# Patient Record
Sex: MALE | Race: WHITE | HISPANIC OR LATINO | Employment: OTHER | URBAN - METROPOLITAN AREA
[De-identification: names, ages, dates, MRNs, and addresses within clinical notes are randomized per-mention and may not be internally consistent; named-entity substitution may affect disease eponyms.]

---

## 2019-09-23 ENCOUNTER — HOSPITAL ENCOUNTER (INPATIENT)
Facility: HOSPITAL | Age: 79
LOS: 4 days | Discharge: HOME | DRG: 445 | End: 2019-09-27
Attending: EMERGENCY MEDICINE | Admitting: EMERGENCY MEDICINE
Payer: MEDICAID

## 2019-09-23 DIAGNOSIS — R10.13 EPIGASTRIC PAIN: Primary | ICD-10-CM

## 2019-09-23 DIAGNOSIS — K80.20 CALCULUS OF GALLBLADDER WITHOUT CHOLECYSTITIS WITHOUT OBSTRUCTION: ICD-10-CM

## 2019-09-23 DIAGNOSIS — R10.9 ABDOMINAL PAIN: ICD-10-CM

## 2019-09-23 DIAGNOSIS — G40.209 PARTIAL SYMPTOMATIC EPILEPSY WITH COMPLEX PARTIAL SEIZURES, NOT INTRACTABLE, WITHOUT STATUS EPILEPTICUS: ICD-10-CM

## 2019-09-23 DIAGNOSIS — I21.4 NSTEMI (NON-ST ELEVATED MYOCARDIAL INFARCTION): ICD-10-CM

## 2019-09-23 PROCEDURE — 12000002 HC ACUTE/MED SURGE SEMI-PRIVATE ROOM

## 2019-09-23 PROCEDURE — 93005 ELECTROCARDIOGRAM TRACING: CPT

## 2019-09-23 PROCEDURE — 99291 CRITICAL CARE FIRST HOUR: CPT | Mod: 25

## 2019-09-23 PROCEDURE — 93010 EKG 12-LEAD: ICD-10-PCS | Mod: ,,, | Performed by: INTERNAL MEDICINE

## 2019-09-23 PROCEDURE — 93010 ELECTROCARDIOGRAM REPORT: CPT | Mod: ,,, | Performed by: INTERNAL MEDICINE

## 2019-09-23 PROCEDURE — 96374 THER/PROPH/DIAG INJ IV PUSH: CPT

## 2019-09-23 PROCEDURE — 96372 THER/PROPH/DIAG INJ SC/IM: CPT

## 2019-09-23 RX ORDER — HYDROMORPHONE HYDROCHLORIDE 2 MG/ML
1 INJECTION, SOLUTION INTRAMUSCULAR; INTRAVENOUS; SUBCUTANEOUS
Status: DISCONTINUED | OUTPATIENT
Start: 2019-09-23 | End: 2019-09-24

## 2019-09-23 RX ORDER — ONDANSETRON 2 MG/ML
4 INJECTION INTRAMUSCULAR; INTRAVENOUS
Status: DISCONTINUED | OUTPATIENT
Start: 2019-09-23 | End: 2019-09-24

## 2019-09-23 RX ORDER — MIDAZOLAM HYDROCHLORIDE 1 MG/ML
INJECTION INTRAMUSCULAR; INTRAVENOUS
Status: DISPENSED
Start: 2019-09-23 | End: 2019-09-24

## 2019-09-23 RX ADMIN — MIDAZOLAM HYDROCHLORIDE 2.5 MG: 1 INJECTION, SOLUTION INTRAMUSCULAR; INTRAVENOUS at 11:09

## 2019-09-23 NOTE — Clinical Note
Catheter is inserted into the ostium   right coronary artery. Angiography performed of the right coronary arteries in multiple views.Hemo Performed.

## 2019-09-23 NOTE — Clinical Note
39 ml injected throughout the case. 111 mL total wasted during the case. 150 mL total used in the case.

## 2019-09-24 PROBLEM — R79.89 ELEVATED TROPONIN: Status: ACTIVE | Noted: 2019-09-24

## 2019-09-24 PROBLEM — K80.20 CALCULUS OF GALLBLADDER WITHOUT BILIARY OBSTRUCTION: Status: ACTIVE | Noted: 2019-09-24

## 2019-09-24 PROBLEM — R31.0 GROSS HEMATURIA: Status: ACTIVE | Noted: 2019-09-24

## 2019-09-24 PROBLEM — R07.9 CHEST PAIN: Status: ACTIVE | Noted: 2019-09-24

## 2019-09-24 PROBLEM — I21.4 NSTEMI (NON-ST ELEVATED MYOCARDIAL INFARCTION): Status: RESOLVED | Noted: 2019-09-24 | Resolved: 2019-09-24

## 2019-09-24 PROBLEM — I21.4 NSTEMI (NON-ST ELEVATED MYOCARDIAL INFARCTION): Status: ACTIVE | Noted: 2019-09-24

## 2019-09-24 LAB
ALBUMIN SERPL BCP-MCNC: 4.1 G/DL (ref 3.5–5.2)
ALP SERPL-CCNC: 71 U/L (ref 55–135)
ALT SERPL W/O P-5'-P-CCNC: 30 U/L (ref 10–44)
ANION GAP SERPL CALC-SCNC: 8 MMOL/L (ref 8–16)
AORTIC ROOT ANNULUS: 3.3 CM
AORTIC VALVE CUSP SEPERATION: 2 CM
ASCENDING AORTA: 3.05 CM
AST SERPL-CCNC: 36 U/L (ref 10–40)
AV INDEX (PROSTH): 0.75
AV MEAN GRADIENT: 7 MMHG
AV PEAK GRADIENT: 13 MMHG
AV VALVE AREA: 3.44 CM2
AV VELOCITY RATIO: 0.75
BACTERIA #/AREA URNS HPF: ABNORMAL /HPF
BACTERIA #/AREA URNS HPF: NORMAL /HPF
BASOPHILS # BLD AUTO: 0.01 K/UL (ref 0–0.2)
BASOPHILS NFR BLD: 0.1 % (ref 0–1.9)
BILIRUB SERPL-MCNC: 0.3 MG/DL (ref 0.1–1)
BILIRUB UR QL STRIP: NEGATIVE
BILIRUB UR QL STRIP: NEGATIVE
BSA FOR ECHO PROCEDURE: 2.03 M2
BUN SERPL-MCNC: 19 MG/DL (ref 8–23)
CALCIUM SERPL-MCNC: 9.3 MG/DL (ref 8.7–10.5)
CHLORIDE SERPL-SCNC: 99 MMOL/L (ref 95–110)
CLARITY UR: CLEAR
CLARITY UR: CLEAR
CO2 SERPL-SCNC: 30 MMOL/L (ref 23–29)
COLOR UR: YELLOW
COLOR UR: YELLOW
CREAT SERPL-MCNC: 1 MG/DL (ref 0.5–1.4)
CRP SERPL-MCNC: 32.2 MG/L (ref 0–8.2)
CV ECHO LV RWT: 0.83 CM
DIFFERENTIAL METHOD: ABNORMAL
DOP CALC AO PEAK VEL: 1.8 M/S
DOP CALC AO VTI: 32.48 CM
DOP CALC LVOT AREA: 4.6 CM2
DOP CALC LVOT DIAMETER: 2.41 CM
DOP CALC LVOT PEAK VEL: 1.35 M/S
DOP CALC LVOT STROKE VOLUME: 111.66 CM3
DOP CALCLVOT PEAK VEL VTI: 24.49 CM
E WAVE DECELERATION TIME: 217.99 MSEC
E/A RATIO: 0.95
E/E' RATIO: 10.44 M/S
ECHO LV POSTERIOR WALL: 1.7 CM (ref 0.6–1.1)
EOSINOPHIL # BLD AUTO: 0 K/UL (ref 0–0.5)
EOSINOPHIL NFR BLD: 0.2 % (ref 0–8)
ERYTHROCYTE [DISTWIDTH] IN BLOOD BY AUTOMATED COUNT: 12.8 % (ref 11.5–14.5)
EST. GFR  (AFRICAN AMERICAN): >60 ML/MIN/1.73 M^2
EST. GFR  (NON AFRICAN AMERICAN): >60 ML/MIN/1.73 M^2
FRACTIONAL SHORTENING: 48 % (ref 28–44)
GLUCOSE SERPL-MCNC: 139 MG/DL (ref 70–110)
GLUCOSE UR QL STRIP: NEGATIVE
GLUCOSE UR QL STRIP: NEGATIVE
HCT VFR BLD AUTO: 42.5 % (ref 40–54)
HGB BLD-MCNC: 14 G/DL (ref 14–18)
HGB UR QL STRIP: ABNORMAL
HGB UR QL STRIP: ABNORMAL
HYALINE CASTS #/AREA URNS LPF: 0 /LPF
INTERVENTRICULAR SEPTUM: 1.6 CM (ref 0.6–1.1)
IVRT: 0.11 MSEC
KETONES UR QL STRIP: NEGATIVE
KETONES UR QL STRIP: NEGATIVE
LA MAJOR: 4.79 CM
LA MINOR: 6.09 CM
LA WIDTH: 3.27 CM
LEFT ATRIUM SIZE: 4.47 CM
LEFT ATRIUM VOLUME INDEX: 33.8 ML/M2
LEFT ATRIUM VOLUME: 66.62 CM3
LEFT INTERNAL DIMENSION IN SYSTOLE: 2.11 CM (ref 2.1–4)
LEFT VENTRICLE DIASTOLIC VOLUME INDEX: 37.42 ML/M2
LEFT VENTRICLE DIASTOLIC VOLUME: 73.7 ML
LEFT VENTRICLE MASS INDEX: 142 G/M2
LEFT VENTRICLE SYSTOLIC VOLUME INDEX: 7.4 ML/M2
LEFT VENTRICLE SYSTOLIC VOLUME: 14.57 ML
LEFT VENTRICULAR INTERNAL DIMENSION IN DIASTOLE: 4.09 CM (ref 3.5–6)
LEFT VENTRICULAR MASS: 279.46 G
LEUKOCYTE ESTERASE UR QL STRIP: NEGATIVE
LEUKOCYTE ESTERASE UR QL STRIP: NEGATIVE
LIPASE SERPL-CCNC: 18 U/L (ref 4–60)
LV LATERAL E/E' RATIO: 9.4 M/S
LV SEPTAL E/E' RATIO: 11.75 M/S
LYMPHOCYTES # BLD AUTO: 0.5 K/UL (ref 1–4.8)
LYMPHOCYTES NFR BLD: 5.1 % (ref 18–48)
MAGNESIUM SERPL-MCNC: 1.6 MG/DL (ref 1.6–2.6)
MCH RBC QN AUTO: 31.3 PG (ref 27–31)
MCHC RBC AUTO-ENTMCNC: 32.9 G/DL (ref 32–36)
MCV RBC AUTO: 95 FL (ref 82–98)
MICROSCOPIC COMMENT: ABNORMAL
MICROSCOPIC COMMENT: NORMAL
MONOCYTES # BLD AUTO: 0.6 K/UL (ref 0.3–1)
MONOCYTES NFR BLD: 6 % (ref 4–15)
MV PEAK A VEL: 0.99 M/S
MV PEAK E VEL: 0.94 M/S
NEUTROPHILS # BLD AUTO: 8.8 K/UL (ref 1.8–7.7)
NEUTROPHILS NFR BLD: 89 % (ref 38–73)
NITRITE UR QL STRIP: NEGATIVE
NITRITE UR QL STRIP: NEGATIVE
PH UR STRIP: 5 [PH] (ref 5–8)
PH UR STRIP: 6 [PH] (ref 5–8)
PHENOBARB SERPL-MCNC: 17.1 UG/DL (ref 15–40)
PHENYTOIN SERPL-MCNC: 11.1 UG/ML (ref 10–20)
PISA TR MAX VEL: 3.23 M/S
PLATELET # BLD AUTO: 214 K/UL (ref 150–350)
PMV BLD AUTO: 8.9 FL (ref 9.2–12.9)
POTASSIUM SERPL-SCNC: 4 MMOL/L (ref 3.5–5.1)
PROT SERPL-MCNC: 8.2 G/DL (ref 6–8.4)
PROT UR QL STRIP: ABNORMAL
PROT UR QL STRIP: NEGATIVE
PULM VEIN S/D RATIO: 1.12
PV PEAK D VEL: 0.34 M/S
PV PEAK S VEL: 0.38 M/S
PV PEAK VELOCITY: 1.06 CM/S
RA MAJOR: 5.27 CM
RA PRESSURE: 3 MMHG
RA WIDTH: 3.02 CM
RBC # BLD AUTO: 4.48 M/UL (ref 4.6–6.2)
RBC #/AREA URNS HPF: 3 /HPF (ref 0–4)
RBC #/AREA URNS HPF: 50 /HPF (ref 0–4)
RIGHT VENTRICULAR END-DIASTOLIC DIMENSION: 3.64 CM
RV TISSUE DOPPLER FREE WALL SYSTOLIC VELOCITY 1 (APICAL 4 CHAMBER VIEW): 17.52 CM/S
SINUS: 3.32 CM
SODIUM SERPL-SCNC: 137 MMOL/L (ref 136–145)
SP GR UR STRIP: 1.02 (ref 1–1.03)
SP GR UR STRIP: 1.03 (ref 1–1.03)
SQUAMOUS #/AREA URNS HPF: 1 /HPF
SQUAMOUS #/AREA URNS HPF: 1 /HPF
STJ: 2.47 CM
TDI LATERAL: 0.1 M/S
TDI SEPTAL: 0.08 M/S
TDI: 0.09 M/S
TR MAX PG: 42 MMHG
TRICUSPID ANNULAR PLANE SYSTOLIC EXCURSION: 2.47 CM
TROPONIN I SERPL DL<=0.01 NG/ML-MCNC: 0.04 NG/ML (ref 0–0.03)
TROPONIN I SERPL DL<=0.01 NG/ML-MCNC: 0.1 NG/ML (ref 0–0.03)
TROPONIN I SERPL DL<=0.01 NG/ML-MCNC: 0.1 NG/ML (ref 0–0.03)
TROPONIN I SERPL DL<=0.01 NG/ML-MCNC: 0.14 NG/ML (ref 0–0.03)
TV REST PULMONARY ARTERY PRESSURE: 45 MMHG
URN SPEC COLLECT METH UR: ABNORMAL
URN SPEC COLLECT METH UR: ABNORMAL
UROBILINOGEN UR STRIP-ACNC: NEGATIVE EU/DL
UROBILINOGEN UR STRIP-ACNC: NEGATIVE EU/DL
WBC # BLD AUTO: 9.93 K/UL (ref 3.9–12.7)
WBC #/AREA URNS HPF: 1 /HPF (ref 0–5)
WBC #/AREA URNS HPF: 2 /HPF (ref 0–5)

## 2019-09-24 PROCEDURE — 21400001 HC TELEMETRY ROOM

## 2019-09-24 PROCEDURE — 99232 SBSQ HOSP IP/OBS MODERATE 35: CPT | Mod: ,,, | Performed by: PSYCHIATRY & NEUROLOGY

## 2019-09-24 PROCEDURE — 99232 PR SUBSEQUENT HOSPITAL CARE,LEVL II: ICD-10-PCS | Mod: ,,, | Performed by: PSYCHIATRY & NEUROLOGY

## 2019-09-24 PROCEDURE — 25000003 PHARM REV CODE 250: Performed by: EMERGENCY MEDICINE

## 2019-09-24 PROCEDURE — 80053 COMPREHEN METABOLIC PANEL: CPT

## 2019-09-24 PROCEDURE — 83690 ASSAY OF LIPASE: CPT

## 2019-09-24 PROCEDURE — 99233 PR SUBSEQUENT HOSPITAL CARE,LEVL III: ICD-10-PCS | Mod: ,,, | Performed by: UROLOGY

## 2019-09-24 PROCEDURE — 81000 URINALYSIS NONAUTO W/SCOPE: CPT | Mod: 91

## 2019-09-24 PROCEDURE — 99233 SBSQ HOSP IP/OBS HIGH 50: CPT | Mod: 25,,, | Performed by: INTERNAL MEDICINE

## 2019-09-24 PROCEDURE — 80184 ASSAY OF PHENOBARBITAL: CPT

## 2019-09-24 PROCEDURE — 80185 ASSAY OF PHENYTOIN TOTAL: CPT

## 2019-09-24 PROCEDURE — 86140 C-REACTIVE PROTEIN: CPT

## 2019-09-24 PROCEDURE — 96372 THER/PROPH/DIAG INJ SC/IM: CPT | Mod: 59

## 2019-09-24 PROCEDURE — 81000 URINALYSIS NONAUTO W/SCOPE: CPT

## 2019-09-24 PROCEDURE — 83735 ASSAY OF MAGNESIUM: CPT

## 2019-09-24 PROCEDURE — 93005 ELECTROCARDIOGRAM TRACING: CPT

## 2019-09-24 PROCEDURE — 93010 ELECTROCARDIOGRAM REPORT: CPT | Mod: ,,, | Performed by: INTERNAL MEDICINE

## 2019-09-24 PROCEDURE — 85025 COMPLETE CBC W/AUTO DIFF WBC: CPT

## 2019-09-24 PROCEDURE — 25500020 PHARM REV CODE 255: Performed by: EMERGENCY MEDICINE

## 2019-09-24 PROCEDURE — 99233 PR SUBSEQUENT HOSPITAL CARE,LEVL III: ICD-10-PCS | Mod: 25,,, | Performed by: INTERNAL MEDICINE

## 2019-09-24 PROCEDURE — 25000003 PHARM REV CODE 250: Performed by: HOSPITALIST

## 2019-09-24 PROCEDURE — 84484 ASSAY OF TROPONIN QUANT: CPT

## 2019-09-24 PROCEDURE — 36415 COLL VENOUS BLD VENIPUNCTURE: CPT

## 2019-09-24 PROCEDURE — 99233 SBSQ HOSP IP/OBS HIGH 50: CPT | Mod: ,,, | Performed by: UROLOGY

## 2019-09-24 PROCEDURE — 84484 ASSAY OF TROPONIN QUANT: CPT | Mod: 91

## 2019-09-24 PROCEDURE — 93010 EKG 12-LEAD: ICD-10-PCS | Mod: ,,, | Performed by: INTERNAL MEDICINE

## 2019-09-24 PROCEDURE — 63600175 PHARM REV CODE 636 W HCPCS: Performed by: EMERGENCY MEDICINE

## 2019-09-24 RX ORDER — PHENYTOIN SODIUM 100 MG/1
200 CAPSULE, EXTENDED RELEASE ORAL 2 TIMES DAILY
Status: DISCONTINUED | OUTPATIENT
Start: 2019-09-24 | End: 2019-09-27 | Stop reason: HOSPADM

## 2019-09-24 RX ORDER — ATORVASTATIN CALCIUM 40 MG/1
40 TABLET, FILM COATED ORAL NIGHTLY
Status: DISCONTINUED | OUTPATIENT
Start: 2019-09-24 | End: 2019-09-27 | Stop reason: HOSPADM

## 2019-09-24 RX ORDER — PHENYTOIN SODIUM 100 MG/1
300 CAPSULE, EXTENDED RELEASE ORAL
Status: COMPLETED | OUTPATIENT
Start: 2019-09-24 | End: 2019-09-24

## 2019-09-24 RX ORDER — ASPIRIN 325 MG
325 TABLET, DELAYED RELEASE (ENTERIC COATED) ORAL
Status: COMPLETED | OUTPATIENT
Start: 2019-09-24 | End: 2019-09-24

## 2019-09-24 RX ORDER — SODIUM CHLORIDE 0.9 % (FLUSH) 0.9 %
10 SYRINGE (ML) INJECTION
Status: DISCONTINUED | OUTPATIENT
Start: 2019-09-24 | End: 2019-09-27 | Stop reason: HOSPADM

## 2019-09-24 RX ORDER — MIDAZOLAM HYDROCHLORIDE 5 MG/ML
2.5 INJECTION INTRAMUSCULAR; INTRAVENOUS ONCE
Status: DISCONTINUED | OUTPATIENT
Start: 2019-09-24 | End: 2019-09-24 | Stop reason: RX

## 2019-09-24 RX ORDER — LISINOPRIL 5 MG/1
10 TABLET ORAL DAILY
Status: DISCONTINUED | OUTPATIENT
Start: 2019-09-24 | End: 2019-09-27 | Stop reason: HOSPADM

## 2019-09-24 RX ORDER — FINASTERIDE 5 MG/1
5 TABLET, FILM COATED ORAL DAILY
Status: DISCONTINUED | OUTPATIENT
Start: 2019-09-24 | End: 2019-09-27 | Stop reason: HOSPADM

## 2019-09-24 RX ORDER — NAPROXEN SODIUM 220 MG/1
81 TABLET, FILM COATED ORAL DAILY
Status: DISCONTINUED | OUTPATIENT
Start: 2019-09-25 | End: 2019-09-27 | Stop reason: HOSPADM

## 2019-09-24 RX ORDER — NITROGLYCERIN 0.4 MG/1
0.4 TABLET SUBLINGUAL EVERY 5 MIN PRN
Status: DISCONTINUED | OUTPATIENT
Start: 2019-09-24 | End: 2019-09-27 | Stop reason: HOSPADM

## 2019-09-24 RX ORDER — PHENYTOIN 50 MG/1
200 TABLET, CHEWABLE ORAL 2 TIMES DAILY
Status: DISCONTINUED | OUTPATIENT
Start: 2019-09-24 | End: 2019-09-24 | Stop reason: CLARIF

## 2019-09-24 RX ORDER — ACETAMINOPHEN 325 MG/1
650 TABLET ORAL EVERY 8 HOURS PRN
Status: DISCONTINUED | OUTPATIENT
Start: 2019-09-24 | End: 2019-09-26 | Stop reason: SDUPTHER

## 2019-09-24 RX ORDER — LORAZEPAM 2 MG/ML
2 INJECTION INTRAMUSCULAR
Status: DISCONTINUED | OUTPATIENT
Start: 2019-09-24 | End: 2019-09-27 | Stop reason: HOSPADM

## 2019-09-24 RX ORDER — FAMOTIDINE 20 MG/1
20 TABLET, FILM COATED ORAL 2 TIMES DAILY
Status: DISCONTINUED | OUTPATIENT
Start: 2019-09-24 | End: 2019-09-27 | Stop reason: HOSPADM

## 2019-09-24 RX ORDER — MIDAZOLAM HYDROCHLORIDE 1 MG/ML
2.5 INJECTION INTRAMUSCULAR; INTRAVENOUS ONCE
Status: COMPLETED | OUTPATIENT
Start: 2019-09-24 | End: 2019-09-23

## 2019-09-24 RX ORDER — PHENOBARBITAL 20 MG/5ML
100 ELIXIR ORAL DAILY
Status: DISCONTINUED | OUTPATIENT
Start: 2019-09-24 | End: 2019-09-27 | Stop reason: HOSPADM

## 2019-09-24 RX ORDER — CLOPIDOGREL BISULFATE 75 MG/1
75 TABLET ORAL DAILY
Status: DISCONTINUED | OUTPATIENT
Start: 2019-09-25 | End: 2019-09-27 | Stop reason: HOSPADM

## 2019-09-24 RX ORDER — CLOPIDOGREL 300 MG/1
300 TABLET, FILM COATED ORAL
Status: COMPLETED | OUTPATIENT
Start: 2019-09-24 | End: 2019-09-24

## 2019-09-24 RX ORDER — PHENYTOIN SODIUM 100 MG/1
200 CAPSULE, EXTENDED RELEASE ORAL 2 TIMES DAILY
Status: DISCONTINUED | OUTPATIENT
Start: 2019-09-24 | End: 2019-09-24

## 2019-09-24 RX ORDER — ONDANSETRON 2 MG/ML
4 INJECTION INTRAMUSCULAR; INTRAVENOUS EVERY 8 HOURS PRN
Status: DISCONTINUED | OUTPATIENT
Start: 2019-09-24 | End: 2019-09-27 | Stop reason: HOSPADM

## 2019-09-24 RX ORDER — MIDAZOLAM HYDROCHLORIDE 1 MG/ML
2.5 INJECTION INTRAMUSCULAR; INTRAVENOUS
Status: COMPLETED | OUTPATIENT
Start: 2019-09-24 | End: 2019-09-23

## 2019-09-24 RX ORDER — ENOXAPARIN SODIUM 100 MG/ML
1 INJECTION SUBCUTANEOUS
Status: DISCONTINUED | OUTPATIENT
Start: 2019-09-24 | End: 2019-09-24

## 2019-09-24 RX ORDER — METOPROLOL TARTRATE 25 MG/1
25 TABLET, FILM COATED ORAL 2 TIMES DAILY
Status: DISCONTINUED | OUTPATIENT
Start: 2019-09-24 | End: 2019-09-27 | Stop reason: HOSPADM

## 2019-09-24 RX ORDER — TAMSULOSIN HYDROCHLORIDE 0.4 MG/1
0.4 CAPSULE ORAL DAILY
Status: DISCONTINUED | OUTPATIENT
Start: 2019-09-24 | End: 2019-09-27 | Stop reason: HOSPADM

## 2019-09-24 RX ADMIN — ENOXAPARIN SODIUM 90 MG: 100 INJECTION SUBCUTANEOUS at 06:09

## 2019-09-24 RX ADMIN — LISINOPRIL 10 MG: 5 TABLET ORAL at 09:09

## 2019-09-24 RX ADMIN — TAMSULOSIN HYDROCHLORIDE 0.4 MG: 0.4 CAPSULE ORAL at 09:09

## 2019-09-24 RX ADMIN — IOHEXOL 80 ML: 350 INJECTION, SOLUTION INTRAVENOUS at 01:09

## 2019-09-24 RX ADMIN — PHENOBARBITAL 100 MG: 20 ELIXIR ORAL at 10:09

## 2019-09-24 RX ADMIN — FAMOTIDINE 20 MG: 20 TABLET ORAL at 09:09

## 2019-09-24 RX ADMIN — ATORVASTATIN CALCIUM 40 MG: 40 TABLET, FILM COATED ORAL at 12:09

## 2019-09-24 RX ADMIN — PHENYTOIN SODIUM 200 MG: 100 CAPSULE ORAL at 08:09

## 2019-09-24 RX ADMIN — CLOPIDOGREL BISULFATE 300 MG: 300 TABLET, FILM COATED ORAL at 06:09

## 2019-09-24 RX ADMIN — METOPROLOL TARTRATE 25 MG: 25 TABLET ORAL at 08:09

## 2019-09-24 RX ADMIN — ASPIRIN 325 MG: 325 TABLET, DELAYED RELEASE ORAL at 04:09

## 2019-09-24 RX ADMIN — NITROGLYCERIN 0.4 MG: 0.4 TABLET SUBLINGUAL at 06:09

## 2019-09-24 RX ADMIN — METOPROLOL TARTRATE 25 MG: 25 TABLET ORAL at 09:09

## 2019-09-24 RX ADMIN — PHENYTOIN SODIUM 300 MG: 100 CAPSULE, EXTENDED RELEASE ORAL at 04:09

## 2019-09-24 RX ADMIN — ATORVASTATIN CALCIUM 40 MG: 40 TABLET, FILM COATED ORAL at 08:09

## 2019-09-24 RX ADMIN — FAMOTIDINE 20 MG: 20 TABLET ORAL at 08:09

## 2019-09-24 RX ADMIN — FINASTERIDE 5 MG: 5 TABLET, FILM COATED ORAL at 09:09

## 2019-09-24 NOTE — NURSING
Patient to scheduled procedure as ordered. Tele monitoring in progress. Patient awake alert and oriented. No apparent distress noted. Explained to patient via Libby  that he is going for a echo. Verbalized an understanding. Patient went via his bed.

## 2019-09-24 NOTE — SUBJECTIVE & OBJECTIVE
Past Medical History:   Diagnosis Date    Abdominal hernia     BPH (benign prostatic hyperplasia)     Dyslipidemia     Hypercholesteremia     Hypertension     Seizure disorder     Seizures     Sinus disorder        Past Surgical History:   Procedure Laterality Date    KNEE SURGERY      NASAL SINUS SURGERY      NOSE SURGERY         Review of patient's allergies indicates:  No Known Allergies    Family History     Problem Relation (Age of Onset)    Coronary artery disease Brother, Brother    Hypertension Brother, Brother          Tobacco Use    Smoking status: Never Smoker    Smokeless tobacco: Never Used   Substance and Sexual Activity    Alcohol use: No    Drug use: No    Sexual activity: Not on file       Review of Systems   Constitutional: Negative.  Negative for fever.   HENT: Negative.    Eyes: Negative.    Respiratory: Negative for cough, chest tightness and shortness of breath.    Cardiovascular: Negative for chest pain.   Gastrointestinal: Positive for abdominal pain. Negative for constipation, diarrhea and nausea.   Genitourinary: Positive for hematuria.   Musculoskeletal: Negative.    Neurological: Negative.    Psychiatric/Behavioral: Negative.        Objective:     Temp:  [97.5 °F (36.4 °C)-98 °F (36.7 °C)] 97.5 °F (36.4 °C)  Pulse:  [] 70  Resp:  [17-30] 18  SpO2:  [94 %-99 %] 98 %  BP: (112-179)/(56-96) 112/59     Body mass index is 32.95 kg/m².      Bladder Scan Volume (mL): 0 mL (09/24/19 1020)    Drains     None                 Physical Exam   Nursing note and vitals reviewed.  Constitutional: He is oriented to person, place, and time. He appears well-developed and well-nourished.   HENT:   Head: Normocephalic.   Eyes: Conjunctivae are normal.   Neck: Normal range of motion. Neck supple. No tracheal deviation present. No thyromegaly present.   Cardiovascular: Normal rate and normal heart sounds.    Pulmonary/Chest: Effort normal and breath sounds normal. No respiratory distress.  He has no wheezes.   Abdominal: Soft. Bowel sounds are normal. There is no hepatosplenomegaly. There is no tenderness. There is no rebound and no CVA tenderness. No hernia.   Musculoskeletal: Normal range of motion. He exhibits no edema or tenderness.   Lymphadenopathy:     He has no cervical adenopathy.   Neurological: He is alert and oriented to person, place, and time.   Skin: Skin is warm and dry. No rash noted. No erythema.     Psychiatric: He has a normal mood and affect. His behavior is normal. Judgment and thought content normal.       Significant Labs:    BMP:  Recent Labs   Lab 09/24/19  0037      K 4.0   CL 99   CO2 30*   BUN 19   CREATININE 1.0   CALCIUM 9.3       CBC:  Recent Labs   Lab 09/24/19 0037   WBC 9.93   HGB 14.0   HCT 42.5          Blood Culture: No results for input(s): LABBLOO in the last 168 hours.  Urine Culture: No results for input(s): LABURIN in the last 168 hours.    Significant Imaging:  CT: I have reviewed all results within the past 24 hours and my personal findings are:  no stones, masses, or hydronephrosis

## 2019-09-24 NOTE — CONSULTS
[facility]  Neurology  Consult Note    Patient Name: Vern Lr  MRN: [id number]  Admission Date: 9/23/2019  Hospital Length of Stay: 0 days  Code Status: Full Code   Attending Provider: Reyna Rocha MD   Consulting Provider: Ori Tineo MD  Primary Care Physician: Maria Fernanda Abbasi MD (Inactive)  Principal Problem:Elevated troponin    Inpatient consult to Neurology  Consult performed by: Ori Tineo MD  Consult ordered by: Reyna Rocha MD        Subjective:     Chief Complaint/Reason for consult: Seizures     HPI: 78 y/o male with medical Hx as listed below comes to ED for abdominal pain. Pt found to have cholelithiasis. Pt had two reported focal seizures in ED. He has Hx of epilepsy; take phenobarbital 97.2 mg and phenytoin 200 mg BID. Pt is back to baseline according to his family.    Past Medical History:   Diagnosis Date    Abdominal hernia     BPH (benign prostatic hyperplasia)     Dyslipidemia     Hypercholesteremia     Hypertension     Seizure disorder     Seizures     Sinus disorder        Past Surgical History:   Procedure Laterality Date    KNEE SURGERY      NASAL SINUS SURGERY      NOSE SURGERY         Review of patient's allergies indicates:  No Known Allergies    Current Neurological Medications:     No current facility-administered medications on file prior to encounter.      Current Outpatient Medications on File Prior to Encounter   Medication Sig    alendronate (FOSAMAX) 70 MG tablet Take 1 tablet (70 mg total) by mouth every 7 days.    aspirin (ECOTRIN) 81 MG EC tablet Take 81 mg by mouth once daily.    atorvastatin (LIPITOR) 40 MG tablet Take 1 tablet (40 mg total) by mouth once daily.    calcium citrate-vitamin D3 (CITRACAL + D MAXIMUM) 315-250 mg-unit Tab Take 315 mg by mouth 2 (two) times daily.    finasteride (PROSCAR) 5 mg tablet Take 1 tablet (5 mg total) by mouth once daily.    fluticasone (FLONASE) 50 mcg/actuation nasal spray  SHAKE WELL AND U 2 SPRAYS IEN BID    lisinopril 10 MG tablet Take 1 tablet (10 mg total) by mouth once daily.    metoprolol tartrate (LOPRESSOR) 50 MG tablet Take 1 tablet (50 mg total) by mouth once daily.    montelukast (SINGULAIR) 10 mg tablet Take 10 mg by mouth every evening.    phenobarbital (LUMINAL) 97.2 MG tablet Take 1 tablet (97.2 mg total) by mouth once daily.    phenytoin (DILANTIN) 100 MG ER capsule 2 capsules po q am, 2 capsules po q hs    tamsulosin (FLOMAX) 0.4 mg Cp24 Take 1 capsule (0.4 mg total) by mouth once daily.    ketorolac 0.5% (ACULAR) 0.5 % Drop Place 1 drop into the right eye 4 (four) times daily.      Family History     Problem Relation (Age of Onset)    Coronary artery disease Brother, Brother    Hypertension Brother, Brother        Tobacco Use    Smoking status: Never Smoker    Smokeless tobacco: Never Used   Substance and Sexual Activity    Alcohol use: No    Drug use: No    Sexual activity: Not on file     Review of Systems   Constitutional: Negative for fever.   HENT: Negative for trouble swallowing.    Eyes: Negative for photophobia.   Respiratory: Negative for shortness of breath.    Cardiovascular: Negative for chest pain.   Gastrointestinal: Negative for abdominal pain.   Genitourinary: Negative for dysuria.   Musculoskeletal: Negative for back pain.   Neurological: Negative for headaches.   Psychiatric/Behavioral: Negative for confusion.     Objective:     Vital Signs (Most Recent):  Temp: 97.9 °F (36.6 °C) (09/24/19 0739)  Pulse: 98 (09/24/19 0739)  Resp: 17 (09/24/19 0739)  BP: 137/75 (09/24/19 0739)  SpO2: 98 % (09/24/19 0739) Vital Signs (24h Range):  Temp:  [97.9 °F (36.6 °C)-98 °F (36.7 °C)] 97.9 °F (36.6 °C)  Pulse:  [] 98  Resp:  [17-30] 17  SpO2:  [94 %-99 %] 98 %  BP: (115-179)/(56-96) 137/75     Weight: 89.8 kg (198 lb)  Body mass index is 32.95 kg/m².    Physical Exam   Constitutional: No distress.   HENT:   Head: Normocephalic.   Eyes: Right eye  exhibits no discharge. Left eye exhibits no discharge.   Neck: Normal range of motion.   Cardiovascular: Regular rhythm.   Pulmonary/Chest: Breath sounds normal.   Abdominal: Bowel sounds are normal.   Musculoskeletal: He exhibits no edema.   Skin: He is not diaphoretic.   Psychiatric: His speech is normal.       NEUROLOGICAL EXAMINATION:     MENTAL STATUS   Speech: speech is normal   Level of consciousness: alert       Oriented to self     CRANIAL NERVES     CN III, IV, VI   Right pupil: Size: 3 mm. Reactivity: brisk.   Left pupil: Size: 3 mm. Reactivity: brisk.   Nystagmus: none   Ophthalmoparesis: none  Conjugate gaze: present    CN VII   Right facial weakness: none  Left facial weakness: none    CN XII   Tongue deviation: none    MOTOR EXAM        UE's 4+5  LE's 4+/5     SENSORY EXAM   Right arm pinprick: normal  Left arm pinprick: normal  Right leg pinprick: normal  Left leg pinprick: normal      Significant Labs:   CBC:   Recent Labs   Lab 09/24/19 0037   WBC 9.93   HGB 14.0   HCT 42.5        CMP:   Recent Labs   Lab 09/24/19 0037   *      K 4.0   CL 99   CO2 30*   BUN 19   CREATININE 1.0   CALCIUM 9.3   MG 1.6   PROT 8.2   ALBUMIN 4.1   BILITOT 0.3   ALKPHOS 71   AST 36   ALT 30   ANIONGAP 8   EGFRNONAA >60       Significant Imaging:  Head CT  FINDINGS:  Mild cerebral atrophy and chronic small vessel ischemic changes are present.  There is no acute intracranial hemorrhage, territorial infarct or mass effect, or midline shift. There are bilateral nasal maxillary windows.  Mild mucoperiosteal seen maxillary sinuses right greater left.  There is mild bilateral inferior frontal sinus mucoperiosteal.  There is partial opacification of the right mastoid cells.      Impression       No acute intracranial abnormality detected.    Partial opacification of the right mastoid air cells.      Electronically signed by: Josie Schulz  Date: 09/24/2019  Time: 01:49         Assessment and Plan:     79  y/o male consulted for seizures    1. Seizures: likely temporal lobe focus given encephalomalacia on left temporal lobe. Breakthrough seizure could be due to acute medical issues.   Levels of AED are low normal.   -Continue phenytoin 200 mg BID. Phenobarbital 100 mg daily.    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 I will follow-up with patient. Please contact us if you have any additional questions.    Ori Tineo MD  Neurology  [facility]

## 2019-09-24 NOTE — PLAN OF CARE
09/24/19 1550   Discharge Assessment   Assessment Type Discharge Planning Assessment   Confirmed/corrected address and phone number on facesheet? Yes   Assessment information obtained from? Patient;Caregiver   Expected Length of Stay (days) 3   Communicated expected length of stay with patient/caregiver yes   Prior to hospitilization cognitive status: Alert/Oriented   Prior to hospitalization functional status: Assistive Equipment;Needs Assistance   Current cognitive status: Alert/Oriented   Current Functional Status: Assistive Equipment;Needs Assistance   Lives With alone   Able to Return to Prior Arrangements no  (Pt. needs assistance at home)   Is patient able to care for self after discharge? No   Who are your caregiver(s) and their phone number(s)? Sister Gianna [phone]   Patient's perception of discharge disposition home or selfcare   Readmission Within the Last 30 Days no previous admission in last 30 days   Patient currently being followed by outpatient case management? No   Patient currently receives any other outside agency services? No   Equipment Currently Used at Home walker, standard;cane, straight   Do you have any problems affording any of your prescribed medications? No   Is the patient taking medications as prescribed? yes   Does the patient have transportation home? Yes   Transportation Anticipated family or friend will provide   Does the patient receive services at the [facility]? No   Discharge Plan A Home Health;Home   Discharge Plan B Home;Home Health   DME Needed Upon Discharge  cane, straight;walker, standard   Patient/Family in Agreement with Plan other (see comments)  (Pt Tamazight speaking . All information received through [facility])   To patient's room to discuss patient managing his care at home.      TN Role Explained.  Patient identified by using 2 identifiers:  Name and date of birth    Patient stated that Sister Gianna  WILL HELP AT HOME WITH his RECOVERY.       TN  "reviewed with patient contents of "[employer] Packet".      TN name and contact info placed on the communication board    Preferred Pharmacy:  [id number] - [place] - [address] [place], [place] [address] [address] [address]  Phone: [phone] Fax: [phone]      Preferred appointment time:evenings  "

## 2019-09-24 NOTE — SUBJECTIVE & OBJECTIVE
Past Medical History:   Diagnosis Date    Abdominal hernia     BPH (benign prostatic hyperplasia)     Dyslipidemia     Hypercholesteremia     Hypertension     Seizure disorder     Seizures     Sinus disorder        Past Surgical History:   Procedure Laterality Date    KNEE SURGERY      NASAL SINUS SURGERY      NOSE SURGERY         Review of patient's allergies indicates:  No Known Allergies    No current facility-administered medications on file prior to encounter.      Current Outpatient Medications on File Prior to Encounter   Medication Sig    alendronate (FOSAMAX) 70 MG tablet Take 1 tablet (70 mg total) by mouth every 7 days.    aspirin (ECOTRIN) 81 MG EC tablet Take 81 mg by mouth once daily.    atorvastatin (LIPITOR) 40 MG tablet Take 1 tablet (40 mg total) by mouth once daily.    calcium citrate-vitamin D3 (CITRACAL + D MAXIMUM) 315-250 mg-unit Tab Take 315 mg by mouth 2 (two) times daily.    finasteride (PROSCAR) 5 mg tablet Take 1 tablet (5 mg total) by mouth once daily.    fluticasone (FLONASE) 50 mcg/actuation nasal spray SHAKE WELL AND U 2 SPRAYS IEN BID    lisinopril 10 MG tablet Take 1 tablet (10 mg total) by mouth once daily.    metoprolol tartrate (LOPRESSOR) 50 MG tablet Take 1 tablet (50 mg total) by mouth once daily.    montelukast (SINGULAIR) 10 mg tablet Take 10 mg by mouth every evening.    phenobarbital (LUMINAL) 97.2 MG tablet Take 1 tablet (97.2 mg total) by mouth once daily.    phenytoin (DILANTIN) 100 MG ER capsule 2 capsules po q am, 2 capsules po q hs    tamsulosin (FLOMAX) 0.4 mg Cp24 Take 1 capsule (0.4 mg total) by mouth once daily.    ketorolac 0.5% (ACULAR) 0.5 % Drop Place 1 drop into the right eye 4 (four) times daily.     Family History     Problem Relation (Age of Onset)    Coronary artery disease Brother, Brother    Hypertension Brother, Brother        Tobacco Use    Smoking status: Never Smoker    Smokeless tobacco: Never Used   Substance and Sexual  Activity    Alcohol use: No    Drug use: No    Sexual activity: Not on file     Review of Systems   Constitutional: Negative for activity change and appetite change.   HENT: Negative for congestion and dental problem.    Eyes: Negative for discharge and itching.   Respiratory: Negative for apnea and chest tightness.    Cardiovascular: Negative for chest pain and leg swelling.   Gastrointestinal: Positive for abdominal pain. Negative for abdominal distention and anal bleeding.   Endocrine: Negative for cold intolerance and heat intolerance.   Genitourinary: Negative for difficulty urinating.   Musculoskeletal: Negative for arthralgias and back pain.   Skin: Negative for color change and pallor.   Allergic/Immunologic: Negative for environmental allergies and food allergies.   Neurological: Negative for dizziness and facial asymmetry.   Hematological: Negative for adenopathy. Does not bruise/bleed easily.   Psychiatric/Behavioral: Negative for agitation.     Objective:     Vital Signs (Most Recent):  Temp: 97.9 °F (36.6 °C) (09/24/19 0739)  Pulse: 98 (09/24/19 0739)  Resp: 17 (09/24/19 0739)  BP: 137/75 (09/24/19 0739)  SpO2: 98 % (09/24/19 0739) Vital Signs (24h Range):  Temp:  [97.9 °F (36.6 °C)-98 °F (36.7 °C)] 97.9 °F (36.6 °C)  Pulse:  [] 98  Resp:  [17-30] 17  SpO2:  [94 %-99 %] 98 %  BP: (115-179)/(56-96) 137/75     Weight: 89.8 kg (198 lb)  Body mass index is 32.95 kg/m².    Physical Exam   Constitutional: No distress.   HENT:   Head: Atraumatic.   Eyes: EOM are normal.   Neck: Neck supple.   Cardiovascular: Normal rate and regular rhythm.   Pulmonary/Chest: Effort normal and breath sounds normal.   Abdominal: Soft. He exhibits no distension. There is no tenderness.   Musculoskeletal: Normal range of motion. He exhibits no edema or deformity.   Neurological: He is alert.   Skin: Skin is dry.   Psychiatric: He has a normal mood and affect. His behavior is normal.         CRANIAL NERVES     CN III, IV,  VI   Extraocular motions are normal.        Significant Labs:   BMP:   Recent Labs   Lab 09/24/19  0037   *      K 4.0   CL 99   CO2 30*   BUN 19   CREATININE 1.0   CALCIUM 9.3   MG 1.6     CBC:   Recent Labs   Lab 09/24/19 0037   WBC 9.93   HGB 14.0   HCT 42.5        Troponin:   Recent Labs   Lab 09/24/19 0037 09/24/19  0451   TROPONINI 0.035* 0.137*       Significant Imaging: reviewed,

## 2019-09-24 NOTE — ED TRIAGE NOTES
Pt present to the ED via personal transportation pt niece reports RUQ pain, pt reports abd hernia pain for 1 day, pt has had bowel movement upon ED arrival. Denies n/v/d, taking pain medication. Last time pt had a seizure it has been months. While transferring pt to the bed the pt urinated on himself and began to have a seizure. MD was at the bedside.

## 2019-09-24 NOTE — H&P
[facility] Medicine  History & Physical    Patient Name: Vern Lr  MRN: [id number]  Admission Date: 9/23/2019  Attending Physician: Reyna Rocha MD   Primary Care Provider: Maria Fernanda Abbasi MD (Inactive)         Patient information was obtained from patient, relative(s), past medical records and ER records.     Subjective:     Principal Problem:Elevated troponin    Chief Complaint:   Chief Complaint   Patient presents with    Abdominal Pain     RUQ pain, pt reports hernia pain for 1 day, pt has had bowel movement upon ED arrival         HPI: 79 years old male with history of mild MR,HTN,BPH,seizure disorder,came with CC of upper abdominal pain. Symptoms started sometime this evening.  History is very limited due to patient's language barrier and history of mental retardation.  He is a poor historian.  Review previous records reveal that he has been a poor historian in the past even with a .  Patient was asked multiple questions in Sammarinese without responding.  He  was  Appearing uncomfortable in ER, He keeps pointing to his upper abdomen.  He will not answer questions regarding chest pain, back pain, nausea, or vomiting. EMS reports that they were called to her residence because the patient was having abdominal pain at the Family believe was secondary to a hernia.  They said no other history was provided.  Review of old medical records reveals the patient was admitted for seizure-like activity and headache 5 years ago.  CT of the abdomen pelvis was performed during the admission that revealed a 2.4x2.7 cm infrarenal abdominal aortic aneurysm.  No significant solid organ abnormalities were seen.  Gallbladder appeared normal.   Per ER record,Niece arrived and provided additional information.  States he started complaining of abdominal pain approximately 45 hr ago.  No reported nausea or vomiting. He was also complaining of headache. No fever.  No diarrhea.  No urinary  symptoms. No reported seizure activity at home.  On arrival to emergency room after being placed in room patient had twobrief focal seizure involving the left side.  Niece  reported in ERstates he has not had a seizure in over a year.  States he has compliance medications.  Patient was postictal after the seizure,at this time is improved,CT abdomen show no acute process,he is alert at this time,his Troponin level is increased,patient has been  admitted for evaluation by cardiology,his abdominal pain is currently resolved,US show cholelithiasis.family arrived to floor,information by family provided.    Past Medical History:   Diagnosis Date    Abdominal hernia     BPH (benign prostatic hyperplasia)     Dyslipidemia     Hypercholesteremia     Hypertension     Seizure disorder     Seizures     Sinus disorder        Past Surgical History:   Procedure Laterality Date    KNEE SURGERY      NASAL SINUS SURGERY      NOSE SURGERY         Review of patient's allergies indicates:  No Known Allergies    No current facility-administered medications on file prior to encounter.      Current Outpatient Medications on File Prior to Encounter   Medication Sig    alendronate (FOSAMAX) 70 MG tablet Take 1 tablet (70 mg total) by mouth every 7 days.    aspirin (ECOTRIN) 81 MG EC tablet Take 81 mg by mouth once daily.    atorvastatin (LIPITOR) 40 MG tablet Take 1 tablet (40 mg total) by mouth once daily.    calcium citrate-vitamin D3 (CITRACAL + D MAXIMUM) 315-250 mg-unit Tab Take 315 mg by mouth 2 (two) times daily.    finasteride (PROSCAR) 5 mg tablet Take 1 tablet (5 mg total) by mouth once daily.    fluticasone (FLONASE) 50 mcg/actuation nasal spray SHAKE WELL AND U 2 SPRAYS IEN BID    lisinopril 10 MG tablet Take 1 tablet (10 mg total) by mouth once daily.    metoprolol tartrate (LOPRESSOR) 50 MG tablet Take 1 tablet (50 mg total) by mouth once daily.    montelukast (SINGULAIR) 10 mg tablet Take 10 mg by mouth  every evening.    phenobarbital (LUMINAL) 97.2 MG tablet Take 1 tablet (97.2 mg total) by mouth once daily.    phenytoin (DILANTIN) 100 MG ER capsule 2 capsules po q am, 2 capsules po q hs    tamsulosin (FLOMAX) 0.4 mg Cp24 Take 1 capsule (0.4 mg total) by mouth once daily.    ketorolac 0.5% (ACULAR) 0.5 % Drop Place 1 drop into the right eye 4 (four) times daily.     Family History     Problem Relation (Age of Onset)    Coronary artery disease Brother, Brother    Hypertension Brother, Brother        Tobacco Use    Smoking status: Never Smoker    Smokeless tobacco: Never Used   Substance and Sexual Activity    Alcohol use: No    Drug use: No    Sexual activity: Not on file     Review of Systems   Constitutional: Negative for activity change and appetite change.   HENT: Negative for congestion and dental problem.    Eyes: Negative for discharge and itching.   Respiratory: Negative for apnea and chest tightness.    Cardiovascular: Negative for chest pain and leg swelling.   Gastrointestinal: Positive for abdominal pain. Negative for abdominal distention and anal bleeding.   Endocrine: Negative for cold intolerance and heat intolerance.   Genitourinary: Negative for difficulty urinating.   Musculoskeletal: Negative for arthralgias and back pain.   Skin: Negative for color change and pallor.   Allergic/Immunologic: Negative for environmental allergies and food allergies.   Neurological: Negative for dizziness and facial asymmetry.   Hematological: Negative for adenopathy. Does not bruise/bleed easily.   Psychiatric/Behavioral: Negative for agitation.     Objective:     Vital Signs (Most Recent):  Temp: 97.9 °F (36.6 °C) (09/24/19 0739)  Pulse: 98 (09/24/19 0739)  Resp: 17 (09/24/19 0739)  BP: 137/75 (09/24/19 0739)  SpO2: 98 % (09/24/19 0739) Vital Signs (24h Range):  Temp:  [97.9 °F (36.6 °C)-98 °F (36.7 °C)] 97.9 °F (36.6 °C)  Pulse:  [] 98  Resp:  [17-30] 17  SpO2:  [94 %-99 %] 98 %  BP:  (115-179)/(56-96) 137/75     Weight: 89.8 kg (198 lb)  Body mass index is 32.95 kg/m².    Physical Exam   Constitutional: No distress.   HENT:   Head: Atraumatic.   Eyes: EOM are normal.   Neck: Neck supple.   Cardiovascular: Normal rate and regular rhythm.   Pulmonary/Chest: Effort normal and breath sounds normal.   Abdominal: Soft. He exhibits no distension. There is no tenderness.   Musculoskeletal: Normal range of motion. He exhibits no edema or deformity.   Neurological: He is alert.   Skin: Skin is dry.   Psychiatric: He has a normal mood and affect. His behavior is normal.         CRANIAL NERVES     CN III, IV, VI   Extraocular motions are normal.        Significant Labs:   BMP:   Recent Labs   Lab 09/24/19  0037   *      K 4.0   CL 99   CO2 30*   BUN 19   CREATININE 1.0   CALCIUM 9.3   MG 1.6     CBC:   Recent Labs   Lab 09/24/19 0037   WBC 9.93   HGB 14.0   HCT 42.5        Troponin:   Recent Labs   Lab 09/24/19 0037 09/24/19  0451   TROPONINI 0.035* 0.137*       Significant Imaging: reviewed,    Assessment/Plan:     * Elevated troponin  He denies chest pain at this time,his pain was in upper abdomen,check Echo,on ACS medications,cardiology consult.      Calculus of gallbladder without biliary obstruction  Per US,possible etiology for abdominal pain.      Mild mental retardation  Supportive care,      DJD (degenerative joint disease)  supportive care,      Abdominal hernia  Will be monitored.      Seizure disorder  On phenobarbital  and Dilantine at home,in therapeutic  [place],had seizure  In ER,alert at this time,resumed home medications,head CT show no acute process,consulted neurology,seizy=ure precaution,prn IV Ativan.      BPH (benign prostatic hyperplasia)  On flomax and finasteride,checkl baller scan.      Essential hypertension, benign  Stable,will monitor.        VTE Risk Mitigation (From admission, onward)    None             Reyna Rocha MD  Department of Hospital  Medicine   [facility]

## 2019-09-24 NOTE — HPI
79 years old male with history of mild MR,HTN,BPH,seizure disorder,came with CC of upper abdominal pain. Symptoms started sometime this evening.  History is very limited due to patient's language barrier and history of mental retardation.  He is a poor historian.  Review previous records reveal that he has been a poor historian in the past even with a .  Patient was asked multiple questions in R[place]andan without responding.  He was  Appearing uncomfortable in ER, He keeps pointing to his upper abdomen.  He will not answer questions regarding chest pain, back pain, nausea, or vomiting. EMS reports that they were called to her residence because the patient was having abdominal pain at the Family believe was secondary to a hernia.  They said no other history was provided.  Review of old medical records reveals the patient was admitted for seizure-like activity and headache 5 years ago.  CT of the abdomen pelvis was performed during the admission that revealed a 2.4x2.7 cm infrarenal abdominal aortic aneurysm.  No significant solid organ abnormalities were seen.  Gallbladder appeared normal.   Per ER record,Niece arrived and provided additional information.  States he started complaining of abdominal pain approximately 45 hr ago.  No reported nausea or vomiting. He was also complaining of headache. No fever.  No diarrhea.  No urinary symptoms. No reported seizure activity at home.  On arrival to emergency room after being placed in room patient had twobrief focal seizure involving the left side.  Niece reported in ERstates he has not had a seizure in over a year.  States he has compliance medications.  Patient was postictal after the seizure,at this time is improved,CT abdomen show no acute process,he is alert at this time,his Troponin level is increased,patient has been  admitted for evaluation by cardiology,his abdominal pain is currently resolved,US show cholelithiasis.family arrived to floor,information by  family provided.

## 2019-09-24 NOTE — ED PROVIDER NOTES
Encounter Date: 9/23/2019       History     Chief Complaint   Patient presents with    Abdominal Pain     RUQ pain, pt reports hernia pain for 1 day, pt has had bowel movement upon ED arrival      Patient presents for evaluation of upper abdominal pain. Symptoms started sometime this evening.  History is very limited due to patient's language barrier and history of mental retardation.  He is a poor historian.  Review previous records reveal that he has been a poor historian in the past even with a .  Patient was asked multiple questions in American without responding.  He appears uncomfortable.  He keeps pointing to his upper abdomen.  He will not answer questions regarding chest pain, back pain, nausea, or vomiting. EMS reports that they were called to her residence because the patient was having abdominal pain at the Family believe was secondary to a hernia.  They said no other history was provided.  Review of old medical records reveals the patient was admitted for seizure-like activity and headache 5 years ago.  CT of the abdomen pelvis was performed during the admission that revealed a 2.4x2.7 cm infrarenal abdominal aortic aneurysm.  No significant solid organ abnormalities were seen.  Gallbladder appeared normal.    Niece arrived and provided additional information.  States he started complaining of abdominal pain approximately 45 hr ago.  No reported nausea or vomiting. He was also complaining of headache. No fever.  No diarrhea.  No urinary symptoms. No reported seizure activity at home.  On arrival to emergency room after being placed in room patient had twobrief focal seizure involving the left side.  Niece states he has not had a seizure in over a year.  States he has compliance medications.  Patient was postictal after the seizure.        Review of patient's allergies indicates:  No Known Allergies  Past Medical History:   Diagnosis Date    Abdominal hernia     BPH (benign prostatic  hyperplasia)     Dyslipidemia     Hypercholesteremia     Hypertension     Seizure disorder     Seizures     Sinus disorder      Past Surgical History:   Procedure Laterality Date    KNEE SURGERY      NASAL SINUS SURGERY      NOSE SURGERY       Family History   Problem Relation Age of Onset    Hypertension Brother     Hypertension Brother     Coronary artery disease Brother     Coronary artery disease Brother      Social History     Tobacco Use    Smoking status: Never Smoker    Smokeless tobacco: Never Used   Substance Use Topics    Alcohol use: No    Drug use: No     Review of Systems   Unable to perform ROS: Other (Mental retardation)       Physical Exam     Initial Vitals [09/23/19 2257]   BP Pulse Resp Temp SpO2   (!) 167/96 110 18 98 °F (36.7 °C) 99 %      MAP       --         Physical Exam    Nursing note and vitals reviewed.  Constitutional: He appears well-developed and well-nourished. He is not diaphoretic. No distress.   HENT:   Head: Normocephalic and atraumatic.   Right Ear: External ear normal.   Left Ear: External ear normal.   Nose: Nose normal.   Eyes: Conjunctivae and EOM are normal. Pupils are equal, round, and reactive to light. Right eye exhibits no discharge. Left eye exhibits no discharge. No scleral icterus.   Neck: Neck supple. No tracheal deviation present.   Cardiovascular: Regular rhythm and normal heart sounds.   No murmur heard.  Tachycardic.   Pulmonary/Chest: Breath sounds normal. No stridor. No respiratory distress. He has no wheezes. He has no rhonchi. He has no rales. He exhibits no tenderness.   Abdominal: Soft. He exhibits no distension and no mass. There is tenderness (Mild tenderness epigastric area.). There is no rebound and no guarding.   Musculoskeletal: Normal range of motion. He exhibits edema (2+ pitting edema to bilateral lower extremities from the knees down.). He exhibits no tenderness.   Moderate effusion to left knee.  No bony tenderness. Normal  bilateral upper extremity exams.  No effusion left knee.   Neurological: He is alert.   Patient moving all 4 extremities. No obvious facial droop.  Patient will not cooperate with exam.  Cannot assess sensory exam.   Skin: Skin is warm and dry. No rash noted. No erythema.   Psychiatric:   Anxious.  Flat affect. Patient will not answer questions.         ED Course   Critical Care  Date/Time: 9/24/2019 5:33 AM  Performed by: Sunny Contreras MD  Authorized by: Sunny Contreras MD   Direct patient critical care time: 10 minutes  Additional history critical care time: 10 minutes  Ordering / reviewing critical care time: 10 minutes  Documentation critical care time: 10 minutes  Total critical care time (exclusive of procedural time) : 40 minutes  Critical care was time spent personally by me on the following activities: pulse oximetry, review of old charts, ordering and review of radiographic studies, ordering and performing treatments and interventions, examination of patient, evaluation of patient's response to treatment, ordering and review of laboratory studies and obtaining history from patient or surrogate.        Labs Reviewed   CBC W/ AUTO DIFFERENTIAL - Abnormal; Notable for the following components:       Result Value    RBC 4.48 (*)     Mean Corpuscular Hemoglobin 31.3 (*)     MPV 8.9 (*)     Gran # (ANC) 8.8 (*)     Lymph # 0.5 (*)     Gran% 89.0 (*)     Lymph% 5.1 (*)     All other components within normal limits   COMPREHENSIVE METABOLIC PANEL - Abnormal; Notable for the following components:    CO2 30 (*)     Glucose 139 (*)     All other components within normal limits   URINALYSIS, REFLEX TO URINE CULTURE - Abnormal; Notable for the following components:    Occult Blood UA 1+ (*)     All other components within normal limits    Narrative:     Preferred Collection Type->Urine, Clean Catch   TROPONIN I - Abnormal; Notable for the following components:    Troponin I 0.035 (*)     All other components  within normal limits   TROPONIN I - Abnormal; Notable for the following components:    Troponin I 0.137 (*)     All other components within normal limits   C-REACTIVE PROTEIN - Abnormal; Notable for the following components:    CRP 32.2 (*)     All other components within normal limits   LIPASE   MAGNESIUM   PHENYTOIN LEVEL, TOTAL   PHENOBARBITAL LEVEL   URINALYSIS MICROSCOPIC    Narrative:     Preferred Collection Type->Urine, Clean Catch     EKG Readings: (Independently Interpreted)   Initial Reading: No STEMI. Rhythm: Sinus Tachycardia. Heart Rate: 119. Ectopy: No Ectopy. Conduction: Normal. ST Segments: Normal ST Segments. T Waves Flipped: I and AVL. Axis: Left Axis Deviation.   Incomplete right bundle branch block.  LVH.  No acute ischemic changes.  No significant change compared to 03/26/2016.       Imaging Results          US Abdomen Limited (Final result)  Result time 09/24/19 03:45:05    Final result by Dheeraj Granger MD (09/24/19 03:45:05)                 Impression:      Biliary sludge and possible cholelithiasis without sonographic evidence of acute cholecystitis.      Electronically signed by: Dheeraj Granger MD  Date:    09/24/2019  Time:    03:45             Narrative:    EXAMINATION:  US ABDOMEN LIMITED    CLINICAL HISTORY:  upper abdominal pain;    TECHNIQUE:  Limited ultrasound of the right upper quadrant of the abdomen (including pancreas, liver, gallbladder, common bile duct, and spleen) was performed.    COMPARISON:  CT abdomen and pelvis 09/24/2019    FINDINGS:  Liver: Mildly enlarged measuring 18.1 cm. Homogeneous echotexture. No focal hepatic lesions.    Gallbladder: There is a small volume of layering sludge within the gallbladder lumen.  There are possible nonshadowing calculi within the sludge.  No significant gallbladder wall thickening or hypervascularity appreciated.  The technologist reports a negative sonographic Johnson sign.    Biliary system: The common duct is not dilated,  measuring 7 mm.  No intrahepatic ductal dilatation.    Pancreas: Obscured by bowel gas.    Spleen: Normal in size and echotexture, measuring 10.0 cm.    Miscellaneous: No upper abdominal ascites.                               CT Abdomen Pelvis With Contrast (Final result)  Result time 09/24/19 02:00:14    Final result by Dheeraj Granger MD (09/24/19 02:00:14)                 Impression:      1.  No CT evidence of acute intra-abdominal abnormality.    2.  Relatively stable appearing ectasia of the infrarenal abdominal aorta.  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    3.  Interval development of new mild-to-moderate compression deformities of the T12, L2, L3, and L5 vertebral bodies.  Additional L4 compression deformity which was present on prior examination, although has significantly progressed from prior study with approximately 80% central height loss now present.  Additional stable appearing chronic compression deformities of the T11 and L1 vertebral bodies.    4.  Tiny layering stones or sludge within the gallbladder lumen.    5.  Additional findings as above.      Electronically signed by: Dheeraj Granger MD  Date:    09/24/2019  Time:    02:00             Narrative:    EXAMINATION:  CT ABDOMEN PELVIS WITH CONTRAST    CLINICAL HISTORY:  Abd pain, fever, abscess suspected;    TECHNIQUE:  Low dose axial images, sagittal and coronal reformations were obtained from the lung bases to the pubic symphysis following the IV administration of 80 mL of Omnipaque 350 .  Oral contrast was not given.    COMPARISON:  CT 11/29/2015    FINDINGS:  Please note image quality is degraded by patient motion artifact.    There is mild bibasilar atelectasis.  No significant pleural effusion.  Heart is mildly enlarged.  There is calcification of the aortic valve.  There is no significant pericardial effusion.    The liver is mildly enlarged.  There is a  punctate left hepatic lobe hypodensity which is too small to accurately characterize.  The portal vein is patent.  There are tiny layering stones or sludge within the gallbladder lumen.  There is no intra-or extrahepatic biliary ductal dilatation.    The stomach, spleen, pancreas, and adrenal glands are unremarkable.    The kidneys enhance symmetrically without evidence of hydronephrosis.  The urinary bladder is unremarkable. There are central dystrophic prostate calcifications.    There is ectasia of the infrarenal abdominal aorta measuring approximately 2.7 x 2.7 cm, similar to prior examination.  There is moderate atherosclerotic calcification along its course.  There is mild aneurysmal dilatation of the bilateral common iliac arteries again demonstrated.  This appears slightly increased in extent compared to prior examination measuring 2.2 cm on the right and 2.1 cm on the left.  There is no evidence of retroperitoneal hematoma.    The visualized loops of small and large bowel show no evidence of obstruction or inflammation.  The appendix is not definitively visualized, however no inflammatory changes identified at its expected location.  There is scattered retained stool throughout the colon.  There is no ascites, free intraperitoneal air or portal venous gas present.  There is a small fat containing umbilical hernia.    There are relatively stable appearing chronic compression deformities of the T11 and L1 vertebral bodies.  There is a severe chronic L4 compression deformity which is advanced compared to prior examination of 2015 with approximately 80% central height loss.  There are new moderate compression deformities of the L2 and L3 vertebral bodies.  There are new mild T12 and L5 compression deformities.  There are multilevel degenerative changes with vacuum disc phenomena present.  The extraperitoneal soft tissues are unremarkable.                               CT Head Without Contrast (Final result)   Result time 09/24/19 01:49:23    Final result by Josie Scuhlz MD (09/24/19 01:49:23)                 Impression:      No acute intracranial abnormality detected.    Partial opacification of the right mastoid air cells.      Electronically signed by: Josie Schulz  Date:    09/24/2019  Time:    01:49             Narrative:    EXAMINATION:  CT OF THE HEAD WITHOUT    CLINICAL HISTORY:  Seizures new or progressive;    TECHNIQUE:  5 mm unenhanced axial images were obtained from the skull base to the vertex.    COMPARISON:  03/26/2016    FINDINGS:  Mild cerebral atrophy and chronic small vessel ischemic changes are present.  There is no acute intracranial hemorrhage, territorial infarct or mass effect, or midline shift. There are bilateral nasal maxillary windows.  Mild mucoperiosteal seen maxillary sinuses right greater left.  There is mild bilateral inferior frontal sinus mucoperiosteal.  There is partial opacification of the right mastoid cells.                               X-Ray Chest 1 View (Final result)  Result time 09/24/19 00:19:18    Final result by Dheeraj Granger MD (09/24/19 00:19:18)                 Impression:      No radiographic evidence of acute intrathoracic process..      Electronically signed by: Dheeraj Granger MD  Date:    09/24/2019  Time:    00:19             Narrative:    EXAMINATION:  XR CHEST 1 VIEW    CLINICAL HISTORY:  Unspecified abdominal pain    TECHNIQUE:  Single frontal view of the chest was performed.    COMPARISON:  03/26/2016    FINDINGS:  Cardiac monitoring leads overlie the chest.  The cardiomediastinal silhouette is mildly enlarged, similar to prior examination.  The lungs are symmetrically expanded with coarse interstitial markings bilaterally.  No large confluent airspace consolidation appreciated.  No evidence of significant pleural effusion or pneumothorax.  Visualized osseous structures demonstrate degenerative changes.                                 Medical  Decision Making:   Differential Diagnosis:   Aortic aneurysm, biliary colic, cholecystitis, peptic ulcer disease, hernia, pancreatitis, acute coronary syndrome  Clinical Tests:   Lab Tests: Ordered and Reviewed  The following lab test(s) were unremarkable: CBC, CMP and Troponin  Radiological Study: Ordered and Reviewed  Medical Tests: Ordered and Reviewed  ED Management:  0400:  No significant ischemic changes on EKG.  Mild elevation troponin which is similar to previous measurements and May of 2018.  Patient states that pain is improved.  Admits to mild discomfort in the right upper quadrant.  Minimal tenderness on repeat exam.  No guarding or rebound. CT negative for intra-abdominal surgical abnormality.  No evidence of perforation.  No evidence of obstruction.  No evidence of aneurysmal dilatation of the aorta.  No evidence of hydronephrosis.  Ultrasound of the gallbladder shows sludge or small stones.  No ultrasonographic evidence of cholecystitis.  Negative sonographic Johnson sign. White blood cell count is normal. Transaminases are normal.  Lipase is normal. Chest x-ray negative for infection.  Phenobarbital and Dilantin level are at the lower level of normal limits. No further seizure activity in the emergency room.  Neurologic exam remains normal. No significant changes at CT of the head.  Given findings on ultrasound I believe the patient's abdominal pain tonight was due to biliary colic.  Will and CRP.  Will repeat troponin level since it is unclear whether not the patient was experiencing chest pain. He continues to be a poor historian.    0440:  Repeat EKG unchanged.  Rate improved.  No ischemic changes.    0530:  Repeat troponin elevated.  Patient remains chest pain free.  Will admit for NSTEMI.   from [employer] ([id number]) used to obtain additional history and give instructions to patient.  Patient still complaining of lower chest pain and upper abdominal pain. Will give  nitroglycerin.    Additional MDM:   Heart Score:    History:          Slightly suspicious.  ECG:             Nonspecific repolarisation disturbance  Age:               >65 years  Risk factors: 1-2 risk factors  Troponin:       1-2x normal limit  Final Score: 5                       Clinical Impression:       ICD-10-CM ICD-9-CM   1. Epigastric pain R10.13 789.06   2. Abdominal pain R10.9 789.00   3. NSTEMI (non-ST elevated myocardial infarction) I21.4 410.70   4. Partial symptomatic epilepsy with complex partial seizures, not intractable, without status epilepticus G40.209 345.40   5. Calculus of gallbladder without cholecystitis without obstruction K80.20 574.20         Disposition:   Disposition: Admitted  Condition: Stable                        Sunny Contreras MD  09/24/19 0533       Sunny Contreras MD  09/24/19 0534       Sunny Cotnreras MD  09/24/19 0601

## 2019-09-24 NOTE — ASSESSMENT & PLAN NOTE
He denies chest pain at this time,his pain was in upper abdomen,check Echo,on ACS medications,cardiology consult.

## 2019-09-24 NOTE — HPI
Patient presents for evaluation of upper abdominal pain. Symptoms started sometime this evening.  History is very limited due to patient's language barrier and history of mental retardation.  He is a poor historian.  Review previous records reveal that he has been a poor historian in the past even with a .  Patient was asked multiple questions in [place]ian without responding.  He appears uncomfortable.  He keeps pointing to his upper abdomen.  He will not answer questions regarding chest pain, back pain, nausea, or vomiting. EMS reports that they were called to her residence because the patient was having abdominal pain at the Family believe was secondary to a hernia.  They said no other history was provided.  Review of old medical records reveals the patient was admitted for seizure-like activity and headache 5 years ago.  CT of the abdomen pelvis was performed during the admission that revealed a 2.4x2.7 cm infrarenal abdominal aortic aneurysm.  No significant solid organ abnormalities were seen.  Gallbladder appeared normal.     Niece arrived and provided additional information.  States he started complaining of abdominal pain approximately 45 hr ago.  No reported nausea or vomiting. He was also complaining of headache. No fever.  No diarrhea.  No urinary symptoms. No reported seizure activity at home.  On arrival to emergency room after being placed in room patient had twobrief focal seizure involving the left side.  Niece states he has not had a seizure in over a year.  States he has compliance medications.  Patient was postictal after the seizure.    EKG personally reviewed. NSR, non sp st changes. No STEMI.    I personally took a detailed history from the patient with the help of Libby and .  Patient states that for the past few years he has been having abdominal pain.  When he came to the emergency room he complained of abdominal bloating and pain.  However since today morning he  states that he has been having some chest tightness.  He is a poor historian and sometimes states that the chest tightness in substernal and all over his chest.  He is unable to tell aggravating or relieving factors.  He states that he lives alone but his sister lives close by and is very involved in his care.  She states that she will be able to take care of his medications.  Denies any orthopnea, PND.  His initial troponin is mildly elevated. He already ate today.      ST 2015:  1. The perfusion scan is free of evidence for myocardial ischemia or injury.   2. Resting wall motion is physiologic.   3. Resting LV function is normal.   4. The ventricular volumes are normal at rest and stress.   5. The extracardiac distribution of radioactivity is normal.         Results for AISHA ULRICH (MRN [id number]) as of 9/24/2019 08:19   Ref. Range 9/24/2019 00:37 9/24/2019 01:38 9/24/2019 03:26 9/24/2019 04:34 9/24/2019 04:51   Troponin I Latest Ref Range: 0.000 - 0.026 ng/mL 0.035 (H)    0.137 (H)

## 2019-09-24 NOTE — ASSESSMENT & PLAN NOTE
Urine culture  He will need a cystoscopy with bilateral retrograde pyelogram as an outpatient  No need for urgent urologic intervention at this time

## 2019-09-24 NOTE — ASSESSMENT & PLAN NOTE
Patient is a poor historian, however complains of occasional chest tightness.  It comes and goes.  Initial troponin mildly elevated.  Complicated by the fact that he has gross hematuria.    Urology consultation has been obtained.  Would need clearance from Neurology for initiating dual antiplatelet therapy on the patient.    Check 2D echocardiogram.  Trend troponins.      If Urology clears patient to be on dual antiplatelet therapy, then start aspirin and Plavix.

## 2019-09-24 NOTE — SUBJECTIVE & OBJECTIVE
Past Medical History:   Diagnosis Date    Abdominal hernia     BPH (benign prostatic hyperplasia)     Dyslipidemia     Hypercholesteremia     Hypertension     Seizure disorder     Seizures     Sinus disorder        Past Surgical History:   Procedure Laterality Date    KNEE SURGERY      NASAL SINUS SURGERY      NOSE SURGERY         Review of patient's allergies indicates:  No Known Allergies    No current facility-administered medications on file prior to encounter.      Current Outpatient Medications on File Prior to Encounter   Medication Sig    alendronate (FOSAMAX) 70 MG tablet Take 1 tablet (70 mg total) by mouth every 7 days.    aspirin (ECOTRIN) 81 MG EC tablet Take 81 mg by mouth once daily.    atorvastatin (LIPITOR) 40 MG tablet Take 1 tablet (40 mg total) by mouth once daily.    calcium citrate-vitamin D3 (CITRACAL + D MAXIMUM) 315-250 mg-unit Tab Take 315 mg by mouth 2 (two) times daily.    finasteride (PROSCAR) 5 mg tablet Take 1 tablet (5 mg total) by mouth once daily.    fluticasone (FLONASE) 50 mcg/actuation nasal spray SHAKE WELL AND U 2 SPRAYS IEN BID    lisinopril 10 MG tablet Take 1 tablet (10 mg total) by mouth once daily.    metoprolol tartrate (LOPRESSOR) 50 MG tablet Take 1 tablet (50 mg total) by mouth once daily.    montelukast (SINGULAIR) 10 mg tablet Take 10 mg by mouth every evening.    phenobarbital (LUMINAL) 97.2 MG tablet Take 1 tablet (97.2 mg total) by mouth once daily.    phenytoin (DILANTIN) 100 MG ER capsule 2 capsules po q am, 2 capsules po q hs    tamsulosin (FLOMAX) 0.4 mg Cp24 Take 1 capsule (0.4 mg total) by mouth once daily.    ketorolac 0.5% (ACULAR) 0.5 % Drop Place 1 drop into the right eye 4 (four) times daily.     Family History     Problem Relation (Age of Onset)    Coronary artery disease Brother, Brother    Hypertension Brother, Brother        Tobacco Use    Smoking status: Never Smoker    Smokeless tobacco: Never Used   Substance and Sexual  Activity    Alcohol use: No    Drug use: No    Sexual activity: Not on file     Review of Systems   Unable to perform ROS: other   Constitution: Positive for malaise/fatigue.   Eyes: Negative.    Cardiovascular: Positive for chest pain.   Respiratory: Positive for shortness of breath.    Endocrine: Negative.    Gastrointestinal: Positive for bloating and abdominal pain.   Genitourinary: Positive for hematuria.     Objective:     Vital Signs (Most Recent):  Temp: 97.9 °F (36.6 °C) (09/24/19 0739)  Pulse: 98 (09/24/19 0739)  Resp: 17 (09/24/19 0739)  BP: 137/75 (09/24/19 0739)  SpO2: 98 % (09/24/19 0739) Vital Signs (24h Range):  Temp:  [97.9 °F (36.6 °C)-98 °F (36.7 °C)] 97.9 °F (36.6 °C)  Pulse:  [] 98  Resp:  [17-30] 17  SpO2:  [94 %-99 %] 98 %  BP: (115-179)/(56-96) 137/75     Weight: 89.8 kg (198 lb)  Body mass index is 32.95 kg/m².    SpO2: 98 %  O2 Device (Oxygen Therapy): room air    No intake or output data in the 24 hours ending 09/24/19 1002    Lines/Drains/Airways     Peripheral Intravenous Line                 Peripheral IV - Single Lumen 11/29/15 0345 Left Forearm 1395 days                Physical Exam   Constitutional: He is oriented to person, place, and time. He appears well-developed and well-nourished.   HENT:   Head: Normocephalic.   Eyes: Pupils are equal, round, and reactive to light. Conjunctivae are normal.   Neck: Normal range of motion. Neck supple.   Cardiovascular: Normal rate, regular rhythm and normal heart sounds.   Pulmonary/Chest: Effort normal and breath sounds normal.   Abdominal: Soft. Bowel sounds are normal.   Neurological: He is alert and oriented to person, place, and time.   Skin: Skin is warm.   Nursing note and vitals reviewed.      Significant Labs:   BMP:   Recent Labs   Lab 09/24/19  0037   *      K 4.0   CL 99   CO2 30*   BUN 19   CREATININE 1.0   CALCIUM 9.3   MG 1.6   , CMP   Recent Labs   Lab 09/24/19  0037      K 4.0   CL 99   CO2 30*   GLU  139*   BUN 19   CREATININE 1.0   CALCIUM 9.3   PROT 8.2   ALBUMIN 4.1   BILITOT 0.3   ALKPHOS 71   AST 36   ALT 30   ANIONGAP 8   ESTGFRAFRICA >60   EGFRNONAA >60   , CBC   Recent Labs   Lab 09/24/19  0037   WBC 9.93   HGB 14.0   HCT 42.5      , INR No results for input(s): INR, PROTIME in the last 48 hours., Lipid Panel No results for input(s): CHOL, HDL, LDLCALC, TRIG, CHOLHDL in the last 48 hours., Troponin   Recent Labs   Lab 09/24/19  0037 09/24/19  0451   TROPONINI 0.035* 0.137*    and All pertinent lab results from the last 24 hours have been reviewed.    Significant Imaging: EKG: personally reviewed

## 2019-09-24 NOTE — CONSULTS
[facility]  Urology  Consult Note    Patient Name: Vern Lr  MRN: [id number]  Admission Date: 9/23/2019  Hospital Length of Stay: 0   Code Status: Full Code   Attending Provider: Reyna Rocha MD   Consulting Provider: ANUM Pearson MD  Primary Care Physician: Maria Fernanda Abbasi MD (Inactive)  Principal Problem:Elevated troponin    Inpatient consult to Urology  Consult performed by: LINDA Pearson MD  Consult ordered by: Reyna Rocha MD          Subjective:     HPI:  Hematuria  Patient complains of gross hematuria. Onset of hematuria was 1 day ago and was sudden in onset. There is not a history of nephrolithiasis. There is not a history of urologic trauma. Other urologic symptoms include nocturia. Patient admits to history of no risk factors for cancer. Patient denies history of Agent Orange exposure, chronic Munoz catheter,  surgeries, occupational exposure, sexually transmitted diseases, tobacco use, trauma and urolithiasis. Prior workup has been CT.  MARTII in the room but not operational.         Past Medical History:   Diagnosis Date    Abdominal hernia     BPH (benign prostatic hyperplasia)     Dyslipidemia     Hypercholesteremia     Hypertension     Seizure disorder     Seizures     Sinus disorder        Past Surgical History:   Procedure Laterality Date    KNEE SURGERY      NASAL SINUS SURGERY      NOSE SURGERY         Review of patient's allergies indicates:  No Known Allergies    Family History     Problem Relation (Age of Onset)    Coronary artery disease Brother, Brother    Hypertension Brother, Brother          Tobacco Use    Smoking status: Never Smoker    Smokeless tobacco: Never Used   Substance and Sexual Activity    Alcohol use: No    Drug use: No    Sexual activity: Not on file       Review of Systems   Constitutional: Negative.  Negative for fever.   HENT: Negative.    Eyes: Negative.    Respiratory: Negative for cough, chest tightness  and shortness of breath.    Cardiovascular: Negative for chest pain.   Gastrointestinal: Positive for abdominal pain. Negative for constipation, diarrhea and nausea.   Genitourinary: Positive for hematuria.   Musculoskeletal: Negative.    Neurological: Negative.    Psychiatric/Behavioral: Negative.        Objective:     Temp:  [97.5 °F (36.4 °C)-98 °F (36.7 °C)] 97.5 °F (36.4 °C)  Pulse:  [] 70  Resp:  [17-30] 18  SpO2:  [94 %-99 %] 98 %  BP: (112-179)/(56-96) 112/59     Body mass index is 32.95 kg/m².      Bladder Scan Volume (mL): 0 mL (09/24/19 1020)    Drains     None                 Physical Exam   Nursing note and vitals reviewed.  Constitutional: He is oriented to person, place, and time. He appears well-developed and well-nourished.   HENT:   Head: Normocephalic.   Eyes: Conjunctivae are normal.   Neck: Normal range of motion. Neck supple. No tracheal deviation present. No thyromegaly present.   Cardiovascular: Normal rate and normal heart sounds.    Pulmonary/Chest: Effort normal and breath sounds normal. No respiratory distress. He has no wheezes.   Abdominal: Soft. Bowel sounds are normal. There is no hepatosplenomegaly. There is no tenderness. There is no rebound and no CVA tenderness. No hernia.   Musculoskeletal: Normal range of motion. He exhibits no edema or tenderness.   Lymphadenopathy:     He has no cervical adenopathy.   Neurological: He is alert and oriented to person, place, and time.   Skin: Skin is warm and dry. No rash noted. No erythema.     Psychiatric: He has a normal mood and affect. His behavior is normal. Judgment and thought content normal.       Significant Labs:    BMP:  Recent Labs   Lab 09/24/19  0037      K 4.0   CL 99   CO2 30*   BUN 19   CREATININE 1.0   CALCIUM 9.3       CBC:  Recent Labs   Lab 09/24/19 0037   WBC 9.93   HGB 14.0   HCT 42.5          Blood Culture: No results for input(s): LABBLOO in the last 168 hours.  Urine Culture: No results for  input(s): LABURIN in the last 168 hours.    Significant Imaging:  CT: I have reviewed all results within the past 24 hours and my personal findings are:  no stones, masses, or hydronephrosis                    Assessment and Plan:     Gross hematuria  Urine culture  He will need a cystoscopy with bilateral retrograde pyelogram as an outpatient  No need for urgent urologic intervention at this time    BPH (benign prostatic hyperplasia)  Stay on Flomax and Proscar        VTE Risk Mitigation (From admission, onward)    None          Thank you for your consult. I will follow-up with patient. Please contact us if you have any additional questions.    ANUM Pearson MD  Urology  [facility]

## 2019-09-24 NOTE — CONSULTS
[facility]  Cardiology  Consult Note    Patient Name: Vern Lr  MRN: [id number]  Admission Date: 9/23/2019  Hospital Length of Stay: 0 days  Code Status: Full Code   Attending Provider: Reyna Rocha MD   Consulting Provider: Zahra Roca MD  Primary Care Physician: Maria Fernanda Abbasi MD (Inactive)  Principal Problem:Elevated troponin    Patient information was obtained from patient and ER records.     Inpatient consult to Cardiology  Consult performed by: Zahra Roca MD  Consult ordered by: Reyna Rocha MD        Subjective:     Chief Complaint:  abd pain  / chest pain     HPI:     Patient presents for evaluation of upper abdominal pain. Symptoms started sometime this evening.  History is very limited due to patient's language barrier and history of mental retardation.  He is a poor historian.  Review previous records reveal that he has been a poor historian in the past even with a .  Patient was asked multiple questions in Latvian without responding.  He appears uncomfortable.  He keeps pointing to his upper abdomen.  He will not answer questions regarding chest pain, back pain, nausea, or vomiting. EMS reports that they were called to her residence because the patient was having abdominal pain at the Family believe was secondary to a hernia.  They said no other history was provided.  Review of old medical records reveals the patient was admitted for seizure-like activity and headache 5 years ago.  CT of the abdomen pelvis was performed during the admission that revealed a 2.4x2.7 cm infrarenal abdominal aortic aneurysm.  No significant solid organ abnormalities were seen.  Gallbladder appeared normal.     Niece arrived and provided additional information.  States he started complaining of abdominal pain approximately 45 hr ago.  No reported nausea or vomiting. He was also complaining of headache. No fever.  No diarrhea.  No urinary symptoms. No reported seizure  activity at home.  On arrival to emergency room after being placed in room patient had twobrief focal seizure involving the left side.  Niece states he has not had a seizure in over a year.  States he has compliance medications.  Patient was postictal after the seizure.    EKG personally reviewed. NSR, non sp st changes. No STEMI.    I personally took a detailed history from the patient with the help of Libby and .  Patient states that for the past few years he has been having abdominal pain.  When he came to the emergency room he complained of abdominal bloating and pain.  However since today morning he states that he has been having some chest tightness.  He is a poor historian and sometimes states that the chest tightness in substernal and all over his chest.  He is unable to tell aggravating or relieving factors.  He states that he lives alone but his sister lives close by and is very involved in his care.  She states that she will be able to take care of his medications.  Denies any orthopnea, PND.  His initial troponin is mildly elevated. He already ate today.      ST 2015:  1. The perfusion scan is free of evidence for myocardial ischemia or injury.   2. Resting wall motion is physiologic.   3. Resting LV function is normal.   4. The ventricular volumes are normal at rest and stress.   5. The extracardiac distribution of radioactivity is normal.         Results for AISHA ULRICH (MRN [id number]) as of 9/24/2019 08:19   Ref. Range 9/24/2019 00:37 9/24/2019 01:38 9/24/2019 03:26 9/24/2019 04:34 9/24/2019 04:51   Troponin I Latest Ref Range: 0.000 - 0.026 ng/mL 0.035 (H)    0.137 (H)       Past Medical History:   Diagnosis Date    Abdominal hernia     BPH (benign prostatic hyperplasia)     Dyslipidemia     Hypercholesteremia     Hypertension     Seizure disorder     Seizures     Sinus disorder        Past Surgical History:   Procedure Laterality Date    KNEE SURGERY      NASAL SINUS  SURGERY      NOSE SURGERY         Review of patient's allergies indicates:  No Known Allergies    No current facility-administered medications on file prior to encounter.      Current Outpatient Medications on File Prior to Encounter   Medication Sig    alendronate (FOSAMAX) 70 MG tablet Take 1 tablet (70 mg total) by mouth every 7 days.    aspirin (ECOTRIN) 81 MG EC tablet Take 81 mg by mouth once daily.    atorvastatin (LIPITOR) 40 MG tablet Take 1 tablet (40 mg total) by mouth once daily.    calcium citrate-vitamin D3 (CITRACAL + D MAXIMUM) 315-250 mg-unit Tab Take 315 mg by mouth 2 (two) times daily.    finasteride (PROSCAR) 5 mg tablet Take 1 tablet (5 mg total) by mouth once daily.    fluticasone (FLONASE) 50 mcg/actuation nasal spray SHAKE WELL AND U 2 SPRAYS IEN BID    lisinopril 10 MG tablet Take 1 tablet (10 mg total) by mouth once daily.    metoprolol tartrate (LOPRESSOR) 50 MG tablet Take 1 tablet (50 mg total) by mouth once daily.    montelukast (SINGULAIR) 10 mg tablet Take 10 mg by mouth every evening.    phenobarbital (LUMINAL) 97.2 MG tablet Take 1 tablet (97.2 mg total) by mouth once daily.    phenytoin (DILANTIN) 100 MG ER capsule 2 capsules po q am, 2 capsules po q hs    tamsulosin (FLOMAX) 0.4 mg Cp24 Take 1 capsule (0.4 mg total) by mouth once daily.    ketorolac 0.5% (ACULAR) 0.5 % Drop Place 1 drop into the right eye 4 (four) times daily.     Family History     Problem Relation (Age of Onset)    Coronary artery disease Brother, Brother    Hypertension Brother, Brother        Tobacco Use    Smoking status: Never Smoker    Smokeless tobacco: Never Used   Substance and Sexual Activity    Alcohol use: No    Drug use: No    Sexual activity: Not on file     Review of Systems   Unable to perform ROS: other   Constitution: Positive for malaise/fatigue.   Eyes: Negative.    Cardiovascular: Positive for chest pain.   Respiratory: Positive for shortness of breath.    Endocrine:  Negative.    Gastrointestinal: Positive for bloating and abdominal pain.   Genitourinary: Positive for hematuria.     Objective:     Vital Signs (Most Recent):  Temp: 97.9 °F (36.6 °C) (09/24/19 0739)  Pulse: 98 (09/24/19 0739)  Resp: 17 (09/24/19 0739)  BP: 137/75 (09/24/19 0739)  SpO2: 98 % (09/24/19 0739) Vital Signs (24h Range):  Temp:  [97.9 °F (36.6 °C)-98 °F (36.7 °C)] 97.9 °F (36.6 °C)  Pulse:  [] 98  Resp:  [17-30] 17  SpO2:  [94 %-99 %] 98 %  BP: (115-179)/(56-96) 137/75     Weight: 89.8 kg (198 lb)  Body mass index is 32.95 kg/m².    SpO2: 98 %  O2 Device (Oxygen Therapy): room air    No intake or output data in the 24 hours ending 09/24/19 1002    Lines/Drains/Airways     Peripheral Intravenous Line                 Peripheral IV - Single Lumen 11/29/15 0345 Left Forearm 1395 days                Physical Exam   Constitutional: He is oriented to person, place, and time. He appears well-developed and well-nourished.   HENT:   Head: Normocephalic.   Eyes: Pupils are equal, round, and reactive to light. Conjunctivae are normal.   Neck: Normal range of motion. Neck supple.   Cardiovascular: Normal rate, regular rhythm and normal heart sounds.   Pulmonary/Chest: Effort normal and breath sounds normal.   Abdominal: Soft. Bowel sounds are normal.   Neurological: He is alert and oriented to person, place, and time.   Skin: Skin is warm.   Nursing note and vitals reviewed.      Significant Labs:   BMP:   Recent Labs   Lab 09/24/19  0037   *      K 4.0   CL 99   CO2 30*   BUN 19   CREATININE 1.0   CALCIUM 9.3   MG 1.6   , CMP   Recent Labs   Lab 09/24/19 0037      K 4.0   CL 99   CO2 30*   *   BUN 19   CREATININE 1.0   CALCIUM 9.3   PROT 8.2   ALBUMIN 4.1   BILITOT 0.3   ALKPHOS 71   AST 36   ALT 30   ANIONGAP 8   ESTGFRAFRICA >60   EGFRNONAA >60   , CBC   Recent Labs   Lab 09/24/19  0037   WBC 9.93   HGB 14.0   HCT 42.5      , INR No results for input(s): INR, PROTIME in the  last 48 hours., Lipid Panel No results for input(s): CHOL, HDL, LDLCALC, TRIG, CHOLHDL in the last 48 hours., Troponin   Recent Labs   Lab 09/24/19  0037 09/24/19  0451   TROPONINI 0.035* 0.137*    and All pertinent lab results from the last 24 hours have been reviewed.    Significant Imaging: EKG: personally reviewed    Assessment and Plan:     Chest pain  Patient is a poor historian, however complains of occasional chest tightness.  It comes and goes.  Initial troponin mildly elevated.  Complicated by the fact that he has gross hematuria.    Urology consultation has been obtained.  Would need clearance from Neurology for initiating dual antiplatelet therapy on the patient.    Check 2D echocardiogram.  Trend troponins.      If Urology clears patient to be on dual antiplatelet therapy, then start aspirin and Plavix.    Dyslipidemia  On lipitor    Essential hypertension, benign  On metoprolol and lisinopril        VTE Risk Mitigation (From admission, onward)    None          Thank you for your consult. I will follow-up with patient. Please contact us if you have any additional questions.    Zahra Roca MD  Cardiology   [facility]

## 2019-09-24 NOTE — ASSESSMENT & PLAN NOTE
On phenobarbital  and Dilantine at home,in therapeutic  [place],had seizure  In ER,alert at this time,resumed home medications,head CT show no acute process,consulted neurology,seizy=ure precaution,prn IV Ativan.

## 2019-09-25 PROBLEM — L03.116 CELLULITIS OF LEFT LOWER EXTREMITY: Status: ACTIVE | Noted: 2019-09-25

## 2019-09-25 PROBLEM — R31.9 HEMATURIA: Status: ACTIVE | Noted: 2019-09-25

## 2019-09-25 LAB
ALBUMIN SERPL BCP-MCNC: 3.5 G/DL (ref 3.5–5.2)
ALP SERPL-CCNC: 58 U/L (ref 55–135)
ALT SERPL W/O P-5'-P-CCNC: 24 U/L (ref 10–44)
ANION GAP SERPL CALC-SCNC: 9 MMOL/L (ref 8–16)
AST SERPL-CCNC: 24 U/L (ref 10–40)
BASOPHILS # BLD AUTO: 0.02 K/UL (ref 0–0.2)
BASOPHILS NFR BLD: 0.3 % (ref 0–1.9)
BILIRUB SERPL-MCNC: 0.3 MG/DL (ref 0.1–1)
BUN SERPL-MCNC: 14 MG/DL (ref 8–23)
CALCIUM SERPL-MCNC: 8.6 MG/DL (ref 8.7–10.5)
CHLORIDE SERPL-SCNC: 100 MMOL/L (ref 95–110)
CO2 SERPL-SCNC: 28 MMOL/L (ref 23–29)
CREAT SERPL-MCNC: 0.7 MG/DL (ref 0.5–1.4)
DIFFERENTIAL METHOD: NORMAL
EOSINOPHIL # BLD AUTO: 0 K/UL (ref 0–0.5)
EOSINOPHIL NFR BLD: 0 % (ref 0–8)
ERYTHROCYTE [DISTWIDTH] IN BLOOD BY AUTOMATED COUNT: 13 % (ref 11.5–14.5)
EST. GFR  (AFRICAN AMERICAN): >60 ML/MIN/1.73 M^2
EST. GFR  (NON AFRICAN AMERICAN): >60 ML/MIN/1.73 M^2
GLUCOSE SERPL-MCNC: 107 MG/DL (ref 70–110)
HCT VFR BLD AUTO: 44.9 % (ref 40–54)
HGB BLD-MCNC: 15 G/DL (ref 14–18)
LYMPHOCYTES # BLD AUTO: 1.4 K/UL (ref 1–4.8)
LYMPHOCYTES NFR BLD: 18.5 % (ref 18–48)
MCH RBC QN AUTO: 30.7 PG (ref 27–31)
MCHC RBC AUTO-ENTMCNC: 33.4 G/DL (ref 32–36)
MCV RBC AUTO: 92 FL (ref 82–98)
MONOCYTES # BLD AUTO: 0.8 K/UL (ref 0.3–1)
MONOCYTES NFR BLD: 10.5 % (ref 4–15)
NEUTROPHILS # BLD AUTO: 5.3 K/UL (ref 1.8–7.7)
NEUTROPHILS NFR BLD: 71 % (ref 38–73)
PLATELET # BLD AUTO: 167 K/UL (ref 150–350)
PMV BLD AUTO: 9.3 FL (ref 9.2–12.9)
POCT GLUCOSE: 121 MG/DL (ref 70–110)
POTASSIUM SERPL-SCNC: 3.9 MMOL/L (ref 3.5–5.1)
PROT SERPL-MCNC: 7.7 G/DL (ref 6–8.4)
RBC # BLD AUTO: 4.88 M/UL (ref 4.6–6.2)
SODIUM SERPL-SCNC: 137 MMOL/L (ref 136–145)
WBC # BLD AUTO: 7.55 K/UL (ref 3.9–12.7)

## 2019-09-25 PROCEDURE — 80053 COMPREHEN METABOLIC PANEL: CPT

## 2019-09-25 PROCEDURE — 99233 PR SUBSEQUENT HOSPITAL CARE,LEVL III: ICD-10-PCS | Mod: ,,, | Performed by: INTERNAL MEDICINE

## 2019-09-25 PROCEDURE — 21400001 HC TELEMETRY ROOM

## 2019-09-25 PROCEDURE — 99232 SBSQ HOSP IP/OBS MODERATE 35: CPT | Mod: ,,, | Performed by: UROLOGY

## 2019-09-25 PROCEDURE — 25000003 PHARM REV CODE 250: Performed by: HOSPITALIST

## 2019-09-25 PROCEDURE — 63600175 PHARM REV CODE 636 W HCPCS: Performed by: INTERNAL MEDICINE

## 2019-09-25 PROCEDURE — 99232 PR SUBSEQUENT HOSPITAL CARE,LEVL II: ICD-10-PCS | Mod: ,,, | Performed by: UROLOGY

## 2019-09-25 PROCEDURE — 25000003 PHARM REV CODE 250: Performed by: EMERGENCY MEDICINE

## 2019-09-25 PROCEDURE — 25000003 PHARM REV CODE 250: Performed by: INTERNAL MEDICINE

## 2019-09-25 PROCEDURE — 85025 COMPLETE CBC W/AUTO DIFF WBC: CPT

## 2019-09-25 PROCEDURE — 63600175 PHARM REV CODE 636 W HCPCS: Performed by: HOSPITALIST

## 2019-09-25 PROCEDURE — 99233 SBSQ HOSP IP/OBS HIGH 50: CPT | Mod: ,,, | Performed by: INTERNAL MEDICINE

## 2019-09-25 PROCEDURE — 36415 COLL VENOUS BLD VENIPUNCTURE: CPT

## 2019-09-25 RX ORDER — SODIUM CHLORIDE 9 MG/ML
INJECTION, SOLUTION INTRAVENOUS CONTINUOUS
Status: DISCONTINUED | OUTPATIENT
Start: 2019-09-25 | End: 2019-09-26

## 2019-09-25 RX ADMIN — ASPIRIN 81 MG 81 MG: 81 TABLET ORAL at 09:09

## 2019-09-25 RX ADMIN — FINASTERIDE 5 MG: 5 TABLET, FILM COATED ORAL at 09:09

## 2019-09-25 RX ADMIN — CLOPIDOGREL BISULFATE 75 MG: 75 TABLET ORAL at 09:09

## 2019-09-25 RX ADMIN — FAMOTIDINE 20 MG: 20 TABLET ORAL at 10:09

## 2019-09-25 RX ADMIN — VANCOMYCIN HYDROCHLORIDE 2250 MG: 500 INJECTION, POWDER, LYOPHILIZED, FOR SOLUTION INTRAVENOUS at 12:09

## 2019-09-25 RX ADMIN — ATORVASTATIN CALCIUM 40 MG: 40 TABLET, FILM COATED ORAL at 10:09

## 2019-09-25 RX ADMIN — PHENYTOIN SODIUM 200 MG: 100 CAPSULE ORAL at 09:09

## 2019-09-25 RX ADMIN — PHENOBARBITAL 100 MG: 20 ELIXIR ORAL at 09:09

## 2019-09-25 RX ADMIN — SODIUM CHLORIDE: 0.9 INJECTION, SOLUTION INTRAVENOUS at 03:09

## 2019-09-25 RX ADMIN — METOPROLOL TARTRATE 25 MG: 25 TABLET ORAL at 10:09

## 2019-09-25 RX ADMIN — VANCOMYCIN HYDROCHLORIDE 1250 MG: 1.25 INJECTION, POWDER, LYOPHILIZED, FOR SOLUTION INTRAVENOUS at 11:09

## 2019-09-25 RX ADMIN — LISINOPRIL 10 MG: 5 TABLET ORAL at 09:09

## 2019-09-25 RX ADMIN — PHENYTOIN SODIUM 200 MG: 100 CAPSULE ORAL at 10:09

## 2019-09-25 NOTE — ASSESSMENT & PLAN NOTE
Urine culture  He will need a cystoscopy with bilateral retrograde pyelogram as an outpatient  No need for urgent urologic intervention at this time  Okay for anticoagulation

## 2019-09-25 NOTE — ASSESSMENT & PLAN NOTE
He denies chest pain at this time,his pain was in upper abdomen,check Echo,on ACS medications,cardiology consult.willhave NST today.

## 2019-09-25 NOTE — PROGRESS NOTES
[facility]  Urology  Progress Note    Patient Name: Vern Lr  MRN: [id number]  Admission Date: 9/23/2019  Hospital Length of Stay: 1 days  Code Status: Full Code   Attending Provider: Reyna Rocha MD   Primary Care Physician: Maria Fernanda Abbasi MD (Inactive)    Subjective:     HPI:  Hematuria  Patient complains of gross hematuria. Onset of hematuria was 1 day ago and was sudden in onset. There is not a history of nephrolithiasis. There is not a history of urologic trauma. Other urologic symptoms include nocturia. Patient admits to history of no risk factors for cancer. Patient denies history of Agent Orange exposure, chronic Munoz catheter,  surgeries, occupational exposure, sexually transmitted diseases, tobacco use, trauma and urolithiasis. Prior workup has been CT.  MARTII in the room but not operational.         Interval History: His family tells me that his last couple of voids have been yellow.  He had a loading dose of Plavix yesterday.    Review of Systems   Constitutional: Negative.  Negative for fever.   HENT: Negative.    Eyes: Negative.    Respiratory: Negative for cough, chest tightness and shortness of breath.    Cardiovascular: Negative for chest pain.   Gastrointestinal: Negative.  Negative for constipation, diarrhea and nausea.   Genitourinary: Positive for hematuria. Negative for difficulty urinating.   Musculoskeletal: Negative.    Neurological: Negative.    Psychiatric/Behavioral: Negative.      Objective:     Temp:  [97.4 °F (36.3 °C)-98.4 °F (36.9 °C)] 98 °F (36.7 °C)  Pulse:  [70-90] 90  Resp:  [18] 18  SpO2:  [95 %-99 %] 95 %  BP: (112-149)/(59-82) 135/82     Body mass index is 31.7 kg/m².      Bladder Scan Volume (mL): 0 mL (09/24/19 1020)    Drains     None                 Physical Exam   Nursing note and vitals reviewed.  Constitutional: He is oriented to person, place, and time. He appears well-developed and well-nourished.   HENT:   Head: Normocephalic.    Eyes: Conjunctivae are normal.   Neck: Normal range of motion. Neck supple. No tracheal deviation present. No thyromegaly present.   Cardiovascular: Normal rate and normal heart sounds.    Pulmonary/Chest: Effort normal and breath sounds normal. No respiratory distress. He has no wheezes.   Abdominal: Soft. Bowel sounds are normal. There is no hepatosplenomegaly. There is no tenderness. There is no rebound and no CVA tenderness. No hernia.   Musculoskeletal: Normal range of motion. He exhibits no edema or tenderness.   Lymphadenopathy:     He has no cervical adenopathy.   Neurological: He is alert and oriented to person, place, and time.   Skin: Skin is warm and dry. No rash noted. No erythema.     Psychiatric: He has a normal mood and affect. His behavior is normal. Judgment and thought content normal.       Significant Labs:    BMP:  Recent Labs   Lab 09/24/19 0037 09/25/19 0613    137   K 4.0 3.9   CL 99 100   CO2 30* 28   BUN 19 14   CREATININE 1.0 0.7   CALCIUM 9.3 8.6*       CBC:   Recent Labs   Lab 09/24/19 0037 09/25/19 0613   WBC 9.93 7.55   HGB 14.0 15.0   HCT 42.5 44.9    167       Blood Culture: No results for input(s): LABBLOO in the last 168 hours.  Urine Culture: No results for input(s): LABURIN in the last 168 hours.    Significant Imaging:                    Assessment/Plan:     Gross hematuria  Urine culture  He will need a cystoscopy with bilateral retrograde pyelogram as an outpatient  No need for urgent urologic intervention at this time  Okay for anticoagulation    BPH (benign prostatic hyperplasia)  Stay on Flomax and Proscar        VTE Risk Mitigation (From admission, onward)    None          ANUM Pearson MD  Urology  [facility]

## 2019-09-25 NOTE — NURSING
New IV started by House supervisor Will. N/S infusing at 100ml/hr to LAC. Will continue to monitor.

## 2019-09-25 NOTE — SUBJECTIVE & OBJECTIVE
Interval History:  Patient was brought down to the cardiac catheterization lab artery for procedure today.  However patient stated that he has been hungry all day and wants to eat now.  Does not want to wait for the procedure to be done and would like to get the procedure done tomorrow.  Denies any active chest pains at the current time.    Review of Systems   Constitution: Negative.   HENT: Negative.    Eyes: Negative.    Cardiovascular: Negative for chest pain.   Respiratory: Negative.    Endocrine: Negative.    Hematologic/Lymphatic: Negative.    Skin: Negative.    Musculoskeletal: Negative.    Gastrointestinal: Negative.    Genitourinary: Negative.    Neurological: Negative.    Psychiatric/Behavioral: Negative.    Allergic/Immunologic: Negative.      Objective:     Vital Signs (Most Recent):  Temp: 98 °F (36.7 °C) (09/25/19 1547)  Pulse: 84 (09/25/19 1547)  Resp: 18 (09/25/19 1547)  BP: 138/65 (09/25/19 1547)  SpO2: 98 % (09/25/19 1547) Vital Signs (24h Range):  Temp:  [97.4 °F (36.3 °C)-98.4 °F (36.9 °C)] 98 °F (36.7 °C)  Pulse:  [77-92] 84  Resp:  [18] 18  SpO2:  [94 %-99 %] 98 %  BP: (130-154)/(65-82) 138/65     Weight: 86.4 kg (190 lb 7.6 oz)  Body mass index is 31.7 kg/m².     SpO2: 98 %  O2 Device (Oxygen Therapy): room air      Intake/Output Summary (Last 24 hours) at 9/25/2019 1751  Last data filed at 9/25/2019 0330  Gross per 24 hour   Intake --   Output 200 ml   Net -200 ml       Lines/Drains/Airways     Peripheral Intravenous Line                 Peripheral IV - Single Lumen 11/29/15 0345 Left Forearm 1396 days         Peripheral IV - Single Lumen 09/25/19 0330 18 G less than 1 day                Physical Exam   Constitutional: He is oriented to person, place, and time. He appears well-developed and well-nourished.   HENT:   Head: Normocephalic.   Eyes: Pupils are equal, round, and reactive to light. Conjunctivae are normal.   Neck: Normal range of motion. Neck supple.   Cardiovascular: Normal rate,  regular rhythm and normal heart sounds.   Pulmonary/Chest: Effort normal and breath sounds normal.   Abdominal: Soft. Bowel sounds are normal.   Neurological: He is alert and oriented to person, place, and time.   Skin: Skin is warm.   Nursing note and vitals reviewed.      Significant Labs:   BMP:   Recent Labs   Lab 09/24/19  0037 09/25/19  0613   * 107    137   K 4.0 3.9   CL 99 100   CO2 30* 28   BUN 19 14   CREATININE 1.0 0.7   CALCIUM 9.3 8.6*   MG 1.6  --    , CMP   Recent Labs   Lab 09/24/19  0037 09/25/19  0613    137   K 4.0 3.9   CL 99 100   CO2 30* 28   * 107   BUN 19 14   CREATININE 1.0 0.7   CALCIUM 9.3 8.6*   PROT 8.2 7.7   ALBUMIN 4.1 3.5   BILITOT 0.3 0.3   ALKPHOS 71 58   AST 36 24   ALT 30 24   ANIONGAP 8 9   ESTGFRAFRICA >60 >60   EGFRNONAA >60 >60   , CBC   Recent Labs   Lab 09/24/19 0037 09/25/19  0613   WBC 9.93 7.55   HGB 14.0 15.0   HCT 42.5 44.9    167   , INR No results for input(s): INR, PROTIME in the last 48 hours., Lipid Panel No results for input(s): CHOL, HDL, LDLCALC, TRIG, CHOLHDL in the last 48 hours., Troponin   Recent Labs   Lab 09/24/19  0037 09/24/19  0451 09/24/19  1008   TROPONINI 0.035* 0.137* 0.099*  0.099*    and All pertinent lab results from the last 24 hours have been reviewed.    Significant Imaging: Echocardiogram:   Transthoracic echo (TTE) complete (Cupid Only):   Results for orders placed or performed during the hospital encounter of 09/23/19   Echo Color Flow Doppler? Yes   Result Value Ref Range    BSA 2.03 m2    TDI SEPTAL 0.08 m/s    LV LATERAL E/E' RATIO 9.40 m/s    LV SEPTAL E/E' RATIO 11.75 m/s    LA WIDTH 3.27 cm    AORTIC VALVE CUSP SEPERATION 2.00 cm    TDI LATERAL 0.10 m/s    PV PEAK VELOCITY 1.06 cm/s    LVIDD 4.09 3.5 - 6.0 cm    IVS 1.60 (A) 0.6 - 1.1 cm    PW 1.70 (A) 0.6 - 1.1 cm    Ao root annulus 3.30 cm    LVIDS 2.11 2.1 - 4.0 cm    FS 48 28 - 44 %    LA volume 66.62 cm3    Sinus 3.32 cm    STJ 2.47 cm     Ascending aorta 3.05 cm    LV mass 279.46 g    LA size 4.47 cm    RVDD 3.64 cm    TAPSE 2.47 cm    RV S' 17.52 cm/s    Left Ventricle Relative Wall Thickness 0.83 cm    AV mean gradient 7 mmHg    AV valve area 3.44 cm2    AV Velocity Ratio 0.75     AV index (prosthetic) 0.75     E/A ratio 0.95     Mean e' 0.09 m/s    E wave decelartion time 217.99 msec    IVRT 0.11 msec    Pulm vein S/D ratio 1.12     LVOT diameter 2.41 cm    LVOT area 4.6 cm2    LVOT peak marquis 1.35 m/s    LVOT peak VTI 24.49 cm    Ao peak marquis 1.80 m/s    Ao VTI 32.48 cm    LVOT stroke volume 111.66 cm3    AV peak gradient 13 mmHg    E/E' ratio 10.44 m/s    MV Peak E Marquis 0.94 m/s    TR Max Marquis 3.23 m/s    MV Peak A Marquis 0.99 m/s    PV Peak S Marquis 0.38 m/s    PV Peak D Marquis 0.34 m/s    LV Systolic Volume 14.57 mL    LV Systolic Volume Index 7.4 mL/m2    LV Diastolic Volume 73.70 mL    LV Diastolic Volume Index 37.42 mL/m2    LA Volume Index 33.8 mL/m2    LV Mass Index 142 g/m2    RA Major Axis 5.27 cm    Left Atrium Minor Axis 6.09 cm    Left Atrium Major Axis 4.79 cm    Triscuspid Valve Regurgitation Peak Gradient 42 mmHg    RA Width 3.02 cm    Right Atrial Pressure (from IVC) 3 mmHg    TV rest pulmonary artery pressure 45 mmHg    Narrative    · Moderate concentric left ventricular hypertrophy.  · Normal left ventricular systolic function. The estimated ejection   fraction is 65%  · Indeterminate left ventricular diastolic function.  · Mild-moderate left atrial enlargement.  · Mild right atrial enlargement.  · Normal central venous pressure (3 mm Hg).  · The estimated PA systolic pressure is 45 mm Hg  · Pulmonary hypertension present.

## 2019-09-25 NOTE — SUBJECTIVE & OBJECTIVE
Past Medical History:   Diagnosis Date    Abdominal hernia     BPH (benign prostatic hyperplasia)     Dyslipidemia     Hypercholesteremia     Hypertension     Seizure disorder     Seizures     Sinus disorder        Past Surgical History:   Procedure Laterality Date    KNEE SURGERY      NASAL SINUS SURGERY      NOSE SURGERY         Review of patient's allergies indicates:  No Known Allergies    No current facility-administered medications on file prior to encounter.      Current Outpatient Medications on File Prior to Encounter   Medication Sig    alendronate (FOSAMAX) 70 MG tablet Take 1 tablet (70 mg total) by mouth every 7 days.    aspirin (ECOTRIN) 81 MG EC tablet Take 81 mg by mouth once daily.    atorvastatin (LIPITOR) 40 MG tablet Take 1 tablet (40 mg total) by mouth once daily.    calcium citrate-vitamin D3 (CITRACAL + D MAXIMUM) 315-250 mg-unit Tab Take 315 mg by mouth 2 (two) times daily.    finasteride (PROSCAR) 5 mg tablet Take 1 tablet (5 mg total) by mouth once daily.    fluticasone (FLONASE) 50 mcg/actuation nasal spray SHAKE WELL AND U 2 SPRAYS IEN BID    lisinopril 10 MG tablet Take 1 tablet (10 mg total) by mouth once daily.    metoprolol tartrate (LOPRESSOR) 50 MG tablet Take 1 tablet (50 mg total) by mouth once daily.    montelukast (SINGULAIR) 10 mg tablet Take 10 mg by mouth every evening.    phenobarbital (LUMINAL) 97.2 MG tablet Take 1 tablet (97.2 mg total) by mouth once daily.    phenytoin (DILANTIN) 100 MG ER capsule 2 capsules po q am, 2 capsules po q hs    tamsulosin (FLOMAX) 0.4 mg Cp24 Take 1 capsule (0.4 mg total) by mouth once daily.    ketorolac 0.5% (ACULAR) 0.5 % Drop Place 1 drop into the right eye 4 (four) times daily.     Family History     Problem Relation (Age of Onset)    Coronary artery disease Brother, Brother    Hypertension Brother, Brother        Tobacco Use    Smoking status: Never Smoker    Smokeless tobacco: Never Used   Substance and Sexual  Activity    Alcohol use: No    Drug use: No    Sexual activity: Not on file     Review of Systems   Constitutional: Negative for activity change and appetite change.   HENT: Negative for congestion and dental problem.    Eyes: Negative for discharge and itching.   Respiratory: Negative for apnea and chest tightness.    Cardiovascular: Negative for chest pain and leg swelling.   Gastrointestinal: Negative for abdominal distention, abdominal pain and anal bleeding.   Endocrine: Negative for cold intolerance and heat intolerance.   Genitourinary: Negative for difficulty urinating.   Musculoskeletal: Negative for arthralgias and back pain.   Skin: Negative for color change and pallor.   Allergic/Immunologic: Negative for environmental allergies and food allergies.   Neurological: Negative for dizziness and facial asymmetry.   Hematological: Negative for adenopathy. Does not bruise/bleed easily.   Psychiatric/Behavioral: Negative for agitation.     Objective:     Vital Signs (Most Recent):  Temp: 98 °F (36.7 °C) (09/25/19 0706)  Pulse: 90 (09/25/19 0706)  Resp: 18 (09/25/19 0706)  BP: 135/82 (09/25/19 0706)  SpO2: 95 % (09/25/19 0706) Vital Signs (24h Range):  Temp:  [97.4 °F (36.3 °C)-98.4 °F (36.9 °C)] 98 °F (36.7 °C)  Pulse:  [70-90] 90  Resp:  [18] 18  SpO2:  [95 %-99 %] 95 %  BP: (112-149)/(59-82) 135/82     Weight: 86.4 kg (190 lb 7.6 oz)  Body mass index is 31.7 kg/m².    Physical Exam   Constitutional: No distress.   HENT:   Head: Atraumatic.   Eyes: EOM are normal.   Neck: Neck supple.   Cardiovascular: Normal rate and regular rhythm.   Pulmonary/Chest: Effort normal and breath sounds normal.   Abdominal: Soft. He exhibits no distension. There is no tenderness.   Musculoskeletal: Normal range of motion. He exhibits no edema or deformity.   Neurological: He is alert.   Skin: Skin is dry.   Psychiatric: He has a normal mood and affect. His behavior is normal.         CRANIAL NERVES     CN III, IV, VI    Extraocular motions are normal.        Significant Labs:   BMP:   Recent Labs   Lab 09/24/19  0037 09/25/19  0613   * 107    137   K 4.0 3.9   CL 99 100   CO2 30* 28   BUN 19 14   CREATININE 1.0 0.7   CALCIUM 9.3 8.6*   MG 1.6  --      CBC:   Recent Labs   Lab 09/24/19  0037 09/25/19  0613   WBC 9.93 7.55   HGB 14.0 15.0   HCT 42.5 44.9    167     Troponin:   Recent Labs   Lab 09/24/19  0037 09/24/19  0451 09/24/19  1008   TROPONINI 0.035* 0.137* 0.099*  0.099*       Significant Imaging: reviewed,

## 2019-09-25 NOTE — PLAN OF CARE
Problem: Physical Therapy Goal  Goal: Physical Therapy Goal  Description  Goals to be met by: 10/02/2019     Patient will increase functional independence with mobility by performin. Supine to sit with Stand-by Assistance  2. Sit to stand transfer with Stand-by Assistance  3. Gait  x 100 feet with Stand-by Assistance using Rolling Walker.   4. Lower extremity exercise program x10 reps per handout, with assistance as needed     Outcome: Ongoing, Progressing   Initial PT evaluation completed.  Pt could benefit from skilled PT services 3-5x/wk in order to maximize function prior to D/C.  Recommend  PT and 24hr supervision upon D/C

## 2019-09-25 NOTE — HOSPITAL COURSE
79 years old male with history of mild MR,HTN,BPH,seizure disorder,came with CC of upper abdominal pain.   History was very limited due to patient's language barrier and history of mental retardation.  He is a poor historian.  Review previous records reveal that he has been a poor historian in the past even with a .  Patient was asked multiple questions in [place] without responding.  He was  Appearing uncomfortable in ER, He keeps pointing to his upper abdomen.  He will not answer questions regarding chest pain, back pain, nausea, or vomiting. EMS reports that they were called to her residence because the patient was having abdominal pain , Family believe was secondary to a hernia.  They said no other history was provided.  Review of old medical records reveals the patient was admitted for seizure-like activity and headache 5 years ago.  CT of the abdomen pelvis was performed during that admission that revealed a 2.4x2.7 cm infrarenal abdominal aortic aneurysm.  No significant solid organ abnormalities were seen.  Gallbladder appeared normal.   Per ER record,Niece arrived and provided additional information.  States he started complaining of abdominal pain approximately 45 hr ago.  No reported nausea or vomiting. He was also complaining of headache. No fever.  No diarrhea.  No urinary symptoms. No reported seizure activity at home.  On arrival to emergency room after being placed in room patient had two brief focal seizure involving the left side.  Niece reported in ER ,states he has not had a seizure in over a year.  States he has compliance medications.  Patient was postictal after the seizure,was alert on floor,.,CT abdomen show no acute process,,his Troponin level is increased,patient has been  admitted for evaluation by cardiology,his abdominal pain is  resolved,US show cholelithiasis.family arrived to floor,information by family provided.he remains seizure free,t,neuroogy was  Following.his home  medications levels,dilantin and phenobarbital was normal.  cardiology performed [facility],has CAD with no obstruction.  Started on IV Abx for left leg cellulitis,PT,OT is consulted.checked US legs,no DVT.  He was tolerated dier.  Patient remains stable with no chest pain,abdominal pain or seizure activity,patient has been discharged home with PO Abx and  PCP follow up.

## 2019-09-25 NOTE — PT/OT/SLP EVAL
Occupational Therapy   Evaluation    Name: Vern Lr  MRN: [id number]  Admitting Diagnosis:  Elevated troponin      Recommendations:     Discharge Recommendations: home health OT  Discharge Equipment Recommendations:  (to be determined by PT)  Barriers to discharge:  (appears to have family support with family in room)    Assessment:     Vern Lr is a 79 y.o. male with a medical diagnosis of Elevated troponin.  He presents with support of family in room. Performance deficits affecting function: impaired endurance, impaired functional mobilty, impaired balance, pain, decreased ROM, impaired muscle length.      Rehab Prognosis: Fair; patient would benefit from acute skilled OT services to address these deficits and reach maximum level of function.       Plan:     Patient to be seen 2 x/week to address the above listed problems via self-care/home management, therapeutic exercises, therapeutic activities  · Plan of Care Expires:    · Plan of Care Reviewed with: patient    Subjective     Chief Complaint: little pain  Patient/Family Comments/goals: none stated    Occupational Profile:  Living Environment: to be determined  Previous level of function: pt appears to ambulate without assistance device    Equipment Used at Home:  (unable to determine)  Assistance upon Discharge: to be determined    Pain/Comfort:  · Pain Rating 1: ( a little pain )    Patients cultural, spiritual, Adventist conflicts given the current situation:      Objective:     Communicated with: nurse Roberts prior to session.  Patient found supine with telemetry, peripheral IV upon OT entry to room.    General Precautions: Standard, fall   Orthopedic Precautions:N/A   Braces: N/A     Occupational Performance:    Bed Mobility:    · Patient completed Scooting/Bridging with minimum assistance  · Patient completed Supine to Sit with contact guard assistance and minimum assistance  · Patient completed Sit to Supine with contact guard assistance and  minimum assistance    Functional Mobility/Transfers:  · Patient completed Sit <> Stand Transfer with contact guard assistance and minimum assistance  with  rolling walker   · Patient completed Toilet Transfer Stand Pivot technique with contact guard assistance and minimum assistance with  rolling walker  · Functional Mobility: Pt able to ambulate to bathroom c/g minimal assist    Activities of Daily Living:  · Grooming: contact guard assistance standing at sink  · Upper Body Dressing: minimum assistance sitting edge of bed  · Lower Body Dressing: moderate assistance pt able to reach to adjust socks    Cognitive/Visual Perceptual:  Visual/Perceptual:      -Intact      Physical Exam:  Upper Extremity Range of Motion:     -       Right Upper Extremity: WFL  -       Left Upper Extremity: WFL  Upper Extremity Strength:    -       Right Upper Extremity: WFL  -       Left Upper Extremity: WFL    AMPAC 6 Click ADL:  AMPAC Total Score: 16    Treatment & Education:  -gestures used to perform treatment of self care and toileitng    Education:    Patient left supine with all lines intact and call button in reach    GOALS:   Multidisciplinary Problems     Occupational Therapy Goals        Problem: Occupational Therapy Goal    Goal Priority Disciplines Outcome Interventions   Occupational Therapy Goal     OT, PT/OT Ongoing, Progressing    Description:  Goals to be met by: 10/5/19    Patient will increase functional independence with ADLs by performing:    UE Dressing with Supervision.  LE Dressing with Stand-by Assistance.  Grooming while standing with Set-up Assistance.  Toileting from toilet with Set-up Assistance for hygiene and clothing management.   Sitting at edge of bed x25 minutes with Supervision.  Toilet transfer to toilet with Stand-by Assistance.  Upper extremity exercise program x15 reps per handout, with supervision.                      History:     Past Medical History:   Diagnosis Date    Abdominal hernia      BPH (benign prostatic hyperplasia)     Dyslipidemia     Hypercholesteremia     Hypertension     Seizure disorder     Seizures     Sinus disorder        Past Surgical History:   Procedure Laterality Date    KNEE SURGERY      NASAL SINUS SURGERY      NOSE SURGERY         Time Tracking:     OT Date of Treatment: 09/25/19  OT Start Time: 1450  OT Stop Time: 1522  OT Total Time (min): 32 min    Billable Minutes:Evaluation 15  Self Care/Home Management 17    Dominique Cagle, OT  9/25/2019

## 2019-09-25 NOTE — NURSING
Patient returned to the floor via bed unable to do the procedure the patient refused. Dinner tray served patient's appetite good. Iv fluids continued at

## 2019-09-25 NOTE — NURSING
11:15pm -Loss of IV access to right hand. Attempt x2 to start new IV, unsuccessful. Charge nurse aware. House supervisor Will notified. IV fluids on hold at this time.   0200- Pt has been shaved to bilateral groin and wrist. Will continue to monitor.

## 2019-09-25 NOTE — PROGRESS NOTES
[facility] Hospital Medicine  Progress Note    Patient Name: Vern Lr  MRN: [id number]  Patient Class: IP- Inpatient   Admission Date: 9/23/2019  Length of Stay: 1 days  Attending Physician: Reyna Rocha MD  Primary Care Provider: Maria Fernanda Abbasi MD (Inactive)        Subjective:     Principal Problem:Elevated troponin        HPI:  79 years old male with history of mild MR,HTN,BPH,seizure disorder,came with CC of upper abdominal pain. Symptoms started sometime this evening.  History is very limited due to patient's language barrier and history of mental retardation.  He is a poor historian.  Review previous records reveal that he has been a poor historian in the past even with a .  Patient was asked multiple questions in Vietnamese without responding.  He  was  Appearing uncomfortable in ER, He keeps pointing to his upper abdomen.  He will not answer questions regarding chest pain, back pain, nausea, or vomiting. EMS reports that they were called to her residence because the patient was having abdominal pain at the Family believe was secondary to a hernia.  They said no other history was provided.  Review of old medical records reveals the patient was admitted for seizure-like activity and headache 5 years ago.  CT of the abdomen pelvis was performed during the admission that revealed a 2.4x2.7 cm infrarenal abdominal aortic aneurysm.  No significant solid organ abnormalities were seen.  Gallbladder appeared normal.   Per ER record,Niece arrived and provided additional information.  States he started complaining of abdominal pain approximately 45 hr ago.  No reported nausea or vomiting. He was also complaining of headache. No fever.  No diarrhea.  No urinary symptoms. No reported seizure activity at home.  On arrival to emergency room after being placed in room patient had twobrief focal seizure involving the left side.  Niece  reported in ERstates he has not had a seizure in  over a year.  States he has compliance medications.  Patient was postictal after the seizure,at this time is improved,CT abdomen show no acute process,he is alert at this time,his Troponin level is increased,patient has been  admitted for evaluation by cardiology,his abdominal pain is currently resolved,US show cholelithiasis.family arrived to floor,information by family provided.    Overview/Hospital Course:  79 years old male with history of mild MR,HTN,BPH,seizure disorder,came with CC of upper abdominal pain. Symptoms started sometime this evening.  History is very limited due to patient's language barrier and history of mental retardation.  He is a poor historian.  Review previous records reveal that he has been a poor historian in the past even with a .  Patient was asked multiple questions in Swedish without responding.  He was  Appearing uncomfortable in ER, He keeps pointing to his upper abdomen.  He will not answer questions regarding chest pain, back pain, nausea, or vomiting. EMS reports that they were called to her residence because the patient was having abdominal pain at the Family believe was secondary to a hernia.  They said no other history was provided.  Review of old medical records reveals the patient was admitted for seizure-like activity and headache 5 years ago.  CT of the abdomen pelvis was performed during the admission that revealed a 2.4x2.7 cm infrarenal abdominal aortic aneurysm.  No significant solid organ abnormalities were seen.  Gallbladder appeared normal.   Per ER record,Niece arrived and provided additional information.  States he started complaining of abdominal pain approximately 45 hr ago.  No reported nausea or vomiting. He was also complaining of headache. No fever.  No diarrhea.  No urinary symptoms. No reported seizure activity at home.  On arrival to emergency room after being placed in room patient had twobrief focal seizure involving the left side.  Niece  reported in ERstates he has not had a seizure in over a year.  States he has compliance medications.  Patient was postictal after the seizure,at this time is improved,CT abdomen show no acute process,he is alert at this time,his Troponin level is increased,patient has been  admitted for evaluation by cardiology,his abdominal pain is currently resolved,US show cholelithiasis.family arrived to floor,information by family provided.he remains seizure free over night,neuroogy is following.  cardiology planing doing NST today.  Started on IV Abx for left leg cellulitis,PT,OT is consulted.check US for DVT.  He tolerated diet last night.    Past Medical History:   Diagnosis Date    Abdominal hernia     BPH (benign prostatic hyperplasia)     Dyslipidemia     Hypercholesteremia     Hypertension     Seizure disorder     Seizures     Sinus disorder        Past Surgical History:   Procedure Laterality Date    KNEE SURGERY      NASAL SINUS SURGERY      NOSE SURGERY         Review of patient's allergies indicates:  No Known Allergies    No current facility-administered medications on file prior to encounter.      Current Outpatient Medications on File Prior to Encounter   Medication Sig    alendronate (FOSAMAX) 70 MG tablet Take 1 tablet (70 mg total) by mouth every 7 days.    aspirin (ECOTRIN) 81 MG EC tablet Take 81 mg by mouth once daily.    atorvastatin (LIPITOR) 40 MG tablet Take 1 tablet (40 mg total) by mouth once daily.    calcium citrate-vitamin D3 (CITRACAL + D MAXIMUM) 315-250 mg-unit Tab Take 315 mg by mouth 2 (two) times daily.    finasteride (PROSCAR) 5 mg tablet Take 1 tablet (5 mg total) by mouth once daily.    fluticasone (FLONASE) 50 mcg/actuation nasal spray SHAKE WELL AND U 2 SPRAYS IEN BID    lisinopril 10 MG tablet Take 1 tablet (10 mg total) by mouth once daily.    metoprolol tartrate (LOPRESSOR) 50 MG tablet Take 1 tablet (50 mg total) by mouth once daily.    montelukast (SINGULAIR) 10  mg tablet Take 10 mg by mouth every evening.    phenobarbital (LUMINAL) 97.2 MG tablet Take 1 tablet (97.2 mg total) by mouth once daily.    phenytoin (DILANTIN) 100 MG ER capsule 2 capsules po q am, 2 capsules po q hs    tamsulosin (FLOMAX) 0.4 mg Cp24 Take 1 capsule (0.4 mg total) by mouth once daily.    ketorolac 0.5% (ACULAR) 0.5 % Drop Place 1 drop into the right eye 4 (four) times daily.     Family History     Problem Relation (Age of Onset)    Coronary artery disease Brother, Brother    Hypertension Brother, Brother        Tobacco Use    Smoking status: Never Smoker    Smokeless tobacco: Never Used   Substance and Sexual Activity    Alcohol use: No    Drug use: No    Sexual activity: Not on file     Review of Systems   Constitutional: Negative for activity change and appetite change.   HENT: Negative for congestion and dental problem.    Eyes: Negative for discharge and itching.   Respiratory: Negative for apnea and chest tightness.    Cardiovascular: Negative for chest pain and leg swelling.   Gastrointestinal: Negative for abdominal distention, abdominal pain and anal bleeding.   Endocrine: Negative for cold intolerance and heat intolerance.   Genitourinary: Negative for difficulty urinating.   Musculoskeletal: Negative for arthralgias and back pain.   Skin: Negative for color change and pallor.   Allergic/Immunologic: Negative for environmental allergies and food allergies.   Neurological: Negative for dizziness and facial asymmetry.   Hematological: Negative for adenopathy. Does not bruise/bleed easily.   Psychiatric/Behavioral: Negative for agitation.     Objective:     Vital Signs (Most Recent):  Temp: 98 °F (36.7 °C) (09/25/19 0706)  Pulse: 90 (09/25/19 0706)  Resp: 18 (09/25/19 0706)  BP: 135/82 (09/25/19 0706)  SpO2: 95 % (09/25/19 0706) Vital Signs (24h Range):  Temp:  [97.4 °F (36.3 °C)-98.4 °F (36.9 °C)] 98 °F (36.7 °C)  Pulse:  [70-90] 90  Resp:  [18] 18  SpO2:  [95 %-99 %] 95 %  BP:  (112-149)/(59-82) 135/82     Weight: 86.4 kg (190 lb 7.6 oz)  Body mass index is 31.7 kg/m².    Physical Exam   Constitutional: No distress.   HENT:   Head: Atraumatic.   Eyes: EOM are normal.   Neck: Neck supple.   Cardiovascular: Normal rate and regular rhythm.   Pulmonary/Chest: Effort normal and breath sounds normal.   Abdominal: Soft. He exhibits no distension. There is no tenderness.   Musculoskeletal: Normal range of motion. He exhibits no edema or deformity.   Neurological: He is alert.   Skin: Skin is dry.   Psychiatric: He has a normal mood and affect. His behavior is normal.         CRANIAL NERVES     CN III, IV, VI   Extraocular motions are normal.        Significant Labs:   BMP:   Recent Labs   Lab 09/24/19 0037 09/25/19  0613   * 107    137   K 4.0 3.9   CL 99 100   CO2 30* 28   BUN 19 14   CREATININE 1.0 0.7   CALCIUM 9.3 8.6*   MG 1.6  --      CBC:   Recent Labs   Lab 09/24/19 0037 09/25/19  0613   WBC 9.93 7.55   HGB 14.0 15.0   HCT 42.5 44.9    167     Troponin:   Recent Labs   Lab 09/24/19  0037 09/24/19  0451 09/24/19  1008   TROPONINI 0.035* 0.137* 0.099*  0.099*       Significant Imaging: reviewed,      Assessment/Plan:      * Elevated troponin  He denies chest pain at this time,his pain was in upper abdomen,check Echo,on ACS medications,cardiology consult.willhave NST today.      Cellulitis of left lower extremity    Started on IV Abx for left leg cellulitis,PT,OT is consulted.check US for DVT.        Gross hematuria  Urology is following,      Calculus of gallbladder without biliary obstruction  Per US,possible etiology for abdominal pain.tolerated diet last night,NPO at this time.      Mild mental retardation  Supportive care,      DJD (degenerative joint disease)  supportive care,      Abdominal hernia  Will be monitored.      Seizure disorder  On phenobarbital  and Dilantine at home,in therapeutic  [place],had seizure  In ER,alert at this time,resumed home  medications,head CT show no acute process,consulted neurology,seizy=ure precaution,prn IV Ativan.remains seizure free over night.      BPH (benign prostatic hyperplasia)  On flomax and finasteride,checkl baller scan.      Essential hypertension, benign  Stable,will monitor.        VTE Risk Mitigation (From admission, onward)    None                Reyna Rocha MD  Department of Hospital Medicine   [facility]

## 2019-09-25 NOTE — ASSESSMENT & PLAN NOTE
Patient has been cleared by Urology for dual antiplatelet therapy.  He is on dual antiplatelet therapy with stable hemoglobin.  Because of non-STEMI and chest pains yesterday, went for coronary angiogram today.  However when he reads the cardiac catheterization lab he refused the procedure because he had been hungry all day and he states that he does not want to stay who agree anymore.  He would like to get the procedure done tomorrow.  NPO after midnight.  We will try to get the patient tomorrow morningdependent on cath lab schedule.  Continue aspirin Plavix.

## 2019-09-25 NOTE — PLAN OF CARE
Problem: Occupational Therapy Goal  Goal: Occupational Therapy Goal  Description  Goals to be met by: 10/5/19    Patient will increase functional independence with ADLs by performing:    UE Dressing with Supervision.  LE Dressing with Stand-by Assistance.  Grooming while standing with Set-up Assistance.  Toileting from toilet with Set-up Assistance for hygiene and clothing management.   Sitting at edge of bed x25 minutes with Supervision.  Toilet transfer to toilet with Stand-by Assistance.  Upper extremity exercise program x15 reps per handout, with supervision.     Outcome: Ongoing, Progressing   Pt would benefit from continued OT to address goals and safety

## 2019-09-25 NOTE — ASSESSMENT & PLAN NOTE
On phenobarbital  and Dilantine at home,in therapeutic  [place],had seizure  In ER,alert at this time,resumed home medications,head CT show no acute process,consulted neurology,seizy=ure precaution,prn IV Ativan.remains seizure free over night.

## 2019-09-25 NOTE — SUBJECTIVE & OBJECTIVE
Interval History: His family tells me that his last couple of voids have been yellow.  He had a loading dose of Plavix yesterday.    Review of Systems   Constitutional: Negative.  Negative for fever.   HENT: Negative.    Eyes: Negative.    Respiratory: Negative for cough, chest tightness and shortness of breath.    Cardiovascular: Negative for chest pain.   Gastrointestinal: Negative.  Negative for constipation, diarrhea and nausea.   Genitourinary: Positive for hematuria. Negative for difficulty urinating.   Musculoskeletal: Negative.    Neurological: Negative.    Psychiatric/Behavioral: Negative.      Objective:     Temp:  [97.4 °F (36.3 °C)-98.4 °F (36.9 °C)] 98 °F (36.7 °C)  Pulse:  [70-90] 90  Resp:  [18] 18  SpO2:  [95 %-99 %] 95 %  BP: (112-149)/(59-82) 135/82     Body mass index is 31.7 kg/m².      Bladder Scan Volume (mL): 0 mL (09/24/19 1020)    Drains     None                 Physical Exam   Nursing note and vitals reviewed.  Constitutional: He is oriented to person, place, and time. He appears well-developed and well-nourished.   HENT:   Head: Normocephalic.   Eyes: Conjunctivae are normal.   Neck: Normal range of motion. Neck supple. No tracheal deviation present. No thyromegaly present.   Cardiovascular: Normal rate and normal heart sounds.    Pulmonary/Chest: Effort normal and breath sounds normal. No respiratory distress. He has no wheezes.   Abdominal: Soft. Bowel sounds are normal. There is no hepatosplenomegaly. There is no tenderness. There is no rebound and no CVA tenderness. No hernia.   Musculoskeletal: Normal range of motion. He exhibits no edema or tenderness.   Lymphadenopathy:     He has no cervical adenopathy.   Neurological: He is alert and oriented to person, place, and time.   Skin: Skin is warm and dry. No rash noted. No erythema.     Psychiatric: He has a normal mood and affect. His behavior is normal. Judgment and thought content normal.       Significant Labs:    BMP:  Recent Labs    Lab 09/24/19  0037 09/25/19  0613    137   K 4.0 3.9   CL 99 100   CO2 30* 28   BUN 19 14   CREATININE 1.0 0.7   CALCIUM 9.3 8.6*       CBC:   Recent Labs   Lab 09/24/19  0037 09/25/19  0613   WBC 9.93 7.55   HGB 14.0 15.0   HCT 42.5 44.9    167       Blood Culture: No results for input(s): LABBLOO in the last 168 hours.  Urine Culture: No results for input(s): LABURIN in the last 168 hours.    Significant Imaging:

## 2019-09-25 NOTE — PT/OT/SLP EVAL
"Physical Therapy Evaluation    Patient Name:  Vern Lr   MRN:  [id number]    Recommendations:     Discharge Recommendations:  home health PT(24 hr supervision)   Discharge Equipment Recommendations: (ongoing assessment)   Barriers to discharge: Decreased caregiver support    Assessment:     Vern Lr is a 79 y.o. male admitted with a medical diagnosis of Elevated troponin.  He presents with the following impairments/functional limitations:  weakness, impaired functional mobilty, decreased safety awareness, impaired coordination, impaired endurance, gait instability, pain, decreased coordination, impaired balance, impaired self care skills .    Rehab Prognosis: Good; patient would benefit from acute skilled PT services to address these deficits and reach maximum level of function.    Recent Surgery: Procedure(s) (LRB):  Left heart cath, radial. 11 am (Left)      Plan:     During this hospitalization, patient to be seen (3-5x/wk) to address the identified rehab impairments via gait training, therapeutic activities, therapeutic exercises and progress toward the following goals:    · Plan of Care Expires:  10/02/19    Subjective     Chief Complaint: needs to go to the bathroom, primarily Belarusian speaking  Patient/Family Comments/goals: to go to the bathroom  Pain/Comfort:  Pain Rating 1: ("a little")  Location 1: (could not identify)  Pain Addressed 1: Reposition, Distraction, Cessation of Activity, Nurse notified    Patients cultural, spiritual, Scientology conflicts given the current situation:  N/A    Living Environment:  Per chart, pt lives alone  Prior to admission, patients level of function was Requires assist, family at bedside.  Equipment used at home: none.  DME owned (not currently used): none.  Upon discharge, patient will have assistance from Family.    Objective:     Communicated with nsg prior to session.  Patient found HOB elevated with telemetry, peripheral IV, bed alarm  upon PT entry to " room.    General Precautions: Standard, fall   Orthopedic Precautions:N/A   Braces: N/A     Exams:  · Cognitive Exam:  Patient is oriented to Person and Place  · Gross Motor Coordination:  impaired 2/2 gen weakness and decondioning  · Postural Exam:  Patient presented with the following abnormalities:    · -       Rounded shoulders  · -       Forward head  · -       pendulous abdomen  · Skin Integrity/Edema:      · -       Skin integrity: Visible skin intact  · RLE ROM: WFL  · RLE Strength: WFL  · LLE ROM: WFL  · LLE Strength: WFL    Functional Mobility:  · Bed Mobility:     · Rolling Right: moderate assistance and with Vc/Tc for reachinf for BR  · Scooting: stand by assistance  · Supine to Sit: maximal assistance  · Sit to Supine: minimum assistance  · Transfers:     · Sit to Stand:  minimum assistance with rolling walker  · Gait: 12' with RW and Min A and 12' with HHA/min A  · Balance: FAir+ sit, Fair stand      Therapeutic Activities and Exercises:   eval only    AM-PAC 6 CLICK MOBILITY  Total Score:15     Patient left HOB elevated with all lines intact, call button in reach, bed alarm on, nsg notified and famiy present.    GOALS:   Multidisciplinary Problems     Physical Therapy Goals        Problem: Physical Therapy Goal    Goal Priority Disciplines Outcome Goal Variances Interventions   Physical Therapy Goal     PT, PT/OT Ongoing, Progressing     Description:  Goals to be met by: 10/02/2019     Patient will increase functional independence with mobility by performin. Supine to sit with Stand-by Assistance  2. Sit to stand transfer with Stand-by Assistance  3. Gait  x 100 feet with Stand-by Assistance using Rolling Walker.   4. Lower extremity exercise program x10 reps per handout, with assistance as needed                      History:     Past Medical History:   Diagnosis Date    Abdominal hernia     BPH (benign prostatic hyperplasia)     Dyslipidemia     Hypercholesteremia     Hypertension      Seizure disorder     Seizures     Sinus disorder        Past Surgical History:   Procedure Laterality Date    KNEE SURGERY      NASAL SINUS SURGERY      NOSE SURGERY         Time Tracking:     PT Received On: 09/25/19  PT Start Time: 1454     PT Stop Time: 1521  PT Total Time (min): 27 min     Billable Minutes: Evaluation 15 OT present      Cher Pop, PT  09/25/2019

## 2019-09-25 NOTE — NURSING
Pt still awaiting cardiac catherization. Explained to family that cardiology is behind in their schedule.

## 2019-09-25 NOTE — PROGRESS NOTES
Pharmacokinetic Initial Assessment: IV Vancomycin    Assessment/Plan:    Initiate intravenous vancomycin with loading dose of 2250 mg once followed by a maintenance dose of vancomycin 1250 mg IV every 12 hours  Desired empiric serum trough concentration is 10 to 20 mcg/mL  Draw vancomycin trough level 30 min prior to fourth dose on 9/26/19 at approximately 2330  Pharmacy will continue to follow and monitor vancomycin.      Please contact pharmacy at extension [phone] with any questions regarding this assessment.     Thank you for the consult,   Luciana Inman       Patient brief summary:  Vern Lr is a 79 y.o. male initiated on antimicrobial therapy with IV Vancomycin for treatment of suspected skin & soft tissue infection    Drug Allergies:   Review of patient's allergies indicates:  No Known Allergies    Actual Body Weight:   86.4 kg    Renal Function:   Estimated Creatinine Clearance: 88 mL/min (based on SCr of 0.7 mg/dL).,     Dialysis Method (if applicable):  N/A    CBC (last 72 hours):  Recent Labs   Lab Result Units 09/24/19  0037 09/25/19  0613   WBC K/uL 9.93 7.55   Hemoglobin g/dL 14.0 15.0   Hematocrit % 42.5 44.9   Platelets K/uL 214 167   Gran% % 89.0* 71.0   Lymph% % 5.1* 18.5   Mono% % 6.0 10.5   Eosinophil% % 0.2 0.0   Basophil% % 0.1 0.3   Differential Method  Automated Automated       Metabolic Panel (last 72 hours):  Recent Labs   Lab Result Units 09/24/19  0000 09/24/19  0037 09/24/19  1927 09/25/19  0613   Sodium mmol/L  --  137  --  137   Potassium mmol/L  --  4.0  --  3.9   Chloride mmol/L  --  99  --  100   CO2 mmol/L  --  30*  --  28   Glucose mg/dL  --  139*  --  107   Glucose, UA  Negative  --  Negative  --    BUN, Bld mg/dL  --  19  --  14   Creatinine mg/dL  --  1.0  --  0.7   Albumin g/dL  --  4.1  --  3.5   Total Bilirubin mg/dL  --  0.3  --  0.3   Alkaline Phosphatase U/L  --  71  --  58   AST U/L  --  36  --  24   ALT U/L  --  30  --  24   Magnesium mg/dL  --  1.6  --   --         Drug levels (last 3 results):  No results for input(s): VANCOMYCINRA, VANCOMYCINPE, VANCOMYCINTR in the last 72 hours.    Microbiologic Results:  Microbiology Results (last 7 days)     ** No results found for the last 168 hours. **

## 2019-09-25 NOTE — NURSING
Patient to scheduled procedure as ordered. Tele monitoring in progress. Patient awake alert and oriented. No apparent distress noted.

## 2019-09-25 NOTE — PROGRESS NOTES
[facility]  Cardiology  Progress Note    Patient Name: Vern Lr  MRN: [id number]  Admission Date: 9/23/2019  Hospital Length of Stay: 1 days  Code Status: Full Code   Attending Physician: Reyna Rocha MD   Primary Care Physician: Maria Fernanda Abbasi MD (Inactive)  Expected Discharge Date:   Principal Problem:Elevated troponin    Subjective:       Interval History:  Patient was brought down to the cardiac catheterization lab artery for procedure today.  However patient stated that he has been hungry all day and wants to eat now.  Does not want to wait for the procedure to be done and would like to get the procedure done tomorrow.  Denies any active chest pains at the current time.    Review of Systems   Constitution: Negative.   HENT: Negative.    Eyes: Negative.    Cardiovascular: Negative for chest pain.   Respiratory: Negative.    Endocrine: Negative.    Hematologic/Lymphatic: Negative.    Skin: Negative.    Musculoskeletal: Negative.    Gastrointestinal: Negative.    Genitourinary: Negative.    Neurological: Negative.    Psychiatric/Behavioral: Negative.    Allergic/Immunologic: Negative.      Objective:     Vital Signs (Most Recent):  Temp: 98 °F (36.7 °C) (09/25/19 1547)  Pulse: 84 (09/25/19 1547)  Resp: 18 (09/25/19 1547)  BP: 138/65 (09/25/19 1547)  SpO2: 98 % (09/25/19 1547) Vital Signs (24h Range):  Temp:  [97.4 °F (36.3 °C)-98.4 °F (36.9 °C)] 98 °F (36.7 °C)  Pulse:  [77-92] 84  Resp:  [18] 18  SpO2:  [94 %-99 %] 98 %  BP: (130-154)/(65-82) 138/65     Weight: 86.4 kg (190 lb 7.6 oz)  Body mass index is 31.7 kg/m².     SpO2: 98 %  O2 Device (Oxygen Therapy): room air      Intake/Output Summary (Last 24 hours) at 9/25/2019 1751  Last data filed at 9/25/2019 0330  Gross per 24 hour   Intake --   Output 200 ml   Net -200 ml       Lines/Drains/Airways     Peripheral Intravenous Line                 Peripheral IV - Single Lumen 11/29/15 0345 Left Forearm 1396 days         Peripheral IV  - Single Lumen 09/25/19 0330 18 G less than 1 day                Physical Exam   Constitutional: He is oriented to person, place, and time. He appears well-developed and well-nourished.   HENT:   Head: Normocephalic.   Eyes: Pupils are equal, round, and reactive to light. Conjunctivae are normal.   Neck: Normal range of motion. Neck supple.   Cardiovascular: Normal rate, regular rhythm and normal heart sounds.   Pulmonary/Chest: Effort normal and breath sounds normal.   Abdominal: Soft. Bowel sounds are normal.   Neurological: He is alert and oriented to person, place, and time.   Skin: Skin is warm.   Nursing note and vitals reviewed.      Significant Labs:   BMP:   Recent Labs   Lab 09/24/19 0037 09/25/19  0613   * 107    137   K 4.0 3.9   CL 99 100   CO2 30* 28   BUN 19 14   CREATININE 1.0 0.7   CALCIUM 9.3 8.6*   MG 1.6  --    , CMP   Recent Labs   Lab 09/24/19 0037 09/25/19  0613    137   K 4.0 3.9   CL 99 100   CO2 30* 28   * 107   BUN 19 14   CREATININE 1.0 0.7   CALCIUM 9.3 8.6*   PROT 8.2 7.7   ALBUMIN 4.1 3.5   BILITOT 0.3 0.3   ALKPHOS 71 58   AST 36 24   ALT 30 24   ANIONGAP 8 9   ESTGFRAFRICA >60 >60   EGFRNONAA >60 >60   , CBC   Recent Labs   Lab 09/24/19 0037 09/25/19  0613   WBC 9.93 7.55   HGB 14.0 15.0   HCT 42.5 44.9    167   , INR No results for input(s): INR, PROTIME in the last 48 hours., Lipid Panel No results for input(s): CHOL, HDL, LDLCALC, TRIG, CHOLHDL in the last 48 hours., Troponin   Recent Labs   Lab 09/24/19  0037 09/24/19  0451 09/24/19  1008   TROPONINI 0.035* 0.137* 0.099*  0.099*    and All pertinent lab results from the last 24 hours have been reviewed.    Significant Imaging: Echocardiogram:   Transthoracic echo (TTE) complete (Cupid Only):   Results for orders placed or performed during the hospital encounter of 09/23/19   Echo Color Flow Doppler? Yes   Result Value Ref Range    BSA 2.03 m2    TDI SEPTAL 0.08 m/s    LV LATERAL E/E' RATIO  9.40 m/s    LV SEPTAL E/E' RATIO 11.75 m/s    LA WIDTH 3.27 cm    AORTIC VALVE CUSP SEPERATION 2.00 cm    TDI LATERAL 0.10 m/s    PV PEAK VELOCITY 1.06 cm/s    LVIDD 4.09 3.5 - 6.0 cm    IVS 1.60 (A) 0.6 - 1.1 cm    PW 1.70 (A) 0.6 - 1.1 cm    Ao root annulus 3.30 cm    LVIDS 2.11 2.1 - 4.0 cm    FS 48 28 - 44 %    LA volume 66.62 cm3    Sinus 3.32 cm    STJ 2.47 cm    Ascending aorta 3.05 cm    LV mass 279.46 g    LA size 4.47 cm    RVDD 3.64 cm    TAPSE 2.47 cm    RV S' 17.52 cm/s    Left Ventricle Relative Wall Thickness 0.83 cm    AV mean gradient 7 mmHg    AV valve area 3.44 cm2    AV Velocity Ratio 0.75     AV index (prosthetic) 0.75     E/A ratio 0.95     Mean e' 0.09 m/s    E wave decelartion time 217.99 msec    IVRT 0.11 msec    Pulm vein S/D ratio 1.12     LVOT diameter 2.41 cm    LVOT area 4.6 cm2    LVOT peak marquis 1.35 m/s    LVOT peak VTI 24.49 cm    Ao peak marquis 1.80 m/s    Ao VTI 32.48 cm    LVOT stroke volume 111.66 cm3    AV peak gradient 13 mmHg    E/E' ratio 10.44 m/s    MV Peak E Marquis 0.94 m/s    TR Max Marquis 3.23 m/s    MV Peak A Marquis 0.99 m/s    PV Peak S Marquis 0.38 m/s    PV Peak D Marquis 0.34 m/s    LV Systolic Volume 14.57 mL    LV Systolic Volume Index 7.4 mL/m2    LV Diastolic Volume 73.70 mL    LV Diastolic Volume Index 37.42 mL/m2    LA Volume Index 33.8 mL/m2    LV Mass Index 142 g/m2    RA Major Axis 5.27 cm    Left Atrium Minor Axis 6.09 cm    Left Atrium Major Axis 4.79 cm    Triscuspid Valve Regurgitation Peak Gradient 42 mmHg    RA Width 3.02 cm    Right Atrial Pressure (from IVC) 3 mmHg    TV rest pulmonary artery pressure 45 mmHg    Narrative    · Moderate concentric left ventricular hypertrophy.  · Normal left ventricular systolic function. The estimated ejection   fraction is 65%  · Indeterminate left ventricular diastolic function.  · Mild-moderate left atrial enlargement.  · Mild right atrial enlargement.  · Normal central venous pressure (3 mm Hg).  · The estimated PA systolic pressure  is 45 mm Hg  · Pulmonary hypertension present.        Assessment and Plan:         Chest pain  Patient has been cleared by Urology for dual antiplatelet therapy.  He is on dual antiplatelet therapy with stable hemoglobin.  Because of non-STEMI and chest pains yesterday, went for coronary angiogram today.  However when he reads the cardiac catheterization lab he refused the procedure because he had been hungry all day and he states that he does not want to stay who agree anymore.  He would like to get the procedure done tomorrow.  NPO after midnight.  We will try to get the patient tomorrow morningdependent on cath lab schedule.  Continue aspirin Plavix.    Dyslipidemia  On lipitor    Essential hypertension, benign  On metoprolol and lisinopril        VTE Risk Mitigation (From admission, onward)    None          Zahra Roca MD  Cardiology  [facility]

## 2019-09-26 LAB
ALBUMIN SERPL BCP-MCNC: 3.3 G/DL (ref 3.5–5.2)
ALP SERPL-CCNC: 57 U/L (ref 55–135)
ALT SERPL W/O P-5'-P-CCNC: 24 U/L (ref 10–44)
ANION GAP SERPL CALC-SCNC: 7 MMOL/L (ref 8–16)
AST SERPL-CCNC: 29 U/L (ref 10–40)
BASOPHILS # BLD AUTO: 0.01 K/UL (ref 0–0.2)
BASOPHILS NFR BLD: 0.1 % (ref 0–1.9)
BILIRUB SERPL-MCNC: 0.3 MG/DL (ref 0.1–1)
BUN SERPL-MCNC: 12 MG/DL (ref 8–23)
CALCIUM SERPL-MCNC: 8.3 MG/DL (ref 8.7–10.5)
CHLORIDE SERPL-SCNC: 100 MMOL/L (ref 95–110)
CO2 SERPL-SCNC: 27 MMOL/L (ref 23–29)
CREAT SERPL-MCNC: 0.7 MG/DL (ref 0.5–1.4)
DIFFERENTIAL METHOD: ABNORMAL
EOSINOPHIL # BLD AUTO: 0 K/UL (ref 0–0.5)
EOSINOPHIL NFR BLD: 0.1 % (ref 0–8)
ERYTHROCYTE [DISTWIDTH] IN BLOOD BY AUTOMATED COUNT: 12.9 % (ref 11.5–14.5)
EST. GFR  (AFRICAN AMERICAN): >60 ML/MIN/1.73 M^2
EST. GFR  (NON AFRICAN AMERICAN): >60 ML/MIN/1.73 M^2
GLUCOSE SERPL-MCNC: 123 MG/DL (ref 70–110)
HCT VFR BLD AUTO: 41 % (ref 40–54)
HGB BLD-MCNC: 13.7 G/DL (ref 14–18)
LYMPHOCYTES # BLD AUTO: 0.8 K/UL (ref 1–4.8)
LYMPHOCYTES NFR BLD: 9.4 % (ref 18–48)
MCH RBC QN AUTO: 30.9 PG (ref 27–31)
MCHC RBC AUTO-ENTMCNC: 33.4 G/DL (ref 32–36)
MCV RBC AUTO: 93 FL (ref 82–98)
MONOCYTES # BLD AUTO: 1 K/UL (ref 0.3–1)
MONOCYTES NFR BLD: 11.6 % (ref 4–15)
NEUTROPHILS # BLD AUTO: 6.9 K/UL (ref 1.8–7.7)
NEUTROPHILS NFR BLD: 79 % (ref 38–73)
PLATELET # BLD AUTO: 167 K/UL (ref 150–350)
PMV BLD AUTO: 9.6 FL (ref 9.2–12.9)
POTASSIUM SERPL-SCNC: 3 MMOL/L (ref 3.5–5.1)
PROT SERPL-MCNC: 7.6 G/DL (ref 6–8.4)
RBC # BLD AUTO: 4.43 M/UL (ref 4.6–6.2)
SODIUM SERPL-SCNC: 134 MMOL/L (ref 136–145)
WBC # BLD AUTO: 8.71 K/UL (ref 3.9–12.7)

## 2019-09-26 PROCEDURE — 25000003 PHARM REV CODE 250: Performed by: EMERGENCY MEDICINE

## 2019-09-26 PROCEDURE — C1769 GUIDE WIRE: HCPCS | Performed by: INTERNAL MEDICINE

## 2019-09-26 PROCEDURE — 63600175 PHARM REV CODE 636 W HCPCS: Performed by: HOSPITALIST

## 2019-09-26 PROCEDURE — 27000221 HC OXYGEN, UP TO 24 HOURS

## 2019-09-26 PROCEDURE — C1894 INTRO/SHEATH, NON-LASER: HCPCS | Performed by: INTERNAL MEDICINE

## 2019-09-26 PROCEDURE — 99152 MOD SED SAME PHYS/QHP 5/>YRS: CPT | Performed by: INTERNAL MEDICINE

## 2019-09-26 PROCEDURE — 25000003 PHARM REV CODE 250: Performed by: INTERNAL MEDICINE

## 2019-09-26 PROCEDURE — 94761 N-INVAS EAR/PLS OXIMETRY MLT: CPT

## 2019-09-26 PROCEDURE — 80202 ASSAY OF VANCOMYCIN: CPT

## 2019-09-26 PROCEDURE — 99152 MOD SED SAME PHYS/QHP 5/>YRS: CPT | Mod: ,,, | Performed by: INTERNAL MEDICINE

## 2019-09-26 PROCEDURE — 25000003 PHARM REV CODE 250: Performed by: HOSPITALIST

## 2019-09-26 PROCEDURE — 63600175 PHARM REV CODE 636 W HCPCS: Performed by: INTERNAL MEDICINE

## 2019-09-26 PROCEDURE — 93458 PR CATH PLACE/CORON ANGIO, IMG SUPER/INTERP,W LEFT HEART VENTRICULOGRAPHY: ICD-10-PCS | Mod: 26,,, | Performed by: INTERNAL MEDICINE

## 2019-09-26 PROCEDURE — 99232 SBSQ HOSP IP/OBS MODERATE 35: CPT | Mod: ,,, | Performed by: UROLOGY

## 2019-09-26 PROCEDURE — 93458 L HRT ARTERY/VENTRICLE ANGIO: CPT | Mod: 26,,, | Performed by: INTERNAL MEDICINE

## 2019-09-26 PROCEDURE — 25500020 PHARM REV CODE 255: Performed by: INTERNAL MEDICINE

## 2019-09-26 PROCEDURE — 21400001 HC TELEMETRY ROOM

## 2019-09-26 PROCEDURE — 80053 COMPREHEN METABOLIC PANEL: CPT

## 2019-09-26 PROCEDURE — 36415 COLL VENOUS BLD VENIPUNCTURE: CPT

## 2019-09-26 PROCEDURE — 93458 L HRT ARTERY/VENTRICLE ANGIO: CPT | Performed by: INTERNAL MEDICINE

## 2019-09-26 PROCEDURE — C1887 CATHETER, GUIDING: HCPCS | Performed by: INTERNAL MEDICINE

## 2019-09-26 PROCEDURE — 85025 COMPLETE CBC W/AUTO DIFF WBC: CPT

## 2019-09-26 PROCEDURE — 99152 PR MOD CONSCIOUS SEDATION, SAME PHYS, 5+ YRS, FIRST 15 MIN: ICD-10-PCS | Mod: ,,, | Performed by: INTERNAL MEDICINE

## 2019-09-26 PROCEDURE — 99232 PR SUBSEQUENT HOSPITAL CARE,LEVL II: ICD-10-PCS | Mod: ,,, | Performed by: UROLOGY

## 2019-09-26 RX ORDER — NITROGLYCERIN 20 MG/100ML
INJECTION INTRAVENOUS
Status: DISCONTINUED | OUTPATIENT
Start: 2019-09-26 | End: 2019-09-27 | Stop reason: HOSPADM

## 2019-09-26 RX ORDER — POTASSIUM CHLORIDE 20 MEQ/15ML
40 SOLUTION ORAL ONCE
Status: COMPLETED | OUTPATIENT
Start: 2019-09-26 | End: 2019-09-26

## 2019-09-26 RX ORDER — SODIUM CHLORIDE AND POTASSIUM CHLORIDE 150; 900 MG/100ML; MG/100ML
INJECTION, SOLUTION INTRAVENOUS CONTINUOUS
Status: DISCONTINUED | OUTPATIENT
Start: 2019-09-26 | End: 2019-09-27

## 2019-09-26 RX ORDER — MORPHINE SULFATE 10 MG/ML
3 INJECTION INTRAMUSCULAR; INTRAVENOUS; SUBCUTANEOUS
Status: DISCONTINUED | OUTPATIENT
Start: 2019-09-26 | End: 2019-09-27 | Stop reason: HOSPADM

## 2019-09-26 RX ORDER — POTASSIUM CHLORIDE 20 MEQ/15ML
SOLUTION ORAL
Status: DISCONTINUED | OUTPATIENT
Start: 2019-09-26 | End: 2019-09-26 | Stop reason: HOSPADM

## 2019-09-26 RX ORDER — HYDROCODONE BITARTRATE AND ACETAMINOPHEN 5; 325 MG/1; MG/1
1 TABLET ORAL EVERY 4 HOURS PRN
Status: DISCONTINUED | OUTPATIENT
Start: 2019-09-26 | End: 2019-09-27 | Stop reason: HOSPADM

## 2019-09-26 RX ORDER — MIDAZOLAM HYDROCHLORIDE 1 MG/ML
INJECTION, SOLUTION INTRAMUSCULAR; INTRAVENOUS
Status: DISCONTINUED | OUTPATIENT
Start: 2019-09-26 | End: 2019-09-26 | Stop reason: HOSPADM

## 2019-09-26 RX ORDER — FENTANYL CITRATE 50 UG/ML
INJECTION, SOLUTION INTRAMUSCULAR; INTRAVENOUS
Status: DISCONTINUED | OUTPATIENT
Start: 2019-09-26 | End: 2019-09-26 | Stop reason: HOSPADM

## 2019-09-26 RX ORDER — ACETAMINOPHEN 325 MG/1
650 TABLET ORAL EVERY 4 HOURS PRN
Status: DISCONTINUED | OUTPATIENT
Start: 2019-09-26 | End: 2019-09-27 | Stop reason: HOSPADM

## 2019-09-26 RX ORDER — PHENYLEPHRINE HCL IN 0.9% NACL 1 MG/10 ML
SYRINGE (ML) INTRAVENOUS
Status: DISCONTINUED | OUTPATIENT
Start: 2019-09-26 | End: 2019-09-26 | Stop reason: HOSPADM

## 2019-09-26 RX ORDER — HEPARIN SODIUM 1000 [USP'U]/ML
INJECTION, SOLUTION INTRAVENOUS; SUBCUTANEOUS
Status: DISCONTINUED | OUTPATIENT
Start: 2019-09-26 | End: 2019-09-26 | Stop reason: HOSPADM

## 2019-09-26 RX ORDER — SODIUM CHLORIDE 9 MG/ML
INJECTION, SOLUTION INTRAVENOUS CONTINUOUS
Status: DISCONTINUED | OUTPATIENT
Start: 2019-09-26 | End: 2019-09-27

## 2019-09-26 RX ORDER — VERAPAMIL HYDROCHLORIDE 2.5 MG/ML
INJECTION, SOLUTION INTRAVENOUS
Status: DISCONTINUED | OUTPATIENT
Start: 2019-09-26 | End: 2019-09-26 | Stop reason: HOSPADM

## 2019-09-26 RX ADMIN — SODIUM CHLORIDE AND POTASSIUM CHLORIDE: .9; .15 SOLUTION INTRAVENOUS at 06:09

## 2019-09-26 RX ADMIN — METOPROLOL TARTRATE 25 MG: 25 TABLET ORAL at 08:09

## 2019-09-26 RX ADMIN — ATORVASTATIN CALCIUM 40 MG: 40 TABLET, FILM COATED ORAL at 08:09

## 2019-09-26 RX ADMIN — PHENYTOIN SODIUM 200 MG: 100 CAPSULE ORAL at 08:09

## 2019-09-26 RX ADMIN — POTASSIUM CHLORIDE 40 MEQ: 20 SOLUTION ORAL at 10:09

## 2019-09-26 RX ADMIN — SODIUM CHLORIDE: 0.9 INJECTION, SOLUTION INTRAVENOUS at 11:09

## 2019-09-26 RX ADMIN — TAMSULOSIN HYDROCHLORIDE 0.4 MG: 0.4 CAPSULE ORAL at 08:09

## 2019-09-26 RX ADMIN — LISINOPRIL 10 MG: 5 TABLET ORAL at 08:09

## 2019-09-26 RX ADMIN — PHENOBARBITAL 100 MG: 20 ELIXIR ORAL at 08:09

## 2019-09-26 RX ADMIN — FAMOTIDINE 20 MG: 20 TABLET ORAL at 08:09

## 2019-09-26 RX ADMIN — CLOPIDOGREL BISULFATE 75 MG: 75 TABLET ORAL at 08:09

## 2019-09-26 RX ADMIN — ASPIRIN 81 MG 81 MG: 81 TABLET ORAL at 08:09

## 2019-09-26 RX ADMIN — VANCOMYCIN HYDROCHLORIDE 1250 MG: 1.25 INJECTION, POWDER, LYOPHILIZED, FOR SOLUTION INTRAVENOUS at 01:09

## 2019-09-26 RX ADMIN — FINASTERIDE 5 MG: 5 TABLET, FILM COATED ORAL at 08:09

## 2019-09-26 NOTE — NURSING
"Arrived to room while rounding, pt standing at the bedside naked, drinched with water. Bathroom door open, shower running. Telemetry monitor noted on bedside table. IV tubing disconnected from patient. Family member sitting up in chair, eyes closed. PCT and I assisted pt back in bed. Bed alarm set. Pt educated on fall risks and safety precautions. Encouraged pt to call for assistance. Pt aware of IV site to be changed. Charge nurse notified, stated "I will take a look." Will continue to monitor.  "

## 2019-09-26 NOTE — ASSESSMENT & PLAN NOTE
Urine culture  He will need a cystoscopy with bilateral retrograde pyelogram as an outpatient  No need for urgent urologic intervention at this time  Okay for anticoagulation    Will sign off for now. Call with questions/concerns.

## 2019-09-26 NOTE — ASSESSMENT & PLAN NOTE
He denies chest pain at this time,his pain was in upper abdomen,check Echo,on ACS medications,cardiology consult.gilda have LHC today.

## 2019-09-26 NOTE — SUBJECTIVE & OBJECTIVE
Interval History: Nurse reported 100cc of yellow urine voided this AM.     Review of Systems   Constitutional: Negative.  Negative for fever.   HENT: Negative.    Eyes: Negative.    Respiratory: Negative for cough, chest tightness and shortness of breath.    Cardiovascular: Negative for chest pain.   Gastrointestinal: Negative.  Negative for constipation, diarrhea and nausea.   Genitourinary: Positive for hematuria. Negative for difficulty urinating.   Musculoskeletal: Negative.    Neurological: Negative.    Psychiatric/Behavioral: Negative.      Objective:     Temp:  [97.8 °F (36.6 °C)-99 °F (37.2 °C)] 98.2 °F (36.8 °C)  Pulse:  [72-92] 90  Resp:  [16-18] 16  SpO2:  [94 %-99 %] 99 %  BP: (129-154)/(61-91) 132/61     Body mass index is 31.77 kg/m².      Bladder Scan Volume (mL): 0 mL (09/24/19 1020)    Drains     None                 Physical Exam   Nursing note and vitals reviewed.  Constitutional: He is oriented to person, place, and time. He appears well-developed and well-nourished.   HENT:   Head: Normocephalic.   Eyes: Conjunctivae are normal.   Neck: Normal range of motion. Neck supple. No tracheal deviation present. No thyromegaly present.   Cardiovascular: Normal rate and normal heart sounds.    Pulmonary/Chest: Effort normal and breath sounds normal. No respiratory distress. He has no wheezes.   Abdominal: Soft. Bowel sounds are normal. There is no hepatosplenomegaly. There is no tenderness. There is no rebound and no CVA tenderness. No hernia.   Musculoskeletal: Normal range of motion. He exhibits no edema or tenderness.   Lymphadenopathy:     He has no cervical adenopathy.   Neurological: He is alert and oriented to person, place, and time.   Skin: Skin is warm and dry. No rash noted. No erythema.     Psychiatric: He has a normal mood and affect. His behavior is normal. Judgment and thought content normal.       Significant Labs:    BMP:  Recent Labs   Lab 09/24/19  0037 09/25/19  0613 09/26/19  0423     137 134*   K 4.0 3.9 3.0*   CL 99 100 100   CO2 30* 28 27   BUN 19 14 12   CREATININE 1.0 0.7 0.7   CALCIUM 9.3 8.6* 8.3*       CBC:   Recent Labs   Lab 09/24/19  0037 09/25/19  0613 09/26/19  0420   WBC 9.93 7.55 8.71   HGB 14.0 15.0 13.7*   HCT 42.5 44.9 41.0    167 167       Blood Culture: No results for input(s): LABBLOO in the last 168 hours.  Urine Culture: No results for input(s): LABURIN in the last 168 hours.  Urine Studies:   Recent Labs   Lab 09/24/19  0000 09/24/19  1927   COLORU Yellow Yellow   APPEARANCEUA Clear Clear   PHUR 5.0 6.0   SPECGRAV 1.020 1.030   PROTEINUA Negative 1+*   GLUCUA Negative Negative   KETONESU Negative Negative   BILIRUBINUA Negative Negative   OCCULTUA 1+* 3+*   NITRITE Negative Negative   UROBILINOGEN Negative Negative   LEUKOCYTESUR Negative Negative   RBCUA 3 50*   WBCUA 2 1   BACTERIA Occasional Occasional   SQUAMEPITHEL 1 1   HYALINECASTS  --  0       Significant Imaging:  All pertinent imaging results/findings from the past 24 hours have been reviewed.

## 2019-09-26 NOTE — PT/OT/SLP PROGRESS
Physical Therapy      Patient Name:  Vern Lr   MRN:  [id number]    Patient not seen today secondary to heart cath . Will follow-up 09/27/2019.    Cher Pop, PT

## 2019-09-26 NOTE — PLAN OF CARE
Problem: Fall Injury Risk  Goal: Absence of Fall and Fall-Related Injury  Outcome: Ongoing, Progressing     Problem: Adult Inpatient Plan of Care  Goal: Plan of Care Review  Outcome: Ongoing, Progressing  Goal: Patient-Specific Goal (Individualization)  Outcome: Ongoing, Progressing  Goal: Absence of Hospital-Acquired Illness or Injury  Outcome: Ongoing, Progressing  Goal: Readiness for Transition of Care  Outcome: Ongoing, Progressing  Goal: Rounds/Family Conference  Outcome: Ongoing, Progressing     Problem: Skin Injury Risk Increased  Goal: Skin Health and Integrity  Outcome: Ongoing, Progressing

## 2019-09-26 NOTE — NURSING
Report given to receiving nurse. Pt awake in bed. Prepped for procedure. Remains NPO. Avasys at the bedside. Walking rounds completed. Pt assessed, NAD noted.     24hr chart check completed.

## 2019-09-26 NOTE — PROGRESS NOTES
[facility] Hospital Medicine  Progress Note    Patient Name: Vern Lr  MRN: [id number]  Patient Class: IP- Inpatient   Admission Date: 9/23/2019  Length of Stay: 2 days  Attending Physician: Reyna Rocha MD  Primary Care Provider: Maria Fernanda Abbasi MD (Inactive)        Subjective:     Principal Problem:Elevated troponin        HPI:  79 years old male with history of mild MR,HTN,BPH,seizure disorder,came with CC of upper abdominal pain. Symptoms started sometime this evening.  History is very limited due to patient's language barrier and history of mental retardation.  He is a poor historian.  Review previous records reveal that he has been a poor historian in the past even with a .  Patient was asked multiple questions in Uzbek without responding.  He  was  Appearing uncomfortable in ER, He keeps pointing to his upper abdomen.  He will not answer questions regarding chest pain, back pain, nausea, or vomiting. EMS reports that they were called to her residence because the patient was having abdominal pain at the Family believe was secondary to a hernia.  They said no other history was provided.  Review of old medical records reveals the patient was admitted for seizure-like activity and headache 5 years ago.  CT of the abdomen pelvis was performed during the admission that revealed a 2.4x2.7 cm infrarenal abdominal aortic aneurysm.  No significant solid organ abnormalities were seen.  Gallbladder appeared normal.   Per ER record,Niece arrived and provided additional information.  States he started complaining of abdominal pain approximately 45 hr ago.  No reported nausea or vomiting. He was also complaining of headache. No fever.  No diarrhea.  No urinary symptoms. No reported seizure activity at home.  On arrival to emergency room after being placed in room patient had twobrief focal seizure involving the left side.  Niece  reported in ERstates he has not had a seizure in  over a year.  States he has compliance medications.  Patient was postictal after the seizure,at this time is improved,CT abdomen show no acute process,he is alert at this time,his Troponin level is increased,patient has been  admitted for evaluation by cardiology,his abdominal pain is currently resolved,US show cholelithiasis.family arrived to floor,information by family provided.    Overview/Hospital Course:  79 years old male with history of mild MR,HTN,BPH,seizure disorder,came with CC of upper abdominal pain. Symptoms started sometime this evening.  History is very limited due to patient's language barrier and history of mental retardation.  He is a poor historian.  Review previous records reveal that he has been a poor historian in the past even with a .  Patient was asked multiple questions in Romansh without responding.  He was  Appearing uncomfortable in ER, He keeps pointing to his upper abdomen.  He will not answer questions regarding chest pain, back pain, nausea, or vomiting. EMS reports that they were called to her residence because the patient was having abdominal pain at the Family believe was secondary to a hernia.  They said no other history was provided.  Review of old medical records reveals the patient was admitted for seizure-like activity and headache 5 years ago.  CT of the abdomen pelvis was performed during the admission that revealed a 2.4x2.7 cm infrarenal abdominal aortic aneurysm.  No significant solid organ abnormalities were seen.  Gallbladder appeared normal.   Per ER record,Niece arrived and provided additional information.  States he started complaining of abdominal pain approximately 45 hr ago.  No reported nausea or vomiting. He was also complaining of headache. No fever.  No diarrhea.  No urinary symptoms. No reported seizure activity at home.  On arrival to emergency room after being placed in room patient had twobrief focal seizure involving the left side.  Niece  reported in ERstates he has not had a seizure in over a year.  States he has compliance medications.  Patient was postictal after the seizure,at this time is improved,CT abdomen show no acute process,he is alert at this time,his Troponin level is increased,patient has been  admitted for evaluation by cardiology,his abdominal pain is currently resolved,US show cholelithiasis.family arrived to floor,information by family provided.he remains seizure free over night,neuroogy is following.  cardiology planing doing [facility] today.  Started on IV Abx for left leg cellulitis,PT,OT is consulted.checked US legs,no DVT.  He tolerated diet last night.    Past Medical History:   Diagnosis Date    Abdominal hernia     BPH (benign prostatic hyperplasia)     Dyslipidemia     Hypercholesteremia     Hypertension     Seizure disorder     Seizures     Sinus disorder        Past Surgical History:   Procedure Laterality Date    KNEE SURGERY      NASAL SINUS SURGERY      NOSE SURGERY         Review of patient's allergies indicates:  No Known Allergies    No current facility-administered medications on file prior to encounter.      Current Outpatient Medications on File Prior to Encounter   Medication Sig    alendronate (FOSAMAX) 70 MG tablet Take 1 tablet (70 mg total) by mouth every 7 days.    aspirin (ECOTRIN) 81 MG EC tablet Take 81 mg by mouth once daily.    atorvastatin (LIPITOR) 40 MG tablet Take 1 tablet (40 mg total) by mouth once daily.    calcium citrate-vitamin D3 (CITRACAL + D MAXIMUM) 315-250 mg-unit Tab Take 315 mg by mouth 2 (two) times daily.    finasteride (PROSCAR) 5 mg tablet Take 1 tablet (5 mg total) by mouth once daily.    fluticasone (FLONASE) 50 mcg/actuation nasal spray SHAKE WELL AND U 2 SPRAYS IEN BID    lisinopril 10 MG tablet Take 1 tablet (10 mg total) by mouth once daily.    metoprolol tartrate (LOPRESSOR) 50 MG tablet Take 1 tablet (50 mg total) by mouth once daily.    montelukast  (SINGULAIR) 10 mg tablet Take 10 mg by mouth every evening.    phenobarbital (LUMINAL) 97.2 MG tablet Take 1 tablet (97.2 mg total) by mouth once daily.    phenytoin (DILANTIN) 100 MG ER capsule 2 capsules po q am, 2 capsules po q hs    tamsulosin (FLOMAX) 0.4 mg Cp24 Take 1 capsule (0.4 mg total) by mouth once daily.    ketorolac 0.5% (ACULAR) 0.5 % Drop Place 1 drop into the right eye 4 (four) times daily.     Family History     Problem Relation (Age of Onset)    Coronary artery disease Brother, Brother    Hypertension Brother, Brother        Tobacco Use    Smoking status: Never Smoker    Smokeless tobacco: Never Used   Substance and Sexual Activity    Alcohol use: No    Drug use: No    Sexual activity: Not on file     Review of Systems   Constitutional: Negative for activity change and appetite change.   HENT: Negative for congestion and dental problem.    Eyes: Negative for discharge and itching.   Respiratory: Negative for apnea and chest tightness.    Cardiovascular: Negative for chest pain and leg swelling.   Gastrointestinal: Negative for abdominal distention, abdominal pain and anal bleeding.   Endocrine: Negative for cold intolerance and heat intolerance.   Genitourinary: Negative for difficulty urinating.   Musculoskeletal: Negative for arthralgias and back pain.   Skin: Negative for color change and pallor.   Allergic/Immunologic: Negative for environmental allergies and food allergies.   Neurological: Negative for dizziness and facial asymmetry.   Hematological: Negative for adenopathy. Does not bruise/bleed easily.   Psychiatric/Behavioral: Negative for agitation.     Objective:     Vital Signs (Most Recent):  Temp: 98.2 °F (36.8 °C) (09/26/19 0757)  Pulse: 90 (09/26/19 0757)  Resp: 16 (09/26/19 0757)  BP: 132/61 (09/26/19 0757)  SpO2: 99 % (09/26/19 0757) Vital Signs (24h Range):  Temp:  [97.8 °F (36.6 °C)-99 °F (37.2 °C)] 98.2 °F (36.8 °C)  Pulse:  [72-92] 90  Resp:  [16-18] 16  SpO2:  [94  %-99 %] 99 %  BP: (129-154)/(61-91) 132/61     Weight: 86.6 kg (190 lb 14.7 oz)  Body mass index is 31.77 kg/m².    Physical Exam   Constitutional: No distress.   HENT:   Head: Atraumatic.   Eyes: EOM are normal.   Neck: Neck supple.   Cardiovascular: Normal rate and regular rhythm.   Pulmonary/Chest: Effort normal and breath sounds normal.   Abdominal: Soft. He exhibits no distension. There is no tenderness.   Musculoskeletal: Normal range of motion. He exhibits no edema or deformity.   Neurological: He is alert.   Skin: Skin is dry.   Left leg warm,some swelling   Psychiatric: He has a normal mood and affect. His behavior is normal.         CRANIAL NERVES     CN III, IV, VI   Extraocular motions are normal.        Significant Labs:   BMP:   Recent Labs   Lab 09/26/19  0420   *   *   K 3.0*      CO2 27   BUN 12   CREATININE 0.7   CALCIUM 8.3*     CBC:   Recent Labs   Lab 09/25/19  0613 09/26/19  0420   WBC 7.55 8.71   HGB 15.0 13.7*   HCT 44.9 41.0    167     Troponin:   Recent Labs   Lab 09/24/19  1008   TROPONINI 0.099*  0.099*       Significant Imaging: reviewed,      Assessment/Plan:      * Elevated troponin  He denies chest pain at this time,his pain was in upper abdomen,check Echo,on ACS medications,cardiology consult.wilkl have LHC today.      Cellulitis of left lower extremity    Started on IV Abx for left leg cellulitis,PT,OT is consulted.checked US legs,NO  DVT.        Gross hematuria  Urology is following,nned out patient cystoscopy.      Chest pain        Calculus of gallbladder without biliary obstruction  Per US,possible etiology for abdominal pain.tolerated diet last night,NPO at this time.      Mild mental retardation  Supportive care,      DJD (degenerative joint disease)  supportive care,      Abdominal hernia  Will be monitored.      Seizure disorder  On phenobarbital  and Dilantine at home,in therapeutic  [place],had seizure  In ER,alert at this time,resumed home  medications,head CT show no acute process,consulted neurology,seizy=ure precaution,prn IV Ativan.remains seizure free over night.      BPH (benign prostatic hyperplasia)  On flomax and finasteride,checkl baller scan.      Essential hypertension, benign  Stable,will monitor.        VTE Risk Mitigation (From admission, onward)    None                Reyna Rocha MD  Department of Hospital Medicine   [facility]

## 2019-09-26 NOTE — BRIEF OP NOTE
[facility]  Brief Operative Note     SUMMARY     Surgery Date: 9/26/2019     Surgeon(s) and Role:     * Zahra Roca MD - Primary    Assisting Surgeon: None    Pre-op Diagnosis:  NSTEMI (non-ST elevated myocardial infarction) [I21.4]    Post-op Diagnosis:  Post-Op Diagnosis Codes:     * NSTEMI (non-ST elevated myocardial infarction) [I21.4]    Procedure(s) (LRB):  Left heart cath, radial. 11 am (Left)    Anesthesia: RN IV Sedation    Description of the findings of the procedure:  Mild nonobstructive coronary artery disease      Dominance:  Codominant  LM:  No significant stenosis  LAD:  Mild mid LAD 20% stenosis  LCx:  Luminal irregularities  RCA:  Luminal irregularities    Hemostasis:  R Radial band      Impression:  Nonobstructive coronary artery disease    Plan:  Aspirin, Plavix, statins.  Follow-up in Cardiology Clinic.    Estimated Blood Loss: < 10 cc         Specimens: None

## 2019-09-26 NOTE — SUBJECTIVE & OBJECTIVE
Past Medical History:   Diagnosis Date    Abdominal hernia     BPH (benign prostatic hyperplasia)     Dyslipidemia     Hypercholesteremia     Hypertension     Seizure disorder     Seizures     Sinus disorder        Past Surgical History:   Procedure Laterality Date    KNEE SURGERY      NASAL SINUS SURGERY      NOSE SURGERY         Review of patient's allergies indicates:  No Known Allergies    No current facility-administered medications on file prior to encounter.      Current Outpatient Medications on File Prior to Encounter   Medication Sig    alendronate (FOSAMAX) 70 MG tablet Take 1 tablet (70 mg total) by mouth every 7 days.    aspirin (ECOTRIN) 81 MG EC tablet Take 81 mg by mouth once daily.    atorvastatin (LIPITOR) 40 MG tablet Take 1 tablet (40 mg total) by mouth once daily.    calcium citrate-vitamin D3 (CITRACAL + D MAXIMUM) 315-250 mg-unit Tab Take 315 mg by mouth 2 (two) times daily.    finasteride (PROSCAR) 5 mg tablet Take 1 tablet (5 mg total) by mouth once daily.    fluticasone (FLONASE) 50 mcg/actuation nasal spray SHAKE WELL AND U 2 SPRAYS IEN BID    lisinopril 10 MG tablet Take 1 tablet (10 mg total) by mouth once daily.    metoprolol tartrate (LOPRESSOR) 50 MG tablet Take 1 tablet (50 mg total) by mouth once daily.    montelukast (SINGULAIR) 10 mg tablet Take 10 mg by mouth every evening.    phenobarbital (LUMINAL) 97.2 MG tablet Take 1 tablet (97.2 mg total) by mouth once daily.    phenytoin (DILANTIN) 100 MG ER capsule 2 capsules po q am, 2 capsules po q hs    tamsulosin (FLOMAX) 0.4 mg Cp24 Take 1 capsule (0.4 mg total) by mouth once daily.    ketorolac 0.5% (ACULAR) 0.5 % Drop Place 1 drop into the right eye 4 (four) times daily.     Family History     Problem Relation (Age of Onset)    Coronary artery disease Brother, Brother    Hypertension Brother, Brother        Tobacco Use    Smoking status: Never Smoker    Smokeless tobacco: Never Used   Substance and Sexual  Activity    Alcohol use: No    Drug use: No    Sexual activity: Not on file     Review of Systems   Constitutional: Negative for activity change and appetite change.   HENT: Negative for congestion and dental problem.    Eyes: Negative for discharge and itching.   Respiratory: Negative for apnea and chest tightness.    Cardiovascular: Negative for chest pain and leg swelling.   Gastrointestinal: Negative for abdominal distention, abdominal pain and anal bleeding.   Endocrine: Negative for cold intolerance and heat intolerance.   Genitourinary: Negative for difficulty urinating.   Musculoskeletal: Negative for arthralgias and back pain.   Skin: Negative for color change and pallor.   Allergic/Immunologic: Negative for environmental allergies and food allergies.   Neurological: Negative for dizziness and facial asymmetry.   Hematological: Negative for adenopathy. Does not bruise/bleed easily.   Psychiatric/Behavioral: Negative for agitation.     Objective:     Vital Signs (Most Recent):  Temp: 98.2 °F (36.8 °C) (09/26/19 0757)  Pulse: 90 (09/26/19 0757)  Resp: 16 (09/26/19 0757)  BP: 132/61 (09/26/19 0757)  SpO2: 99 % (09/26/19 0757) Vital Signs (24h Range):  Temp:  [97.8 °F (36.6 °C)-99 °F (37.2 °C)] 98.2 °F (36.8 °C)  Pulse:  [72-92] 90  Resp:  [16-18] 16  SpO2:  [94 %-99 %] 99 %  BP: (129-154)/(61-91) 132/61     Weight: 86.6 kg (190 lb 14.7 oz)  Body mass index is 31.77 kg/m².    Physical Exam   Constitutional: No distress.   HENT:   Head: Atraumatic.   Eyes: EOM are normal.   Neck: Neck supple.   Cardiovascular: Normal rate and regular rhythm.   Pulmonary/Chest: Effort normal and breath sounds normal.   Abdominal: Soft. He exhibits no distension. There is no tenderness.   Musculoskeletal: Normal range of motion. He exhibits no edema or deformity.   Neurological: He is alert.   Skin: Skin is dry.   Left leg warm,some swelling   Psychiatric: He has a normal mood and affect. His behavior is normal.         CRANIAL  NERVES     CN III, IV, VI   Extraocular motions are normal.        Significant Labs:   BMP:   Recent Labs   Lab 09/26/19  0420   *   *   K 3.0*      CO2 27   BUN 12   CREATININE 0.7   CALCIUM 8.3*     CBC:   Recent Labs   Lab 09/25/19  0613 09/26/19  0420   WBC 7.55 8.71   HGB 15.0 13.7*   HCT 44.9 41.0    167     Troponin:   Recent Labs   Lab 09/24/19  1008   TROPONINI 0.099*  0.099*       Significant Imaging: reviewed,

## 2019-09-26 NOTE — NURSING
New IV placed to left forearm by Charge nurse Edith. N/S infusing at 100ml/hr to left forearm. [employer] monitor at the bedside. Bed alarm set. Family at the bedside. Will continue to monitor.

## 2019-09-26 NOTE — NURSING
Connect to [place] on computer to translate. Spoke with Mik #[id number], pt in agreement with medications. Medications administered, pt tolerated well.

## 2019-09-26 NOTE — NURSING TRANSFER
Nursing Transfer Note      9/26/2019     Transfer To: Cath Lab    Transfer via bed    Transfer with  O2, cardiac monitoring    Transported by nurse from cath lab    Chart send with patient: Yes

## 2019-09-26 NOTE — PROGRESS NOTES
[facility]  Urology  Progress Note    Patient Name: Vern Lr  MRN: [id number]  Admission Date: 9/23/2019  Hospital Length of Stay: 2 days  Code Status: Full Code   Attending Provider: Reyna Rocha MD   Primary Care Physician: Maria Fernanda Abbasi MD (Inactive)    Subjective:     HPI:  Hematuria  Patient complains of gross hematuria. Onset of hematuria was 1 day ago and was sudden in onset. There is not a history of nephrolithiasis. There is not a history of urologic trauma. Other urologic symptoms include nocturia. Patient admits to history of no risk factors for cancer. Patient denies history of Agent Orange exposure, chronic Munoz catheter,  surgeries, occupational exposure, sexually transmitted diseases, tobacco use, trauma and urolithiasis. Prior workup has been CT.  MARTII in the room but not operational.         Interval History: Nurse reported 100cc of yellow urine voided this AM.     Review of Systems   Constitutional: Negative.  Negative for fever.   HENT: Negative.    Eyes: Negative.    Respiratory: Negative for cough, chest tightness and shortness of breath.    Cardiovascular: Negative for chest pain.   Gastrointestinal: Negative.  Negative for constipation, diarrhea and nausea.   Genitourinary: Positive for hematuria. Negative for difficulty urinating.   Musculoskeletal: Negative.    Neurological: Negative.    Psychiatric/Behavioral: Negative.      Objective:     Temp:  [97.8 °F (36.6 °C)-99 °F (37.2 °C)] 98.2 °F (36.8 °C)  Pulse:  [72-92] 90  Resp:  [16-18] 16  SpO2:  [94 %-99 %] 99 %  BP: (129-154)/(61-91) 132/61     Body mass index is 31.77 kg/m².      Bladder Scan Volume (mL): 0 mL (09/24/19 1020)    Drains     None                 Physical Exam   Nursing note and vitals reviewed.  Constitutional: He is oriented to person, place, and time. He appears well-developed and well-nourished.   HENT:   Head: Normocephalic.   Eyes: Conjunctivae are normal.   Neck: Normal range of  motion. Neck supple. No tracheal deviation present. No thyromegaly present.   Cardiovascular: Normal rate and normal heart sounds.    Pulmonary/Chest: Effort normal and breath sounds normal. No respiratory distress. He has no wheezes.   Abdominal: Soft. Bowel sounds are normal. There is no hepatosplenomegaly. There is no tenderness. There is no rebound and no CVA tenderness. No hernia.   Musculoskeletal: Normal range of motion. He exhibits no edema or tenderness.   Lymphadenopathy:     He has no cervical adenopathy.   Neurological: He is alert and oriented to person, place, and time.   Skin: Skin is warm and dry. No rash noted. No erythema.     Psychiatric: He has a normal mood and affect. His behavior is normal. Judgment and thought content normal.       Significant Labs:    BMP:  Recent Labs   Lab 09/24/19  0037 09/25/19  0613 09/26/19  0420    137 134*   K 4.0 3.9 3.0*   CL 99 100 100   CO2 30* 28 27   BUN 19 14 12   CREATININE 1.0 0.7 0.7   CALCIUM 9.3 8.6* 8.3*       CBC:   Recent Labs   Lab 09/24/19  0037 09/25/19  0613 09/26/19  0420   WBC 9.93 7.55 8.71   HGB 14.0 15.0 13.7*   HCT 42.5 44.9 41.0    167 167       Blood Culture: No results for input(s): LABBLOO in the last 168 hours.  Urine Culture: No results for input(s): LABURIN in the last 168 hours.  Urine Studies:   Recent Labs   Lab 09/24/19  0000 09/24/19 1927   COLORU Yellow Yellow   APPEARANCEUA Clear Clear   PHUR 5.0 6.0   SPECGRAV 1.020 1.030   PROTEINUA Negative 1+*   GLUCUA Negative Negative   KETONESU Negative Negative   BILIRUBINUA Negative Negative   OCCULTUA 1+* 3+*   NITRITE Negative Negative   UROBILINOGEN Negative Negative   LEUKOCYTESUR Negative Negative   RBCUA 3 50*   WBCUA 2 1   BACTERIA Occasional Occasional   SQUAMEPITHEL 1 1   HYALINECASTS  --  0       Significant Imaging:  All pertinent imaging results/findings from the past 24 hours have been reviewed.                  Assessment/Plan:     Gross hematuria  Urine  culture  He will need a cystoscopy with bilateral retrograde pyelogram as an outpatient  No need for urgent urologic intervention at this time  Okay for anticoagulation    Will sign off for now. Call with questions/concerns.     BPH (benign prostatic hyperplasia)  Stay on Flomax and Proscar        VTE Risk Mitigation (From admission, onward)    None          Luz Marina Joshua MD  Urology  [facility]

## 2019-09-27 ENCOUNTER — TELEPHONE (OUTPATIENT)
Dept: UROLOGY | Facility: CLINIC | Age: 79
End: 2019-09-27

## 2019-09-27 VITALS
SYSTOLIC BLOOD PRESSURE: 125 MMHG | TEMPERATURE: 98 F | DIASTOLIC BLOOD PRESSURE: 68 MMHG | OXYGEN SATURATION: 95 % | HEIGHT: 65 IN | WEIGHT: 190.06 LBS | RESPIRATION RATE: 18 BRPM | BODY MASS INDEX: 31.67 KG/M2 | HEART RATE: 72 BPM

## 2019-09-27 LAB
ALBUMIN SERPL BCP-MCNC: 3.1 G/DL (ref 3.5–5.2)
ALP SERPL-CCNC: 55 U/L (ref 55–135)
ALT SERPL W/O P-5'-P-CCNC: 22 U/L (ref 10–44)
ANION GAP SERPL CALC-SCNC: 8 MMOL/L (ref 8–16)
AST SERPL-CCNC: 32 U/L (ref 10–40)
BASOPHILS # BLD AUTO: 0.01 K/UL (ref 0–0.2)
BASOPHILS NFR BLD: 0.2 % (ref 0–1.9)
BILIRUB SERPL-MCNC: 0.2 MG/DL (ref 0.1–1)
BUN SERPL-MCNC: 8 MG/DL (ref 8–23)
CALCIUM SERPL-MCNC: 8 MG/DL (ref 8.7–10.5)
CHLORIDE SERPL-SCNC: 101 MMOL/L (ref 95–110)
CO2 SERPL-SCNC: 28 MMOL/L (ref 23–29)
CREAT SERPL-MCNC: 0.6 MG/DL (ref 0.5–1.4)
DIFFERENTIAL METHOD: ABNORMAL
EOSINOPHIL # BLD AUTO: 0.1 K/UL (ref 0–0.5)
EOSINOPHIL NFR BLD: 0.9 % (ref 0–8)
ERYTHROCYTE [DISTWIDTH] IN BLOOD BY AUTOMATED COUNT: 13 % (ref 11.5–14.5)
EST. GFR  (AFRICAN AMERICAN): >60 ML/MIN/1.73 M^2
EST. GFR  (NON AFRICAN AMERICAN): >60 ML/MIN/1.73 M^2
GLUCOSE SERPL-MCNC: 108 MG/DL (ref 70–110)
HCT VFR BLD AUTO: 41.9 % (ref 40–54)
HGB BLD-MCNC: 14 G/DL (ref 14–18)
LYMPHOCYTES # BLD AUTO: 1.2 K/UL (ref 1–4.8)
LYMPHOCYTES NFR BLD: 20.8 % (ref 18–48)
MCH RBC QN AUTO: 30.8 PG (ref 27–31)
MCHC RBC AUTO-ENTMCNC: 33.4 G/DL (ref 32–36)
MCV RBC AUTO: 92 FL (ref 82–98)
MONOCYTES # BLD AUTO: 0.9 K/UL (ref 0.3–1)
MONOCYTES NFR BLD: 15 % (ref 4–15)
NEUTROPHILS # BLD AUTO: 3.6 K/UL (ref 1.8–7.7)
NEUTROPHILS NFR BLD: 63.3 % (ref 38–73)
PLATELET # BLD AUTO: 132 K/UL (ref 150–350)
PMV BLD AUTO: 9.6 FL (ref 9.2–12.9)
POTASSIUM SERPL-SCNC: 3.4 MMOL/L (ref 3.5–5.1)
PROT SERPL-MCNC: 7.2 G/DL (ref 6–8.4)
RBC # BLD AUTO: 4.55 M/UL (ref 4.6–6.2)
SODIUM SERPL-SCNC: 137 MMOL/L (ref 136–145)
VANCOMYCIN TROUGH SERPL-MCNC: 9 UG/ML (ref 10–22)
WBC # BLD AUTO: 5.72 K/UL (ref 3.9–12.7)

## 2019-09-27 PROCEDURE — 63600175 PHARM REV CODE 636 W HCPCS: Performed by: HOSPITALIST

## 2019-09-27 PROCEDURE — 99233 PR SUBSEQUENT HOSPITAL CARE,LEVL III: ICD-10-PCS | Mod: ,,, | Performed by: INTERNAL MEDICINE

## 2019-09-27 PROCEDURE — 97116 GAIT TRAINING THERAPY: CPT

## 2019-09-27 PROCEDURE — 94761 N-INVAS EAR/PLS OXIMETRY MLT: CPT

## 2019-09-27 PROCEDURE — 25000003 PHARM REV CODE 250: Performed by: EMERGENCY MEDICINE

## 2019-09-27 PROCEDURE — 25000003 PHARM REV CODE 250: Performed by: INTERNAL MEDICINE

## 2019-09-27 PROCEDURE — 80053 COMPREHEN METABOLIC PANEL: CPT

## 2019-09-27 PROCEDURE — 97530 THERAPEUTIC ACTIVITIES: CPT

## 2019-09-27 PROCEDURE — 27000221 HC OXYGEN, UP TO 24 HOURS

## 2019-09-27 PROCEDURE — 99233 SBSQ HOSP IP/OBS HIGH 50: CPT | Mod: ,,, | Performed by: INTERNAL MEDICINE

## 2019-09-27 PROCEDURE — 85025 COMPLETE CBC W/AUTO DIFF WBC: CPT

## 2019-09-27 PROCEDURE — 36415 COLL VENOUS BLD VENIPUNCTURE: CPT

## 2019-09-27 PROCEDURE — 25000003 PHARM REV CODE 250: Performed by: HOSPITALIST

## 2019-09-27 RX ORDER — CLINDAMYCIN HYDROCHLORIDE 300 MG/1
300 CAPSULE ORAL 3 TIMES DAILY
Qty: 21 CAPSULE | Refills: 0 | Status: SHIPPED | OUTPATIENT
Start: 2019-09-27 | End: 2019-10-04

## 2019-09-27 RX ORDER — POTASSIUM CHLORIDE 20 MEQ/15ML
40 SOLUTION ORAL ONCE
Status: COMPLETED | OUTPATIENT
Start: 2019-09-27 | End: 2019-09-27

## 2019-09-27 RX ADMIN — SODIUM CHLORIDE AND POTASSIUM CHLORIDE: .9; .15 SOLUTION INTRAVENOUS at 03:09

## 2019-09-27 RX ADMIN — PHENOBARBITAL 100 MG: 20 ELIXIR ORAL at 09:09

## 2019-09-27 RX ADMIN — FAMOTIDINE 20 MG: 20 TABLET ORAL at 08:09

## 2019-09-27 RX ADMIN — LISINOPRIL 10 MG: 5 TABLET ORAL at 08:09

## 2019-09-27 RX ADMIN — POTASSIUM CHLORIDE 40 MEQ: 20 SOLUTION ORAL at 09:09

## 2019-09-27 RX ADMIN — METOPROLOL TARTRATE 25 MG: 25 TABLET ORAL at 08:09

## 2019-09-27 RX ADMIN — ASPIRIN 81 MG 81 MG: 81 TABLET ORAL at 08:09

## 2019-09-27 RX ADMIN — PHENYTOIN SODIUM 200 MG: 100 CAPSULE ORAL at 08:09

## 2019-09-27 RX ADMIN — TAMSULOSIN HYDROCHLORIDE 0.4 MG: 0.4 CAPSULE ORAL at 08:09

## 2019-09-27 RX ADMIN — CLOPIDOGREL BISULFATE 75 MG: 75 TABLET ORAL at 08:09

## 2019-09-27 RX ADMIN — FINASTERIDE 5 MG: 5 TABLET, FILM COATED ORAL at 08:09

## 2019-09-27 RX ADMIN — VANCOMYCIN HYDROCHLORIDE 1500 MG: 1.5 INJECTION, POWDER, LYOPHILIZED, FOR SOLUTION INTRAVENOUS at 01:09

## 2019-09-27 NOTE — TELEPHONE ENCOUNTER
Tried calling patient several times .. Printed the message and will try calling again later today.. JENIFER.

## 2019-09-27 NOTE — SUBJECTIVE & OBJECTIVE
Interval History: pt doing fine. No complications from cardiac cath. Radial pulse present    Review of Systems   Constitution: Negative.   HENT: Negative.    Eyes: Negative.    Cardiovascular: Negative.  Negative for chest pain.   Respiratory: Negative for shortness of breath.    Endocrine: Negative.    Hematologic/Lymphatic: Negative.    Skin: Negative.    Musculoskeletal: Negative.    Gastrointestinal: Negative.    Genitourinary: Negative.    Neurological: Negative.    Psychiatric/Behavioral: Negative.    Allergic/Immunologic: Negative.      Objective:     Vital Signs (Most Recent):  Temp: 98.2 °F (36.8 °C) (09/27/19 0731)  Pulse: 80 (09/27/19 0731)  Resp: 16 (09/27/19 0731)  BP: (!) 147/82 (09/27/19 0731)  SpO2: 96 % (09/27/19 0808) Vital Signs (24h Range):  Temp:  [98.1 °F (36.7 °C)-99.5 °F (37.5 °C)] 98.2 °F (36.8 °C)  Pulse:  [66-86] 80  Resp:  [16-18] 16  SpO2:  [96 %-100 %] 96 %  BP: (102-153)/(57-85) 147/82     Weight: 86.2 kg (190 lb 0.6 oz)  Body mass index is 31.62 kg/m².     SpO2: 96 %  O2 Device (Oxygen Therapy): nasal cannula      Intake/Output Summary (Last 24 hours) at 9/27/2019 1049  Last data filed at 9/27/2019 0800  Gross per 24 hour   Intake 838 ml   Output 1350 ml   Net -512 ml       Lines/Drains/Airways     Peripheral Intravenous Line                 Peripheral IV - Single Lumen 09/25/19 0330 18 G 2 days         Peripheral IV - Single Lumen 09/26/19 0308 Anterior;Right Forearm 1 day                Physical Exam   Constitutional: He is oriented to person, place, and time. He appears well-developed and well-nourished.   HENT:   Head: Normocephalic.   Eyes: Pupils are equal, round, and reactive to light. Conjunctivae are normal.   Neck: Normal range of motion. Neck supple.   Cardiovascular: Normal rate, regular rhythm and normal heart sounds.   No access site complications   Pulmonary/Chest: Effort normal and breath sounds normal.   Abdominal: Soft. Bowel sounds are normal.   Neurological: He is  alert and oriented to person, place, and time.   Skin: Skin is warm.   Vitals reviewed.      Significant Labs:   BMP:   Recent Labs   Lab 09/26/19  0420 09/27/19  0440   * 108   * 137   K 3.0* 3.4*    101   CO2 27 28   BUN 12 8   CREATININE 0.7 0.6   CALCIUM 8.3* 8.0*   , CMP   Recent Labs   Lab 09/26/19  0420 09/27/19  0440   * 137   K 3.0* 3.4*    101   CO2 27 28   * 108   BUN 12 8   CREATININE 0.7 0.6   CALCIUM 8.3* 8.0*   PROT 7.6 7.2   ALBUMIN 3.3* 3.1*   BILITOT 0.3 0.2   ALKPHOS 57 55   AST 29 32   ALT 24 22   ANIONGAP 7* 8   ESTGFRAFRICA >60 >60   EGFRNONAA >60 >60   , CBC   Recent Labs   Lab 09/26/19 0420 09/27/19 0440   WBC 8.71 5.72   HGB 13.7* 14.0   HCT 41.0 41.9    132*   , INR No results for input(s): INR, PROTIME in the last 48 hours., Lipid Panel No results for input(s): CHOL, HDL, LDLCALC, TRIG, CHOLHDL in the last 48 hours., Troponin No results for input(s): TROPONINI in the last 48 hours. and All pertinent lab results from the last 24 hours have been reviewed.    Significant Imaging: Echocardiogram:   Transthoracic echo (TTE) complete (Cupid Only):   Results for orders placed or performed during the hospital encounter of 09/23/19   Echo Color Flow Doppler? Yes   Result Value Ref Range    BSA 2.03 m2    TDI SEPTAL 0.08 m/s    LV LATERAL E/E' RATIO 9.40 m/s    LV SEPTAL E/E' RATIO 11.75 m/s    LA WIDTH 3.27 cm    AORTIC VALVE CUSP SEPERATION 2.00 cm    TDI LATERAL 0.10 m/s    PV PEAK VELOCITY 1.06 cm/s    LVIDD 4.09 3.5 - 6.0 cm    IVS 1.60 (A) 0.6 - 1.1 cm    PW 1.70 (A) 0.6 - 1.1 cm    Ao root annulus 3.30 cm    LVIDS 2.11 2.1 - 4.0 cm    FS 48 28 - 44 %    LA volume 66.62 cm3    Sinus 3.32 cm    STJ 2.47 cm    Ascending aorta 3.05 cm    LV mass 279.46 g    LA size 4.47 cm    RVDD 3.64 cm    TAPSE 2.47 cm    RV S' 17.52 cm/s    Left Ventricle Relative Wall Thickness 0.83 cm    AV mean gradient 7 mmHg    AV valve area 3.44 cm2    AV Velocity Ratio  0.75     AV index (prosthetic) 0.75     E/A ratio 0.95     Mean e' 0.09 m/s    E wave decelartion time 217.99 msec    IVRT 0.11 msec    Pulm vein S/D ratio 1.12     LVOT diameter 2.41 cm    LVOT area 4.6 cm2    LVOT peak marquis 1.35 m/s    LVOT peak VTI 24.49 cm    Ao peak marquis 1.80 m/s    Ao VTI 32.48 cm    LVOT stroke volume 111.66 cm3    AV peak gradient 13 mmHg    E/E' ratio 10.44 m/s    MV Peak E Marquis 0.94 m/s    TR Max Marquis 3.23 m/s    MV Peak A Marquis 0.99 m/s    PV Peak S Marquis 0.38 m/s    PV Peak D Marquis 0.34 m/s    LV Systolic Volume 14.57 mL    LV Systolic Volume Index 7.4 mL/m2    LV Diastolic Volume 73.70 mL    LV Diastolic Volume Index 37.42 mL/m2    LA Volume Index 33.8 mL/m2    LV Mass Index 142 g/m2    RA Major Axis 5.27 cm    Left Atrium Minor Axis 6.09 cm    Left Atrium Major Axis 4.79 cm    Triscuspid Valve Regurgitation Peak Gradient 42 mmHg    RA Width 3.02 cm    Right Atrial Pressure (from IVC) 3 mmHg    TV rest pulmonary artery pressure 45 mmHg    Narrative    · Moderate concentric left ventricular hypertrophy.  · Normal left ventricular systolic function. The estimated ejection   fraction is 65%  · Indeterminate left ventricular diastolic function.  · Mild-moderate left atrial enlargement.  · Mild right atrial enlargement.  · Normal central venous pressure (3 mm Hg).  · The estimated PA systolic pressure is 45 mm Hg  · Pulmonary hypertension present.

## 2019-09-27 NOTE — PLAN OF CARE
Problem: Physical Therapy Goal  Goal: Physical Therapy Goal  Description  Goals to be met by: 10/02/2019     Patient will increase functional independence with mobility by performin. Supine to sit with Stand-by Assistance  2. Sit to stand transfer with Stand-by Assistance  3. Gait  x 100 feet with Stand-by Assistance using Rolling Walker.   4. Lower extremity exercise program x10 reps per handout, with assistance as needed     Outcome: Ongoing, Progressing   Pt ambulated ~300ft with RW, CGA on 2LO2 NC.  Pt requires assistance for all OOB mobility 2/2 safety.

## 2019-09-27 NOTE — PT/OT/SLP PROGRESS
Physical Therapy Treatment    Patient Name:  Vern Lr   MRN:  [id number]    Recommendations:     Discharge Recommendations:  home health PT(24hr supervision)   Discharge Equipment Recommendations: (ongoing assessment (would benefit from RW))   Barriers to discharge: Decreased caregiver support    Assessment:     Vern Lr is a 79 y.o. male admitted with a medical diagnosis of Elevated troponin.  He presents with the following impairments/functional limitations:  weakness, impaired endurance, impaired self care skills, impaired functional mobilty, gait instability, impaired balance, decreased coordination, decreased safety awareness, impaired cardiopulmonary response to activity.  Pt ambulated ~300ft with RW, CGA on 2LO2 NC.  Pt requires assistance for all OOB mobility 2/2 safety.    Rehab Prognosis: Good; patient would benefit from acute skilled PT services to address these deficits and reach maximum level of function.    Recent Surgery: Procedure(s) (LRB):  Left heart cath, radial. 11 am (Left) 1 Day Post-Op    Plan:     During this hospitalization, patient to be seen (3-5/wk) to address the identified rehab impairments via gait training, therapeutic activities, therapeutic exercises and progress toward the following goals:    · Plan of Care Expires:  10/02/19    Subjective     Chief Complaint: needing to use the bathroom  Patient/Family Comments/goals: Sister present and pt's nurse, Allegra for translation as needed.  Pt agreeable to therapy.  Pain/Comfort:  Pain Rating 1: 0/10      Objective:     Communicated with pt's nurse, Allegra, prior to session.  Patient found HOB elevated with peripheral IV, telemetry, bed alarm upon PT entry to room.     General Precautions: Standard, fall   Orthopedic Precautions:N/A   Braces: N/A     Functional Mobility:  · Bed Mobility:     · Scooting: contact guard assistance  · Supine to Sit: minimum assistance  · Transfers: from EOB and from toilet with Min A    · Sit to Stand:   minimum assistance with rolling walker  · Gait: Pt ambulated ~20ft from EOB>bathroom with RW, CGA on 2LO2 and ~300ft with RW, CGA on 2LO2 with cues for upright posture.  Pt with decreased safety, step length, velocity of limb, postural control, endurance, and weight shifting ability  · Balance: fair sitting and standing      AM-PAC 6 CLICK MOBILITY  Turning over in bed (including adjusting bedclothes, sheets and blankets)?: 3  Sitting down on and standing up from a chair with arms (e.g., wheelchair, bedside commode, etc.): 3  Moving from lying on back to sitting on the side of the bed?: 3  Moving to and from a bed to a chair (including a wheelchair)?: 3  Need to walk in hospital room?: 3  Climbing 3-5 steps with a railing?: 3  Basic Mobility Total Score: 18       Therapeutic Activities and Exercises:   Pt performed BM, toileting, transfers, and gait training.  Pt initially ambulated to bathroom and voided standing up with RW, CGA, followed by sitting down for BM.  Pt required Max A for perineal cleaning.  Min A, RW for sit>stand transfer from toilet.  Pt ambulated with RW, CGA on 2LO2 NC.    Patient left reclined in [place] with BLEs elevated with all lines intact, call button in reach, chair alarm on and pt's sister present and pt's nurse, Allegra, notified..    GOALS:   Multidisciplinary Problems     Physical Therapy Goals        Problem: Physical Therapy Goal    Goal Priority Disciplines Outcome Goal Variances Interventions   Physical Therapy Goal     PT, PT/OT Ongoing, Progressing     Description:  Goals to be met by: 10/02/2019     Patient will increase functional independence with mobility by performin. Supine to sit with Stand-by Assistance  2. Sit to stand transfer with Stand-by Assistance  3. Gait  x 100 feet with Stand-by Assistance using Rolling Walker.   4. Lower extremity exercise program x10 reps per handout, with assistance as needed                      Time Tracking:     PT Received On:  09/27/19  PT Start Time: 1025     PT Stop Time: 1103  PT Total Time (min): 38 min     Billable Minutes: Gait Training 15 and Therapeutic Activity 23    Treatment Type: Treatment  PT/PTA: PTA     PTA Visit Number: 1     Jacqueline Rosen PTA  09/27/2019

## 2019-09-27 NOTE — NURSING
Discharge instructions given to patient and sister, both verbalized understanding. Patient left with transport by wheelchair to the family vehicle. No distress noted.

## 2019-09-27 NOTE — PROGRESS NOTES
Pharmacokinetic Assessment Follow Up: IV Vancomycin    Vancomycin serum concentration assessment(s):    The trough level was drawn correctly and can be used to guide therapy at this time. The measurement is below the desired definitive target range of 10 to 20 mcg/mL.    Vancomycin Regimen Plan:    Change regimen to Vancomycin 1500 mg IV every 12 hours with next serum trough concentration measured at 12:30 prior to fourth dose on 9/28/19     Drug levels (last 3 results):  Recent Labs   Lab Result Units 09/26/19  2346   Vancomycin-Trough ug/mL 9.0*       Pharmacy will continue to follow and monitor vancomycin.    Please contact pharmacy at extension [phone] for questions regarding this assessment.    Thank you for the consult,   Sindi Ash       Patient brief summary:  Vern Lr is a 79 y.o. male initiated on antimicrobial therapy with IV Vancomycin for treatment of skin & soft tissue infection    The patient's current regimen is Vancomycin 1250 mg IV every 12 hours    Drug Allergies:   Review of patient's allergies indicates:  No Known Allergies    Actual Body Weight:   86.6 kg    Renal Function:   Estimated Creatinine Clearance: 88 mL/min (based on SCr of 0.7 mg/dL).,     Dialysis Method (if applicable):  N/A    CBC (last 72 hours):  Recent Labs   Lab Result Units 09/25/19  0613 09/26/19  0420   WBC K/uL 7.55 8.71   Hemoglobin g/dL 15.0 13.7*   Hematocrit % 44.9 41.0   Platelets K/uL 167 167   Gran% % 71.0 79.0*   Lymph% % 18.5 9.4*   Mono% % 10.5 11.6   Eosinophil% % 0.0 0.1   Basophil% % 0.3 0.1   Differential Method  Automated Automated       Metabolic Panel (last 72 hours):  Recent Labs   Lab Result Units 09/24/19  1927 09/25/19  0613 09/26/19  0420   Sodium mmol/L  --  137 134*   Potassium mmol/L  --  3.9 3.0*   Chloride mmol/L  --  100 100   CO2 mmol/L  --  28 27   Glucose mg/dL  --  107 123*   Glucose, UA  Negative  --   --    BUN, Bld mg/dL  --  14 12   Creatinine mg/dL  --  0.7 0.7   Albumin g/dL  --   3.5 3.3*   Total Bilirubin mg/dL  --  0.3 0.3   Alkaline Phosphatase U/L  --  58 57   AST U/L  --  24 29   ALT U/L  --  24 24       Vancomycin Administrations:  vancomycin given in the last 96 hours                     vancomycin 1.5 g in dextrose 5 % 250 mL IVPB (ready to mix) (mg) 1,500 mg New Bag 09/27/19 0108    vancomycin 1.25 g in dextrose 5% 250 mL IVPB (ready to mix) (mg) 1,250 mg New Bag 09/26/19 1300     1,250 mg New Bag 09/25/19 2311    vancomycin (VANCOCIN) 2,250 mg in dextrose 5 % 500 mL IVPB (mg) 2,250 mg New Bag 09/25/19 1211                      Microbiologic Results:  Microbiology Results (last 7 days)       ** No results found for the last 168 hours. **

## 2019-09-27 NOTE — PROGRESS NOTES
"      [facility] CASE MANAGEMENT                  WRITTEN DISCHARGE INFORMATION      APPOINTMENTS AND RESOURCES TO HELP YOU MANAGE YOUR CARE AT HOME BASED ON YOUR PREFERENCES:  (If an appointment is not scheduled for you when you leave the hospital, call your doctor to schedule a follow up visit within a week)  Follow-up Information     [facility] - Urology On 10/4/2019.    Specialty:  Urology  Why:  Hospital follow.  Nurse with [facility] will call the pt with an appointment  Contact information:  [address]  [facility] [address] [phone]  Additional information:  [address]           [facility] [place].    Why:  Hospital follow up. Pt. to make appointment and f/u  PCP and cardiology   Contact information:  [address] [facility] [address] [phone]               EDUCATION: Pt  provided with educational information on Chest Pain  Information reviewed and placed in :My Healthcare Packet" to be brought home for him  to use as resource after discharge.  Information included:  signs and symptoms to look for and call the doctor if experiencing, and symptoms that may indicate a medical emergency: CALL 911.      All questions answered.  Teach back method used.    Patient stated, " nausea vomiting, lightheaded call the DrDebbie  ".    Pt. Nurse Allegra notified all CM needs are met          Healthy Living Instructions to HELP MANAGE YOUR CARE AT HOME:  Things You are responsible for:  1.    Getting your prescriptions filled   2.    Taking your medications as directed, DO NOT MISS ANY DOSES!  3.    Following the diet and exercise recommended by your doctor  4.    Going to your follow-up doctor appointment. This is important because it allows the doctor to monitor your progress and determine if any changes need to made to your treatment plan.  5. If you have any questions about MANAGING YOUR CARE AT HOME Call the Nurse Care Line for 24/7 Assistance [phone] "       Please answer any calls you may receive from [facility]. We want to continue to support you as you manage your healthcare needs. [facility] is happy to have the opportunity to serve you.      Thank you for choosing [facility] for your healthcare needs!  Your [facility] Case Management Team,

## 2019-09-27 NOTE — TELEPHONE ENCOUNTER
Pt is being d/c today how soon does he need to be set up looks like he will need to be seen for cysto/retrogrades. Please advise-sully

## 2019-09-27 NOTE — DISCHARGE SUMMARY
[facility] Hospital Medicine  Discharge Summary      Patient Name: Vern Lr  MRN: [id number]  Admission Date: 9/23/2019  Hospital Length of Stay: 3 days  Discharge Date and Time:  09/27/2019 11:55 AM  Attending Physician: Reyna Rocha MD   Discharging Provider: Reyna Rocha MD  Primary Care Provider: Maria Fernanda Abbasi MD (Inactive)      HPI:   79 years old male with history of mild MR,HTN,BPH,seizure disorder,came with CC of upper abdominal pain. Symptoms started sometime this evening.  History is very limited due to patient's language barrier and history of mental retardation.  He is a poor historian.  Review previous records reveal that he has been a poor historian in the past even with a .  Patient was asked multiple questions in Belarusian without responding.  He  was  Appearing uncomfortable in ER, He keeps pointing to his upper abdomen.  He will not answer questions regarding chest pain, back pain, nausea, or vomiting. EMS reports that they were called to her residence because the patient was having abdominal pain at the Family believe was secondary to a hernia.  They said no other history was provided.  Review of old medical records reveals the patient was admitted for seizure-like activity and headache 5 years ago.  CT of the abdomen pelvis was performed during the admission that revealed a 2.4x2.7 cm infrarenal abdominal aortic aneurysm.  No significant solid organ abnormalities were seen.  Gallbladder appeared normal.   Per ER record,Niece arrived and provided additional information.  States he started complaining of abdominal pain approximately 45 hr ago.  No reported nausea or vomiting. He was also complaining of headache. No fever.  No diarrhea.  No urinary symptoms. No reported seizure activity at home.  On arrival to emergency room after being placed in room patient had twobrief focal seizure involving the left side.  Niece  reported in ERstates he has not  had a seizure in over a year.  States he has compliance medications.  Patient was postictal after the seizure,at this time is improved,CT abdomen show no acute process,he is alert at this time,his Troponin level is increased,patient has been  admitted for evaluation by cardiology,his abdominal pain is currently resolved,US show cholelithiasis.family arrived to floor,information by family provided.    Procedure(s) (LRB):  Left heart cath, radial. 11 am (Left)      Hospital Course:   79 years old male with history of mild MR,HTN,BPH,seizure disorder,came with CC of upper abdominal pain.   History  was very limited due to patient's language barrier and history of mental retardation.  He is a poor historian.  Review previous records reveal that he has been a poor historian in the past even with a .  Patient was asked multiple questions in Guyanese without responding.  He was  Appearing uncomfortable in ER, He keeps pointing to his upper abdomen.  He will not answer questions regarding chest pain, back pain, nausea, or vomiting. EMS reports that they were called to her residence because the patient was having abdominal pain , Family believe was secondary to a hernia.  They said no other history was provided.  Review of old medical records reveals the patient was admitted for seizure-like activity and headache 5 years ago.  CT of the abdomen pelvis was performed during that admission that revealed a 2.4x2.7 cm infrarenal abdominal aortic aneurysm.  No significant solid organ abnormalities were seen.  Gallbladder appeared normal.   Per ER record,Niece arrived and provided additional information.  States he started complaining of abdominal pain approximately 45 hr ago.  No reported nausea or vomiting. He was also complaining of headache. No fever.  No diarrhea.  No urinary symptoms. No reported seizure activity at home.  On arrival to emergency room after being placed in room patient had two brief focal seizure  involving the left side.  Niece reported in ER ,states he has not had a seizure in over a year.  States he has compliance medications.  Patient was postictal after the seizure,was alert on floor,.,CT abdomen show no acute process,,his Troponin level is increased,patient has been  admitted for evaluation by cardiology,his abdominal pain is  resolved,US show cholelithiasis.family arrived to floor,information by family provided.he remains seizure free,t,neuroogy was  Following.his home medications levels,dilantin and phenobarbital was normal.  cardiology performed [facility],has CAD with no obstruction.  Started on IV Abx for left leg cellulitis,PT,OT is consulted.checked US legs,no DVT.  He was tolerated dier.  Patient remains stable with no chest pain,abdominal pain or seizure activity,patient has been discharged home with PO Abx and  PCP follow up.     Consults:   Consults (From admission, onward)        Status Ordering Provider     Inpatient consult to Cardiology  Once     Provider:  Jonel Aviles MD    Completed JOHNNY JOSE     Inpatient consult to Neurology  Once     Provider:  Ori Tineo MD    Completed JOHNNY JOSE     Inpatient consult to Urology  Once     Provider:  LINDA Pearson MD    Completed JOHNNY JOSE     Pharmacy to dose Vancomycin consult  Once     Provider:  (Not yet assigned)    Acknowledged JOHNNY JOSE          No new Assessment & Plan notes have been filed under this hospital service since the last note was generated.  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] 12/12/2013 Yes    Seizure  disorder [G40.909] 01/04/2013 Yes    DJD (degenerative joint disease) [M19.90] 01/04/2013 Yes    Abdominal hernia [K46.9] 01/04/2013 Yes    Essential hypertension, benign [I10] 11/02/2012 Yes    BPH (benign prostatic hyperplasia) [N40.0] 11/02/2012 Yes    Dyslipidemia [E78.5] 11/02/2012 Yes      Problems Resolved During this Admission:    Diagnosis Date Noted Date Resolved POA    NSTEMI (non-ST elevated myocardial infarction) [I21.4] 09/24/2019 09/24/2019 Yes       Discharged Condition: stable    Disposition: Home or Self Care    Follow Up:  Follow-up Information     [facility] On 10/4/2019.    Specialty:  Urology  Why:  Hospital follow.  Nurse with [facility] will call the pt with an appointment  Contact information:  [address] [facility] [address]  [facility] [address] [phone]  Additional information:  [address]           [facility] [place].    Why:  Hospital follow up. Pt. to make appointment and f/u  PCP and cardiology   Contact information:  [address] [facility] [address] [phone]             OTHER.               Patient Instructions:      Ambulatory Referral to Physical/Occupational Therapy   Referral Priority: Routine Referral Type: Physical Medicine   Referral Reason: Specialty Services Required   Number of Visits Requested: 1     Activity as tolerated       Significant Diagnostic Studies: Labs:   CMP   Recent Labs   Lab 09/26/19 0420 09/27/19  0440   * 137   K 3.0* 3.4*    101   CO2 27 28   * 108   BUN 12 8   CREATININE 0.7 0.6   CALCIUM 8.3* 8.0*   PROT 7.6 7.2   ALBUMIN 3.3* 3.1*   BILITOT 0.3 0.2   ALKPHOS 57 55   AST 29 32   ALT 24 22   ANIONGAP 7* 8   ESTGFRAFRICA >60 >60   EGFRNONAA >60 >60   , CBC   Recent Labs   Lab 09/26/19 0420 09/27/19  0440   WBC 8.71 5.72   HGB 13.7* 14.0   HCT 41.0 41.9    132*   , Lipid Panel   Lab Results   Component Value Date    CHOL 177 06/30/2017    HDL 44 06/30/2017    LDLCALC 112  06/30/2017    TRIG 107 06/30/2017    CHOLHDL 4.0 06/30/2017    and Troponin   Recent Labs   Lab 09/24/19  1008   TROPONINI 0.099*  0.099*     Microbiology: Blood Culture No results found for: LABBLOO  Radiology: X-Ray: CXR: X-Ray Chest 1 View (CXR):   Results for orders placed or performed during the hospital encounter of 09/23/19   X-Ray Chest 1 View    Narrative    EXAMINATION:  XR CHEST 1 VIEW    CLINICAL HISTORY:  Unspecified abdominal pain    TECHNIQUE:  Single frontal view of the chest was performed.    COMPARISON:  03/26/2016    FINDINGS:  Cardiac monitoring leads overlie the chest.  The cardiomediastinal silhouette is mildly enlarged, similar to prior examination.  The lungs are symmetrically expanded with coarse interstitial markings bilaterally.  No large confluent airspace consolidation appreciated.  No evidence of significant pleural effusion or pneumothorax.  Visualized osseous structures demonstrate degenerative changes.      Impression    No radiographic evidence of acute intrathoracic process..      Electronically signed by: Dheeraj Granger MD  Date:    09/24/2019  Time:    00:19    and X-Ray Chest PA and Lateral (CXR): No results found for this visit on 09/23/19.  Ultrasound: legs  Cardiac Graphics: Echocardiogram:   2D echo with color flow doppler:   Results for orders placed or performed during the hospital encounter of 03/03/15   2D echo with color flow doppler   Result Value Ref Range    QEF 55 55 - 65    Diastolic Dysfunction No     Est. PA Systolic Pressure 39.24     Pericardial Effusion NONE     Tricuspid Valve Regurgitation MILD     Narrative    TEST DESCRIPTION   Technical Quality: This is a technically adequate study.     Aorta: The aortic root is normal in size, measuring 2.2 cm at sinotubular junction and 3.3 cm at Sinuses of Valsalva. The proximal ascending aorta is normal in size, measuring 2.2 cm across.     Left Atrium: The left atrial volume index is normal, measuring 23.81 cc/m2.      Left Ventricle: The left ventricle is normal in size, with an end-diastolic diameter of 4.2 cm, and an end-systolic diameter of 3.1 cm. LV wall thickness is normal, with the septum and the posterior wall each measuring 1.2 cm across. Relative wall   thickness was increased at 0.57, and the LV mass index was 105.2 g/m2 consistent with concentric remodeling. Global left ventricular systolic function appears normal. Visually estimated ejection fraction is 55-60%. The LV Doppler derived stroke volume   equals 75.0 ccs.   The E/e'(lat) is 9, consistent with normal diastolic function.     Right Atrium: The right atrium is normal in size, measuring 4.0 cm in length and 2.2 cm in width in the apical view.     Right Ventricle: The right ventricle is normal in size measuring 3.3 cm at the base in the apical right ventricle-focused view. Global right ventricular systolic function appears normal. Tricuspid annular plane systolic excursion (TAPSE) is 1.7 cm. The   estimated PA systolic pressure is 39 mmHg.     Tricuspid Valve:  There is mild tricuspid regurgitation.     Pericardium: There is no evidence of pericardial effusion.      IVC: IVC is normal in size and collapses > 50% with a sniff, suggesting normal right atrial pressure of 3 mmHg.     Intracavitary: There is no evidence of intracavity mass, thrombi, or vegetation.         CONCLUSIONS     1 - Normal left ventricular systolic function (EF 55-60%).         This document has been electronically    SIGNED BY: Sreekanth Rosario MD On: 03/03/2015 15:04    and Transthoracic echo (TTE) complete (Cupid Only):   Results for orders placed or performed during the hospital encounter of 09/23/19   Echo Color Flow Doppler? Yes   Result Value Ref Range    BSA 2.03 m2    TDI SEPTAL 0.08 m/s    LV LATERAL E/E' RATIO 9.40 m/s    LV SEPTAL E/E' RATIO 11.75 m/s    LA WIDTH 3.27 cm    AORTIC VALVE CUSP SEPERATION 2.00 cm    TDI LATERAL 0.10 m/s    PV PEAK VELOCITY 1.06 cm/s    LVIDD 4.09  3.5 - 6.0 cm    IVS 1.60 (A) 0.6 - 1.1 cm    PW 1.70 (A) 0.6 - 1.1 cm    Ao root annulus 3.30 cm    LVIDS 2.11 2.1 - 4.0 cm    FS 48 28 - 44 %    LA volume 66.62 cm3    Sinus 3.32 cm    STJ 2.47 cm    Ascending aorta 3.05 cm    LV mass 279.46 g    LA size 4.47 cm    RVDD 3.64 cm    TAPSE 2.47 cm    RV S' 17.52 cm/s    Left Ventricle Relative Wall Thickness 0.83 cm    AV mean gradient 7 mmHg    AV valve area 3.44 cm2    AV Velocity Ratio 0.75     AV index (prosthetic) 0.75     E/A ratio 0.95     Mean e' 0.09 m/s    E wave decelartion time 217.99 msec    IVRT 0.11 msec    Pulm vein S/D ratio 1.12     LVOT diameter 2.41 cm    LVOT area 4.6 cm2    LVOT peak marquis 1.35 m/s    LVOT peak VTI 24.49 cm    Ao peak marquis 1.80 m/s    Ao VTI 32.48 cm    LVOT stroke volume 111.66 cm3    AV peak gradient 13 mmHg    E/E' ratio 10.44 m/s    MV Peak E Marquis 0.94 m/s    TR Max Marquis 3.23 m/s    MV Peak A Marquis 0.99 m/s    PV Peak S Marquis 0.38 m/s    PV Peak D Marquis 0.34 m/s    LV Systolic Volume 14.57 mL    LV Systolic Volume Index 7.4 mL/m2    LV Diastolic Volume 73.70 mL    LV Diastolic Volume Index 37.42 mL/m2    LA Volume Index 33.8 mL/m2    LV Mass Index 142 g/m2    RA Major Axis 5.27 cm    Left Atrium Minor Axis 6.09 cm    Left Atrium Major Axis 4.79 cm    Triscuspid Valve Regurgitation Peak Gradient 42 mmHg    RA Width 3.02 cm    Right Atrial Pressure (from IVC) 3 mmHg    TV rest pulmonary artery pressure 45 mmHg    Narrative    · Moderate concentric left ventricular hypertrophy.  · Normal left ventricular systolic function. The estimated ejection   fraction is 65%  · Indeterminate left ventricular diastolic function.  · Mild-moderate left atrial enlargement.  · Mild right atrial enlargement.  · Normal central venous pressure (3 mm Hg).  · The estimated PA systolic pressure is 45 mm Hg  · Pulmonary hypertension present.        Angiography: [facility]     Pending Diagnostic Studies:     None         Medications:  Reconciled Home Medications:       Medication List      START taking these medications    clindamycin 300 MG capsule  Commonly known as:  CLEOCIN  Take 1 capsule (300 mg total) by mouth 3 (three) times daily. for 7 days        CONTINUE taking these medications    alendronate 70 MG tablet  Commonly known as:  FOSAMAX  Take 1 tablet (70 mg total) by mouth every 7 days.     aspirin 81 MG EC tablet  Commonly known as:  ECOTRIN  Take 81 mg by mouth once daily.     atorvastatin 40 MG tablet  Commonly known as:  LIPITOR  Take 1 tablet (40 mg total) by mouth once daily.     calcium citrate-vitamin D3 315 mg- 250 unit Tab  Commonly known as:  CITRACAL + D MAXIMUM  Take 315 mg by mouth 2 (two) times daily.     finasteride 5 mg tablet  Commonly known as:  PROSCAR  Take 1 tablet (5 mg total) by mouth once daily.     fluticasone propionate 50 mcg/actuation nasal spray  Commonly known as:  FLONASE  SHAKE WELL AND U 2 SPRAYS IEN BID     ketorolac 0.5% 0.5 % Drop  Commonly known as:  ACULAR  Place 1 drop into the right eye 4 (four) times daily.     lisinopril 10 MG tablet  Take 1 tablet (10 mg total) by mouth once daily.     metoprolol tartrate 50 MG tablet  Commonly known as:  LOPRESSOR  Take 1 tablet (50 mg total) by mouth once daily.     montelukast 10 mg tablet  Commonly known as:  SINGULAIR  Take 10 mg by mouth every evening.     PHENobarbital 97.2 MG tablet  Commonly known as:  LUMINAL  Take 1 tablet (97.2 mg total) by mouth once daily.     phenytoin 100 MG ER capsule  Commonly known as:  DILANTIN  2 capsules po q am, 2 capsules po q hs     tamsulosin 0.4 mg Cap  Commonly known as:  FLOMAX  Take 1 capsule (0.4 mg total) by mouth once daily.            Indwelling Lines/Drains at time of discharge:   Lines/Drains/Airways     None                 Time spent on the discharge of patient: over 30  minutes  Patient was seen and examined on the date of discharge and determined to be suitable for discharge.         Reyna Rocha MD  Department of Hospital  Medicine  [facility]

## 2019-09-27 NOTE — PLAN OF CARE
09/27/19 1221   Final Note   Anticipated Discharge Disposition Home   What phone number can be called within the next 1-3 days to see how you are doing after discharge? [phone]   Hospital Follow Up  Appt(s) scheduled? Yes   Discharge plans and expectations educations in teach back method with documentation complete? Yes   Right Care Referral Info   Post Acute Recommendation Other   Referral Type Out Patient PT

## 2019-09-27 NOTE — PROGRESS NOTES
Allegra Martins, RN Nurse, medical .  TN taught Symptoms and Problems for UTI home care with pt and sister/Aguilar  with teach back:  1. Pressure in chest, 2.SOB. TN placed education sheet in [place] Packet..     Help at home will be from sister, Aguilar, assisting in pt's recovery.     TN discussed patient's preferences while patient has been here in the hospital.      TN taught patient about things HE is responsible for when discharged TO HELP WITH HIS RECOVERY:  Particularly on how to Manage HIS Care At Home:  1. Getting his prescriptions filled.  2. Taking his medications as directed. DO NOT MISS ANY DOSES!  3. Going to his follow-up doctor appointments.   .  Mackenzie Dumont RN, BSN, STN CCM

## 2019-09-27 NOTE — PROGRESS NOTES
[facility]  Cardiology  Progress Note    Patient Name: Vern Lr  MRN: [id number]  Admission Date: 9/23/2019  Hospital Length of Stay: 3 days  Code Status: Full Code   Attending Physician: Reyna Rocha MD   Primary Care Physician: Maria Fernanda Abbasi MD (Inactive)  Expected Discharge Date: 9/27/2019  Principal Problem:Elevated troponin    Subjective:       Interval History: pt doing fine. No complications from cardiac cath. Radial pulse present    Review of Systems   Constitution: Negative.   HENT: Negative.    Eyes: Negative.    Cardiovascular: Negative.  Negative for chest pain.   Respiratory: Negative for shortness of breath.    Endocrine: Negative.    Hematologic/Lymphatic: Negative.    Skin: Negative.    Musculoskeletal: Negative.    Gastrointestinal: Negative.    Genitourinary: Negative.    Neurological: Negative.    Psychiatric/Behavioral: Negative.    Allergic/Immunologic: Negative.      Objective:     Vital Signs (Most Recent):  Temp: 98.2 °F (36.8 °C) (09/27/19 0731)  Pulse: 80 (09/27/19 0731)  Resp: 16 (09/27/19 0731)  BP: (!) 147/82 (09/27/19 0731)  SpO2: 96 % (09/27/19 0808) Vital Signs (24h Range):  Temp:  [98.1 °F (36.7 °C)-99.5 °F (37.5 °C)] 98.2 °F (36.8 °C)  Pulse:  [66-86] 80  Resp:  [16-18] 16  SpO2:  [96 %-100 %] 96 %  BP: (102-153)/(57-85) 147/82     Weight: 86.2 kg (190 lb 0.6 oz)  Body mass index is 31.62 kg/m².     SpO2: 96 %  O2 Device (Oxygen Therapy): nasal cannula      Intake/Output Summary (Last 24 hours) at 9/27/2019 1049  Last data filed at 9/27/2019 0800  Gross per 24 hour   Intake 838 ml   Output 1350 ml   Net -512 ml       Lines/Drains/Airways     Peripheral Intravenous Line                 Peripheral IV - Single Lumen 09/25/19 0330 18 G 2 days         Peripheral IV - Single Lumen 09/26/19 0308 Anterior;Right Forearm 1 day                Physical Exam   Constitutional: He is oriented to person, place, and time. He appears well-developed and well-nourished.    HENT:   Head: Normocephalic.   Eyes: Pupils are equal, round, and reactive to light. Conjunctivae are normal.   Neck: Normal range of motion. Neck supple.   Cardiovascular: Normal rate, regular rhythm and normal heart sounds.   No access site complications   Pulmonary/Chest: Effort normal and breath sounds normal.   Abdominal: Soft. Bowel sounds are normal.   Neurological: He is alert and oriented to person, place, and time.   Skin: Skin is warm.   Vitals reviewed.      Significant Labs:   BMP:   Recent Labs   Lab 09/26/19 0420 09/27/19 0440   * 108   * 137   K 3.0* 3.4*    101   CO2 27 28   BUN 12 8   CREATININE 0.7 0.6   CALCIUM 8.3* 8.0*   , CMP   Recent Labs   Lab 09/26/19 0420 09/27/19 0440   * 137   K 3.0* 3.4*    101   CO2 27 28   * 108   BUN 12 8   CREATININE 0.7 0.6   CALCIUM 8.3* 8.0*   PROT 7.6 7.2   ALBUMIN 3.3* 3.1*   BILITOT 0.3 0.2   ALKPHOS 57 55   AST 29 32   ALT 24 22   ANIONGAP 7* 8   ESTGFRAFRICA >60 >60   EGFRNONAA >60 >60   , CBC   Recent Labs   Lab 09/26/19 0420 09/27/19 0440   WBC 8.71 5.72   HGB 13.7* 14.0   HCT 41.0 41.9    132*   , INR No results for input(s): INR, PROTIME in the last 48 hours., Lipid Panel No results for input(s): CHOL, HDL, LDLCALC, TRIG, CHOLHDL in the last 48 hours., Troponin No results for input(s): TROPONINI in the last 48 hours. and All pertinent lab results from the last 24 hours have been reviewed.    Significant Imaging: Echocardiogram:   Transthoracic echo (TTE) complete (Cupid Only):   Results for orders placed or performed during the hospital encounter of 09/23/19   Echo Color Flow Doppler? Yes   Result Value Ref Range    BSA 2.03 m2    TDI SEPTAL 0.08 m/s    LV LATERAL E/E' RATIO 9.40 m/s    LV SEPTAL E/E' RATIO 11.75 m/s    LA WIDTH 3.27 cm    AORTIC VALVE CUSP SEPERATION 2.00 cm    TDI LATERAL 0.10 m/s    PV PEAK VELOCITY 1.06 cm/s    LVIDD 4.09 3.5 - 6.0 cm    IVS 1.60 (A) 0.6 - 1.1 cm    PW 1.70 (A) 0.6  - 1.1 cm    Ao root annulus 3.30 cm    LVIDS 2.11 2.1 - 4.0 cm    FS 48 28 - 44 %    LA volume 66.62 cm3    Sinus 3.32 cm    STJ 2.47 cm    Ascending aorta 3.05 cm    LV mass 279.46 g    LA size 4.47 cm    RVDD 3.64 cm    TAPSE 2.47 cm    RV S' 17.52 cm/s    Left Ventricle Relative Wall Thickness 0.83 cm    AV mean gradient 7 mmHg    AV valve area 3.44 cm2    AV Velocity Ratio 0.75     AV index (prosthetic) 0.75     E/A ratio 0.95     Mean e' 0.09 m/s    E wave decelartion time 217.99 msec    IVRT 0.11 msec    Pulm vein S/D ratio 1.12     LVOT diameter 2.41 cm    LVOT area 4.6 cm2    LVOT peak marquis 1.35 m/s    LVOT peak VTI 24.49 cm    Ao peak marquis 1.80 m/s    Ao VTI 32.48 cm    LVOT stroke volume 111.66 cm3    AV peak gradient 13 mmHg    E/E' ratio 10.44 m/s    MV Peak E Marquis 0.94 m/s    TR Max Marquis 3.23 m/s    MV Peak A Marquis 0.99 m/s    PV Peak S Marquis 0.38 m/s    PV Peak D Marquis 0.34 m/s    LV Systolic Volume 14.57 mL    LV Systolic Volume Index 7.4 mL/m2    LV Diastolic Volume 73.70 mL    LV Diastolic Volume Index 37.42 mL/m2    LA Volume Index 33.8 mL/m2    LV Mass Index 142 g/m2    RA Major Axis 5.27 cm    Left Atrium Minor Axis 6.09 cm    Left Atrium Major Axis 4.79 cm    Triscuspid Valve Regurgitation Peak Gradient 42 mmHg    RA Width 3.02 cm    Right Atrial Pressure (from IVC) 3 mmHg    TV rest pulmonary artery pressure 45 mmHg    Narrative    · Moderate concentric left ventricular hypertrophy.  · Normal left ventricular systolic function. The estimated ejection   fraction is 65%  · Indeterminate left ventricular diastolic function.  · Mild-moderate left atrial enlargement.  · Mild right atrial enlargement.  · Normal central venous pressure (3 mm Hg).  · The estimated PA systolic pressure is 45 mm Hg  · Pulmonary hypertension present.        Assessment and Plan:         * Elevated troponin  Non obs cad. rx aspirin, statins. Maximize medical rx    Chest pain  No more CP    Dyslipidemia  On lipitor    Essential  hypertension, benign  On metoprolol and lisinopril.        VTE Risk Mitigation (From admission, onward)    None          Zahra Roca MD  Cardiology  [facility]

## 2019-09-27 NOTE — PROGRESS NOTES
[facility] CASE MANAGEMENT                  WRITTEN DISCHARGE INFORMATION      APPOINTMENTS AND RESOURCES TO HELP YOU MANAGE YOUR CARE AT HOME BASED ON YOUR PREFERENCES:  (If an appointment is not scheduled for you when you leave the hospital, call your doctor to schedule a follow up visit within a week)        Healthy Living Instructions to HELP MANAGE YOUR CARE AT HOME:  Things You are responsible for:  1.    Getting your prescriptions filled   2.    Taking your medications as directed, DO NOT MISS ANY DOSES!  3.    Following the diet and exercise recommended by your doctor  4.    Going to your follow-up doctor appointment. This is important because it allows the doctor to monitor your progress and determine if any changes need to made to your treatment plan.  5. If you have any questions about MANAGING YOUR CARE AT HOME Call the Nurse Care Line for 24/7 Assistance [phone]   Follow-up Information     [facility] - [facility]rology On 10/4/2019.    Specialty:  Urology  Why:  Hospital follow.  Nurse with [facility] will call the pt with an appointment  Contact information:  [address] [facility] [address] [facility] [address]  [facility] [address] [phone]  Additional information:  [address]           [facility].    Why:  Hospital follow up. Pt. to make appointment and f/u  PCP and cardiology   Contact information:  [facility] [address]  [phone]             OTHER.           [facility].    Why:  A reprensative will contact you to schedule your appointment. Call [phone] if you do not recieve a call.                 Please answer any calls you may receive from [facility]. We want to continue to support you as you manage your healthcare needs. [facility] happy to have the opportunity to serve you.      Thank you for choosing [facility] for your healthcare needs!  Your [facility] Case Management Team,

## 2019-09-27 NOTE — PLAN OF CARE
Problem: Fall Injury Risk  Goal: Absence of Fall and Fall-Related Injury  Outcome: Ongoing, Progressing  Intervention: Promote Injury-Free Environment  Flowsheets (Taken 9/27/2019 0311)  Safety Promotion/Fall Prevention: assistive device/personal item within reach; bed alarm set; commode/urinal/bedpan at bedside; Fall Risk reviewed with patient/family; Fall Risk signage in place; family to remain at bedside; lighting adjusted; medications reviewed; nonskid shoes/socks when out of bed; side rails raised x 3; /camera at bedside; instructed to call staff for mobility; supervised activity  Environmental Safety Modification: assistive device/personal items within reach; clutter free environment maintained; lighting adjusted; room near unit station     Problem: Skin Injury Risk Increased  Goal: Skin Health and Integrity  Outcome: Ongoing, Progressing  Intervention: Optimize Skin Protection  Flowsheets (Taken 9/27/2019 0311)  Pressure Reduction Techniques: frequent weight shift encouraged  Skin Protection: adhesive use limited; tubing/devices free from skin contact; incontinence pads utilized  Head of Bed (HOB): HOB elevated  Intervention: Promote and Optimize Oral Intake  Flowsheets (Taken 9/27/2019 0311)  Oral Nutrition Promotion: safe use of adaptive equipment encouraged

## 2019-09-27 NOTE — PHYSICIAN QUERY
PT Name: Vern Lr  MR #: [id number]     Physician Query Form - Diagnosis Clarification      CDS/: Jasmin Ramirez               Contact information:[email or web address]    This form is a permanent document in the medical record.     Query Date: September 27, 2019    By submitting this query, we are merely seeking further clarification of documentation.  Please utilize your independent clinical judgment when addressing the question(s) below.     The medical record contains the following:      Findings Supporting Clinical Information Location in Medical Record   NSTEMI     Pre-op Diagnosis:  NSTEMI (non-ST elevated myocardial infarction) [I21.4]       Post-op Diagnosis:  Post-Op Diagnosis Codes:      * NSTEMI (non-ST elevated myocardial infarction) [I21.4]       Procedure(s) (LRB):   Left heart cath, radial. 11 am (Left)      Description of the findings of the procedure:  Mild nonobstructive coronary artery disease     Troponin=0.035->0.137->0.0999   Neurology cn 9-24    Brief op note 9-26                            Lab 9-24     Please clarify if the NSTEMI diagnosis has been:    [  ] Ruled In   [x  ] Ruled Out   [  ] Other/Clarification of findings (please specify):     [  ] Clinically undetermined     Please document in your progress notes daily for the duration of treatment, until resolved, and include in your discharge summary.

## 2019-09-30 ENCOUNTER — PATIENT OUTREACH (OUTPATIENT)
Dept: ADMINISTRATIVE | Facility: CLINIC | Age: 79
End: 2019-09-30

## 2019-09-30 NOTE — PATIENT INSTRUCTIONS
Troponin  Does this test have other names?  Cardiac troponin (cTn), cardiac troponin I (cTnI), cardiac troponin T (cTnT)  What is this test?  This test measures the amount of the protein troponin in your blood.  Troponin is found in cells in your heart muscle. When these cells are injured--most often because the heart isn't getting enough oxygen and nutrients--they can release troponin and other substances into the blood.  Measuring your levels of troponin often can quickly tell your healthcare provider whether you are having a heart attack. During a heart attack, an artery that feeds your heart muscle with blood becomes blocked.  Why do I need this test?  You might have this test if your healthcare provider suspects that you are having a heart attack. Symptoms of a heart attack often include:  · Pain or discomfort in the chest that may feel like a squeezing sensation or a sense of fullness  · Pain in other areas, such as the neck, back, arm, or jaw  · Shortness of breath  · Lightheadedness or dizziness  · Nausea or vomiting  · Sudden sweating  · Extreme tiredness  What other tests might I have along with this test?  Your healthcare provider may also order other tests to diagnose a heart attack and learn more about how it's affecting the heart. These tests often include:  · Electrocardiogram (ECG) to measure the heart's electrical activity  · Blood tests to measure creatine kinase MB, a substance found in heart muscle and other tissues  What do my test results mean?  Many things may affect your lab test results. These include the method each lab uses to do the test. Even if your test results are different from the normal value, you may not have a problem. To learn what the results mean for you, talk with your healthcare provider.  Results are given in nanograms per milliliter (ng/mL). The normal range for troponin is between 0 and 0.4 ng/mL.  Other types of heart injury may cause a rise in troponin levels. These  include:  · Atrial fibrillation  · Heart failure  · Myocarditis  · Damage to the heart from anthracycline medicines. These are used for cancer treatment.  Conditions in other parts of your body may cause troponin levels to rise. These include:  · Blood clot in your lungs (pulmonary embolism)  · Chronic kidney disease  · Chronic obstructive pulmonary disease (COPD)  How is this test done?  The test requires a blood sample, which is drawn through a needle from a vein in your arm.  Does this test pose any risks?  Taking a blood sample with a needle carries risks that include bleeding, infection, bruising, or feeling dizzy. When the needle pricks your arm, you may feel a slight stinging sensation or pain. Afterward, the site may be slightly sore.  What might affect my test results?  Having this test too soon after a heart attack may give a false-negative. Cardiac troponin takes a few hours to rise after heart-cell death begins. Your healthcare provider may need to measure it several times over a few hours after the symptoms start.  How do I get ready for this test?  You don't need to prepare for this test. But be sure your healthcare provider knows about all medicines, herbs, vitamins, and supplements you are taking. This includes medicines that don't need a prescription and any illicit drugs you may use.      © 3734-2050 [employer]. [address] [place] [address], [place] [address]. All rights reserved. This information is not intended as a substitute for professional medical care. Always follow your healthcare professional's instructions.

## 2020-04-09 ENCOUNTER — HOSPITAL ENCOUNTER (INPATIENT)
Facility: HOSPITAL | Age: 80
LOS: 32 days | Discharge: NURSING, REHAB OR LONG-TERM CARE | DRG: 177 | End: 2020-05-11
Attending: EMERGENCY MEDICINE | Admitting: INTERNAL MEDICINE
Payer: MEDICAID

## 2020-04-09 DIAGNOSIS — U07.1 COVID-19 VIRUS INFECTION: Primary | ICD-10-CM

## 2020-04-09 DIAGNOSIS — R06.02 SOB (SHORTNESS OF BREATH): ICD-10-CM

## 2020-04-09 DIAGNOSIS — G40.909 SEIZURE DISORDER: ICD-10-CM

## 2020-04-09 DIAGNOSIS — I25.10 CORONARY ARTERY DISEASE INVOLVING NATIVE CORONARY ARTERY OF NATIVE HEART WITHOUT ANGINA PECTORIS: ICD-10-CM

## 2020-04-09 DIAGNOSIS — J22 LOWER RESPIRATORY TRACT INFECTION DUE TO COVID-19 VIRUS: ICD-10-CM
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
## 2020-04-09 DIAGNOSIS — U07.1 LOWER RESPIRATORY TRACT INFECTION DUE TO COVID-19 VIRUS: ICD-10-CM

## 2020-04-09 DIAGNOSIS — R79.89 ELEVATED TROPONIN: ICD-10-CM

## 2020-04-09 DIAGNOSIS — I25.10 CAD (CORONARY ARTERY DISEASE): ICD-10-CM

## 2020-04-09 DIAGNOSIS — I10 ESSENTIAL HYPERTENSION, BENIGN: ICD-10-CM

## 2020-04-09 DIAGNOSIS — J18.9 MULTIFOCAL PNEUMONIA: ICD-10-CM

## 2020-04-09 PROBLEM — K59.00 CONSTIPATION: Status: ACTIVE | Noted: 2020-04-09
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
## 2020-04-09 LAB
ALBUMIN SERPL BCP-MCNC: 3.2 G/DL (ref 3.5–5.2)
ALLENS TEST: ABNORMAL
ALP SERPL-CCNC: 43 U/L (ref 55–135)
ALT SERPL W/O P-5'-P-CCNC: 46 U/L (ref 10–44)
AMMONIA PLAS-SCNC: 51 UMOL/L (ref 10–50)
AMPHET+METHAMPHET UR QL: NEGATIVE
ANION GAP SERPL CALC-SCNC: 11 MMOL/L (ref 8–16)
AORTIC ROOT ANNULUS: 3.83 CM
AORTIC VALVE CUSP SEPERATION: 2.46 CM
ASCENDING AORTA: 3.09 CM
AST SERPL-CCNC: 99 U/L (ref 10–40)
AV INDEX (PROSTH): 0.99
AV MEAN GRADIENT: 4 MMHG
AV PEAK GRADIENT: 6 MMHG
AV VALVE AREA: 4.22 CM2
AV VELOCITY RATIO: 0.95
BACTERIA #/AREA URNS HPF: ABNORMAL /HPF
BARBITURATES UR QL SCN>200 NG/ML: NORMAL
BASOPHILS # BLD AUTO: 0.01 K/UL (ref 0–0.2)
BASOPHILS NFR BLD: 0.1 % (ref 0–1.9)
BENZODIAZ UR QL SCN>200 NG/ML: NEGATIVE
BILIRUB SERPL-MCNC: 0.5 MG/DL (ref 0.1–1)
BILIRUB UR QL STRIP: NEGATIVE
BNP SERPL-MCNC: 46 PG/ML (ref 0–99)
BNP SERPL-MCNC: 66 PG/ML (ref 0–99)
BSA FOR ECHO PROCEDURE: 1.99 M2
BUN SERPL-MCNC: 17 MG/DL (ref 8–23)
BZE UR QL SCN: NEGATIVE
CALCIUM SERPL-MCNC: 8.2 MG/DL (ref 8.7–10.5)
CANNABINOIDS UR QL SCN: NEGATIVE
CHLORIDE SERPL-SCNC: 97 MMOL/L (ref 95–110)
CLARITY UR: CLEAR
CO2 SERPL-SCNC: 26 MMOL/L (ref 23–29)
COLOR UR: YELLOW
CREAT SERPL-MCNC: 0.8 MG/DL (ref 0.5–1.4)
CREAT UR-MCNC: 153.2 MG/DL (ref 23–375)
CRP SERPL-MCNC: 269.6 MG/L (ref 0–8.2)
CV ECHO LV RWT: 0.69 CM
DELSYS: ABNORMAL
DIFFERENTIAL METHOD: ABNORMAL
DOP CALC AO PEAK VEL: 1.26 M/S
DOP CALC AO VTI: 29.38 CM
DOP CALC LVOT AREA: 4.3 CM2
DOP CALC LVOT DIAMETER: 2.33 CM
DOP CALC LVOT PEAK VEL: 1.2 M/S
DOP CALC LVOT STROKE VOLUME: 123.93 CM3
DOP CALCLVOT PEAK VEL VTI: 29.08 CM
E WAVE DECELERATION TIME: 225.81 MSEC
E/A RATIO: 0.83
E/E' RATIO: 9.57 M/S
ECHO LV POSTERIOR WALL: 1.45 CM (ref 0.6–1.1)
EOSINOPHIL # BLD AUTO: 0 K/UL (ref 0–0.5)
EOSINOPHIL NFR BLD: 0 % (ref 0–8)
ERYTHROCYTE [DISTWIDTH] IN BLOOD BY AUTOMATED COUNT: 12.3 % (ref 11.5–14.5)
ERYTHROCYTE [SEDIMENTATION RATE] IN BLOOD BY WESTERGREN METHOD: 50 MM/HR (ref 0–10)
EST. GFR  (AFRICAN AMERICAN): >60 ML/MIN/1.73 M^2
EST. GFR  (NON AFRICAN AMERICAN): >60 ML/MIN/1.73 M^2
ETHANOL SERPL-MCNC: <10 MG/DL
FRACTIONAL SHORTENING: 30 % (ref 28–44)
GLUCOSE SERPL-MCNC: 130 MG/DL (ref 70–110)
GLUCOSE UR QL STRIP: NEGATIVE
HCO3 UR-SCNC: 29.2 MMOL/L (ref 24–28)
HCT VFR BLD AUTO: 40.7 % (ref 40–54)
HGB BLD-MCNC: 13.9 G/DL (ref 14–18)
HGB UR QL STRIP: ABNORMAL
HYALINE CASTS #/AREA URNS LPF: 0 /LPF
IMM GRANULOCYTES # BLD AUTO: 0.05 K/UL (ref 0–0.04)
IMM GRANULOCYTES NFR BLD AUTO: 0.7 % (ref 0–0.5)
INTERVENTRICULAR SEPTUM: 1.43 CM (ref 0.6–1.1)
IVRT: 143.02 MSEC
KETONES UR QL STRIP: NEGATIVE
LA MAJOR: 5.65 CM
LA MINOR: 5.91 CM
LA WIDTH: 3.97 CM
LACTATE SERPL-SCNC: 1.6 MMOL/L (ref 0.5–2.2)
LEFT ATRIUM SIZE: 3.69 CM
LEFT ATRIUM VOLUME INDEX: 37.2 ML/M2
LEFT ATRIUM VOLUME: 71.94 CM3
LEFT INTERNAL DIMENSION IN SYSTOLE: 2.96 CM (ref 2.1–4)
LEFT VENTRICLE DIASTOLIC VOLUME INDEX: 41.12 ML/M2
LEFT VENTRICLE DIASTOLIC VOLUME: 79.58 ML
LEFT VENTRICLE MASS INDEX: 122 G/M2
LEFT VENTRICLE SYSTOLIC VOLUME INDEX: 17.5 ML/M2
LEFT VENTRICLE SYSTOLIC VOLUME: 33.85 ML
LEFT VENTRICULAR INTERNAL DIMENSION IN DIASTOLE: 4.22 CM (ref 3.5–6)
LEFT VENTRICULAR MASS: 235.86 G
LEUKOCYTE ESTERASE UR QL STRIP: NEGATIVE
LIPASE SERPL-CCNC: 77 U/L (ref 4–60)
LV LATERAL E/E' RATIO: 9.57 M/S
LV SEPTAL E/E' RATIO: 9.57 M/S
LYMPHOCYTES # BLD AUTO: 0.5 K/UL (ref 1–4.8)
LYMPHOCYTES NFR BLD: 6.3 % (ref 18–48)
MCH RBC QN AUTO: 30.3 PG (ref 27–31)
MCHC RBC AUTO-ENTMCNC: 34.2 G/DL (ref 32–36)
MCV RBC AUTO: 89 FL (ref 82–98)
METHADONE UR QL SCN>300 NG/ML: NEGATIVE
MICROSCOPIC COMMENT: ABNORMAL
MODE: ABNORMAL
MONOCYTES # BLD AUTO: 0.3 K/UL (ref 0.3–1)
MONOCYTES NFR BLD: 4.3 % (ref 4–15)
MV PEAK A VEL: 0.81 M/S
MV PEAK E VEL: 0.67 M/S
NEUTROPHILS # BLD AUTO: 6.8 K/UL (ref 1.8–7.7)
NEUTROPHILS NFR BLD: 88.6 % (ref 38–73)
NITRITE UR QL STRIP: NEGATIVE
NRBC BLD-RTO: 0 /100 WBC
OPIATES UR QL SCN: NEGATIVE
PCO2 BLDA: 40.2 MMHG (ref 35–45)
PCP UR QL SCN>25 NG/ML: NEGATIVE
PH SMN: 7.47 [PH] (ref 7.35–7.45)
PH UR STRIP: 7 [PH] (ref 5–8)
PISA TR MAX VEL: 2.95 M/S
PLATELET # BLD AUTO: 199 K/UL (ref 150–350)
PMV BLD AUTO: 9.9 FL (ref 9.2–12.9)
PO2 BLDA: 39 MMHG (ref 40–60)
POC BE: 5 MMOL/L
POC SATURATED O2: 77 % (ref 95–100)
POC TCO2: 30 MMOL/L (ref 24–29)
POCT GLUCOSE: 125 MG/DL (ref 70–110)
POTASSIUM SERPL-SCNC: 3.9 MMOL/L (ref 3.5–5.1)
PROCALCITONIN SERPL IA-MCNC: 0.19 NG/ML
PROT SERPL-MCNC: 7.7 G/DL (ref 6–8.4)
PROT UR QL STRIP: ABNORMAL
PULM VEIN S/D RATIO: 1.28
PV PEAK D VEL: 0.32 M/S
PV PEAK S VEL: 0.41 M/S
PV PEAK VELOCITY: 1.02 CM/S
RA MAJOR: 4.92 CM
RA WIDTH: 3.41 CM
RBC # BLD AUTO: 4.58 M/UL (ref 4.6–6.2)
RBC #/AREA URNS HPF: 25 /HPF (ref 0–4)
RIGHT VENTRICULAR END-DIASTOLIC DIMENSION: 3.63 CM
RV TISSUE DOPPLER FREE WALL SYSTOLIC VELOCITY 1 (APICAL 4 CHAMBER VIEW): 14.8 CM/S
SAMPLE: ABNORMAL
SARS-COV-2 RDRP RESP QL NAA+PROBE: POSITIVE
SINUS: 3.44 CM
SITE: ABNORMAL
SODIUM SERPL-SCNC: 134 MMOL/L (ref 136–145)
SP GR UR STRIP: 1.02 (ref 1–1.03)
SP02: 92
SQUAMOUS #/AREA URNS HPF: 5 /HPF
STJ: 2.9 CM
TDI LATERAL: 0.07 M/S
TDI SEPTAL: 0.07 M/S
TDI: 0.07 M/S
TOXICOLOGY INFORMATION: NORMAL
TR MAX PG: 35 MMHG
TRICUSPID ANNULAR PLANE SYSTOLIC EXCURSION: 2.82 CM
TROPONIN I SERPL DL<=0.01 NG/ML-MCNC: 0.05 NG/ML (ref 0–0.03)
TROPONIN I SERPL DL<=0.01 NG/ML-MCNC: 0.05 NG/ML (ref 0–0.03)
TROPONIN I SERPL DL<=0.01 NG/ML-MCNC: 0.11 NG/ML (ref 0–0.03)
TROPONIN I SERPL DL<=0.01 NG/ML-MCNC: 0.12 NG/ML (ref 0–0.03)
URN SPEC COLLECT METH UR: ABNORMAL
UROBILINOGEN UR STRIP-ACNC: ABNORMAL EU/DL
WBC # BLD AUTO: 7.65 K/UL (ref 3.9–12.7)
WBC #/AREA URNS HPF: 0 /HPF (ref 0–5)

## 2020-04-09 PROCEDURE — 25000003 PHARM REV CODE 250: Performed by: EMERGENCY MEDICINE

## 2020-04-09 PROCEDURE — 25000003 PHARM REV CODE 250: Performed by: INTERNAL MEDICINE

## 2020-04-09 PROCEDURE — 83690 ASSAY OF LIPASE: CPT

## 2020-04-09 PROCEDURE — 81000 URINALYSIS NONAUTO W/SCOPE: CPT | Mod: 59

## 2020-04-09 PROCEDURE — 85025 COMPLETE CBC W/AUTO DIFF WBC: CPT

## 2020-04-09 PROCEDURE — 99900035 HC TECH TIME PER 15 MIN (STAT)

## 2020-04-09 PROCEDURE — U0002 COVID-19 LAB TEST NON-CDC: HCPCS

## 2020-04-09 PROCEDURE — 21400001 HC TELEMETRY ROOM

## 2020-04-09 PROCEDURE — 83880 ASSAY OF NATRIURETIC PEPTIDE: CPT | Mod: 91

## 2020-04-09 PROCEDURE — 80053 COMPREHEN METABOLIC PANEL: CPT

## 2020-04-09 PROCEDURE — 63600175 PHARM REV CODE 636 W HCPCS: Performed by: EMERGENCY MEDICINE

## 2020-04-09 PROCEDURE — 83605 ASSAY OF LACTIC ACID: CPT

## 2020-04-09 PROCEDURE — 63600175 PHARM REV CODE 636 W HCPCS: Performed by: INTERNAL MEDICINE

## 2020-04-09 PROCEDURE — 36415 COLL VENOUS BLD VENIPUNCTURE: CPT

## 2020-04-09 PROCEDURE — 80307 DRUG TEST PRSMV CHEM ANLYZR: CPT

## 2020-04-09 PROCEDURE — 99233 SBSQ HOSP IP/OBS HIGH 50: CPT | Mod: 25,,, | Performed by: INTERNAL MEDICINE

## 2020-04-09 PROCEDURE — 96365 THER/PROPH/DIAG IV INF INIT: CPT

## 2020-04-09 PROCEDURE — 82140 ASSAY OF AMMONIA: CPT

## 2020-04-09 PROCEDURE — 82803 BLOOD GASES ANY COMBINATION: CPT

## 2020-04-09 PROCEDURE — 96361 HYDRATE IV INFUSION ADD-ON: CPT

## 2020-04-09 PROCEDURE — 25000242 PHARM REV CODE 250 ALT 637 W/ HCPCS: Performed by: INTERNAL MEDICINE

## 2020-04-09 PROCEDURE — 85652 RBC SED RATE AUTOMATED: CPT

## 2020-04-09 PROCEDURE — 93005 ELECTROCARDIOGRAM TRACING: CPT

## 2020-04-09 PROCEDURE — 99233 PR SUBSEQUENT HOSPITAL CARE,LEVL III: ICD-10-PCS | Mod: 25,,, | Performed by: INTERNAL MEDICINE

## 2020-04-09 PROCEDURE — 80320 DRUG SCREEN QUANTALCOHOLS: CPT

## 2020-04-09 PROCEDURE — 25500020 PHARM REV CODE 255: Performed by: EMERGENCY MEDICINE

## 2020-04-09 PROCEDURE — 87040 BLOOD CULTURE FOR BACTERIA: CPT

## 2020-04-09 PROCEDURE — 84484 ASSAY OF TROPONIN QUANT: CPT | Mod: 91

## 2020-04-09 PROCEDURE — 86140 C-REACTIVE PROTEIN: CPT

## 2020-04-09 PROCEDURE — 93010 EKG 12-LEAD: ICD-10-PCS | Mod: ,,, | Performed by: INTERNAL MEDICINE

## 2020-04-09 PROCEDURE — 84145 PROCALCITONIN (PCT): CPT

## 2020-04-09 PROCEDURE — 93010 ELECTROCARDIOGRAM REPORT: CPT | Mod: ,,, | Performed by: INTERNAL MEDICINE

## 2020-04-09 PROCEDURE — 83880 ASSAY OF NATRIURETIC PEPTIDE: CPT

## 2020-04-09 PROCEDURE — 84484 ASSAY OF TROPONIN QUANT: CPT

## 2020-04-09 PROCEDURE — 99285 EMERGENCY DEPT VISIT HI MDM: CPT | Mod: 25

## 2020-04-09 PROCEDURE — 83036 HEMOGLOBIN GLYCOSYLATED A1C: CPT

## 2020-04-09 RX ORDER — HYDROXYCHLOROQUINE SULFATE 200 MG/1
400 TABLET, FILM COATED ORAL 2 TIMES DAILY
Status: COMPLETED | OUTPATIENT
Start: 2020-04-09 | End: 2020-04-09

## 2020-04-09 RX ORDER — PHENYTOIN SODIUM 100 MG/1
200 CAPSULE, EXTENDED RELEASE ORAL 2 TIMES DAILY
Status: DISCONTINUED | OUTPATIENT
Start: 2020-04-09 | End: 2020-05-11 | Stop reason: HOSPADM

## 2020-04-09 RX ORDER — MONTELUKAST SODIUM 10 MG/1
10 TABLET ORAL NIGHTLY
Status: DISCONTINUED | OUTPATIENT
Start: 2020-04-09 | End: 2020-05-11 | Stop reason: HOSPADM

## 2020-04-09 RX ORDER — ONDANSETRON 8 MG/1
8 TABLET, ORALLY DISINTEGRATING ORAL EVERY 8 HOURS PRN
Status: DISCONTINUED | OUTPATIENT
Start: 2020-04-09 | End: 2020-04-13

## 2020-04-09 RX ORDER — SODIUM CHLORIDE 0.9 % (FLUSH) 0.9 %
10 SYRINGE (ML) INJECTION
Status: DISCONTINUED | OUTPATIENT
Start: 2020-04-09 | End: 2020-04-13

## 2020-04-09 RX ORDER — ACETAMINOPHEN 325 MG/1
650 TABLET ORAL EVERY 4 HOURS PRN
Status: DISCONTINUED | OUTPATIENT
Start: 2020-04-09 | End: 2020-05-11 | Stop reason: HOSPADM

## 2020-04-09 RX ORDER — HYDROXYCHLOROQUINE SULFATE 200 MG/1
400 TABLET, FILM COATED ORAL DAILY
Status: COMPLETED | OUTPATIENT
Start: 2020-04-10 | End: 2020-04-13

## 2020-04-09 RX ORDER — TAMSULOSIN HYDROCHLORIDE 0.4 MG/1
0.4 CAPSULE ORAL DAILY
Status: DISCONTINUED | OUTPATIENT
Start: 2020-04-09 | End: 2020-05-11 | Stop reason: HOSPADM

## 2020-04-09 RX ORDER — FINASTERIDE 5 MG/1
5 TABLET, FILM COATED ORAL DAILY
Status: DISCONTINUED | OUTPATIENT
Start: 2020-04-09 | End: 2020-05-11 | Stop reason: HOSPADM

## 2020-04-09 RX ORDER — CLOPIDOGREL BISULFATE 75 MG/1
75 TABLET ORAL DAILY
Status: DISCONTINUED | OUTPATIENT
Start: 2020-04-09 | End: 2020-05-11 | Stop reason: HOSPADM

## 2020-04-09 RX ORDER — LISINOPRIL 10 MG/1
10 TABLET ORAL DAILY
Status: DISCONTINUED | OUTPATIENT
Start: 2020-04-09 | End: 2020-05-11 | Stop reason: HOSPADM

## 2020-04-09 RX ORDER — ENOXAPARIN SODIUM 100 MG/ML
40 INJECTION SUBCUTANEOUS EVERY 24 HOURS
Status: DISCONTINUED | OUTPATIENT
Start: 2020-04-09 | End: 2020-05-11 | Stop reason: HOSPADM

## 2020-04-09 RX ORDER — ASPIRIN 81 MG/1
81 TABLET ORAL DAILY
Status: DISCONTINUED | OUTPATIENT
Start: 2020-04-09 | End: 2020-05-11 | Stop reason: HOSPADM

## 2020-04-09 RX ORDER — ATORVASTATIN CALCIUM 20 MG/1
40 TABLET, FILM COATED ORAL DAILY
Status: DISCONTINUED | OUTPATIENT
Start: 2020-04-09 | End: 2020-05-11 | Stop reason: HOSPADM

## 2020-04-09 RX ORDER — ALBUTEROL SULFATE 90 UG/1
2 AEROSOL, METERED RESPIRATORY (INHALATION) EVERY 8 HOURS
Status: DISCONTINUED | OUTPATIENT
Start: 2020-04-09 | End: 2020-04-15

## 2020-04-09 RX ORDER — PHENOBARBITAL 30 MG/1
90 TABLET ORAL DAILY
Status: DISCONTINUED | OUTPATIENT
Start: 2020-04-09 | End: 2020-05-11 | Stop reason: HOSPADM

## 2020-04-09 RX ORDER — POLYETHYLENE GLYCOL 3350 17 G/17G
17 POWDER, FOR SOLUTION ORAL ONCE
Status: COMPLETED | OUTPATIENT
Start: 2020-04-09 | End: 2020-04-09

## 2020-04-09 RX ORDER — METOPROLOL TARTRATE 50 MG/1
50 TABLET ORAL DAILY
Status: DISCONTINUED | OUTPATIENT
Start: 2020-04-09 | End: 2020-05-11 | Stop reason: HOSPADM

## 2020-04-09 RX ORDER — AMOXICILLIN 250 MG
1 CAPSULE ORAL 2 TIMES DAILY
Status: DISCONTINUED | OUTPATIENT
Start: 2020-04-09 | End: 2020-05-11 | Stop reason: HOSPADM

## 2020-04-09 RX ORDER — SODIUM CHLORIDE 0.9 % (FLUSH) 0.9 %
10 SYRINGE (ML) INJECTION
Status: DISCONTINUED | OUTPATIENT
Start: 2020-04-09 | End: 2020-04-12

## 2020-04-09 RX ADMIN — FINASTERIDE 5 MG: 5 TABLET, FILM COATED ORAL at 03:04

## 2020-04-09 RX ADMIN — MONTELUKAST 10 MG: 10 TABLET, FILM COATED ORAL at 09:04

## 2020-04-09 RX ADMIN — CLOPIDOGREL BISULFATE 75 MG: 75 TABLET ORAL at 10:04

## 2020-04-09 RX ADMIN — STANDARDIZED SENNA CONCENTRATE AND DOCUSATE SODIUM 1 TABLET: 8.6; 5 TABLET ORAL at 09:04

## 2020-04-09 RX ADMIN — TAMSULOSIN HYDROCHLORIDE 0.4 MG: 0.4 CAPSULE ORAL at 10:04

## 2020-04-09 RX ADMIN — IOHEXOL 100 ML: 350 INJECTION, SOLUTION INTRAVENOUS at 05:04

## 2020-04-09 RX ADMIN — LISINOPRIL 10 MG: 5 TABLET ORAL at 03:04

## 2020-04-09 RX ADMIN — STANDARDIZED SENNA CONCENTRATE AND DOCUSATE SODIUM 1 TABLET: 8.6; 5 TABLET ORAL at 12:04

## 2020-04-09 RX ADMIN — PHENOBARBITAL 90 MG: 30 TABLET ORAL at 10:04

## 2020-04-09 RX ADMIN — ASPIRIN 81 MG: 81 TABLET, COATED ORAL at 10:04

## 2020-04-09 RX ADMIN — ENOXAPARIN SODIUM 40 MG: 100 INJECTION SUBCUTANEOUS at 06:04

## 2020-04-09 RX ADMIN — ALBUTEROL SULFATE 2 PUFF: 90 AEROSOL, METERED RESPIRATORY (INHALATION) at 03:04

## 2020-04-09 RX ADMIN — PHENYTOIN SODIUM 200 MG: 100 CAPSULE ORAL at 10:04

## 2020-04-09 RX ADMIN — HYDROXYCHLOROQUINE SULFATE 400 MG: 200 TABLET ORAL at 09:04

## 2020-04-09 RX ADMIN — AZITHROMYCIN 500 MG: 500 INJECTION, POWDER, LYOPHILIZED, FOR SOLUTION INTRAVENOUS at 07:04

## 2020-04-09 RX ADMIN — METOPROLOL TARTRATE 50 MG: 50 TABLET, FILM COATED ORAL at 10:04

## 2020-04-09 RX ADMIN — POLYETHYLENE GLYCOL 3350 17 G: 17 POWDER, FOR SOLUTION ORAL at 12:04

## 2020-04-09 RX ADMIN — CEFTRIAXONE 1 G: 1 INJECTION, SOLUTION INTRAVENOUS at 07:04

## 2020-04-09 RX ADMIN — SODIUM CHLORIDE 1000 ML: 0.9 INJECTION, SOLUTION INTRAVENOUS at 05:04

## 2020-04-09 RX ADMIN — ATORVASTATIN CALCIUM 40 MG: 40 TABLET, FILM COATED ORAL at 10:04

## 2020-04-09 RX ADMIN — HYDROXYCHLOROQUINE SULFATE 400 MG: 200 TABLET ORAL at 08:04

## 2020-04-09 RX ADMIN — PHENYTOIN SODIUM 200 MG: 100 CAPSULE ORAL at 09:04

## 2020-04-09 NOTE — NURSING
Pt remained safe throughout shift, bed alarm set,  denies any chest pain, PIV x2 remains patent and intact, no BM this shift.

## 2020-04-09 NOTE — HOSPITAL COURSE
The patient was placed on supplemental oxygen, 5 L NC.  Azithromycin, ceftriaxone, and hydroxychloroquine, and tamsulosin were started per protocol.  His oxygenation gradually improved.  On hospital day 5, he was transferred to this hospital due to capacity issues at [facility].  Here, oxygenation continued to improve, but the patient has continued to have constitutional symptoms.  Deescalated to room air. Pending SNF placement Per PT/OT recs.   COVID-19 positive 4/24

## 2020-04-09 NOTE — ASSESSMENT & PLAN NOTE
2 falls in setting of COVID infection and baseline gait instability requiring cane.  CT head and abdomen/pelvis show no new fractures. Nothing on exam to specify areas of fracture concern. Does have hx of osteoporosis and on anti-epileptic so will have very low threshold to add additional imaging.

## 2020-04-09 NOTE — ASSESSMENT & PLAN NOTE
[facility] with non-obstructive dz, Sept 2019. On home asa and statin plus Plavix for NSTEMI at that time.   Mild trop elevation in setting of acute illness. Will trend. Continuing home meds.

## 2020-04-09 NOTE — ASSESSMENT & PLAN NOTE
PNA with COVID + on 4/9. Presenting symptoms of weakness, falls, mild sob.   Saturating well on RA during admission thus far. CRP over 200 and mildly elevated trop.   COVID protocol with isolation and 02 supplementation if needed. Albuterol inhaler prn. Continuing course of HCQ for now. Received Azithro and Rocephin in ED, will continue for now. Repeat EKG in 1 to 2 days.

## 2020-04-09 NOTE — ED TRIAGE NOTES
Pt reports to ED via EMS, c/o of constipation for 2 weeks with generalized abd pain. No medication taken for symptoms. Denies N/V/D or any other symptoms.

## 2020-04-09 NOTE — SUBJECTIVE & OBJECTIVE
Past Medical History:   Diagnosis Date    Abdominal hernia     BPH (benign prostatic hyperplasia)     Coronary artery disease     Non-obstructive CAD on [facility], Sept 2019. Performed for NSTEMI.    Diabetes mellitus     sister states he is borderline    Dyslipidemia     Hypercholesteremia     Hypertension     Seizure disorder     Seizures     Sinus disorder     Stroke        Past Surgical History:   Procedure Laterality Date    KNEE SURGERY      LEFT HEART CATHETERIZATION Left 9/26/2019    Procedure: Left heart cath, radial. 11 am;  Surgeon: Zahra Roca MD;  Location: [facility] CATH LAB;  Service: Cardiology;  Laterality: Left;    NASAL SINUS SURGERY      NOSE SURGERY         Review of patient's allergies indicates:  No Known Allergies    No current facility-administered medications on file prior to encounter.      Current Outpatient Medications on File Prior to Encounter   Medication Sig    alendronate (FOSAMAX) 70 MG tablet Take 1 tablet (70 mg total) by mouth every 7 days.    aspirin (ECOTRIN) 81 MG EC tablet Take 81 mg by mouth once daily.    atorvastatin (LIPITOR) 40 MG tablet Take 1 tablet (40 mg total) by mouth once daily.    calcium citrate-vitamin D3 (CITRACAL + D MAXIMUM) 315-250 mg-unit Tab Take 315 mg by mouth 2 (two) times daily.    finasteride (PROSCAR) 5 mg tablet Take 1 tablet (5 mg total) by mouth once daily.    fluticasone (FLONASE) 50 mcg/actuation nasal spray SHAKE WELL AND U 2 SPRAYS IEN BID    ketorolac 0.5% (ACULAR) 0.5 % Drop Place 1 drop into the right eye 4 (four) times daily.    lisinopril 10 MG tablet Take 1 tablet (10 mg total) by mouth once daily.    metoprolol tartrate (LOPRESSOR) 50 MG tablet Take 1 tablet (50 mg total) by mouth once daily.    phenobarbital (LUMINAL) 97.2 MG tablet Take 1 tablet (97.2 mg total) by mouth once daily.    phenytoin (DILANTIN) 100 MG ER capsule 2 capsules po q am, 2 capsules po q hs    tamsulosin (FLOMAX) 0.4 mg Cp24 Take 1 capsule  (0.4 mg total) by mouth once daily.    montelukast (SINGULAIR) 10 mg tablet Take 10 mg by mouth every evening.     Family History     Problem Relation (Age of Onset)    Cancer Mother    Coronary artery disease Brother, Brother    Heart disease Sister    Hypertension Brother, Brother    Kidney disease Father        Tobacco Use    Smoking status: Never Smoker    Smokeless tobacco: Never Used   Substance and Sexual Activity    Alcohol use: No    Drug use: No    Sexual activity: Not on file     Review of Systems   Constitution: Positive for malaise/fatigue.   HENT: Negative.    Eyes: Negative.    Cardiovascular: Negative.  Negative for chest pain and dyspnea on exertion.   Respiratory: Negative.    Endocrine: Negative.    Hematologic/Lymphatic: Negative.    Skin: Negative.    Musculoskeletal: Negative.    Gastrointestinal: Positive for constipation.   Genitourinary: Negative.    Neurological: Negative.    Psychiatric/Behavioral: Negative.    Allergic/Immunologic: Negative.      Objective:     Vital Signs (Most Recent):  Temp: 98.3 °F (36.8 °C) (04/09/20 1124)  Pulse: 84 (04/09/20 1124)  Resp: 18 (04/09/20 1124)  BP: (!) 183/91 (04/09/20 1124)  SpO2: (!) 92 % (04/09/20 1124) Vital Signs (24h Range):  Temp:  [97.9 °F (36.6 °C)-98.3 °F (36.8 °C)] 98.3 °F (36.8 °C)  Pulse:  [81-94] 84  Resp:  [18-22] 18  SpO2:  [92 %-99 %] 92 %  BP: (115-183)/(61-91) 183/91     Weight: 86.2 kg (190 lb)  Body mass index is 31.62 kg/m².    SpO2: (!) 92 %  O2 Device (Oxygen Therapy): room air      Intake/Output Summary (Last 24 hours) at 4/9/2020 1448  Last data filed at 4/9/2020 0858  Gross per 24 hour   Intake 1300 ml   Output --   Net 1300 ml       Lines/Drains/Airways     Peripheral Intravenous Line                 Peripheral IV - Single Lumen 04/09/20 0500 20 G Right Hand less than 1 day         Peripheral IV - Single Lumen 04/09/20 0744 20 G Left Forearm less than 1 day                Physical Exam   Nursing note and vitals  reviewed.    A full-contact physical exam was not possible due to the clinical condition of the patient due to Covid 19 infection and the necessity to minimize exposure. Physical exam of the primary team reviewed. Patient inspected from a distance. I thoroughly reviewed the notes, demographics, clinical, laboratorial and imaging information available in medical records.       Significant Labs:   BMP:   Recent Labs   Lab 04/09/20  0405   *   *   K 3.9   CL 97   CO2 26   BUN 17   CREATININE 0.8   CALCIUM 8.2*   , CMP   Recent Labs   Lab 04/09/20  0405   *   K 3.9   CL 97   CO2 26   *   BUN 17   CREATININE 0.8   CALCIUM 8.2*   PROT 7.7   ALBUMIN 3.2*   BILITOT 0.5   ALKPHOS 43*   AST 99*   ALT 46*   ANIONGAP 11   ESTGFRAFRICA >60   EGFRNONAA >60   , CBC   Recent Labs   Lab 04/09/20  0405   WBC 7.65   HGB 13.9*   HCT 40.7      , INR No results for input(s): INR, PROTIME in the last 48 hours., Lipid Panel No results for input(s): CHOL, HDL, LDLCALC, TRIG, CHOLHDL in the last 48 hours., Troponin   Recent Labs   Lab 04/09/20  0405 04/09/20  1221   TROPONINI 0.120* 0.049*  0.049*    and All pertinent lab results from the last 24 hours have been reviewed.    Significant Imaging: Echocardiogram:   Transthoracic echo (TTE) complete (Cupid Only):   Results for orders placed or performed during the hospital encounter of 09/23/19   Echo Color Flow Doppler? Yes   Result Value Ref Range    BSA 2.03 m2    TDI SEPTAL 0.08 m/s    LV LATERAL E/E' RATIO 9.40 m/s    LV SEPTAL E/E' RATIO 11.75 m/s    LA WIDTH 3.27 cm    AORTIC VALVE CUSP SEPERATION 2.00 cm    TDI LATERAL 0.10 m/s    PV PEAK VELOCITY 1.06 cm/s    LVIDD 4.09 3.5 - 6.0 cm    IVS 1.60 (A) 0.6 - 1.1 cm    PW 1.70 (A) 0.6 - 1.1 cm    Ao root annulus 3.30 cm    LVIDS 2.11 2.1 - 4.0 cm    FS 48 28 - 44 %    LA volume 66.62 cm3    Sinus 3.32 cm    STJ 2.47 cm    Ascending aorta 3.05 cm    LV mass 279.46 g    LA size 4.47 cm    RVDD 3.64 cm     TAPSE 2.47 cm    RV S' 17.52 cm/s    Left Ventricle Relative Wall Thickness 0.83 cm    AV mean gradient 7 mmHg    AV valve area 3.44 cm2    AV Velocity Ratio 0.75     AV index (prosthetic) 0.75     E/A ratio 0.95     Mean e' 0.09 m/s    E wave decelartion time 217.99 msec    IVRT 0.11 msec    Pulm vein S/D ratio 1.12     LVOT diameter 2.41 cm    LVOT area 4.6 cm2    LVOT peak marquis 1.35 m/s    LVOT peak VTI 24.49 cm    Ao peak marquis 1.80 m/s    Ao VTI 32.48 cm    LVOT stroke volume 111.66 cm3    AV peak gradient 13 mmHg    E/E' ratio 10.44 m/s    MV Peak E Marquis 0.94 m/s    TR Max Marquis 3.23 m/s    MV Peak A Marquis 0.99 m/s    PV Peak S Marquis 0.38 m/s    PV Peak D Marquis 0.34 m/s    LV Systolic Volume 14.57 mL    LV Systolic Volume Index 7.4 mL/m2    LV Diastolic Volume 73.70 mL    LV Diastolic Volume Index 37.42 mL/m2    LA Volume Index 33.8 mL/m2    LV Mass Index 142 g/m2    RA Major Axis 5.27 cm    Left Atrium Minor Axis 6.09 cm    Left Atrium Major Axis 4.79 cm    Triscuspid Valve Regurgitation Peak Gradient 42 mmHg    RA Width 3.02 cm    Right Atrial Pressure (from IVC) 3 mmHg    TV rest pulmonary artery pressure 45 mmHg    Narrative    · Moderate concentric left ventricular hypertrophy.  · Normal left ventricular systolic function. The estimated ejection   fraction is 65%  · Indeterminate left ventricular diastolic function.  · Mild-moderate left atrial enlargement.  · Mild right atrial enlargement.  · Normal central venous pressure (3 mm Hg).  · The estimated PA systolic pressure is 45 mm Hg  · Pulmonary hypertension present.

## 2020-04-09 NOTE — CONSULTS
[facility]  Cardiology  Consult Note    Patient Name: Vern Lr  MRN: [id number]  Admission Date: 4/9/2020  Hospital Length of Stay: 0 days  Code Status: Full Code   Attending Provider: Tello Arana MD   Consulting Provider: Zahra Roca MD  Primary Care Physician: Maria Fernanda Abbasi MD (Inactive)  Principal Problem:COVID-19 virus infection    Patient information was obtained from patient and ER records.     Inpatient consult to Cardiology  Consult performed by: Zahra Roca MD  Consult ordered by: Ehsan Mendez MD        Subjective:     Chief Complaint:  Elevated troponin    HPI:   HISTORY WAS TAKEN WITH THE HELP OF A NURSE WHO IS [place]-SPEAKING.  Patient denies any chest pains at rest on exertion, orthopnea, PND, swelling of feet.  Difficult to do a complete review of systems because of poor historian.  However states that he came in because of constipation and feeling malaise.  Was found to be COVID positive.    Results for VERN LR (MRN [id number]) as of 4/9/2020 14:47   Ref. Range 4/9/2020 12:21 4/9/2020 12:21   Troponin I Latest Ref Range: 0.000 - 0.026 ng/mL 0.049 (H) 0.049 (H)       HPI:  Vern Lr is an 80 y.o. male presenting to the ED via EMS complaining of a sudden onset of constipation persisting for two weeks. [place] to English ranslation provided by ED nurse. Per nurse, patient states he has not had a bowel movement in two weeks. Patient reports symptoms of abdominal pain, occasional chest pain, and respiratory issues due to stuffed nose. Patient denies cough. Patient reports a past medical history of abdominal hernia, dyslipidemia, sinus disorder, and history of seizures. Patient reports a past surgical history of knee surgery, nose surgery, nasal sinus surgery, and left heart catheterization. No known drug allergies. No tobacco, EtOH, or street drug abuse. Accurate review of systems not possible on account of patient being a poor historian due to language  barrier and developmental issues.      CT SCAN:    Impression       1. Diffuse bilateral ground-glass opacities affecting all lobes concerning for underlying infectious process in the appropriate clinical setting with additional differential considerations including pulmonary edema or non infectious inflammatory process.  2. No acute intra-abdominal abnormality identified.  3. 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  4. Moderate volume of fecal material in the colon which can be seen with constipation.  5. Redemonstration of multiple compression fracture deformities of the lower thoracic and lumbar spine similar to most recent CT examination.  Concavity of the superior endplates of the T6 and T7 vertebral bodies of unknown chronicity.  Correlation with point tenderness advised.  6. Multiple additional stable findings as detailed above.             EKG personally reviewed.  Normal sinus rhythm, incomplete right bundle-branch block.  No acute ischemic changes seen on EKG        Results for AISHA ULRICH (MRN [id number]) as of 4/9/2020 09:21   Ref. Range 9/24/2019 00:37 9/24/2019 04:51 9/24/2019 10:08 9/24/2019 10:08 4/9/2020 04:05   Troponin I Latest Ref Range: 0.000 - 0.026 ng/mL 0.035 (H) 0.137 (H) 0.099 (H) 0.099 (H) 0.120 (H)           2D echo 2019    · Moderate concentric left ventricular hypertrophy.  · Normal left ventricular systolic function. The estimated ejection fraction is 65%  · Indeterminate left ventricular diastolic function.  · Mild-moderate left atrial enlargement.  · Mild right atrial enlargement.  · Normal central venous pressure (3 mm Hg).  · The estimated PA systolic pressure is 45 mm Hg  · Pulmonary hypertension present.               Cardiac catheterization 2019:     Description of the findings of the procedure:  Mild nonobstructive coronary artery disease        Dominance:  Codominant  LM:  No significant  stenosis  LAD:  Mild mid LAD 20% stenosis  LCx:  Luminal irregularities  RCA:  Luminal irregularities     Hemostasis:  R Radial band        Impression:  Nonobstructive coronary artery disease     Plan:  Aspirin, Plavix, statins.  Follow-up in Cardiology Clinic.    Past Medical History:   Diagnosis Date    Abdominal hernia     BPH (benign prostatic hyperplasia)     Coronary artery disease     Non-obstructive CAD on [facility], Sept 2019. Performed for NSTEMI.    Diabetes mellitus     sister states he is borderline    Dyslipidemia     Hypercholesteremia     Hypertension     Seizure disorder     Seizures     Sinus disorder     Stroke        Past Surgical History:   Procedure Laterality Date    KNEE SURGERY      LEFT HEART CATHETERIZATION Left 9/26/2019    Procedure: Left heart cath, radial. 11 am;  Surgeon: Zahra Roca MD;  Location: [facility] CATH LAB;  Service: Cardiology;  Laterality: Left;    NASAL SINUS SURGERY      NOSE SURGERY         Review of patient's allergies indicates:  No Known Allergies    No current facility-administered medications on file prior to encounter.      Current Outpatient Medications on File Prior to Encounter   Medication Sig    alendronate (FOSAMAX) 70 MG tablet Take 1 tablet (70 mg total) by mouth every 7 days.    aspirin (ECOTRIN) 81 MG EC tablet Take 81 mg by mouth once daily.    atorvastatin (LIPITOR) 40 MG tablet Take 1 tablet (40 mg total) by mouth once daily.    calcium citrate-vitamin D3 (CITRACAL + D MAXIMUM) 315-250 mg-unit Tab Take 315 mg by mouth 2 (two) times daily.    finasteride (PROSCAR) 5 mg tablet Take 1 tablet (5 mg total) by mouth once daily.    fluticasone (FLONASE) 50 mcg/actuation nasal spray SHAKE WELL AND U 2 SPRAYS IEN BID    ketorolac 0.5% (ACULAR) 0.5 % Drop Place 1 drop into the right eye 4 (four) times daily.    lisinopril 10 MG tablet Take 1 tablet (10 mg total) by mouth once daily.    metoprolol tartrate (LOPRESSOR) 50 MG tablet Take 1 tablet  (50 mg total) by mouth once daily.    phenobarbital (LUMINAL) 97.2 MG tablet Take 1 tablet (97.2 mg total) by mouth once daily.    phenytoin (DILANTIN) 100 MG ER capsule 2 capsules po q am, 2 capsules po q hs    tamsulosin (FLOMAX) 0.4 mg Cp24 Take 1 capsule (0.4 mg total) by mouth once daily.    montelukast (SINGULAIR) 10 mg tablet Take 10 mg by mouth every evening.     Family History     Problem Relation (Age of Onset)    Cancer Mother    Coronary artery disease Brother, Brother    Heart disease Sister    Hypertension Brother, Brother    Kidney disease Father        Tobacco Use    Smoking status: Never Smoker    Smokeless tobacco: Never Used   Substance and Sexual Activity    Alcohol use: No    Drug use: No    Sexual activity: Not on file     Review of Systems   Constitution: Positive for malaise/fatigue.   HENT: Negative.    Eyes: Negative.    Cardiovascular: Negative.  Negative for chest pain and dyspnea on exertion.   Respiratory: Negative.    Endocrine: Negative.    Hematologic/Lymphatic: Negative.    Skin: Negative.    Musculoskeletal: Negative.    Gastrointestinal: Positive for constipation.   Genitourinary: Negative.    Neurological: Negative.    Psychiatric/Behavioral: Negative.    Allergic/Immunologic: Negative.      Objective:     Vital Signs (Most Recent):  Temp: 98.3 °F (36.8 °C) (04/09/20 1124)  Pulse: 84 (04/09/20 1124)  Resp: 18 (04/09/20 1124)  BP: (!) 183/91 (04/09/20 1124)  SpO2: (!) 92 % (04/09/20 1124) Vital Signs (24h Range):  Temp:  [97.9 °F (36.6 °C)-98.3 °F (36.8 °C)] 98.3 °F (36.8 °C)  Pulse:  [81-94] 84  Resp:  [18-22] 18  SpO2:  [92 %-99 %] 92 %  BP: (115-183)/(61-91) 183/91     Weight: 86.2 kg (190 lb)  Body mass index is 31.62 kg/m².    SpO2: (!) 92 %  O2 Device (Oxygen Therapy): room air      Intake/Output Summary (Last 24 hours) at 4/9/2020 1448  Last data filed at 4/9/2020 0858  Gross per 24 hour   Intake 1300 ml   Output --   Net 1300 ml       Lines/Drains/Airways      Peripheral Intravenous Line                 Peripheral IV - Single Lumen 04/09/20 0500 20 G Right Hand less than 1 day         Peripheral IV - Single Lumen 04/09/20 0744 20 G Left Forearm less than 1 day                Physical Exam   Nursing note and vitals reviewed.    A full-contact physical exam was not possible due to the clinical condition of the patient due to Covid 19 infection and the necessity to minimize exposure. Physical exam of the primary team reviewed. Patient inspected from a distance. I thoroughly reviewed the notes, demographics, clinical, laboratorial and imaging information available in medical records.       Significant Labs:   BMP:   Recent Labs   Lab 04/09/20  0405   *   *   K 3.9   CL 97   CO2 26   BUN 17   CREATININE 0.8   CALCIUM 8.2*   , CMP   Recent Labs   Lab 04/09/20  0405   *   K 3.9   CL 97   CO2 26   *   BUN 17   CREATININE 0.8   CALCIUM 8.2*   PROT 7.7   ALBUMIN 3.2*   BILITOT 0.5   ALKPHOS 43*   AST 99*   ALT 46*   ANIONGAP 11   ESTGFRAFRICA >60   EGFRNONAA >60   , CBC   Recent Labs   Lab 04/09/20  0405   WBC 7.65   HGB 13.9*   HCT 40.7      , INR No results for input(s): INR, PROTIME in the last 48 hours., Lipid Panel No results for input(s): CHOL, HDL, LDLCALC, TRIG, CHOLHDL in the last 48 hours., Troponin   Recent Labs   Lab 04/09/20  0405 04/09/20  1221   TROPONINI 0.120* 0.049*  0.049*    and All pertinent lab results from the last 24 hours have been reviewed.    Significant Imaging: Echocardiogram:   Transthoracic echo (TTE) complete (Cupid Only):   Results for orders placed or performed during the hospital encounter of 09/23/19   Echo Color Flow Doppler? Yes   Result Value Ref Range    BSA 2.03 m2    TDI SEPTAL 0.08 m/s    LV LATERAL E/E' RATIO 9.40 m/s    LV SEPTAL E/E' RATIO 11.75 m/s    LA WIDTH 3.27 cm    AORTIC VALVE CUSP SEPERATION 2.00 cm    TDI LATERAL 0.10 m/s    PV PEAK VELOCITY 1.06 cm/s    LVIDD 4.09 3.5 - 6.0 cm    IVS 1.60 (A)  0.6 - 1.1 cm    PW 1.70 (A) 0.6 - 1.1 cm    Ao root annulus 3.30 cm    LVIDS 2.11 2.1 - 4.0 cm    FS 48 28 - 44 %    LA volume 66.62 cm3    Sinus 3.32 cm    STJ 2.47 cm    Ascending aorta 3.05 cm    LV mass 279.46 g    LA size 4.47 cm    RVDD 3.64 cm    TAPSE 2.47 cm    RV S' 17.52 cm/s    Left Ventricle Relative Wall Thickness 0.83 cm    AV mean gradient 7 mmHg    AV valve area 3.44 cm2    AV Velocity Ratio 0.75     AV index (prosthetic) 0.75     E/A ratio 0.95     Mean e' 0.09 m/s    E wave decelartion time 217.99 msec    IVRT 0.11 msec    Pulm vein S/D ratio 1.12     LVOT diameter 2.41 cm    LVOT area 4.6 cm2    LVOT peak marquis 1.35 m/s    LVOT peak VTI 24.49 cm    Ao peak marquis 1.80 m/s    Ao VTI 32.48 cm    LVOT stroke volume 111.66 cm3    AV peak gradient 13 mmHg    E/E' ratio 10.44 m/s    MV Peak E Marquis 0.94 m/s    TR Max Marquis 3.23 m/s    MV Peak A Marquis 0.99 m/s    PV Peak S Marquis 0.38 m/s    PV Peak D Marquis 0.34 m/s    LV Systolic Volume 14.57 mL    LV Systolic Volume Index 7.4 mL/m2    LV Diastolic Volume 73.70 mL    LV Diastolic Volume Index 37.42 mL/m2    LA Volume Index 33.8 mL/m2    LV Mass Index 142 g/m2    RA Major Axis 5.27 cm    Left Atrium Minor Axis 6.09 cm    Left Atrium Major Axis 4.79 cm    Triscuspid Valve Regurgitation Peak Gradient 42 mmHg    RA Width 3.02 cm    Right Atrial Pressure (from IVC) 3 mmHg    TV rest pulmonary artery pressure 45 mmHg    Narrative    · Moderate concentric left ventricular hypertrophy.  · Normal left ventricular systolic function. The estimated ejection   fraction is 65%  · Indeterminate left ventricular diastolic function.  · Mild-moderate left atrial enlargement.  · Mild right atrial enlargement.  · Normal central venous pressure (3 mm Hg).  · The estimated PA systolic pressure is 45 mm Hg  · Pulmonary hypertension present.        Assessment and Plan:     * COVID-19 virus infection  Management per primary    Coronary artery disease  Nonobstructive coronary artery disease  per angiogram in 2019    Constipation  Presenting complaint.  Management per primary    Elevated troponin  Minimally elevated troponin in a patient with known nonobstructive mild coronary artery disease per angiogram in 2019.  No chest pains.  Main symptom is constipation.  No further ischemic workup currently indicated.  Continue aspirin, Plavix.  If patient develops anginal sounding chest pains or lifestyle limiting angina, then will consider stress test for further evaluation.  At this point it is not indicated.  Check 2D echocardiogram to rule out new large wall motion abnormalities.        VTE Risk Mitigation (From admission, onward)         Ordered     enoxaparin injection 40 mg  Daily      04/09/20 0909     IP VTE HIGH RISK PATIENT  Once      04/09/20 0914     Place sequential compression device  Until discontinued      04/09/20 0914                Thank you for your consult. I will follow-up with patient. Please contact us if you have any additional questions.    Zahra Roca MD  Cardiology   [facility]

## 2020-04-09 NOTE — ASSESSMENT & PLAN NOTE
Minimally elevated troponin in a patient with known nonobstructive mild coronary artery disease per angiogram in 2019.  No chest pains.  Main symptom is constipation.  No further ischemic workup currently indicated.  Continue aspirin, Plavix.  If patient develops anginal sounding chest pains or lifestyle limiting angina, then will consider stress test for further evaluation.  At this point it is not indicated.  Check 2D echocardiogram to rule out new large wall motion abnormalities.

## 2020-04-09 NOTE — NURSING
Report received at 0835 from Hillary pt to floor via stretcher at 0850, tele monitor replace with floor monitor alert and awake, primary language Icelandic with inability to understanding English, [place] and unable to comprehend medical lingo. Oriented to room, routines and call light interpreted by  Bonita, as she is fluent [place]ic speaking. Bed locked in lowest position, side rails up x3, call light and possessions in reach.

## 2020-04-09 NOTE — H&P
[facility] Medicine  History & Physical    Patient Name: Vern Lr  MRN: [id number]  Admission Date: 4/9/2020  Attending Physician: Tello Arana MD   Primary Care Provider: Maria Fernanda Abbasi MD (Inactive)         Patient information was obtained from patient with , sister and ER records.     Subjective:     Principal Problem:COVID-19 virus infection    Chief Complaint:   Chief Complaint   Patient presents with    Constipation     Constipated x 2 weeks, abd pain and increased difficulty walking, pt ambulates with cane    Fall        HPI: 80/M admitted on 4/9/20 for falls and PNA 2/2 COVID infection. Positive testing in ED (4/9/20). Patient speaks only Serbian and language line used to communicate. Spoke to patient's sister who admits patient is hard of hearing and likely has mild dementia so history is somewhat limited as a result. Patient notes constipation without bm in a week. Sister states he often complains of this but patient actually was brought in for falls. He fell twice in past couple of days. She admits he has looked fatigued recently and she is actually recovering from COVID infection herself. He normally ambulates with assistance of cane. After first fall he was brought to [facility] and went home. Second fall while in bathroom yesterday and was subsequently brought here to [facility]. Seizure hx but sister did not see anything to suggest seizure and patient has been on meds. Patient has no recent issues with passing out. Hx of NSTEMI with non-obstructive CAD in Sept 2019. Largest occulusion noted on LHC was 20% at that time. Has been on asa, Plavix, and statin since. Normally walks with cane and was actually walking yesterday without issue per sister. Patient admits to some shortness of breath and cough as well as nasal congestion.    Past Medical History:   Diagnosis Date    Abdominal hernia     BPH (benign prostatic hyperplasia)     Coronary artery disease      Non-obstructive CAD on [facility], Sept 2019. Performed for NSTEMI.    Diabetes mellitus     sister states he is borderline    Dyslipidemia     Hypercholesteremia     Hypertension     Seizure disorder     Seizures     Sinus disorder     Stroke        Past Surgical History:   Procedure Laterality Date    KNEE SURGERY      LEFT HEART CATHETERIZATION Left 9/26/2019    Procedure: Left heart cath, radial. 11 am;  Surgeon: Zahra Roca MD;  Location: [facility] CATH LAB;  Service: Cardiology;  Laterality: Left;    NASAL SINUS SURGERY      NOSE SURGERY         Review of patient's allergies indicates:  No Known Allergies    No current facility-administered medications on file prior to encounter.      Current Outpatient Medications on File Prior to Encounter   Medication Sig    alendronate (FOSAMAX) 70 MG tablet Take 1 tablet (70 mg total) by mouth every 7 days.    aspirin (ECOTRIN) 81 MG EC tablet Take 81 mg by mouth once daily.    atorvastatin (LIPITOR) 40 MG tablet Take 1 tablet (40 mg total) by mouth once daily.    calcium citrate-vitamin D3 (CITRACAL + D MAXIMUM) 315-250 mg-unit Tab Take 315 mg by mouth 2 (two) times daily.    finasteride (PROSCAR) 5 mg tablet Take 1 tablet (5 mg total) by mouth once daily.    fluticasone (FLONASE) 50 mcg/actuation nasal spray SHAKE WELL AND U 2 SPRAYS IEN BID    ketorolac 0.5% (ACULAR) 0.5 % Drop Place 1 drop into the right eye 4 (four) times daily.    lisinopril 10 MG tablet Take 1 tablet (10 mg total) by mouth once daily.    metoprolol tartrate (LOPRESSOR) 50 MG tablet Take 1 tablet (50 mg total) by mouth once daily.    phenobarbital (LUMINAL) 97.2 MG tablet Take 1 tablet (97.2 mg total) by mouth once daily.    phenytoin (DILANTIN) 100 MG ER capsule 2 capsules po q am, 2 capsules po q hs    tamsulosin (FLOMAX) 0.4 mg Cp24 Take 1 capsule (0.4 mg total) by mouth once daily.    montelukast (SINGULAIR) 10 mg tablet Take 10 mg by mouth every evening.     Family History      Problem Relation (Age of Onset)    Cancer Mother    Coronary artery disease Brother, Brother    Heart disease Sister    Hypertension Brother, Brother    Kidney disease Father        Tobacco Use    Smoking status: Never Smoker    Smokeless tobacco: Never Used   Substance and Sexual Activity    Alcohol use: No    Drug use: No    Sexual activity: Not on file     Review of Systems   Constitutional: Positive for fatigue. Negative for fever.   HENT: Positive for congestion.    Respiratory: Positive for cough and shortness of breath.    Cardiovascular: Negative for chest pain and leg swelling.   Gastrointestinal: Positive for abdominal pain and constipation. Negative for diarrhea, nausea and vomiting.   Genitourinary: Negative for difficulty urinating.   Musculoskeletal: Negative for arthralgias, joint swelling and neck pain.   Skin: Negative for rash.   Neurological: Negative for seizures.   Psychiatric/Behavioral: Negative for suicidal ideas. The patient is not nervous/anxious.      Objective:     Vital Signs (Most Recent):  Temp: 98 °F (36.7 °C) (04/09/20 0940)  Pulse: 86 (04/09/20 0940)  Resp: 18 (04/09/20 0940)  BP: 139/77 (04/09/20 0940)  SpO2: (!) 94 % (04/09/20 0940) Vital Signs (24h Range):  Temp:  [97.9 °F (36.6 °C)-98.3 °F (36.8 °C)] 98 °F (36.7 °C)  Pulse:  [81-94] 86  Resp:  [18-22] 18  SpO2:  [92 %-99 %] 94 %  BP: (115-176)/(61-90) 139/77     Weight: 86.2 kg (190 lb)  Body mass index is 31.62 kg/m².    Physical Exam   Constitutional: He appears well-developed and well-nourished. No distress.   HENT:   Head: Normocephalic.   Small abrasion on frontal scalp just right of midline. Dried blood, no lacerations.   Eyes: EOM are normal.   Neck: Normal range of motion. No JVD present. No tracheal deviation present. No thyromegaly present.   Cardiovascular: Normal rate and regular rhythm.   No murmur heard.  Pulmonary/Chest: Effort normal.   B/L coarse bs with light crackles.   Abdominal: Soft. Bowel sounds  are normal. He exhibits no distension and no mass. There is no tenderness. There is no guarding.   Musculoskeletal: Normal range of motion.   Specifically there is no signficant TTP on manipulation of shoulders, hips, or knees. He is sitting up and moving extremities spontaneously.   Lymphadenopathy:     He has no cervical adenopathy.   Neurological: He is alert. No cranial nerve deficit. Coordination normal.   Oriented to person and place but based on conversations with daughter not fully oriented to situation.   Skin:   LE changes of hyperpigmentation and mild pitting edema consistent with chronic stasis.   Psychiatric: He has a normal mood and affect. His behavior is normal.         CRANIAL NERVES     CN I  cranial nerve I not tested    CN II   Visual acuity: normal    CN III, IV, VI   Extraocular motions are normal.   CN III: no CN III palsy  CN VI: no CN VI palsy    CN VII   Facial expression full, symmetric.        Significant Labs:   Recent Results (from the past 24 hour(s))   CBC W/ AUTO DIFFERENTIAL    Collection Time: 04/09/20  4:05 AM   Result Value Ref Range    WBC 7.65 3.90 - 12.70 K/uL    RBC 4.58 (L) 4.60 - 6.20 M/uL    Hemoglobin 13.9 (L) 14.0 - 18.0 g/dL    Hematocrit 40.7 40.0 - 54.0 %    Mean Corpuscular Volume 89 82 - 98 fL    Mean Corpuscular Hemoglobin 30.3 27.0 - 31.0 pg    Mean Corpuscular Hemoglobin Conc 34.2 32.0 - 36.0 g/dL    RDW 12.3 11.5 - 14.5 %    Platelets 199 150 - 350 K/uL    MPV 9.9 9.2 - 12.9 fL    Immature Granulocytes 0.7 (H) 0.0 - 0.5 %    Gran # (ANC) 6.8 1.8 - 7.7 K/uL    Immature Grans (Abs) 0.05 (H) 0.00 - 0.04 K/uL    Lymph # 0.5 (L) 1.0 - 4.8 K/uL    Mono # 0.3 0.3 - 1.0 K/uL    Eos # 0.0 0.0 - 0.5 K/uL    Baso # 0.01 0.00 - 0.20 K/uL    nRBC 0 0 /100 WBC    Gran% 88.6 (H) 38.0 - 73.0 %    Lymph% 6.3 (L) 18.0 - 48.0 %    Mono% 4.3 4.0 - 15.0 %    Eosinophil% 0.0 0.0 - 8.0 %    Basophil% 0.1 0.0 - 1.9 %    Differential Method Automated    Comp. Metabolic Panel     Collection Time: 04/09/20  4:05 AM   Result Value Ref Range    Sodium 134 (L) 136 - 145 mmol/L    Potassium 3.9 3.5 - 5.1 mmol/L    Chloride 97 95 - 110 mmol/L    CO2 26 23 - 29 mmol/L    Glucose 130 (H) 70 - 110 mg/dL    BUN, Bld 17 8 - 23 mg/dL    Creatinine 0.8 0.5 - 1.4 mg/dL    Calcium 8.2 (L) 8.7 - 10.5 mg/dL    Total Protein 7.7 6.0 - 8.4 g/dL    Albumin 3.2 (L) 3.5 - 5.2 g/dL    Total Bilirubin 0.5 0.1 - 1.0 mg/dL    Alkaline Phosphatase 43 (L) 55 - 135 U/L    AST 99 (H) 10 - 40 U/L    ALT 46 (H) 10 - 44 U/L    Anion Gap 11 8 - 16 mmol/L    eGFR if African American >60 >60 mL/min/1.73 m^2    eGFR if non African American >60 >60 mL/min/1.73 m^2   Lipase    Collection Time: 04/09/20  4:05 AM   Result Value Ref Range    Lipase 77 (H) 4 - 60 U/L   Lactic acid, plasma    Collection Time: 04/09/20  4:05 AM   Result Value Ref Range    Lactate (Lactic Acid) 1.6 0.5 - 2.2 mmol/L   Troponin I    Collection Time: 04/09/20  4:05 AM   Result Value Ref Range    Troponin I 0.120 (H) 0.000 - 0.026 ng/mL   Brain Natriuretic Peptide    Collection Time: 04/09/20  4:05 AM   Result Value Ref Range    BNP 46 0 - 99 pg/mL   Urinalysis, Reflex to Urine Culture Urine, Clean Catch    Collection Time: 04/09/20  4:14 AM   Result Value Ref Range    Specimen UA Urine, Clean Catch     Color, UA Yellow Yellow, Straw, Millie    Appearance, UA Clear Clear    pH, UA 7.0 5.0 - 8.0    Specific Gravity, UA 1.020 1.005 - 1.030    Protein, UA 3+ (A) Negative    Glucose, UA Negative Negative    Ketones, UA Negative Negative    Bilirubin (UA) Negative Negative    Occult Blood UA 2+ (A) Negative    Nitrite, UA Negative Negative    Urobilinogen, UA 4.0-6.0 (A) <2.0 EU/dL    Leukocytes, UA Negative Negative   Urinalysis Microscopic    Collection Time: 04/09/20  4:14 AM   Result Value Ref Range    RBC, UA 25 (H) 0 - 4 /hpf    WBC, UA 0 0 - 5 /hpf    Bacteria None None-Occ /hpf    Squam Epithel, UA 5 /hpf    Hyaline Casts, UA 0 0-1/lpf /lpf    Microscopic  Comment SEE COMMENT    SARS-CoV-2 RNA, Rapid Amplification, Qual    Collection Time: 04/09/20  4:37 AM   Result Value Ref Range    SARS-CoV-2 RNA, Amplification, Qual Positive (A) Negative   Drug screen panel, emergency    Collection Time: 04/09/20  4:51 AM   Result Value Ref Range    Benzodiazepines Negative     Methadone metabolites Negative     Cocaine (Metab.) Negative     Opiate Scrn, Ur Negative     Barbiturate Screen, Ur Presumptive Positive     Amphetamine Screen, Ur Negative     THC Negative     Phencyclidine Negative     Creatinine, Random Ur 153.2 23.0 - 375.0 mg/dL    Toxicology Information SEE COMMENT    C-reactive protein    Collection Time: 04/09/20  5:00 AM   Result Value Ref Range    .6 (H) 0.0 - 8.2 mg/L   Procalcitonin    Collection Time: 04/09/20  5:00 AM   Result Value Ref Range    Procalcitonin 0.19 <0.25 ng/mL   Ethanol    Collection Time: 04/09/20  5:00 AM   Result Value Ref Range    Alcohol, Medical, Serum <10 <10 mg/dL   Ammonia    Collection Time: 04/09/20  5:00 AM   Result Value Ref Range    Ammonia 51 (H) 10 - 50 umol/L   ISTAT PROCEDURE    Collection Time: 04/09/20  5:35 AM   Result Value Ref Range    POC PH 7.469 (H) 7.35 - 7.45    POC PCO2 40.2 35 - 45 mmHg    POC PO2 39 (LL) 40 - 60 mmHg    POC HCO3 29.2 (H) 24 - 28 mmol/L    POC BE 5 -2 to 2 mmol/L    POC SATURATED O2 77 (L) 95 - 100 %    POC TCO2 30 (H) 24 - 29 mmol/L    Sample VENOUS     Site Other     Allens Test N/A     DelSys Room Air     Mode SPONT     [id number]    Brain natriuretic peptide    Collection Time: 04/09/20  7:40 AM   Result Value Ref Range    BNP 66 0 - 99 pg/mL   POCT glucose    Collection Time: 04/09/20  9:41 AM   Result Value Ref Range    POCT Glucose 125 (H) 70 - 110 mg/dL     Significant Imaging: I have reviewed and interpreted all pertinent imaging results/findings within the past 24 hours.   Notably, CT abdomen with moderate stool and b/l ground glass opacities of the lung, nothing acute. Unchanged  compression fractures of thoracolumbar spine also noted. CT head without acute abnormality.    Assessment/Plan:     * COVID-19 virus infection  PNA with COVID + on 4/9. Presenting symptoms of weakness, falls, mild sob.   Saturating well on RA during admission thus far. CRP over 200 and mildly elevated trop.   COVID protocol with isolation and 02 supplementation if needed. Albuterol inhaler prn. Continuing course of HCQ for now. Received Azithro and Rocephin in ED, will continue for now. Repeat EKG in 1 to 2 days.    Elevated troponin  Tp .12. Will repeat.   Possibly related to acute illness. No cp. Hx of NSTEMI with [facility] showing non-obstructive CAD in Sept 2019. On asa, Plavix, statin at home; will continue inpatient.    Fall  2 falls in setting of COVID infection and baseline gait instability requiring cane.  CT head and abdomen/pelvis show no new fractures. Nothing on exam to specify areas of fracture concern. Does have hx of osteoporosis and on anti-epileptic so will have very low threshold to add additional imaging.    Constipation  Will try po laxatives this morning, add pr if needed.    Coronary artery disease  [facility] with non-obstructive dz, Sept 2019. On home asa and statin plus Plavix for NSTEMI at that time.   Mild trop elevation in setting of acute illness. Will trend. Continuing home meds.    Seizure disorder  Controlled. Continue home Dilantin and valproic acid.    Dyslipidemia  Continue home statin.    BPH (benign prostatic hyperplasia)  Controlled. Continue home Flomax.    Essential hypertension, benign  Controlled. Continue home meds.        VTE Risk Mitigation (From admission, onward)         Ordered     enoxaparin injection 40 mg  Daily      04/09/20 0909     IP VTE HIGH RISK PATIENT  Once      04/09/20 0914     Place sequential compression device  Until discontinued      04/09/20 0914                   Rojas Daniels MD  Department of Hospital Medicine   [facility]

## 2020-04-09 NOTE — ED PROVIDER NOTES
Encounter Date: 4/9/2020    SCRIBE #1 NOTE: I, Kristopher Huerta, am scribing for, and in the presence of,  Kaleb Zarate MD. I have scribed the following portions of the note - Other sections scribed: HPI, PE, MDM.       History     Chief Complaint   Patient presents with    Constipation     Constipated x 2 weeks, abd pain and increased difficulty walking, pt ambulates with cane     Vern Lr is an 80 y.o. male presenting to the ED via EMS complaining of a sudden onset of constipation persisting for two weeks. Malay to English ranslation provided by ED nurse. Per nurse, patient states he has not had a bowel movement in two weeks. Patient reports symptoms of abdominal pain, occasional chest pain, and respiratory issues due to stuffed nose. Patient denies cough. Patient reports a past medical history of abdominal hernia, dyslipidemia, sinus disorder, and history of seizures. Patient reports a past surgical history of knee surgery, nose surgery, nasal sinus surgery, and left heart catheterization. No known drug allergies. No tobacco, EtOH, or street drug abuse. Accurate history of systems not possible on account of patient being a poor historian due to language barrier and developmental issues.        The history is provided by the patient. The history is limited by a developmental delay and a language barrier. A  was used (ED Nurse).     Review of patient's allergies indicates:  No Known Allergies  Past Medical History:   Diagnosis Date    Abdominal hernia     BPH (benign prostatic hyperplasia)     Dyslipidemia     Hypercholesteremia     Hypertension     Seizure disorder     Seizures     Sinus disorder      Past Surgical History:   Procedure Laterality Date    KNEE SURGERY      LEFT HEART CATHETERIZATION Left 9/26/2019    Procedure: Left heart cath, radial. 11 am;  Surgeon: Zahra Roca MD;  Location: [facility] CATH LAB;  Service: Cardiology;  Laterality: Left;    NASAL SINUS  SURGERY      NOSE SURGERY       Family History   Problem Relation Age of Onset    Hypertension Brother     Hypertension Brother     Coronary artery disease Brother     Coronary artery disease Brother      Social History     Tobacco Use    Smoking status: Never Smoker    Smokeless tobacco: Never Used   Substance Use Topics    Alcohol use: No    Drug use: No     Review of Systems   Unable to perform ROS: Acuity of condition       Physical Exam     Initial Vitals [04/09/20 0256]   BP Pulse Resp Temp SpO2   (!) 176/90 90 18 97.9 °F (36.6 °C) 99 %      MAP       --         Physical Exam    Nursing note and vitals reviewed.  Constitutional: He appears well-developed and well-nourished. He is not diaphoretic. No distress.   HENT:   Head: Normocephalic. Head is with abrasion (Right scalp).   Mouth/Throat: Mucous membranes are dry.   Eyes: Conjunctivae and EOM are normal. Pupils are equal, round, and reactive to light. No scleral icterus.   Neck: Normal range of motion. Neck supple.   Cardiovascular: Normal rate, regular rhythm, normal heart sounds and intact distal pulses. Exam reveals no gallop and no friction rub.    No murmur heard.  Pulmonary/Chest: Breath sounds normal. No respiratory distress. He has no wheezes. He has no rhonchi. He has no rales.   Abdominal: Soft. Bowel sounds are normal. There is tenderness (Tenderness to palpation to right lower quadrant.).   Musculoskeletal: Normal range of motion. He exhibits no edema or tenderness.   Neurological: He is alert and oriented to person, place, and time. He has normal strength. No cranial nerve deficit.   Skin: Skin is warm and dry. Capillary refill takes less than 2 seconds. No rash noted.   Psychiatric: He has a normal mood and affect. His behavior is normal.         ED Course   Procedures  Labs Reviewed   CBC W/ AUTO DIFFERENTIAL - Abnormal; Notable for the following components:       Result Value    RBC 4.58 (*)     Hemoglobin 13.9 (*)     Immature  Granulocytes 0.7 (*)     Immature Grans (Abs) 0.05 (*)     Lymph # 0.5 (*)     Gran% 88.6 (*)     Lymph% 6.3 (*)     All other components within normal limits   COMPREHENSIVE METABOLIC PANEL - Abnormal; Notable for the following components:    Sodium 134 (*)     Glucose 130 (*)     Calcium 8.2 (*)     Albumin 3.2 (*)     Alkaline Phosphatase 43 (*)     AST 99 (*)     ALT 46 (*)     All other components within normal limits   LIPASE - Abnormal; Notable for the following components:    Lipase 77 (*)     All other components within normal limits   URINALYSIS, REFLEX TO URINE CULTURE - Abnormal; Notable for the following components:    Protein, UA 3+ (*)     Occult Blood UA 2+ (*)     Urobilinogen, UA 4.0-6.0 (*)     All other components within normal limits    Narrative:     Preferred Collection Type->Urine, Clean Catch   SARS-COV-2 RNA AMPLIFICATION, QUAL - Abnormal; Notable for the following components:    SARS-CoV-2 RNA, Amplification, Qual Positive (*)     All other components within normal limits    Narrative:     What symptom criteria does the patient meet?->Difficulty  breathing   URINALYSIS MICROSCOPIC - Abnormal; Notable for the following components:    RBC, UA 25 (*)     All other components within normal limits    Narrative:     Preferred Collection Type->Urine, Clean Catch   C-REACTIVE PROTEIN - Abnormal; Notable for the following components:    .6 (*)     All other components within normal limits   AMMONIA - Abnormal; Notable for the following components:    Ammonia 51 (*)     All other components within normal limits   TROPONIN I - Abnormal; Notable for the following components:    Troponin I 0.120 (*)     All other components within normal limits   ISTAT PROCEDURE - Abnormal; Notable for the following components:    POC PH 7.469 (*)     POC PO2 39 (*)     POC HCO3 29.2 (*)     POC SATURATED O2 77 (*)     POC TCO2 30 (*)     All other components within normal limits   CULTURE, BLOOD   CULTURE,  BLOOD   LACTIC ACID, PLASMA   PROCALCITONIN   ALCOHOL,MEDICAL (ETHANOL)   DRUG SCREEN PANEL, URINE EMERGENCY   TROPONIN I   B-TYPE NATRIURETIC PEPTIDE   B-TYPE NATRIURETIC PEPTIDE   B-TYPE NATRIURETIC PEPTIDE        ECG Results          EKG 12-lead (In process)  Result time 04/09/20 08:02:00    In process by Interface, Lab In [facility] (04/09/20 08:02:00)                 Narrative:    Test Reason : R06.02,    Vent. Rate : 090 BPM     Atrial Rate : 090 BPM     P-R Int : 128 ms          QRS Dur : 108 ms      QT Int : 372 ms       P-R-T Axes : 050 -44 077 degrees     QTc Int : 455 ms    Sinus rhythm with Premature supraventricular complexes  Left axis deviation  Incomplete right bundle branch block  LVH with repolarization abnormality  Cannot rule out Septal infarct ,age undetermined  Abnormal ECG  When compared with ECG of 24-SEP-2019 04:34,  No significant change was found    Referred By: [id number]   SELF           Confirmed By:                             Imaging Results          CT Chest Abdomen Pelvis With Contrast (Final result)  Result time 04/09/20 06:22:08    Final result by Lashonda Granger MD (04/09/20 06:22:08)                 Impression:      1. Diffuse bilateral ground-glass opacities affecting all lobes concerning for underlying infectious process in the appropriate clinical setting with additional differential considerations including pulmonary edema or non infectious inflammatory process.  2. No acute intra-abdominal abnormality identified.  3. 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  4. Moderate volume of fecal material in the colon which can be seen with constipation.  5. Redemonstration of multiple compression fracture deformities of the lower thoracic and lumbar spine similar to most recent CT examination.  Concavity of the superior endplates of the T6 and T7 vertebral bodies of unknown chronicity.  Correlation with  point tenderness advised.  6. Multiple additional stable findings as detailed above.      Electronically signed by: Lashonda Granger MD  Date:    04/09/2020  Time:    06:22             Narrative:    EXAMINATION:  CT CHEST ABDOMEN PELVIS WITH CONTRAST (XPD)    CLINICAL HISTORY:  Sepsis;    TECHNIQUE:  Low dose axial images, sagittal and coronal reformations were obtained from the thoracic inlet to the pubic symphysis following the IV administration of 100 mL of Omnipaque 350 .  No oral contrast was administered.    COMPARISON:  CT abdomen and pelvis 09/24/2019    FINDINGS:  The visualized soft tissue and vascular structures at the base of the neck are within normal limits.  The thoracic aorta is nonaneurysmal with mild atherosclerosis.  The heart is enlarged, and there is no significant pericardial fluid present.  There is calcific atherosclerosis of the coronary vessels.  No axillary adenopathy.  There are normal and upper limit of normal sized mediastinal lymph nodes present.  Hilar contours are within normal limits.  The esophagus maintains normal caliber and course.    The trachea is midline and proximal airways appear patent.  The lungs demonstrate diffuse bilateral ground-glass opacities affecting all lobes concerning for underlying infectious process in the appropriate clinical setting.  Additional differential considerations include pulmonary edema or non infectious inflammatory process.  There is no pleural fluid.  No pneumothorax.    Please note evaluation of solid organ parenchyma is limited due to artifact from the patient's upper extremities.  The liver is prominent in size.  There is a subcentimeter left hepatic hypodensity too small to definitively characterize.  The portal vein and splenic vein appear patent.  There are probable layering stones or sludge within the gallbladder lumen.  No intra or extrahepatic biliary ductal dilatation.    The kidneys are normal in size and location and enhance  symmetrically without evidence of hydronephrosis.  The urinary bladder demonstrates smooth margins.  The prostate demonstrates dystrophic calcification.    The visualized loops of large and small bowel demonstrate no evidence of obstruction or inflammatory change.  There is a moderate volume of fecal material throughout the colon which can be seen with constipation.  The appendix is not definitively visualized, but there is no inflammatory change in the right lower quadrant to suggest acute appendicitis.  There is no ascites, free intraperitoneal air or portal venous gas.    There is mild infrarenal abdominal aortic ectasia.  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  There is aortoiliac atherosclerosis.  No bulky lymphadenopathy.  There is redemonstration of compression fracture deformities involving the T11 through L5 vertebral body similar to most recent CT exam of 09/24/2019.  There is additional can cavity involving the superior endplates of the T6 and T7 vertebral bodies of uncertain chronicity.  Correlation with point tenderness advised.  There is a small fat containing umbilical hernia.                               CT Head Without Contrast (Final result)  Result time 04/09/20 05:59:46    Final result by Lashonda Granger MD (04/09/20 05:59:46)                 Impression:      1. No CT evidence of acute intracranial abnormality. Clinical correlation and further evaluation as warranted.  2. Generalized cerebral volume loss, findings suggestive of chronic microvascular ischemic change bitemporal encephalomalacia likely relating to sequela of prior trauma.  3. Small air-fluid level in the right maxillary sinus with paranasal sinus mucosal thickening.  Correlation for underlying acute sinusitis advised.      Electronically signed by: Lashonda Granger MD  Date:    04/09/2020  Time:    05:59             Narrative:    EXAMINATION:  CT HEAD WITHOUT  CONTRAST    CLINICAL HISTORY:  Confusion/delirium, altered LOC, unexplained;    TECHNIQUE:  Low dose axial images were obtained through the head.  Coronal and sagittal reformations were also performed. Contrast was not administered.    COMPARISON:  Head CT 09/24/2019    FINDINGS:  There is no acute intracranial hemorrhage, hydrocephalus, midline shift or mass effect. Brain parenchyma appears stable with generalized cerebral volume loss and findings suggestive of chronic microvascular ischemic change.  There is encephalomalacia involving the bilateral temporal lobes (left greater than right) possibly relating to sequela of prior trauma.  Gray-white matter differentiation is otherwise maintained.  The basilar cisterns are patent.  There is postoperative change of bilateral antrostomies.  There is mucosal thickening and small air-fluid level in the right maxillary sinus.  There is also mucosal thickening of the frontal and ethmoid air cells.  There is partial opacification of inferior right mastoid air cells, similar to prior studies.  There is heterogeneous opacity in the external auditory canals likely relating to cerumen although correlation with physical exam advised.  The calvarium is intact.                               X-Ray Abdomen Flat And Erect (Final result)  Result time 04/09/20 04:02:59    Final result by Lashonda Granger MD (04/09/20 04:02:59)                 Impression:      Please see above.      Electronically signed by: Lashonda Granger MD  Date:    04/09/2020  Time:    04:02             Narrative:    EXAMINATION:  XR ABDOMEN FLAT AND ERECT    CLINICAL HISTORY:  Abdominal Pain;    TECHNIQUE:  Flat and erect AP views of the abdomen were performed.    COMPARISON:  None    FINDINGS:  Scattered air is seen in nondilated loops of small and large bowel.  There is a moderate volume of fecal material in the right colon.  No definite evidence of free intraperitoneal air.  Visualized lung bases demonstrate patchy  interstitial and basilar opacities.  Visualized osseous structures are intact with degenerative change.                                 Medical Decision Making:   Clinical Tests:   Lab Tests: Reviewed and Ordered  Radiological Study: Reviewed and Ordered            Scribe Attestation:   Scribe #1: I performed the above scribed service and the documentation accurately describes the services I performed. I attest to the accuracy of the note.      MDM:    80 y.o.male with PMHx as noted above presents with fatigue and abdominal pain x 2 weeks. Physical exam remarkable for mildly distressed appearing male, conversing with ease, no peripheral edema noted, abdomen soft, nt/nd, RRR, in no overt respiratory distress.  ED workup remarkable for EKG - nsr, rate 90bpm, occ PVCs, no ischemic pattern noted, no STEMI,  Trop 0.120, BNP wnl, ETOH neg, procal 0.19, pH - 7.469, CRP - 270, lipase - 7, AST/ALT - 99/46, LA - 1.6, CTA c/a/p - diffuse bilateral ground glass opacities, moderate fecal material present, multiple compression fractures similar to prior CT, CT head - chronic findings present.  Pt presentation consistent with COVID19 infection, with symptoms consistent as noted above.  Pt notably in more distress with movement, comfortable while resting 94-95% on RA, dropping to 90% and tachypneic with any exertion, unable to ambulate at this time without a walker and recent h/o falls.  Discussed elevated troponin with recent cath by Dr. Roca, with Dr. Roca who reports to continue plavix/ASA, consult placed.  Discussed further with inpatient medicine and will admit.  At this time given patient's history, physical exam, and ED workup do not suspect acute PE, acute MI, PTX, CHF/COPD exacerbation, bacterial PNA, septic shock, acute respiratory failure, or any further malignant cause. Discussed diagnosis and further treatment with patient at bedside. All questions answered, patient transferred to floor improved and stable.                           Clinical Impression:     1. COVID-19 virus infection    2. SOB (shortness of breath)    3. Elevated troponin    4. Multifocal pneumonia                ED Disposition Condition    Admit                I, Ehsan Mendez M.D., personally performed the services described in this documentation. All medical record entries made by the scribe were at my direction and in my presence. I have reviewed the chart and agree that the record reflects my personal performance and is accurate and complete.             Ehsan Mendez MD  04/09/20 0840

## 2020-04-09 NOTE — PLAN OF CARE
Patient from home alone.  Has hx of recent falls.  Uses a walker or cane to assist with ambulation.   Pt. niece and sister who would normally assist him will not be available to help as they both have been diagnosed with the virus.  PCP is Dr. Pascual per record.  Preferred pharmacy is [facility] on [address] [facility].       04/09/20 1545   Discharge Assessment   Assessment Type Discharge Planning Assessment   Confirmed/corrected address and phone number on facesheet? Yes   Assessment information obtained from? Other  (Niece:  Sherin Smith; [phone])   Expected Length of Stay (days) 2   Prior to hospitilization cognitive status: Alert/Oriented   Prior to hospitalization functional status: Assistive Equipment  (Walker/Cane)   Current cognitive status: Alert/Oriented   Current Functional Status: Assistive Equipment;Needs Assistance   Facility Arrived From: home   Lives With alone   Able to Return to Prior Arrangements other (see comments)  (Unable to determine)   Is patient able to care for self after discharge? Unable to determine at this time (comments)   Who are your caregiver(s) and their phone number(s)? Emergency contact:  Hung Smith or Gianna Smith  [phone]   Patient's perception of discharge disposition other (comments)  (TBD.  Patient lives alone and will not have help at home per niece.  Niece and Sister both have the virus.)   Readmission Within the Last 30 Days no previous admission in last 30 days  (Went to  ED due to fall 2-3 days ago per niece)   Patient currently being followed by outpatient case management? No   Patient currently receives any other outside agency services? No   Equipment Currently Used at Home walker, rolling;cane, straight   Part D Coverage n/a   Do you have any problems affording any of your prescribed medications? No   Is the patient taking medications as prescribed? yes   Does the patient have transportation home? No   Dialysis Name and Scheduled days n/a   Does  the patient receive services at the [facility]mad[facility]? No   Discharge Plan A Home   Discharge Plan B Other   DME Needed Upon Discharge    (TBD)   Patient/Family in Agreement with Plan yes   Anastasiia Whalen LMSW, CCM  4/9/20

## 2020-04-09 NOTE — HPI
HISTORY WAS TAKEN WITH THE HELP OF A NURSE WHO IS Hungarian-SPEAKING.  Patient denies any chest pains at rest on exertion, orthopnea, PND, swelling of feet.  Difficult to do a complete review of systems because of poor historian.  However states that he came in because of constipation and feeling malaise.  Was found to be COVID positive.    Results for VERN ULRICH (MRN [id number]) as of 4/9/2020 14:47   Ref. Range 4/9/2020 12:21 4/9/2020 12:21   Troponin I Latest Ref Range: 0.000 - 0.026 ng/mL 0.049 (H) 0.049 (H)       HPI:  Vern Ulrich is an 80 y.o. male presenting to the ED via EMS complaining of a sudden onset of constipation persisting for two weeks. Algerian to English ranslation provided by ED nurse. Per nurse, patient states he has not had a bowel movement in two weeks. Patient reports symptoms of abdominal pain, occasional chest pain, and respiratory issues due to stuffed nose. Patient denies cough. Patient reports a past medical history of abdominal hernia, dyslipidemia, sinus disorder, and history of seizures. Patient reports a past surgical history of knee surgery, nose surgery, nasal sinus surgery, and left heart catheterization. No known drug allergies. No tobacco, EtOH, or street drug abuse. Accurate review of systems not possible on account of patient being a poor historian due to language barrier and developmental issues.      CT SCAN:    Impression       1. Diffuse bilateral ground-glass opacities affecting all lobes concerning for underlying infectious process in the appropriate clinical setting with additional differential considerations including pulmonary edema or non infectious inflammatory process.  2. No acute intra-abdominal abnormality identified.  3. 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  4. Moderate volume of fecal material in the colon which can be seen with constipation.  5. Redemonstration of multiple  compression fracture deformities of the lower thoracic and lumbar spine similar to most recent CT examination.  Concavity of the superior endplates of the T6 and T7 vertebral bodies of unknown chronicity.  Correlation with point tenderness advised.  6. Multiple additional stable findings as detailed above.             EKG personally reviewed.  Normal sinus rhythm, incomplete right bundle-branch block.  No acute ischemic changes seen on EKG        Results for AISHA ULRICH (MRN [id number]) as of 4/9/2020 09:21   Ref. Range 9/24/2019 00:37 9/24/2019 04:51 9/24/2019 10:08 9/24/2019 10:08 4/9/2020 04:05   Troponin I Latest Ref Range: 0.000 - 0.026 ng/mL 0.035 (H) 0.137 (H) 0.099 (H) 0.099 (H) 0.120 (H)           2D echo 2019    · Moderate concentric left ventricular hypertrophy.  · Normal left ventricular systolic function. The estimated ejection fraction is 65%  · Indeterminate left ventricular diastolic function.  · Mild-moderate left atrial enlargement.  · Mild right atrial enlargement.  · Normal central venous pressure (3 mm Hg).  · The estimated PA systolic pressure is 45 mm Hg  · Pulmonary hypertension present.               Cardiac catheterization 2019:     Description of the findings of the procedure:  Mild nonobstructive coronary artery disease        Dominance:  Codominant  LM:  No significant stenosis  LAD:  Mild mid LAD 20% stenosis  LCx:  Luminal irregularities  RCA:  Luminal irregularities     Hemostasis:  R Radial band        Impression:  Nonobstructive coronary artery disease     Plan:  Aspirin, Plavix, statins.  Follow-up in Cardiology Clinic.

## 2020-04-09 NOTE — ASSESSMENT & PLAN NOTE
Tp .12. Will repeat.   Possibly related to acute illness. No cp. Hx of NSTEMI with LHC showing non-obstructive CAD in Sept 2019. On asa, Plavix, statin at home; will continue inpatient.

## 2020-04-09 NOTE — SUBJECTIVE & OBJECTIVE
Past Medical History:   Diagnosis Date    Abdominal hernia     BPH (benign prostatic hyperplasia)     Coronary artery disease     Non-obstructive CAD on [facility], Sept 2019. Performed for NSTEMI.    Diabetes mellitus     sister states he is borderline    Dyslipidemia     Hypercholesteremia     Hypertension     Seizure disorder     Seizures     Sinus disorder     Stroke        Past Surgical History:   Procedure Laterality Date    KNEE SURGERY      LEFT HEART CATHETERIZATION Left 9/26/2019    Procedure: Left heart cath, radial. 11 am;  Surgeon: Zahra Roca MD;  Location: [facility] CATH LAB;  Service: Cardiology;  Laterality: Left;    NASAL SINUS SURGERY      NOSE SURGERY         Review of patient's allergies indicates:  No Known Allergies    No current facility-administered medications on file prior to encounter.      Current Outpatient Medications on File Prior to Encounter   Medication Sig    alendronate (FOSAMAX) 70 MG tablet Take 1 tablet (70 mg total) by mouth every 7 days.    aspirin (ECOTRIN) 81 MG EC tablet Take 81 mg by mouth once daily.    atorvastatin (LIPITOR) 40 MG tablet Take 1 tablet (40 mg total) by mouth once daily.    calcium citrate-vitamin D3 (CITRACAL + D MAXIMUM) 315-250 mg-unit Tab Take 315 mg by mouth 2 (two) times daily.    finasteride (PROSCAR) 5 mg tablet Take 1 tablet (5 mg total) by mouth once daily.    fluticasone (FLONASE) 50 mcg/actuation nasal spray SHAKE WELL AND U 2 SPRAYS IEN BID    ketorolac 0.5% (ACULAR) 0.5 % Drop Place 1 drop into the right eye 4 (four) times daily.    lisinopril 10 MG tablet Take 1 tablet (10 mg total) by mouth once daily.    metoprolol tartrate (LOPRESSOR) 50 MG tablet Take 1 tablet (50 mg total) by mouth once daily.    phenobarbital (LUMINAL) 97.2 MG tablet Take 1 tablet (97.2 mg total) by mouth once daily.    phenytoin (DILANTIN) 100 MG ER capsule 2 capsules po q am, 2 capsules po q hs    tamsulosin (FLOMAX) 0.4 mg Cp24 Take 1 capsule  (0.4 mg total) by mouth once daily.    montelukast (SINGULAIR) 10 mg tablet Take 10 mg by mouth every evening.     Family History     Problem Relation (Age of Onset)    Cancer Mother    Coronary artery disease Brother, Brother    Heart disease Sister    Hypertension Brother, Brother    Kidney disease Father        Tobacco Use    Smoking status: Never Smoker    Smokeless tobacco: Never Used   Substance and Sexual Activity    Alcohol use: No    Drug use: No    Sexual activity: Not on file     Review of Systems   Constitutional: Positive for fatigue. Negative for fever.   HENT: Positive for congestion.    Respiratory: Positive for cough and shortness of breath.    Cardiovascular: Negative for chest pain and leg swelling.   Gastrointestinal: Positive for abdominal pain and constipation. Negative for diarrhea, nausea and vomiting.   Genitourinary: Negative for difficulty urinating.   Musculoskeletal: Negative for arthralgias, joint swelling and neck pain.   Skin: Negative for rash.   Neurological: Negative for seizures.   Psychiatric/Behavioral: Negative for suicidal ideas. The patient is not nervous/anxious.      Objective:     Vital Signs (Most Recent):  Temp: 98 °F (36.7 °C) (04/09/20 0940)  Pulse: 86 (04/09/20 0940)  Resp: 18 (04/09/20 0940)  BP: 139/77 (04/09/20 0940)  SpO2: (!) 94 % (04/09/20 0940) Vital Signs (24h Range):  Temp:  [97.9 °F (36.6 °C)-98.3 °F (36.8 °C)] 98 °F (36.7 °C)  Pulse:  [81-94] 86  Resp:  [18-22] 18  SpO2:  [92 %-99 %] 94 %  BP: (115-176)/(61-90) 139/77     Weight: 86.2 kg (190 lb)  Body mass index is 31.62 kg/m².    Physical Exam   Constitutional: He appears well-developed and well-nourished. No distress.   HENT:   Head: Normocephalic.   Small abrasion on frontal scalp just right of midline. Dried blood, no lacerations.   Eyes: EOM are normal.   Neck: Normal range of motion. No JVD present. No tracheal deviation present. No thyromegaly present.   Cardiovascular: Normal rate and regular  rhythm.   No murmur heard.  Pulmonary/Chest: Effort normal.   B/L coarse bs with light crackles.   Abdominal: Soft. Bowel sounds are normal. He exhibits no distension and no mass. There is no tenderness. There is no guarding.   Musculoskeletal: Normal range of motion.   Specifically there is no signficant TTP on manipulation of shoulders, hips, or knees. He is sitting up and moving extremities spontaneously.   Lymphadenopathy:     He has no cervical adenopathy.   Neurological: He is alert. No cranial nerve deficit. Coordination normal.   Oriented to person and place but based on conversations with daughter not fully oriented to situation.   Skin:   LE changes of hyperpigmentation and mild pitting edema consistent with chronic stasis.   Psychiatric: He has a normal mood and affect. His behavior is normal.         CRANIAL NERVES     CN I  cranial nerve I not tested    CN II   Visual acuity: normal    CN III, IV, VI   Extraocular motions are normal.   CN III: no CN III palsy  CN VI: no CN VI palsy    CN VII   Facial expression full, symmetric.        Significant Labs:   Recent Results (from the past 24 hour(s))   CBC W/ AUTO DIFFERENTIAL    Collection Time: 04/09/20  4:05 AM   Result Value Ref Range    WBC 7.65 3.90 - 12.70 K/uL    RBC 4.58 (L) 4.60 - 6.20 M/uL    Hemoglobin 13.9 (L) 14.0 - 18.0 g/dL    Hematocrit 40.7 40.0 - 54.0 %    Mean Corpuscular Volume 89 82 - 98 fL    Mean Corpuscular Hemoglobin 30.3 27.0 - 31.0 pg    Mean Corpuscular Hemoglobin Conc 34.2 32.0 - 36.0 g/dL    RDW 12.3 11.5 - 14.5 %    Platelets 199 150 - 350 K/uL    MPV 9.9 9.2 - 12.9 fL    Immature Granulocytes 0.7 (H) 0.0 - 0.5 %    Gran # (ANC) 6.8 1.8 - 7.7 K/uL    Immature Grans (Abs) 0.05 (H) 0.00 - 0.04 K/uL    Lymph # 0.5 (L) 1.0 - 4.8 K/uL    Mono # 0.3 0.3 - 1.0 K/uL    Eos # 0.0 0.0 - 0.5 K/uL    Baso # 0.01 0.00 - 0.20 K/uL    nRBC 0 0 /100 WBC    Gran% 88.6 (H) 38.0 - 73.0 %    Lymph% 6.3 (L) 18.0 - 48.0 %    Mono% 4.3 4.0 - 15.0 %     Eosinophil% 0.0 0.0 - 8.0 %    Basophil% 0.1 0.0 - 1.9 %    Differential Method Automated    Comp. Metabolic Panel    Collection Time: 04/09/20  4:05 AM   Result Value Ref Range    Sodium 134 (L) 136 - 145 mmol/L    Potassium 3.9 3.5 - 5.1 mmol/L    Chloride 97 95 - 110 mmol/L    CO2 26 23 - 29 mmol/L    Glucose 130 (H) 70 - 110 mg/dL    BUN, Bld 17 8 - 23 mg/dL    Creatinine 0.8 0.5 - 1.4 mg/dL    Calcium 8.2 (L) 8.7 - 10.5 mg/dL    Total Protein 7.7 6.0 - 8.4 g/dL    Albumin 3.2 (L) 3.5 - 5.2 g/dL    Total Bilirubin 0.5 0.1 - 1.0 mg/dL    Alkaline Phosphatase 43 (L) 55 - 135 U/L    AST 99 (H) 10 - 40 U/L    ALT 46 (H) 10 - 44 U/L    Anion Gap 11 8 - 16 mmol/L    eGFR if African American >60 >60 mL/min/1.73 m^2    eGFR if non African American >60 >60 mL/min/1.73 m^2   Lipase    Collection Time: 04/09/20  4:05 AM   Result Value Ref Range    Lipase 77 (H) 4 - 60 U/L   Lactic acid, plasma    Collection Time: 04/09/20  4:05 AM   Result Value Ref Range    Lactate (Lactic Acid) 1.6 0.5 - 2.2 mmol/L   Troponin I    Collection Time: 04/09/20  4:05 AM   Result Value Ref Range    Troponin I 0.120 (H) 0.000 - 0.026 ng/mL   Brain Natriuretic Peptide    Collection Time: 04/09/20  4:05 AM   Result Value Ref Range    BNP 46 0 - 99 pg/mL   Urinalysis, Reflex to Urine Culture Urine, Clean Catch    Collection Time: 04/09/20  4:14 AM   Result Value Ref Range    Specimen UA Urine, Clean Catch     Color, UA Yellow Yellow, Straw, Millie    Appearance, UA Clear Clear    pH, UA 7.0 5.0 - 8.0    Specific Gravity, UA 1.020 1.005 - 1.030    Protein, UA 3+ (A) Negative    Glucose, UA Negative Negative    Ketones, UA Negative Negative    Bilirubin (UA) Negative Negative    Occult Blood UA 2+ (A) Negative    Nitrite, UA Negative Negative    Urobilinogen, UA 4.0-6.0 (A) <2.0 EU/dL    Leukocytes, UA Negative Negative   Urinalysis Microscopic    Collection Time: 04/09/20  4:14 AM   Result Value Ref Range    RBC, UA 25 (H) 0 - 4 /hpf    WBC, UA 0  0 - 5 /hpf    Bacteria None None-Occ /hpf    Squam Epithel, UA 5 /hpf    Hyaline Casts, UA 0 0-1/lpf /lpf    Microscopic Comment SEE COMMENT    SARS-CoV-2 RNA, Rapid Amplification, Qual    Collection Time: 04/09/20  4:37 AM   Result Value Ref Range    SARS-CoV-2 RNA, Amplification, Qual Positive (A) Negative   Drug screen panel, emergency    Collection Time: 04/09/20  4:51 AM   Result Value Ref Range    Benzodiazepines Negative     Methadone metabolites Negative     Cocaine (Metab.) Negative     Opiate Scrn, Ur Negative     Barbiturate Screen, Ur Presumptive Positive     Amphetamine Screen, Ur Negative     THC Negative     Phencyclidine Negative     Creatinine, Random Ur 153.2 23.0 - 375.0 mg/dL    Toxicology Information SEE COMMENT    C-reactive protein    Collection Time: 04/09/20  5:00 AM   Result Value Ref Range    .6 (H) 0.0 - 8.2 mg/L   Procalcitonin    Collection Time: 04/09/20  5:00 AM   Result Value Ref Range    Procalcitonin 0.19 <0.25 ng/mL   Ethanol    Collection Time: 04/09/20  5:00 AM   Result Value Ref Range    Alcohol, Medical, Serum <10 <10 mg/dL   Ammonia    Collection Time: 04/09/20  5:00 AM   Result Value Ref Range    Ammonia 51 (H) 10 - 50 umol/L   ISTAT PROCEDURE    Collection Time: 04/09/20  5:35 AM   Result Value Ref Range    POC PH 7.469 (H) 7.35 - 7.45    POC PCO2 40.2 35 - 45 mmHg    POC PO2 39 (LL) 40 - 60 mmHg    POC HCO3 29.2 (H) 24 - 28 mmol/L    POC BE 5 -2 to 2 mmol/L    POC SATURATED O2 77 (L) 95 - 100 %    POC TCO2 30 (H) 24 - 29 mmol/L    Sample VENOUS     Site Other     Allens Test N/A     DelSys Room Air     Mode SPONT     [id number]    Brain natriuretic peptide    Collection Time: 04/09/20  7:40 AM   Result Value Ref Range    BNP 66 0 - 99 pg/mL   POCT glucose    Collection Time: 04/09/20  9:41 AM   Result Value Ref Range    POCT Glucose 125 (H) 70 - 110 mg/dL     Significant Imaging: I have reviewed and interpreted all pertinent imaging results/findings within the past 24  hours.   Notably, CT abdomen with moderate stool and b/l ground glass opacities of the lung, nothing acute. Unchanged compression fractures of thoracolumbar spine also noted. CT head without acute abnormality.

## 2020-04-09 NOTE — NURSING
Admit navigator, including home medication review completed in the ER using the language line and patient's sister Gianna [phone] for further clarification.

## 2020-04-09 NOTE — HPI
80/M admitted on 4/9/20 for falls and PNA 2/2 COVID infection. Positive testing in ED (4/9/20). Patient speaks only [place] citizen and language line used to communicate. Spoke to patient's sister who admits patient is hard of hearing and likely has mild dementia so history is somewhat limited as a result. Patient notes constipation without bm in a week. Sister states he often complains of this but patient actually was brought in for falls. He fell twice in past couple of days. She admits he has looked fatigued recently and she is actually recovering from COVID infection herself. He normally ambulates with assistance of cane. After first fall he was brought to [facility] and went home. Second fall while in bathroom yesterday and was subsequently brought here to [facility]. Seizure hx but sister did not see anything to suggest seizure and patient has been on meds. Patient has no recent issues with passing out. Hx of NSTEMI with non-obstructive CAD in Sept 2019. Largest occulusion noted on LHC was 20% at that time. Has been on asa, Plavix, and statin since. Normally walks with cane and was actually walking yesterday without issue per sister. Patient admits to some shortness of breath and cough as well as nasal congestion.

## 2020-04-10 LAB
ALBUMIN SERPL BCP-MCNC: 2.7 G/DL (ref 3.5–5.2)
ALP SERPL-CCNC: 48 U/L (ref 55–135)
ALT SERPL W/O P-5'-P-CCNC: 44 U/L (ref 10–44)
ANION GAP SERPL CALC-SCNC: 13 MMOL/L (ref 8–16)
AST SERPL-CCNC: 80 U/L (ref 10–40)
BASOPHILS # BLD AUTO: 0.01 K/UL (ref 0–0.2)
BASOPHILS NFR BLD: 0.2 % (ref 0–1.9)
BILIRUB SERPL-MCNC: 0.4 MG/DL (ref 0.1–1)
BUN SERPL-MCNC: 10 MG/DL (ref 8–23)
CALCIUM SERPL-MCNC: 7.8 MG/DL (ref 8.7–10.5)
CHLORIDE SERPL-SCNC: 98 MMOL/L (ref 95–110)
CO2 SERPL-SCNC: 23 MMOL/L (ref 23–29)
CREAT SERPL-MCNC: 0.7 MG/DL (ref 0.5–1.4)
DIFFERENTIAL METHOD: ABNORMAL
EOSINOPHIL # BLD AUTO: 0 K/UL (ref 0–0.5)
EOSINOPHIL NFR BLD: 0.3 % (ref 0–8)
ERYTHROCYTE [DISTWIDTH] IN BLOOD BY AUTOMATED COUNT: 12.5 % (ref 11.5–14.5)
EST. GFR  (AFRICAN AMERICAN): >60 ML/MIN/1.73 M^2
EST. GFR  (NON AFRICAN AMERICAN): >60 ML/MIN/1.73 M^2
ESTIMATED AVG GLUCOSE: 117 MG/DL (ref 68–131)
GLUCOSE SERPL-MCNC: 97 MG/DL (ref 70–110)
HBA1C MFR BLD HPLC: 5.7 % (ref 4–5.6)
HCT VFR BLD AUTO: 39.7 % (ref 40–54)
HGB BLD-MCNC: 13.4 G/DL (ref 14–18)
IMM GRANULOCYTES # BLD AUTO: 0.04 K/UL (ref 0–0.04)
IMM GRANULOCYTES NFR BLD AUTO: 0.6 % (ref 0–0.5)
LYMPHOCYTES # BLD AUTO: 0.9 K/UL (ref 1–4.8)
LYMPHOCYTES NFR BLD: 13.3 % (ref 18–48)
MAGNESIUM SERPL-MCNC: 2.1 MG/DL (ref 1.6–2.6)
MCH RBC QN AUTO: 30.1 PG (ref 27–31)
MCHC RBC AUTO-ENTMCNC: 33.8 G/DL (ref 32–36)
MCV RBC AUTO: 89 FL (ref 82–98)
MONOCYTES # BLD AUTO: 0.4 K/UL (ref 0.3–1)
MONOCYTES NFR BLD: 6.1 % (ref 4–15)
NEUTROPHILS # BLD AUTO: 5.2 K/UL (ref 1.8–7.7)
NEUTROPHILS NFR BLD: 79.5 % (ref 38–73)
NRBC BLD-RTO: 0 /100 WBC
PHOSPHATE SERPL-MCNC: 2.8 MG/DL (ref 2.7–4.5)
PLATELET # BLD AUTO: 213 K/UL (ref 150–350)
PMV BLD AUTO: 9.6 FL (ref 9.2–12.9)
POTASSIUM SERPL-SCNC: 3.5 MMOL/L (ref 3.5–5.1)
PROT SERPL-MCNC: 7 G/DL (ref 6–8.4)
RBC # BLD AUTO: 4.45 M/UL (ref 4.6–6.2)
SODIUM SERPL-SCNC: 134 MMOL/L (ref 136–145)
WBC # BLD AUTO: 6.55 K/UL (ref 3.9–12.7)

## 2020-04-10 PROCEDURE — 85025 COMPLETE CBC W/AUTO DIFF WBC: CPT

## 2020-04-10 PROCEDURE — 63700000 PHARM REV CODE 250 ALT 637 W/O HCPCS: Performed by: INTERNAL MEDICINE

## 2020-04-10 PROCEDURE — 21400001 HC TELEMETRY ROOM

## 2020-04-10 PROCEDURE — 25000003 PHARM REV CODE 250: Performed by: INTERNAL MEDICINE

## 2020-04-10 PROCEDURE — 83735 ASSAY OF MAGNESIUM: CPT

## 2020-04-10 PROCEDURE — 84100 ASSAY OF PHOSPHORUS: CPT

## 2020-04-10 PROCEDURE — 80053 COMPREHEN METABOLIC PANEL: CPT

## 2020-04-10 PROCEDURE — 63600175 PHARM REV CODE 636 W HCPCS: Performed by: INTERNAL MEDICINE

## 2020-04-10 PROCEDURE — 36415 COLL VENOUS BLD VENIPUNCTURE: CPT

## 2020-04-10 PROCEDURE — 25000003 PHARM REV CODE 250: Performed by: EMERGENCY MEDICINE

## 2020-04-10 RX ORDER — LACTULOSE 10 G/15ML
20 SOLUTION ORAL ONCE
Status: COMPLETED | OUTPATIENT
Start: 2020-04-10 | End: 2020-04-10

## 2020-04-10 RX ORDER — AZITHROMYCIN 250 MG/1
250 TABLET, FILM COATED ORAL DAILY
Status: DISCONTINUED | OUTPATIENT
Start: 2020-04-10 | End: 2020-04-13

## 2020-04-10 RX ADMIN — HYDROXYCHLOROQUINE SULFATE 400 MG: 200 TABLET, FILM COATED ORAL at 10:04

## 2020-04-10 RX ADMIN — ALBUTEROL SULFATE 2 PUFF: 90 AEROSOL, METERED RESPIRATORY (INHALATION) at 12:04

## 2020-04-10 RX ADMIN — STANDARDIZED SENNA CONCENTRATE AND DOCUSATE SODIUM 1 TABLET: 8.6; 5 TABLET ORAL at 10:04

## 2020-04-10 RX ADMIN — ACETAMINOPHEN 650 MG: 325 TABLET ORAL at 10:04

## 2020-04-10 RX ADMIN — FINASTERIDE 5 MG: 5 TABLET, FILM COATED ORAL at 10:04

## 2020-04-10 RX ADMIN — LISINOPRIL 10 MG: 5 TABLET ORAL at 10:04

## 2020-04-10 RX ADMIN — CLOPIDOGREL BISULFATE 75 MG: 75 TABLET ORAL at 10:04

## 2020-04-10 RX ADMIN — STANDARDIZED SENNA CONCENTRATE AND DOCUSATE SODIUM 1 TABLET: 8.6; 5 TABLET ORAL at 08:04

## 2020-04-10 RX ADMIN — MONTELUKAST 10 MG: 10 TABLET, FILM COATED ORAL at 08:04

## 2020-04-10 RX ADMIN — ASPIRIN 81 MG: 81 TABLET, COATED ORAL at 10:04

## 2020-04-10 RX ADMIN — TAMSULOSIN HYDROCHLORIDE 0.4 MG: 0.4 CAPSULE ORAL at 10:04

## 2020-04-10 RX ADMIN — PHENYTOIN SODIUM 200 MG: 100 CAPSULE ORAL at 08:04

## 2020-04-10 RX ADMIN — ATORVASTATIN CALCIUM 40 MG: 40 TABLET, FILM COATED ORAL at 11:04

## 2020-04-10 RX ADMIN — CEFTRIAXONE 1 G: 1 INJECTION, POWDER, FOR SOLUTION INTRAMUSCULAR; INTRAVENOUS at 04:04

## 2020-04-10 RX ADMIN — AZITHROMYCIN MONOHYDRATE 250 MG: 250 TABLET ORAL at 04:04

## 2020-04-10 RX ADMIN — LACTULOSE 20 G: 10 SOLUTION ORAL at 02:04

## 2020-04-10 RX ADMIN — PHENYTOIN SODIUM 200 MG: 100 CAPSULE ORAL at 10:04

## 2020-04-10 RX ADMIN — ALBUTEROL SULFATE 2 PUFF: 90 AEROSOL, METERED RESPIRATORY (INHALATION) at 04:04

## 2020-04-10 RX ADMIN — METOPROLOL TARTRATE 50 MG: 50 TABLET, FILM COATED ORAL at 10:04

## 2020-04-10 RX ADMIN — ENOXAPARIN SODIUM 40 MG: 100 INJECTION SUBCUTANEOUS at 04:04

## 2020-04-10 RX ADMIN — PHENOBARBITAL 90 MG: 30 TABLET ORAL at 10:04

## 2020-04-10 RX ADMIN — ACETAMINOPHEN 650 MG: 325 TABLET ORAL at 05:04

## 2020-04-10 NOTE — ASSESSMENT & PLAN NOTE
[facility] with non-obstructive dz, Sept 2019. On home asa and statin plus Plavix for NSTEMI at that time.   Mild trop elevation in setting of acute illness stabilized. Doubt ACS. Continuing home meds.

## 2020-04-10 NOTE — PROGRESS NOTES
Normal LVEF. No WMA on echo. Non obstructive CAD from angiogram last year. Recommend medical management. RF modification. Appreciate consult. Will sign off for now. Please call if reconsult needed.

## 2020-04-10 NOTE — ASSESSMENT & PLAN NOTE
Hx of NSTEMI with C showing non-obstructive CAD in Sept 2019.   Mild, stable. Likely related to acute illness. ACS unlikely. Cards has evaluated and signed off. Appreciate reccs.  On asa, Plavix, statin at home; will continue inpatient.

## 2020-04-10 NOTE — SUBJECTIVE & OBJECTIVE
Interval History:   Patient sitting up and eating breakfast when seen this am. Was saturating well on RA but desaturated to 70's and was put on 5L NC later in the day.     Review of Systems   Unable to perform ROS: Other     Objective:     Vital Signs (Most Recent):  Temp: 98.2 °F (36.8 °C) (04/10/20 1155)  Pulse: 92 (04/10/20 1155)  Resp: (!) 21 (04/10/20 1217)  BP: 124/68 (04/10/20 1155)  SpO2: 95 % (04/10/20 1217) Vital Signs (24h Range):  Temp:  [97.9 °F (36.6 °C)-100.5 °F (38.1 °C)] 98.2 °F (36.8 °C)  Pulse:  [80-92] 92  Resp:  [18-24] 21  SpO2:  [90 %-95 %] 95 %  BP: (124-163)/(67-85) 124/68     Weight: 86.2 kg (190 lb)  Body mass index is 31.62 kg/m².    Intake/Output Summary (Last 24 hours) at 4/10/2020 1224  Last data filed at 4/10/2020 0430  Gross per 24 hour   Intake 600 ml   Output 600 ml   Net 0 ml      Physical Exam   Constitutional: He appears well-developed and well-nourished. No distress.   Pulmonary/Chest: Effort normal. No respiratory distress.   Musculoskeletal: He exhibits no edema or deformity.   Neurological: He is alert. No cranial nerve deficit.   Skin: No rash noted.   Psychiatric: His behavior is normal.       Significant Labs:   Recent Results (from the past 24 hour(s))   Echo Color Flow Doppler? Yes    Collection Time: 04/09/20  3:40 PM   Result Value Ref Range    BSA 1.99 m2    TDI SEPTAL 0.07 m/s    LV LATERAL E/E' RATIO 9.57 m/s    LV SEPTAL E/E' RATIO 9.57 m/s    LA WIDTH 3.97 cm    AORTIC VALVE CUSP SEPERATION 2.46 cm    TDI LATERAL 0.07 m/s    PV PEAK VELOCITY 1.02 cm/s    LVIDD 4.22 3.5 - 6.0 cm    IVS 1.43 (A) 0.6 - 1.1 cm    PW 1.45 (A) 0.6 - 1.1 cm    Ao root annulus 3.83 cm    LVIDS 2.96 2.1 - 4.0 cm    FS 30 28 - 44 %    LA volume 71.94 cm3    Sinus 3.44 cm    STJ 2.90 cm    Ascending aorta 3.09 cm    LV mass 235.86 g    LA size 3.69 cm    RVDD 3.63 cm    TAPSE 2.82 cm    RV S' 14.80 cm/s    Left Ventricle Relative Wall Thickness 0.69 cm    AV mean gradient 4 mmHg    AV valve  area 4.22 cm2    AV Velocity Ratio 0.95     AV index (prosthetic) 0.99     E/A ratio 0.83     Mean e' 0.07 m/s    E wave decelartion time 225.81 msec    IVRT 143.02 msec    Pulm vein S/D ratio 1.28     LVOT diameter 2.33 cm    LVOT area 4.3 cm2    LVOT peak marquis 1.20 m/s    LVOT peak VTI 29.08 cm    Ao peak marquis 1.26 m/s    Ao VTI 29.38 cm    LVOT stroke volume 123.93 cm3    AV peak gradient 6 mmHg    E/E' ratio 9.57 m/s    MV Peak E Marquis 0.67 m/s    TR Max Marquis 2.95 m/s    MV Peak A Marquis 0.81 m/s    PV Peak S Marquis 0.41 m/s    PV Peak D Marquis 0.32 m/s    LV Systolic Volume 33.85 mL    LV Systolic Volume Index 17.5 mL/m2    LV Diastolic Volume 79.58 mL    LV Diastolic Volume Index 41.12 mL/m2    LA Volume Index 37.2 mL/m2    LV Mass Index 122 g/m2    RA Major Axis 4.92 cm    Left Atrium Minor Axis 5.91 cm    Left Atrium Major Axis 5.65 cm    Triscuspid Valve Regurgitation Peak Gradient 35 mmHg    RA Width 3.41 cm   Troponin I    Collection Time: 04/09/20  6:16 PM   Result Value Ref Range    Troponin I 0.108 (H) 0.000 - 0.026 ng/mL   Sedimentation rate    Collection Time: 04/09/20  6:17 PM   Result Value Ref Range    Sed Rate 50 (H) 0 - 10 mm/Hr   CBC auto differential    Collection Time: 04/10/20  7:49 AM   Result Value Ref Range    WBC 6.55 3.90 - 12.70 K/uL    RBC 4.45 (L) 4.60 - 6.20 M/uL    Hemoglobin 13.4 (L) 14.0 - 18.0 g/dL    Hematocrit 39.7 (L) 40.0 - 54.0 %    Mean Corpuscular Volume 89 82 - 98 fL    Mean Corpuscular Hemoglobin 30.1 27.0 - 31.0 pg    Mean Corpuscular Hemoglobin Conc 33.8 32.0 - 36.0 g/dL    RDW 12.5 11.5 - 14.5 %    Platelets 213 150 - 350 K/uL    MPV 9.6 9.2 - 12.9 fL    Immature Granulocytes 0.6 (H) 0.0 - 0.5 %    Gran # (ANC) 5.2 1.8 - 7.7 K/uL    Immature Grans (Abs) 0.04 0.00 - 0.04 K/uL    Lymph # 0.9 (L) 1.0 - 4.8 K/uL    Mono # 0.4 0.3 - 1.0 K/uL    Eos # 0.0 0.0 - 0.5 K/uL    Baso # 0.01 0.00 - 0.20 K/uL    nRBC 0 0 /100 WBC    Gran% 79.5 (H) 38.0 - 73.0 %    Lymph% 13.3 (L) 18.0 - 48.0 %     Mono% 6.1 4.0 - 15.0 %    Eosinophil% 0.3 0.0 - 8.0 %    Basophil% 0.2 0.0 - 1.9 %    Differential Method Automated    Magnesium    Collection Time: 04/10/20  7:49 AM   Result Value Ref Range    Magnesium 2.1 1.6 - 2.6 mg/dL   Phosphorus    Collection Time: 04/10/20  7:49 AM   Result Value Ref Range    Phosphorus 2.8 2.7 - 4.5 mg/dL   Comprehensive metabolic panel    Collection Time: 04/10/20  7:49 AM   Result Value Ref Range    Sodium 134 (L) 136 - 145 mmol/L    Potassium 3.5 3.5 - 5.1 mmol/L    Chloride 98 95 - 110 mmol/L    CO2 23 23 - 29 mmol/L    Glucose 97 70 - 110 mg/dL    BUN, Bld 10 8 - 23 mg/dL    Creatinine 0.7 0.5 - 1.4 mg/dL    Calcium 7.8 (L) 8.7 - 10.5 mg/dL    Total Protein 7.0 6.0 - 8.4 g/dL    Albumin 2.7 (L) 3.5 - 5.2 g/dL    Total Bilirubin 0.4 0.1 - 1.0 mg/dL    Alkaline Phosphatase 48 (L) 55 - 135 U/L    AST 80 (H) 10 - 40 U/L    ALT 44 10 - 44 U/L    Anion Gap 13 8 - 16 mmol/L    eGFR if African American >60 >60 mL/min/1.73 m^2    eGFR if non African American >60 >60 mL/min/1.73 m^2         Significant Imaging: I have reviewed and interpreted all pertinent imaging results/findings within the past 24 hours.

## 2020-04-10 NOTE — ASSESSMENT & PLAN NOTE
PNA with COVID + on 4/9. Presenting symptoms of weakness, falls, mild sob.   Saturating well on RA initially, put on 5L NC today after desat to 70's. CXR b/l infiltrates, CRP over 200 and mildly elevated trop on admission.   COVID protocol with isolation and 02 supplementation. Albuterol inhaler prn. Continuing course of HCQ, Azithro and Rocephin. Repeat CXR today. CRP repeat tomorrow.

## 2020-04-10 NOTE — PROGRESS NOTES
[facility]  Progress Note    Patient Name: Vern Lr  MRN: [id number]  Patient Class: IP- Inpatient   Admission Date: 4/9/2020  Length of Stay: 1 days  Attending Physician: Tello Arana MD  Primary Care Provider: Lucio Byrd MD        Subjective:     Principal Problem:COVID-19 virus infection        HPI:  80/M admitted on 4/9/20 for falls and PNA 2/2 COVID infection. Positive testing in ED (4/9/20). Patient speaks only Greenlandic and language line used to communicate. Spoke to patient's sister who admits patient is hard of hearing and likely has mild dementia so history is somewhat limited as a result. Patient notes constipation without bm in a week. Sister states he often complains of this but patient actually was brought in for falls. He fell twice in past couple of days. She admits he has looked fatigued recently and she is actually recovering from COVID infection herself. He normally ambulates with assistance of cane. After first fall he was brought to [facility] and went home. Second fall while in bathroom yesterday and was subsequently brought here to [facility]. Seizure hx but sister did not see anything to suggest seizure and patient has been on meds. Patient has no recent issues with passing out. Hx of NSTEMI with non-obstructive CAD in Sept 2019. Largest occulusion noted on LHC was 20% at that time. Has been on asa, Plavix, and statin since. Normally walks with cane and was actually walking yesterday without issue per sister. Patient admits to some shortness of breath and cough as well as nasal congestion.    Overview/Hospital Course:  80/M admitted 4/9/20 for PNA 2/2 COVID-19. Weakness with 2 recent falls and mild shortness were main presenting symptoms prompting family to bring patient to hospital. Pt is Greenlandic speaking with poor hearing and likely mild dementia.   Abrasion on scalp with negative CT head. Otherwise no concerning findings on exam with regard to  falls and potential fracture. CT abdomen noted unchanged previously present compressions fractures on thoracolumbar spine.  Saturating well on room air then desaturating and requiring 5L NC on 4/10.   Constipation without bm in a week per patient. Benign abdominal exam and moderate stool without acute abnormality on imaging.    Interval History:   Patient sitting up and eating breakfast when seen this am. Was saturating well on RA but desaturated to 70's and was put on 5L NC later in the day.     Review of Systems   Unable to perform ROS: Other     Objective:     Vital Signs (Most Recent):  Temp: 98.2 °F (36.8 °C) (04/10/20 1155)  Pulse: 92 (04/10/20 1155)  Resp: (!) 21 (04/10/20 1217)  BP: 124/68 (04/10/20 1155)  SpO2: 95 % (04/10/20 1217) Vital Signs (24h Range):  Temp:  [97.9 °F (36.6 °C)-100.5 °F (38.1 °C)] 98.2 °F (36.8 °C)  Pulse:  [80-92] 92  Resp:  [18-24] 21  SpO2:  [90 %-95 %] 95 %  BP: (124-163)/(67-85) 124/68     Weight: 86.2 kg (190 lb)  Body mass index is 31.62 kg/m².    Intake/Output Summary (Last 24 hours) at 4/10/2020 1224  Last data filed at 4/10/2020 0430  Gross per 24 hour   Intake 600 ml   Output 600 ml   Net 0 ml      Physical Exam   Constitutional: He appears well-developed and well-nourished. No distress.   Pulmonary/Chest: Effort normal. No respiratory distress.   Musculoskeletal: He exhibits no edema or deformity.   Neurological: He is alert. No cranial nerve deficit.   Skin: No rash noted.   Psychiatric: His behavior is normal.       Significant Labs:   Recent Results (from the past 24 hour(s))   Echo Color Flow Doppler? Yes    Collection Time: 04/09/20  3:40 PM   Result Value Ref Range    BSA 1.99 m2    TDI SEPTAL 0.07 m/s    LV LATERAL E/E' RATIO 9.57 m/s    LV SEPTAL E/E' RATIO 9.57 m/s    LA WIDTH 3.97 cm    AORTIC VALVE CUSP SEPERATION 2.46 cm    TDI LATERAL 0.07 m/s    PV PEAK VELOCITY 1.02 cm/s    LVIDD 4.22 3.5 - 6.0 cm    IVS 1.43 (A) 0.6 - 1.1 cm    PW 1.45 (A) 0.6 - 1.1 cm    Ao  root annulus 3.83 cm    LVIDS 2.96 2.1 - 4.0 cm    FS 30 28 - 44 %    LA volume 71.94 cm3    Sinus 3.44 cm    STJ 2.90 cm    Ascending aorta 3.09 cm    LV mass 235.86 g    LA size 3.69 cm    RVDD 3.63 cm    TAPSE 2.82 cm    RV S' 14.80 cm/s    Left Ventricle Relative Wall Thickness 0.69 cm    AV mean gradient 4 mmHg    AV valve area 4.22 cm2    AV Velocity Ratio 0.95     AV index (prosthetic) 0.99     E/A ratio 0.83     Mean e' 0.07 m/s    E wave decelartion time 225.81 msec    IVRT 143.02 msec    Pulm vein S/D ratio 1.28     LVOT diameter 2.33 cm    LVOT area 4.3 cm2    LVOT peak marquis 1.20 m/s    LVOT peak VTI 29.08 cm    Ao peak marquis 1.26 m/s    Ao VTI 29.38 cm    LVOT stroke volume 123.93 cm3    AV peak gradient 6 mmHg    E/E' ratio 9.57 m/s    MV Peak E Marquis 0.67 m/s    TR Max Marquis 2.95 m/s    MV Peak A Marquis 0.81 m/s    PV Peak S Marquis 0.41 m/s    PV Peak D Marquis 0.32 m/s    LV Systolic Volume 33.85 mL    LV Systolic Volume Index 17.5 mL/m2    LV Diastolic Volume 79.58 mL    LV Diastolic Volume Index 41.12 mL/m2    LA Volume Index 37.2 mL/m2    LV Mass Index 122 g/m2    RA Major Axis 4.92 cm    Left Atrium Minor Axis 5.91 cm    Left Atrium Major Axis 5.65 cm    Triscuspid Valve Regurgitation Peak Gradient 35 mmHg    RA Width 3.41 cm   Troponin I    Collection Time: 04/09/20  6:16 PM   Result Value Ref Range    Troponin I 0.108 (H) 0.000 - 0.026 ng/mL   Sedimentation rate    Collection Time: 04/09/20  6:17 PM   Result Value Ref Range    Sed Rate 50 (H) 0 - 10 mm/Hr   CBC auto differential    Collection Time: 04/10/20  7:49 AM   Result Value Ref Range    WBC 6.55 3.90 - 12.70 K/uL    RBC 4.45 (L) 4.60 - 6.20 M/uL    Hemoglobin 13.4 (L) 14.0 - 18.0 g/dL    Hematocrit 39.7 (L) 40.0 - 54.0 %    Mean Corpuscular Volume 89 82 - 98 fL    Mean Corpuscular Hemoglobin 30.1 27.0 - 31.0 pg    Mean Corpuscular Hemoglobin Conc 33.8 32.0 - 36.0 g/dL    RDW 12.5 11.5 - 14.5 %    Platelets 213 150 - 350 K/uL    MPV 9.6 9.2 - 12.9 fL     Immature Granulocytes 0.6 (H) 0.0 - 0.5 %    Gran # (ANC) 5.2 1.8 - 7.7 K/uL    Immature Grans (Abs) 0.04 0.00 - 0.04 K/uL    Lymph # 0.9 (L) 1.0 - 4.8 K/uL    Mono # 0.4 0.3 - 1.0 K/uL    Eos # 0.0 0.0 - 0.5 K/uL    Baso # 0.01 0.00 - 0.20 K/uL    nRBC 0 0 /100 WBC    Gran% 79.5 (H) 38.0 - 73.0 %    Lymph% 13.3 (L) 18.0 - 48.0 %    Mono% 6.1 4.0 - 15.0 %    Eosinophil% 0.3 0.0 - 8.0 %    Basophil% 0.2 0.0 - 1.9 %    Differential Method Automated    Magnesium    Collection Time: 04/10/20  7:49 AM   Result Value Ref Range    Magnesium 2.1 1.6 - 2.6 mg/dL   Phosphorus    Collection Time: 04/10/20  7:49 AM   Result Value Ref Range    Phosphorus 2.8 2.7 - 4.5 mg/dL   Comprehensive metabolic panel    Collection Time: 04/10/20  7:49 AM   Result Value Ref Range    Sodium 134 (L) 136 - 145 mmol/L    Potassium 3.5 3.5 - 5.1 mmol/L    Chloride 98 95 - 110 mmol/L    CO2 23 23 - 29 mmol/L    Glucose 97 70 - 110 mg/dL    BUN, Bld 10 8 - 23 mg/dL    Creatinine 0.7 0.5 - 1.4 mg/dL    Calcium 7.8 (L) 8.7 - 10.5 mg/dL    Total Protein 7.0 6.0 - 8.4 g/dL    Albumin 2.7 (L) 3.5 - 5.2 g/dL    Total Bilirubin 0.4 0.1 - 1.0 mg/dL    Alkaline Phosphatase 48 (L) 55 - 135 U/L    AST 80 (H) 10 - 40 U/L    ALT 44 10 - 44 U/L    Anion Gap 13 8 - 16 mmol/L    eGFR if African American >60 >60 mL/min/1.73 m^2    eGFR if non African American >60 >60 mL/min/1.73 m^2         Significant Imaging: I have reviewed and interpreted all pertinent imaging results/findings within the past 24 hours.      Assessment/Plan:      COVID-19 virus infection  PNA with COVID + on 4/9. Presenting symptoms of weakness, falls, mild sob.   Saturating well on RA initially, put on 5L NC today after desat to 70's. CXR b/l infiltrates, CRP over 200 and mildly elevated trop on admission.   COVID protocol with isolation and 02 supplementation. Albuterol inhaler prn. Continuing course of HCQ, Azithro and Rocephin. Repeat CXR today. CRP repeat tomorrow.     Elevated troponin  Hx  of NSTEMI with [facility] showing non-obstructive CAD in Sept 2019.   Mild, stable. Likely related to acute illness. ACS unlikely. Cards has evaluated and signed off. Appreciate reccs.  On asa, Plavix, statin at home; will continue inpatient.    Fall  2 falls in setting of COVID infection and baseline gait instability requiring cane.  CT head and abdomen/pelvis show no new fractures. Nothing on exam to specify areas of fracture concern. Does have hx of osteoporosis and on anti-epileptic so will have very low threshold to add additional imaging.    Constipation  Continue daily laxatives while inpatient.    Coronary artery disease  [facility] with non-obstructive dz, Sept 2019. On home asa and statin plus Plavix for NSTEMI at that time.   Mild trop elevation in setting of acute illness stabilized. Doubt ACS. Continuing home meds.    Seizure disorder  Controlled. Continue home Dilantin and valproic acid.    Dyslipidemia  Continue home statin.    BPH (benign prostatic hyperplasia)  Controlled. Continue home Flomax.    Essential hypertension, benign  Controlled. Continue home meds.        VTE Risk Mitigation (From admission, onward)         Ordered     enoxaparin injection 40 mg  Daily      04/09/20 0909     IP VTE HIGH RISK PATIENT  Once      04/09/20 0914     Place sequential compression device  Until discontinued      04/09/20 0914                      Rojas Daniels MD  Department of Hospital Medicine   [facility] [place]

## 2020-04-11 LAB
BASOPHILS # BLD AUTO: 0.01 K/UL (ref 0–0.2)
BASOPHILS NFR BLD: 0.1 % (ref 0–1.9)
CRP SERPL-MCNC: 312 MG/L (ref 0–8.2)
DIFFERENTIAL METHOD: ABNORMAL
EOSINOPHIL # BLD AUTO: 0 K/UL (ref 0–0.5)
EOSINOPHIL NFR BLD: 0.6 % (ref 0–8)
ERYTHROCYTE [DISTWIDTH] IN BLOOD BY AUTOMATED COUNT: 12.5 % (ref 11.5–14.5)
HCT VFR BLD AUTO: 40.7 % (ref 40–54)
HGB BLD-MCNC: 13.6 G/DL (ref 14–18)
IMM GRANULOCYTES # BLD AUTO: 0.04 K/UL (ref 0–0.04)
IMM GRANULOCYTES NFR BLD AUTO: 0.6 % (ref 0–0.5)
LYMPHOCYTES # BLD AUTO: 0.9 K/UL (ref 1–4.8)
LYMPHOCYTES NFR BLD: 13.2 % (ref 18–48)
MAGNESIUM SERPL-MCNC: 2.1 MG/DL (ref 1.6–2.6)
MCH RBC QN AUTO: 30.3 PG (ref 27–31)
MCHC RBC AUTO-ENTMCNC: 33.4 G/DL (ref 32–36)
MCV RBC AUTO: 91 FL (ref 82–98)
MONOCYTES # BLD AUTO: 0.3 K/UL (ref 0.3–1)
MONOCYTES NFR BLD: 4.8 % (ref 4–15)
NEUTROPHILS # BLD AUTO: 5.7 K/UL (ref 1.8–7.7)
NEUTROPHILS NFR BLD: 80.7 % (ref 38–73)
NRBC BLD-RTO: 0 /100 WBC
PHOSPHATE SERPL-MCNC: 3 MG/DL (ref 2.7–4.5)
PLATELET # BLD AUTO: 251 K/UL (ref 150–350)
PMV BLD AUTO: 9.6 FL (ref 9.2–12.9)
RBC # BLD AUTO: 4.49 M/UL (ref 4.6–6.2)
WBC # BLD AUTO: 7.03 K/UL (ref 3.9–12.7)

## 2020-04-11 PROCEDURE — 85025 COMPLETE CBC W/AUTO DIFF WBC: CPT

## 2020-04-11 PROCEDURE — 25000003 PHARM REV CODE 250: Performed by: INTERNAL MEDICINE

## 2020-04-11 PROCEDURE — 83735 ASSAY OF MAGNESIUM: CPT

## 2020-04-11 PROCEDURE — 84100 ASSAY OF PHOSPHORUS: CPT

## 2020-04-11 PROCEDURE — 63700000 PHARM REV CODE 250 ALT 637 W/O HCPCS: Performed by: INTERNAL MEDICINE

## 2020-04-11 PROCEDURE — 63600175 PHARM REV CODE 636 W HCPCS: Performed by: INTERNAL MEDICINE

## 2020-04-11 PROCEDURE — 36415 COLL VENOUS BLD VENIPUNCTURE: CPT

## 2020-04-11 PROCEDURE — 21400001 HC TELEMETRY ROOM

## 2020-04-11 PROCEDURE — 86140 C-REACTIVE PROTEIN: CPT

## 2020-04-11 PROCEDURE — 25000003 PHARM REV CODE 250: Performed by: EMERGENCY MEDICINE

## 2020-04-11 RX ADMIN — PHENYTOIN SODIUM 200 MG: 100 CAPSULE ORAL at 08:04

## 2020-04-11 RX ADMIN — PHENOBARBITAL 90 MG: 30 TABLET ORAL at 08:04

## 2020-04-11 RX ADMIN — STANDARDIZED SENNA CONCENTRATE AND DOCUSATE SODIUM 1 TABLET: 8.6; 5 TABLET ORAL at 08:04

## 2020-04-11 RX ADMIN — LISINOPRIL 10 MG: 5 TABLET ORAL at 08:04

## 2020-04-11 RX ADMIN — ENOXAPARIN SODIUM 40 MG: 100 INJECTION SUBCUTANEOUS at 04:04

## 2020-04-11 RX ADMIN — ASPIRIN 81 MG: 81 TABLET, COATED ORAL at 08:04

## 2020-04-11 RX ADMIN — CLOPIDOGREL BISULFATE 75 MG: 75 TABLET ORAL at 08:04

## 2020-04-11 RX ADMIN — TAMSULOSIN HYDROCHLORIDE 0.4 MG: 0.4 CAPSULE ORAL at 08:04

## 2020-04-11 RX ADMIN — ACETAMINOPHEN 650 MG: 325 TABLET ORAL at 02:04

## 2020-04-11 RX ADMIN — ALBUTEROL SULFATE 2 PUFF: 90 AEROSOL, METERED RESPIRATORY (INHALATION) at 12:04

## 2020-04-11 RX ADMIN — ATORVASTATIN CALCIUM 40 MG: 40 TABLET, FILM COATED ORAL at 08:04

## 2020-04-11 RX ADMIN — HYDROXYCHLOROQUINE SULFATE 400 MG: 200 TABLET, FILM COATED ORAL at 08:04

## 2020-04-11 RX ADMIN — METOPROLOL TARTRATE 50 MG: 50 TABLET, FILM COATED ORAL at 08:04

## 2020-04-11 RX ADMIN — FINASTERIDE 5 MG: 5 TABLET, FILM COATED ORAL at 08:04

## 2020-04-11 RX ADMIN — CEFTRIAXONE 1 G: 1 INJECTION, POWDER, FOR SOLUTION INTRAMUSCULAR; INTRAVENOUS at 02:04

## 2020-04-11 RX ADMIN — ALBUTEROL SULFATE 2 PUFF: 90 AEROSOL, METERED RESPIRATORY (INHALATION) at 04:04

## 2020-04-11 RX ADMIN — MONTELUKAST 10 MG: 10 TABLET, FILM COATED ORAL at 08:04

## 2020-04-11 RX ADMIN — AZITHROMYCIN MONOHYDRATE 250 MG: 250 TABLET ORAL at 08:04

## 2020-04-11 NOTE — SUBJECTIVE & OBJECTIVE
Interval History:   Resp status improving. Is currently on 4L NC with sat of 92%. No resp distress.     Review of Systems   Unable to perform ROS: Other     Objective:     Vital Signs (Most Recent):  Temp: 98.1 °F (36.7 °C) (04/11/20 1150)  Pulse: 75 (04/11/20 1600)  Resp: 18 (04/11/20 1600)  BP: 125/63 (04/11/20 1150)  SpO2: (!) 91 % (04/11/20 1150) Vital Signs (24h Range):  Temp:  [98.1 °F (36.7 °C)-100.1 °F (37.8 °C)] 98.1 °F (36.7 °C)  Pulse:  [75-95] 75  Resp:  [17-19] 18  SpO2:  [91 %-96 %] 91 %  BP: (125-140)/(63-78) 125/63     Weight: 86.2 kg (190 lb 0.6 oz)  Body mass index is 31.62 kg/m².    Intake/Output Summary (Last 24 hours) at 4/11/2020 1633  Last data filed at 4/11/2020 1500  Gross per 24 hour   Intake 240 ml   Output 950 ml   Net -710 ml      Physical Exam   Constitutional: He appears well-developed and well-nourished. No distress.   Pulmonary/Chest: Effort normal. No respiratory distress.   Musculoskeletal: He exhibits no edema or deformity.   Neurological: He is alert. No cranial nerve deficit.   Skin: No rash noted.   Psychiatric: His behavior is normal.       Significant Labs:   Recent Results (from the past 24 hour(s))   C-Reactive Protein    Collection Time: 04/11/20  8:33 AM   Result Value Ref Range    .0 (H) 0.0 - 8.2 mg/L   CBC auto differential    Collection Time: 04/11/20  8:33 AM   Result Value Ref Range    WBC 7.03 3.90 - 12.70 K/uL    RBC 4.49 (L) 4.60 - 6.20 M/uL    Hemoglobin 13.6 (L) 14.0 - 18.0 g/dL    Hematocrit 40.7 40.0 - 54.0 %    Mean Corpuscular Volume 91 82 - 98 fL    Mean Corpuscular Hemoglobin 30.3 27.0 - 31.0 pg    Mean Corpuscular Hemoglobin Conc 33.4 32.0 - 36.0 g/dL    RDW 12.5 11.5 - 14.5 %    Platelets 251 150 - 350 K/uL    MPV 9.6 9.2 - 12.9 fL    Immature Granulocytes 0.6 (H) 0.0 - 0.5 %    Gran # (ANC) 5.7 1.8 - 7.7 K/uL    Immature Grans (Abs) 0.04 0.00 - 0.04 K/uL    Lymph # 0.9 (L) 1.0 - 4.8 K/uL    Mono # 0.3 0.3 - 1.0 K/uL    Eos # 0.0 0.0 - 0.5 K/uL     Baso # 0.01 0.00 - 0.20 K/uL    nRBC 0 0 /100 WBC    Gran% 80.7 (H) 38.0 - 73.0 %    Lymph% 13.2 (L) 18.0 - 48.0 %    Mono% 4.8 4.0 - 15.0 %    Eosinophil% 0.6 0.0 - 8.0 %    Basophil% 0.1 0.0 - 1.9 %    Differential Method Automated    Magnesium    Collection Time: 04/11/20  8:33 AM   Result Value Ref Range    Magnesium 2.1 1.6 - 2.6 mg/dL   Phosphorus    Collection Time: 04/11/20  8:33 AM   Result Value Ref Range    Phosphorus 3.0 2.7 - 4.5 mg/dL         Significant Imaging: I have reviewed and interpreted all pertinent imaging results/findings within the past 24 hours.

## 2020-04-11 NOTE — PROGRESS NOTES
[facility]  Progress Note    Patient Name: Vern Lr  MRN: [id number]  Patient Class: IP- Inpatient   Admission Date: 4/9/2020  Length of Stay: 2 days  Attending Physician: Tello Arana MD  Primary Care Provider: Lucio Byrd MD        Subjective:     Principal Problem:COVID-19 virus infection        HPI:  80/M admitted on 4/9/20 for falls and PNA 2/2 COVID infection. Positive testing in ED (4/9/20). Patient speaks only Azeri and language line used to communicate. Spoke to patient's sister who admits patient is hard of hearing and likely has mild dementia so history is somewhat limited as a result. Patient notes constipation without bm in a week. Sister states he often complains of this but patient actually was brought in for falls. He fell twice in past couple of days. She admits he has looked fatigued recently and she is actually recovering from COVID infection herself. He normally ambulates with assistance of cane. After first fall he was brought to [facility] and went home. Second fall while in bathroom yesterday and was subsequently brought here to [facility]. Seizure hx but sister did not see anything to suggest seizure and patient has been on meds. Patient has no recent issues with passing out. Hx of NSTEMI with non-obstructive CAD in Sept 2019. Largest occulusion noted on LHC was 20% at that time. Has been on asa, Plavix, and statin since. Normally walks with cane and was actually walking yesterday without issue per sister. Patient admits to some shortness of breath and cough as well as nasal congestion.    Overview/Hospital Course:  80/M admitted 4/9/20 for PNA 2/2 COVID-19. Weakness with 2 recent falls and mild shortness were main presenting symptoms prompting family to bring patient to hospital. Pt is Azeri speaking with poor hearing and likely mild dementia.   Abrasion on scalp with negative CT head. Otherwise no concerning findings on exam with regard to  falls and potential fracture. CT abdomen noted unchanged previously present compressions fractures on thoracolumbar spine.  Saturating well on room air then desaturating and requiring 5L NC on 4/10.   Constipation without bm in a week per patient. Benign abdominal exam and moderate stool without acute abnormality on imaging.    Interval History:   Resp status improving. Is currently on 4L NC with sat of 92%. No resp distress.     Review of Systems   Unable to perform ROS: Other     Objective:     Vital Signs (Most Recent):  Temp: 98.1 °F (36.7 °C) (04/11/20 1150)  Pulse: 75 (04/11/20 1600)  Resp: 18 (04/11/20 1600)  BP: 125/63 (04/11/20 1150)  SpO2: (!) 91 % (04/11/20 1150) Vital Signs (24h Range):  Temp:  [98.1 °F (36.7 °C)-100.1 °F (37.8 °C)] 98.1 °F (36.7 °C)  Pulse:  [75-95] 75  Resp:  [17-19] 18  SpO2:  [91 %-96 %] 91 %  BP: (125-140)/(63-78) 125/63     Weight: 86.2 kg (190 lb 0.6 oz)  Body mass index is 31.62 kg/m².    Intake/Output Summary (Last 24 hours) at 4/11/2020 1633  Last data filed at 4/11/2020 1500  Gross per 24 hour   Intake 240 ml   Output 950 ml   Net -710 ml      Physical Exam   Constitutional: He appears well-developed and well-nourished. No distress.   Pulmonary/Chest: Effort normal. No respiratory distress.   Musculoskeletal: He exhibits no edema or deformity.   Neurological: He is alert. No cranial nerve deficit.   Skin: No rash noted.   Psychiatric: His behavior is normal.       Significant Labs:   Recent Results (from the past 24 hour(s))   C-Reactive Protein    Collection Time: 04/11/20  8:33 AM   Result Value Ref Range    .0 (H) 0.0 - 8.2 mg/L   CBC auto differential    Collection Time: 04/11/20  8:33 AM   Result Value Ref Range    WBC 7.03 3.90 - 12.70 K/uL    RBC 4.49 (L) 4.60 - 6.20 M/uL    Hemoglobin 13.6 (L) 14.0 - 18.0 g/dL    Hematocrit 40.7 40.0 - 54.0 %    Mean Corpuscular Volume 91 82 - 98 fL    Mean Corpuscular Hemoglobin 30.3 27.0 - 31.0 pg    Mean Corpuscular  Hemoglobin Conc 33.4 32.0 - 36.0 g/dL    RDW 12.5 11.5 - 14.5 %    Platelets 251 150 - 350 K/uL    MPV 9.6 9.2 - 12.9 fL    Immature Granulocytes 0.6 (H) 0.0 - 0.5 %    Gran # (ANC) 5.7 1.8 - 7.7 K/uL    Immature Grans (Abs) 0.04 0.00 - 0.04 K/uL    Lymph # 0.9 (L) 1.0 - 4.8 K/uL    Mono # 0.3 0.3 - 1.0 K/uL    Eos # 0.0 0.0 - 0.5 K/uL    Baso # 0.01 0.00 - 0.20 K/uL    nRBC 0 0 /100 WBC    Gran% 80.7 (H) 38.0 - 73.0 %    Lymph% 13.2 (L) 18.0 - 48.0 %    Mono% 4.8 4.0 - 15.0 %    Eosinophil% 0.6 0.0 - 8.0 %    Basophil% 0.1 0.0 - 1.9 %    Differential Method Automated    Magnesium    Collection Time: 04/11/20  8:33 AM   Result Value Ref Range    Magnesium 2.1 1.6 - 2.6 mg/dL   Phosphorus    Collection Time: 04/11/20  8:33 AM   Result Value Ref Range    Phosphorus 3.0 2.7 - 4.5 mg/dL         Significant Imaging: I have reviewed and interpreted all pertinent imaging results/findings within the past 24 hours.      Assessment/Plan:      * COVID-19 virus infection  PNA with COVID + on 4/9. Presenting symptoms of weakness, falls, mild sob.   Saturating well on RA initially, put on 5L NC today after desat to 70's. CXR b/l infiltrates, CRP over 200 and mildly elevated trop on admission.   COVID protocol with isolation and 02 supplementation. Albuterol inhaler prn. Continuing course of HCQ, Azithro and Rocephin.   Oxygenation improving  Wean off supp o2 as tolerated      Coronary artery disease  [facility] with non-obstructive dz, Sept 2019. On home asa and statin plus Plavix for NSTEMI at that time.   Mild trop elevation in setting of acute illness stabilized. Doubt ACS. Continuing home meds.    Fall  2 falls in setting of COVID infection and baseline gait instability requiring cane.  CT head and abdomen/pelvis show no new fractures. Nothing on exam to specify areas of fracture concern. Does have hx of osteoporosis and on anti-epileptic so will have very low threshold to add additional imaging.      Constipation  Continue daily  laxatives while inpatient.      Elevated troponin  Hx of NSTEMI with [facility] showing non-obstructive CAD in Sept 2019.   Mild, stable. Likely related to acute illness. ACS unlikely. Cards has evaluated and signed off. Appreciate reccs.  On asa, Plavix, statin at home; will continue inpatient.    Seizure disorder  Controlled. Continue home Dilantin and valproic acid.      Dyslipidemia  Continue home statin.    BPH (benign prostatic hyperplasia)  Controlled. Continue home Flomax.      Essential hypertension, benign  Controlled. Continue home meds.        VTE Risk Mitigation (From admission, onward)         Ordered     enoxaparin injection 40 mg  Daily      04/09/20 0909     IP VTE HIGH RISK PATIENT  Once      04/09/20 0914     Place sequential compression device  Until discontinued      04/09/20 0914                      Tello Arana MD  Department of Hospital Medicine   [facility]

## 2020-04-11 NOTE — PLAN OF CARE
Recommendation:   1. Add Boost Glucose Control bid.   2. Encourage intake at meals.   3. Pt may benefit from appetite stimulant    Goals:   Pt will consume at least 50% intake at meals  Nutrition Goal Status: new  Communication of RD Recs: (POC)

## 2020-04-11 NOTE — ASSESSMENT & PLAN NOTE
[facility] with non-obstructive dz, Sept 2019. On home asa and statin plus Plavix for NSTEMI at that time.   Mild trop elevation in setting of acute illness stabilized. Doubt ACS. Continuing home meds.

## 2020-04-11 NOTE — CONSULTS
"  [facility]  Adult Nutrition  Consult Note    SUMMARY     Recommendations    Recommendation:   1. Add Boost Glucose Control bid.   2. Encourage intake at meals.   3. Pt may benefit from appetite stimulant    Goals:   Pt will consume at least 50% intake at meals  Nutrition Goal Status: new  Communication of RD Recs: (POC)    Reason for Assessment  Reason For Assessment: consult(poor appetite)  Diagnosis: (COVID)  Relevant Medical History: seizure disorder, BPH, DM, stroke, CAD  General Information Comments: pt on Cardiac diet. Noted poor intake at meals. RD covering remotely for weekend coverage.   Nutrition Discharge Planning: pt to d/c on Cardiac diet    Nutrition Risk Screen  Nutrition Risk Screen: cultural or Evangelical food preferences    Nutrition/Diet History  Food Preferences: unable to assess  Factors Affecting Nutritional Intake: decreased appetite    Anthropometrics  Temp: 98.1 °F (36.7 °C)  Height: 5' 5" (165.1 cm)  Height (inches): 65 in  Weight Method: Bed Scale  Weight: 86.2 kg (190 lb 0.6 oz)  Weight (lb): 190.04 lb  Ideal Body Weight (IBW), Male: 136 lb  % Ideal Body Weight, Male (lb): 139.74 %  BMI (Calculated): 31.6  BMI Grade: 30 - 34.9- obesity - grade I     Lab/Procedures/Meds  Pertinent Labs Reviewed: reviewed  Pertinent Labs Comments: Na 134L, Ca 7.8L, Alb 2.7L  Pertinent Medications Reviewed: reviewed  Pertinent Medications Comments: aspirin, rocephin, lactulose, lopressor    Estimated/Assessed Needs  Weight Used For Calorie Calculations: 86.2 kg (190 lb 0.6 oz)  Energy Calorie Requirements (kcal): 2155 (25 kcal/kg)  Energy Need Method: Kcal/kg  Protein Requirements: 69g (0.8g/kg)  Weight Used For Protein Calculations: 86.2 kg (190 lb 0.6 oz)  Estimated Fluid Requirement Method: RDA Method  RDA Method (mL): 2155     Nutrition Prescription Ordered  Current Diet Order: Cardiac    Evaluation of Received Nutrient/Fluid Intake  I/O: 240/350  Energy Calories Required: not meeting " needs  Protein Required: not meeting needs  Fluid Required: not meeting needs  Comments: LBM 4/11  % Intake of Estimated Energy Needs: 0 - 25 %  % Meal Intake: 0 - 25 %    Nutrition Risk  Level of Risk/Frequency of Follow-up: (1xweekly)     Assessment and Plan  * COVID-19 virus infection  Contributing Nutrition Diagnosis  Inadequate energy intake    Related to (etiology):   Decreased appetite, COVID    Signs and Symptoms (as evidenced by):   Poor intake at meals    Interventions:  Commercial beverage    Nutrition Diagnosis Status:   New      Monitor and Evaluation  Food and Nutrient Intake: food and beverage intake  Food and Nutrient Adminstration: diet order  Physical Activity and Function: nutrition-related ADLs and IADLs  Anthropometric Measurements: weight  Biochemical Data, Medical Tests and Procedures: electrolyte and renal panel  Nutrition-Focused Physical Findings: overall appearance     Malnutrition Assessment  NFPE not performed, patient has been screened for possible COVID-19 and has been placed on airborne and contact precautions. Patient is noted as being positive for COVID-19.       Nutrition Follow-Up  RD Follow-up?: Yes

## 2020-04-11 NOTE — ASSESSMENT & PLAN NOTE
PNA with COVID + on 4/9. Presenting symptoms of weakness, falls, mild sob.   Saturating well on RA initially, put on 5L NC today after desat to 70's. CXR b/l infiltrates, CRP over 200 and mildly elevated trop on admission.   COVID protocol with isolation and 02 supplementation. Albuterol inhaler prn. Continuing course of HCQ, Azithro and Rocephin.   Oxygenation improving  Wean off supp o2 as tolerated

## 2020-04-11 NOTE — PLAN OF CARE
Problem: Fall Injury Risk  Goal: Absence of Fall and Fall-Related Injury  Outcome: Ongoing, Progressing     Problem: Adult Inpatient Plan of Care  Goal: Plan of Care Review  Outcome: Ongoing, Progressing  Goal: Patient-Specific Goal (Individualization)  Outcome: Ongoing, Progressing  Goal: Absence of Hospital-Acquired Illness or Injury  Outcome: Ongoing, Progressing  Goal: Optimal Comfort and Wellbeing  Outcome: Ongoing, Progressing  Goal: Readiness for Transition of Care  Outcome: Ongoing, Progressing  Goal: Rounds/Family Conference  Outcome: Ongoing, Progressing     Problem: Skin Injury Risk Increased  Goal: Skin Health and Integrity  Outcome: Ongoing, Progressing     Problem: Social Isolation  Goal: Increased Social Interaction  Outcome: Ongoing, Progressing     Problem: Infection  Goal: Infection Symptom Resolution  Outcome: Ongoing, Progressing

## 2020-04-11 NOTE — ASSESSMENT & PLAN NOTE
Contributing Nutrition Diagnosis  Inadequate energy intake    Related to (etiology):   Decreased appetite, COVID    Signs and Symptoms (as evidenced by):   Poor intake at meals    Interventions:  Commercial beverage    Nutrition Diagnosis Status:   New

## 2020-04-12 PROBLEM — J96.01 ACUTE RESPIRATORY FAILURE WITH HYPOXIA: Status: ACTIVE | Noted: 2020-04-12

## 2020-04-12 LAB
BASOPHILS # BLD AUTO: 0.01 K/UL (ref 0–0.2)
BASOPHILS NFR BLD: 0.1 % (ref 0–1.9)
DIFFERENTIAL METHOD: ABNORMAL
EOSINOPHIL # BLD AUTO: 0 K/UL (ref 0–0.5)
EOSINOPHIL NFR BLD: 0.6 % (ref 0–8)
ERYTHROCYTE [DISTWIDTH] IN BLOOD BY AUTOMATED COUNT: 12.5 % (ref 11.5–14.5)
HCT VFR BLD AUTO: 37.1 % (ref 40–54)
HGB BLD-MCNC: 12.5 G/DL (ref 14–18)
IMM GRANULOCYTES # BLD AUTO: 0.04 K/UL (ref 0–0.04)
IMM GRANULOCYTES NFR BLD AUTO: 0.6 % (ref 0–0.5)
LYMPHOCYTES # BLD AUTO: 0.8 K/UL (ref 1–4.8)
LYMPHOCYTES NFR BLD: 11 % (ref 18–48)
MAGNESIUM SERPL-MCNC: 2.1 MG/DL (ref 1.6–2.6)
MCH RBC QN AUTO: 30.4 PG (ref 27–31)
MCHC RBC AUTO-ENTMCNC: 33.7 G/DL (ref 32–36)
MCV RBC AUTO: 90 FL (ref 82–98)
MONOCYTES # BLD AUTO: 0.4 K/UL (ref 0.3–1)
MONOCYTES NFR BLD: 4.9 % (ref 4–15)
NEUTROPHILS # BLD AUTO: 5.9 K/UL (ref 1.8–7.7)
NEUTROPHILS NFR BLD: 82.8 % (ref 38–73)
NRBC BLD-RTO: 0 /100 WBC
PHOSPHATE SERPL-MCNC: 2.8 MG/DL (ref 2.7–4.5)
PLATELET # BLD AUTO: 283 K/UL (ref 150–350)
PMV BLD AUTO: 9.6 FL (ref 9.2–12.9)
PROCALCITONIN SERPL IA-MCNC: 0.12 NG/ML
RBC # BLD AUTO: 4.11 M/UL (ref 4.6–6.2)
WBC # BLD AUTO: 7.09 K/UL (ref 3.9–12.7)

## 2020-04-12 PROCEDURE — 84145 PROCALCITONIN (PCT): CPT

## 2020-04-12 PROCEDURE — 86140 C-REACTIVE PROTEIN: CPT

## 2020-04-12 PROCEDURE — 25000242 PHARM REV CODE 250 ALT 637 W/ HCPCS: Performed by: INTERNAL MEDICINE

## 2020-04-12 PROCEDURE — 25000003 PHARM REV CODE 250: Performed by: EMERGENCY MEDICINE

## 2020-04-12 PROCEDURE — 83735 ASSAY OF MAGNESIUM: CPT

## 2020-04-12 PROCEDURE — 11000001 HC ACUTE MED/SURG PRIVATE ROOM

## 2020-04-12 PROCEDURE — 25000003 PHARM REV CODE 250: Performed by: INTERNAL MEDICINE

## 2020-04-12 PROCEDURE — 63700000 PHARM REV CODE 250 ALT 637 W/O HCPCS: Performed by: INTERNAL MEDICINE

## 2020-04-12 PROCEDURE — 63600175 PHARM REV CODE 636 W HCPCS: Performed by: INTERNAL MEDICINE

## 2020-04-12 PROCEDURE — 36415 COLL VENOUS BLD VENIPUNCTURE: CPT

## 2020-04-12 PROCEDURE — 84100 ASSAY OF PHOSPHORUS: CPT

## 2020-04-12 PROCEDURE — 85025 COMPLETE CBC W/AUTO DIFF WBC: CPT

## 2020-04-12 RX ADMIN — PHENYTOIN SODIUM 200 MG: 100 CAPSULE ORAL at 08:04

## 2020-04-12 RX ADMIN — STANDARDIZED SENNA CONCENTRATE AND DOCUSATE SODIUM 1 TABLET: 8.6; 5 TABLET ORAL at 08:04

## 2020-04-12 RX ADMIN — ASPIRIN 81 MG: 81 TABLET, COATED ORAL at 08:04

## 2020-04-12 RX ADMIN — HYDROXYCHLOROQUINE SULFATE 400 MG: 200 TABLET, FILM COATED ORAL at 08:04

## 2020-04-12 RX ADMIN — ALBUTEROL SULFATE 2 PUFF: 90 AEROSOL, METERED RESPIRATORY (INHALATION) at 05:04

## 2020-04-12 RX ADMIN — MONTELUKAST 10 MG: 10 TABLET, FILM COATED ORAL at 08:04

## 2020-04-12 RX ADMIN — CEFTRIAXONE 1 G: 1 INJECTION, POWDER, FOR SOLUTION INTRAMUSCULAR; INTRAVENOUS at 12:04

## 2020-04-12 RX ADMIN — AZITHROMYCIN MONOHYDRATE 250 MG: 250 TABLET ORAL at 08:04

## 2020-04-12 RX ADMIN — LISINOPRIL 10 MG: 5 TABLET ORAL at 08:04

## 2020-04-12 RX ADMIN — CLOPIDOGREL BISULFATE 75 MG: 75 TABLET ORAL at 08:04

## 2020-04-12 RX ADMIN — METOPROLOL TARTRATE 50 MG: 50 TABLET, FILM COATED ORAL at 08:04

## 2020-04-12 RX ADMIN — ALBUTEROL SULFATE 2 PUFF: 90 AEROSOL, METERED RESPIRATORY (INHALATION) at 08:04

## 2020-04-12 RX ADMIN — FINASTERIDE 5 MG: 5 TABLET, FILM COATED ORAL at 09:04

## 2020-04-12 RX ADMIN — ALBUTEROL SULFATE 2 PUFF: 90 AEROSOL, METERED RESPIRATORY (INHALATION) at 11:04

## 2020-04-12 RX ADMIN — TAMSULOSIN HYDROCHLORIDE 0.4 MG: 0.4 CAPSULE ORAL at 08:04

## 2020-04-12 RX ADMIN — ALBUTEROL SULFATE 2 PUFF: 90 AEROSOL, METERED RESPIRATORY (INHALATION) at 12:04

## 2020-04-12 RX ADMIN — ENOXAPARIN SODIUM 40 MG: 100 INJECTION SUBCUTANEOUS at 05:04

## 2020-04-12 RX ADMIN — PHENOBARBITAL 90 MG: 30 TABLET ORAL at 08:04

## 2020-04-12 RX ADMIN — ATORVASTATIN CALCIUM 40 MG: 40 TABLET, FILM COATED ORAL at 08:04

## 2020-04-12 NOTE — PROGRESS NOTES
[facility]  Progress Note    Patient Name: Vern Lr  MRN: [id number]  Patient Class: IP- Inpatient   Admission Date: 4/9/2020  Length of Stay: 3 days  Attending Physician: Tello Arana MD  Primary Care Provider: Lucio Byrd MD        Subjective:     Principal Problem:COVID-19 virus infection        HPI:  80/M admitted on 4/9/20 for falls and PNA 2/2 COVID infection. Positive testing in ED (4/9/20). Patient speaks only Irish and language line used to communicate. Spoke to patient's sister who admits patient is hard of hearing and likely has mild dementia so history is somewhat limited as a result. Patient notes constipation without bm in a week. Sister states he often complains of this but patient actually was brought in for falls. He fell twice in past couple of days. She admits he has looked fatigued recently and she is actually recovering from COVID infection herself. He normally ambulates with assistance of cane. After first fall he was brought to [facility] and went home. Second fall while in bathroom yesterday and was subsequently brought here to [facility]. Seizure hx but sister did not see anything to suggest seizure and patient has been on meds. Patient has no recent issues with passing out. Hx of NSTEMI with non-obstructive CAD in Sept 2019. Largest occulusion noted on LHC was 20% at that time. Has been on asa, Plavix, and statin since. Normally walks with cane and was actually walking yesterday without issue per sister. Patient admits to some shortness of breath and cough as well as nasal congestion.    Overview/Hospital Course:  80/M admitted 4/9/20 for PNA 2/2 COVID-19. Weakness with 2 recent falls and mild shortness were main presenting symptoms prompting family to bring patient to hospital. Pt is Irish speaking with poor hearing and likely mild dementia.   Abrasion on scalp with negative CT head. Otherwise no concerning findings on exam with regard to  falls and potential fracture. CT abdomen noted unchanged previously present compressions fractures on thoracolumbar spine.  Saturating well on room air then desaturating and requiring 5L NC on 4/10.   Constipation without bm in a week per patient. Benign abdominal exam and moderate stool without acute abnormality on imaging.    Interval History:   Resp status improving    Review of Systems   Unable to perform ROS: Other     Objective:     Vital Signs (Most Recent):  Temp: 98.4 °F (36.9 °C) (04/12/20 1205)  Pulse: 85 (04/12/20 1205)  Resp: (!) 22 (04/12/20 1205)  BP: 117/68 (04/12/20 1205)  SpO2: 95 % (04/12/20 1205) Vital Signs (24h Range):  Temp:  [98.3 °F (36.8 °C)-98.7 °F (37.1 °C)] 98.4 °F (36.9 °C)  Pulse:  [75-97] 85  Resp:  [14-24] 22  SpO2:  [91 %-99 %] 95 %  BP: (116-135)/(68-77) 117/68     Weight: 86.2 kg (190 lb 0.6 oz)  Body mass index is 31.62 kg/m².    Intake/Output Summary (Last 24 hours) at 4/12/2020 1249  Last data filed at 4/12/2020 0900  Gross per 24 hour   Intake 960 ml   Output 600 ml   Net 360 ml      Physical Exam   Constitutional: He appears well-developed and well-nourished. No distress.   Pulmonary/Chest: Effort normal. No respiratory distress.   Musculoskeletal: He exhibits no edema or deformity.   Neurological: He is alert. No cranial nerve deficit.   Skin: No rash noted.   Psychiatric: His behavior is normal.       Significant Labs:   Recent Results (from the past 24 hour(s))   CBC auto differential    Collection Time: 04/12/20  4:57 AM   Result Value Ref Range    WBC 7.09 3.90 - 12.70 K/uL    RBC 4.11 (L) 4.60 - 6.20 M/uL    Hemoglobin 12.5 (L) 14.0 - 18.0 g/dL    Hematocrit 37.1 (L) 40.0 - 54.0 %    Mean Corpuscular Volume 90 82 - 98 fL    Mean Corpuscular Hemoglobin 30.4 27.0 - 31.0 pg    Mean Corpuscular Hemoglobin Conc 33.7 32.0 - 36.0 g/dL    RDW 12.5 11.5 - 14.5 %    Platelets 283 150 - 350 K/uL    MPV 9.6 9.2 - 12.9 fL    Immature Granulocytes 0.6 (H) 0.0 - 0.5 %    Gran # (ANC)  5.9 1.8 - 7.7 K/uL    Immature Grans (Abs) 0.04 0.00 - 0.04 K/uL    Lymph # 0.8 (L) 1.0 - 4.8 K/uL    Mono # 0.4 0.3 - 1.0 K/uL    Eos # 0.0 0.0 - 0.5 K/uL    Baso # 0.01 0.00 - 0.20 K/uL    nRBC 0 0 /100 WBC    Gran% 82.8 (H) 38.0 - 73.0 %    Lymph% 11.0 (L) 18.0 - 48.0 %    Mono% 4.9 4.0 - 15.0 %    Eosinophil% 0.6 0.0 - 8.0 %    Basophil% 0.1 0.0 - 1.9 %    Differential Method Automated    Magnesium    Collection Time: 04/12/20  4:57 AM   Result Value Ref Range    Magnesium 2.1 1.6 - 2.6 mg/dL   Phosphorus    Collection Time: 04/12/20  4:57 AM   Result Value Ref Range    Phosphorus 2.8 2.7 - 4.5 mg/dL   Procalcitonin    Collection Time: 04/12/20 11:27 AM   Result Value Ref Range    Procalcitonin 0.12 <0.25 ng/mL         Significant Imaging: I have reviewed and interpreted all pertinent imaging results/findings within the past 24 hours.      Assessment/Plan:      * COVID-19 virus infection  PNA with COVID + on 4/9. Presenting symptoms of weakness, falls, mild sob.   Saturating well on RA initially, put on 5L NC today after desat to 70's. CXR b/l infiltrates, CRP over 200 and mildly elevated trop on admission.   COVID protocol with isolation and 02 supplementation. Albuterol inhaler prn. Continuing course of HCQ, Azithro and Rocephin.   Oxygenation improving  Wean off supp o2 as tolerated      Coronary artery disease  [facility] with non-obstructive dz, Sept 2019. On home asa and statin plus Plavix for NSTEMI at that time.   Mild trop elevation in setting of acute illness stabilized. Doubt ACS. Continuing home meds.    Fall  2 falls in setting of COVID infection and baseline gait instability requiring cane.  CT head and abdomen/pelvis show no new fractures. Nothing on exam to specify areas of fracture concern. Does have hx of osteoporosis and on anti-epileptic so will have very low threshold to add additional imaging.      Constipation  Continue daily laxatives while inpatient.      Elevated troponin  Hx of NSTEMI with  [facility] showing non-obstructive CAD in Sept 2019.   Mild, stable. Likely related to acute illness. ACS unlikely. Cards has evaluated and signed off. Appreciate reccs.  On asa, Plavix, statin at home; will continue inpatient.    Seizure disorder  Controlled. Continue home Dilantin and valproic acid.      Dyslipidemia  Continue home statin.    BPH (benign prostatic hyperplasia)  Controlled. Continue home Flomax.      Essential hypertension, benign  Controlled. Continue home meds.        VTE Risk Mitigation (From admission, onward)         Ordered     enoxaparin injection 40 mg  Daily      04/09/20 0909     IP VTE HIGH RISK PATIENT  Once      04/09/20 0914     Place sequential compression device  Until discontinued      04/09/20 0914                      Tello Arana MD  Department of Hospital Medicine   [facility]

## 2020-04-12 NOTE — PLAN OF CARE
Problem: Fall Injury Risk  Goal: Absence of Fall and Fall-Related Injury  Outcome: Ongoing, Progressing     Problem: Adult Inpatient Plan of Care  Goal: Plan of Care Review  Outcome: Ongoing, Progressing  Goal: Patient-Specific Goal (Individualization)  Outcome: Ongoing, Progressing  Goal: Absence of Hospital-Acquired Illness or Injury  Outcome: Ongoing, Progressing  Goal: Optimal Comfort and Wellbeing  Outcome: Ongoing, Progressing  Goal: Readiness for Transition of Care  Outcome: Ongoing, Progressing  Goal: Rounds/Family Conference  Outcome: Ongoing, Progressing     Problem: Skin Injury Risk Increased  Goal: Skin Health and Integrity  Outcome: Ongoing, Progressing     Problem: Social Isolation  Goal: Increased Social Interaction  Outcome: Ongoing, Progressing     Problem: Infection  Goal: Infection Symptom Resolution  Outcome: Ongoing, Progressing     Problem: Oral Intake Inadequate  Goal: Improved Oral Intake  Outcome: Ongoing, Progressing

## 2020-04-12 NOTE — ASSESSMENT & PLAN NOTE
[facility] with non-obstructive dz, Sept 2019. On home asa and statin plus Plavix for NSTEMI at that time.   Mild trop elevation in setting of acute illness stabilized. Doubt ACS. Continuing home meds.

## 2020-04-12 NOTE — SUBJECTIVE & OBJECTIVE
Interval History:   Resp status improving    Review of Systems   Unable to perform ROS: Other     Objective:     Vital Signs (Most Recent):  Temp: 98.4 °F (36.9 °C) (04/12/20 1205)  Pulse: 85 (04/12/20 1205)  Resp: (!) 22 (04/12/20 1205)  BP: 117/68 (04/12/20 1205)  SpO2: 95 % (04/12/20 1205) Vital Signs (24h Range):  Temp:  [98.3 °F (36.8 °C)-98.7 °F (37.1 °C)] 98.4 °F (36.9 °C)  Pulse:  [75-97] 85  Resp:  [14-24] 22  SpO2:  [91 %-99 %] 95 %  BP: (116-135)/(68-77) 117/68     Weight: 86.2 kg (190 lb 0.6 oz)  Body mass index is 31.62 kg/m².    Intake/Output Summary (Last 24 hours) at 4/12/2020 1249  Last data filed at 4/12/2020 0900  Gross per 24 hour   Intake 960 ml   Output 600 ml   Net 360 ml      Physical Exam   Constitutional: He appears well-developed and well-nourished. No distress.   Pulmonary/Chest: Effort normal. No respiratory distress.   Musculoskeletal: He exhibits no edema or deformity.   Neurological: He is alert. No cranial nerve deficit.   Skin: No rash noted.   Psychiatric: His behavior is normal.       Significant Labs:   Recent Results (from the past 24 hour(s))   CBC auto differential    Collection Time: 04/12/20  4:57 AM   Result Value Ref Range    WBC 7.09 3.90 - 12.70 K/uL    RBC 4.11 (L) 4.60 - 6.20 M/uL    Hemoglobin 12.5 (L) 14.0 - 18.0 g/dL    Hematocrit 37.1 (L) 40.0 - 54.0 %    Mean Corpuscular Volume 90 82 - 98 fL    Mean Corpuscular Hemoglobin 30.4 27.0 - 31.0 pg    Mean Corpuscular Hemoglobin Conc 33.7 32.0 - 36.0 g/dL    RDW 12.5 11.5 - 14.5 %    Platelets 283 150 - 350 K/uL    MPV 9.6 9.2 - 12.9 fL    Immature Granulocytes 0.6 (H) 0.0 - 0.5 %    Gran # (ANC) 5.9 1.8 - 7.7 K/uL    Immature Grans (Abs) 0.04 0.00 - 0.04 K/uL    Lymph # 0.8 (L) 1.0 - 4.8 K/uL    Mono # 0.4 0.3 - 1.0 K/uL    Eos # 0.0 0.0 - 0.5 K/uL    Baso # 0.01 0.00 - 0.20 K/uL    nRBC 0 0 /100 WBC    Gran% 82.8 (H) 38.0 - 73.0 %    Lymph% 11.0 (L) 18.0 - 48.0 %    Mono% 4.9 4.0 - 15.0 %    Eosinophil% 0.6 0.0 - 8.0 %     Basophil% 0.1 0.0 - 1.9 %    Differential Method Automated    Magnesium    Collection Time: 04/12/20  4:57 AM   Result Value Ref Range    Magnesium 2.1 1.6 - 2.6 mg/dL   Phosphorus    Collection Time: 04/12/20  4:57 AM   Result Value Ref Range    Phosphorus 2.8 2.7 - 4.5 mg/dL   Procalcitonin    Collection Time: 04/12/20 11:27 AM   Result Value Ref Range    Procalcitonin 0.12 <0.25 ng/mL         Significant Imaging: I have reviewed and interpreted all pertinent imaging results/findings within the past 24 hours.

## 2020-04-12 NOTE — NURSING
Patient transferred off [facility] to [facility].

## 2020-04-12 NOTE — NURSING
Received report from NUBIA Summers, Patient stable, NAD noted. Safety precautions maintained,will monitior.

## 2020-04-12 NOTE — PROVIDER TRANSFER
"Confirmed COVID 19 Patient     (Physician in Lead of Transfers)/Regional Referral Center Note    Patients name/MRN: Vern Lr/MRN [id number]    Referring Physician or Mid-Level provider giving report: Dr. Tello Arana ([facility])  Contact number: [phone]    Referral Facility: [facility]/Tele    Date/Time of Acceptance: 4/12/2020 4:35 PM    :  Dr. Stephanie Briggs (Hospital Medicine)    Accepting facility [facility]/Telemetry    Reason for transfer request: Transfer for capacity; COVID positive with hypoxic respiratory failure and increasing CRP     Report from Physician/Mid-Level Provider/HPI: 79 y/o Yoruba speaking male with previous stroke, Seizure disorder, HTN, HLP, BPH and non-obstructive CAD admitted to [facility] on 4/9 after multiple falls at home with generalized weakness and fatigue. CT scan done on admit of chest and abdomen showed diffuse bilateral ground-glass opacities affecting all lobes concerning for underlying infectious process in the appropriate clinical setting with additional differential considerations including pulmonary edema or non infectious inflammatory process. but no acute intra-abdominal abnormality identified. OVID test done and patient positive. Patient also noted to be hypoxic and placed on 5 liters of oxygen. Since admit patient has been weaned to 4 liters but CRP has increased from 269 to 312. Procal is normal. BNP normal. 2 D echo showed normal EF 60% with no wall motion abnormalities. Patient requiring continued hospitalization for hypoxia and elevated CRP. [facility] at capacity so transfer for continued care. Patient on Rocephin/Azithromycin and Plaquenil as per protocol and on day #3.     VS: /75 (Patient Position: Lying)   Pulse 93   Temp 98.2 °F (36.8 °C) (Oral)   Resp 20   Ht 5' 5" (1.651 m)   Wt 86.2 kg (190 lb 0.6 oz)   SpO2 (!) 92% on 4 liters  BMI 31.62 kg/m²  Lines/Tubes:  Peripheral line ? " Type: 20 gauge Location: Left Forearm    Past Medical/Surgical History: See EPIC     Meds: See EPIC    Labs: See EPIC     Imaging: See EPIC    To Do List upon arrival:     Admit to Inpatient to COVID unit    Special COVID isolation- Airborne, Droplet and Contact   Continue treatment as per protocol for COVID 19 infection    Stephanie Briggs MD  Senior Staff Physician  Department of Hospital Medicine  Patient Flow Center/   [phone]

## 2020-04-13 LAB
ALBUMIN SERPL BCP-MCNC: 2.3 G/DL (ref 3.5–5.2)
ANION GAP SERPL CALC-SCNC: 8 MMOL/L (ref 8–16)
BACTERIA BLD CULT: NORMAL
BACTERIA BLD CULT: NORMAL
BASOPHILS # BLD AUTO: 0.02 K/UL (ref 0–0.2)
BASOPHILS NFR BLD: 0.2 % (ref 0–1.9)
BUN SERPL-MCNC: 13 MG/DL (ref 8–23)
CALCIUM SERPL-MCNC: 8.4 MG/DL (ref 8.7–10.5)
CHLORIDE SERPL-SCNC: 98 MMOL/L (ref 95–110)
CO2 SERPL-SCNC: 29 MMOL/L (ref 23–29)
CREAT SERPL-MCNC: 0.6 MG/DL (ref 0.5–1.4)
CRP SERPL-MCNC: 337.8 MG/L (ref 0–8.2)
DIFFERENTIAL METHOD: ABNORMAL
EOSINOPHIL # BLD AUTO: 0.1 K/UL (ref 0–0.5)
EOSINOPHIL NFR BLD: 1.1 % (ref 0–8)
ERYTHROCYTE [DISTWIDTH] IN BLOOD BY AUTOMATED COUNT: 12.4 % (ref 11.5–14.5)
EST. GFR  (AFRICAN AMERICAN): >60 ML/MIN/1.73 M^2
EST. GFR  (NON AFRICAN AMERICAN): >60 ML/MIN/1.73 M^2
GLUCOSE SERPL-MCNC: 119 MG/DL (ref 70–110)
HCT VFR BLD AUTO: 37.2 % (ref 40–54)
HGB BLD-MCNC: 12 G/DL (ref 14–18)
IMM GRANULOCYTES # BLD AUTO: 0.04 K/UL (ref 0–0.04)
IMM GRANULOCYTES NFR BLD AUTO: 0.5 % (ref 0–0.5)
LYMPHOCYTES # BLD AUTO: 0.8 K/UL (ref 1–4.8)
LYMPHOCYTES NFR BLD: 9.8 % (ref 18–48)
MCH RBC QN AUTO: 30.2 PG (ref 27–31)
MCHC RBC AUTO-ENTMCNC: 32.3 G/DL (ref 32–36)
MCV RBC AUTO: 94 FL (ref 82–98)
MONOCYTES # BLD AUTO: 0.4 K/UL (ref 0.3–1)
MONOCYTES NFR BLD: 4.8 % (ref 4–15)
NEUTROPHILS # BLD AUTO: 6.8 K/UL (ref 1.8–7.7)
NEUTROPHILS NFR BLD: 83.6 % (ref 38–73)
NRBC BLD-RTO: 0 /100 WBC
PHOSPHATE SERPL-MCNC: 3 MG/DL (ref 2.7–4.5)
PLATELET # BLD AUTO: 335 K/UL (ref 150–350)
PMV BLD AUTO: 9.6 FL (ref 9.2–12.9)
POTASSIUM SERPL-SCNC: 3.2 MMOL/L (ref 3.5–5.1)
RBC # BLD AUTO: 3.97 M/UL (ref 4.6–6.2)
SODIUM SERPL-SCNC: 135 MMOL/L (ref 136–145)
WBC # BLD AUTO: 8.18 K/UL (ref 3.9–12.7)

## 2020-04-13 PROCEDURE — 63600175 PHARM REV CODE 636 W HCPCS: Performed by: INTERNAL MEDICINE

## 2020-04-13 PROCEDURE — 97112 NEUROMUSCULAR REEDUCATION: CPT

## 2020-04-13 PROCEDURE — 99232 SBSQ HOSP IP/OBS MODERATE 35: CPT | Mod: ,,, | Performed by: HOSPITALIST

## 2020-04-13 PROCEDURE — 97530 THERAPEUTIC ACTIVITIES: CPT

## 2020-04-13 PROCEDURE — 25000003 PHARM REV CODE 250: Performed by: INTERNAL MEDICINE

## 2020-04-13 PROCEDURE — 94761 N-INVAS EAR/PLS OXIMETRY MLT: CPT

## 2020-04-13 PROCEDURE — 25000003 PHARM REV CODE 250: Performed by: EMERGENCY MEDICINE

## 2020-04-13 PROCEDURE — 25000003 PHARM REV CODE 250: Performed by: HOSPITALIST

## 2020-04-13 PROCEDURE — 63600175 PHARM REV CODE 636 W HCPCS: Performed by: HOSPITALIST

## 2020-04-13 PROCEDURE — 11000001 HC ACUTE MED/SURG PRIVATE ROOM

## 2020-04-13 PROCEDURE — 97162 PT EVAL MOD COMPLEX 30 MIN: CPT

## 2020-04-13 PROCEDURE — 27000221 HC OXYGEN, UP TO 24 HOURS

## 2020-04-13 PROCEDURE — 97165 OT EVAL LOW COMPLEX 30 MIN: CPT

## 2020-04-13 PROCEDURE — 36415 COLL VENOUS BLD VENIPUNCTURE: CPT

## 2020-04-13 PROCEDURE — 63700000 PHARM REV CODE 250 ALT 637 W/O HCPCS: Performed by: INTERNAL MEDICINE

## 2020-04-13 PROCEDURE — 80069 RENAL FUNCTION PANEL: CPT

## 2020-04-13 PROCEDURE — 94640 AIRWAY INHALATION TREATMENT: CPT

## 2020-04-13 PROCEDURE — 99232 PR SUBSEQUENT HOSPITAL CARE,LEVL II: ICD-10-PCS | Mod: ,,, | Performed by: HOSPITALIST

## 2020-04-13 PROCEDURE — 85025 COMPLETE CBC W/AUTO DIFF WBC: CPT

## 2020-04-13 RX ORDER — CEFTRIAXONE 1 G/1
1 INJECTION, POWDER, FOR SOLUTION INTRAMUSCULAR; INTRAVENOUS
Status: COMPLETED | OUTPATIENT
Start: 2020-04-13 | End: 2020-04-13

## 2020-04-13 RX ORDER — TALC
6 POWDER (GRAM) TOPICAL NIGHTLY PRN
Status: DISCONTINUED | OUTPATIENT
Start: 2020-04-13 | End: 2020-05-11 | Stop reason: HOSPADM

## 2020-04-13 RX ORDER — ACETAMINOPHEN 325 MG/1
650 TABLET ORAL EVERY 6 HOURS PRN
Status: DISCONTINUED | OUTPATIENT
Start: 2020-04-13 | End: 2020-05-11 | Stop reason: HOSPADM

## 2020-04-13 RX ORDER — ALUMINUM HYDROXIDE, MAGNESIUM HYDROXIDE, AND SIMETHICONE 2400; 240; 2400 MG/30ML; MG/30ML; MG/30ML
30 SUSPENSION ORAL EVERY 6 HOURS PRN
Status: DISCONTINUED | OUTPATIENT
Start: 2020-04-13 | End: 2020-05-11 | Stop reason: HOSPADM

## 2020-04-13 RX ORDER — POTASSIUM CHLORIDE 20 MEQ/1
40 TABLET, EXTENDED RELEASE ORAL ONCE
Status: COMPLETED | OUTPATIENT
Start: 2020-04-13 | End: 2020-04-13

## 2020-04-13 RX ORDER — SODIUM CHLORIDE 0.9 % (FLUSH) 0.9 %
10 SYRINGE (ML) INJECTION
Status: DISCONTINUED | OUTPATIENT
Start: 2020-04-13 | End: 2020-05-11 | Stop reason: HOSPADM

## 2020-04-13 RX ORDER — BISACODYL 10 MG
10 SUPPOSITORY, RECTAL RECTAL DAILY PRN
Status: DISCONTINUED | OUTPATIENT
Start: 2020-04-13 | End: 2020-05-11 | Stop reason: HOSPADM

## 2020-04-13 RX ORDER — ONDANSETRON 4 MG/1
4 TABLET, FILM COATED ORAL EVERY 6 HOURS PRN
Status: DISCONTINUED | OUTPATIENT
Start: 2020-04-13 | End: 2020-05-11 | Stop reason: HOSPADM

## 2020-04-13 RX ADMIN — METOPROLOL TARTRATE 50 MG: 50 TABLET, FILM COATED ORAL at 09:04

## 2020-04-13 RX ADMIN — CLOPIDOGREL BISULFATE 75 MG: 75 TABLET ORAL at 09:04

## 2020-04-13 RX ADMIN — ALBUTEROL SULFATE 2 PUFF: 90 AEROSOL, METERED RESPIRATORY (INHALATION) at 04:04

## 2020-04-13 RX ADMIN — POTASSIUM CHLORIDE 40 MEQ: 1500 TABLET, EXTENDED RELEASE ORAL at 05:04

## 2020-04-13 RX ADMIN — STANDARDIZED SENNA CONCENTRATE AND DOCUSATE SODIUM 1 TABLET: 8.6; 5 TABLET ORAL at 09:04

## 2020-04-13 RX ADMIN — PHENYTOIN SODIUM 200 MG: 100 CAPSULE ORAL at 09:04

## 2020-04-13 RX ADMIN — CEFTRIAXONE SODIUM 1 G: 1 INJECTION, POWDER, FOR SOLUTION INTRAMUSCULAR; INTRAVENOUS at 01:04

## 2020-04-13 RX ADMIN — Medication 6 MG: at 09:04

## 2020-04-13 RX ADMIN — ASPIRIN 81 MG: 81 TABLET, COATED ORAL at 09:04

## 2020-04-13 RX ADMIN — FINASTERIDE 5 MG: 5 TABLET, FILM COATED ORAL at 09:04

## 2020-04-13 RX ADMIN — TAMSULOSIN HYDROCHLORIDE 0.4 MG: 0.4 CAPSULE ORAL at 09:04

## 2020-04-13 RX ADMIN — AZITHROMYCIN MONOHYDRATE 250 MG: 250 TABLET ORAL at 09:04

## 2020-04-13 RX ADMIN — PHENOBARBITAL 90 MG: 30 TABLET ORAL at 12:04

## 2020-04-13 RX ADMIN — ATORVASTATIN CALCIUM 40 MG: 40 TABLET, FILM COATED ORAL at 09:04

## 2020-04-13 RX ADMIN — ENOXAPARIN SODIUM 40 MG: 100 INJECTION SUBCUTANEOUS at 09:04

## 2020-04-13 RX ADMIN — LISINOPRIL 10 MG: 5 TABLET ORAL at 09:04

## 2020-04-13 RX ADMIN — MONTELUKAST 10 MG: 10 TABLET, FILM COATED ORAL at 09:04

## 2020-04-13 RX ADMIN — HYDROXYCHLOROQUINE SULFATE 400 MG: 200 TABLET, FILM COATED ORAL at 09:04

## 2020-04-13 NOTE — CONSULTS
Palliative Medicine Covid 19 Goals of Care Consult     Consult received by:   Stacey Galan MD  Advance Care Planning      Impression:    Patient is a 79 y/o Martiniquais speaking male with previous stroke in late 2019, Seizure disorder, HTN, HLD, BPH and non-obstructive CAD w/ NSTEMI in Sept 2019 admitted to [facility] on 4/9 after multiple falls at home with generalized weakness and fatigue, found to be COVID positive. Patient was hypoxemic on admission and has required 4-5L NC throughout stay. Patient was transferred to [facility] on 4/12 due to bed shortage. Patient has mild dementia at baseline and is very hard of hearing. Palliative care is involved for advance care planning and goals of care.      Advance Care Planning /Goals of care     Living Will: no  LaPOST: no  Do Not Resuscitate Status: no  Medical Power of : no  Next of kin for medical decision making: sister Gianna Smith and brother Johnathon Lr.         Discussion with the patient's sister Ms Smith, telephone number [phone], speaks English.    I called Ms Smith to update her on the care of her brother Mr Vern Lr. I asked her about his previous functional status and she explained that he does have mild dementia, but does live at home by himself and took care of himself prior to this admission. His only family is his two siblings. He does not have a spouse or children.     I next explained that Mr Lr is admitted to [facility] with acute hypoxemic respiratory failure 2/2 COVID and has required about 4-5L NC since admission. I then explained that we have seen oxygen requirements rise very acutely in our COVID patients and that we like to have discussions regarding goals of care ahead of time, as when the patient reaches approximately 6L NC in oxygen requirements, we would consider intubation and mechanical ventilation. I then explained that, given Mr Lr's advanced age and significant comorbidities (particularly previous  stroke, previous NSTEMI, HTN), there is a very significant risk that he may not be able to come off the ventilator if he was indeed intubated. I told her that we wanted to start explaining these details early so they can make an informed decision.    Ms Smith demonstrated clear understanding of this discussion. She did state that as far as she knows, Mr Lr would want to be intubated. She will discuss this further with Mr Lr and her other brother as well. I stated that we would reach out to her tomorrow, but told her that someone would reach out to her sooner if he decompensates. She was agreeable to this.           Recommendations:   - Continue current plan of care per primary team  - FULL CODE   - Continue to update sister Ms Smith with patient's clinical status  - Call sister Ms Smith in the event of decompensation    Recommendations discussed with Dr Baron, hospital medicine, via secure chat.    Will follow.     Stacey Galan MD  PGY-1  Palliative Medicine ext [phone]

## 2020-04-13 NOTE — PLAN OF CARE
Pt involved in plan of care and communicating needs throughout shift as able. Pt Lao speaking only; needs language line or family to translate.  Pt worked with PT/OT this afternoon; generalized weakness noted; able to assist with turning and repositioning with encouragement; sitting up for meals today. No c/o pain or discomfort today.  Tolerating regular diet with good appetite noted.  Pt is voiding without difficulty per urinal; had small BM while working with therapy.  Telemetry and continuous pulse oximetry monitoring maintained; NSR with HR 70s-80s; O2 sats 92-94% on 4L NC.  Airborne and contact precautions maintained for covid-19+.  IV rocephin admin as ordered.  40 PO KCl admin as ordered for K=3.2.  All VSS; no acute events so far this shift.  Pt remaining free from falls or injury throughout shift; bed in lowest position; call light within reach.  Pt instructed to call for assistance as needed.  Q1H rounding done on pt.

## 2020-04-13 NOTE — PLAN OF CARE
PT Eval complete and POC established.    Grecia Colby, PT, DPT  2020      Problem: Physical Therapy Goal  Goal: Physical Therapy Goal  Description  Goals to be met by: 2020    Patient will increase functional independence with mobility by performin. Pt will perform bed mobility (rolling L/R, scooting, and bridging) with Nawaf.  2. Pt will perform supine to/from sit with Nawaf.  3. Pt will sit EOB x 10 mins with B UE support with Supervision assistance.  4. Pt with perform sit to stand with modA assistance and straight cane.  5. Pt will ambulate 30 feet with mod assistance and straight cane.  6. Pt will perform there-ex from handout x 15 reps to improve strength for functional mobility.       Outcome: Ongoing, Progressing

## 2020-04-13 NOTE — PROGRESS NOTES
"[facility]  Adult Nutrition  Progress Note    SUMMARY       Recommendations     1. Continue Cardiac diet. 2. Add Boost Plus TID.  Goals: Pt will consume at least 50% intake at meals  Nutrition Goal Status: goal met  Communication of RD Recs: reviewed with physician    Reason for Assessment    Reason For Assessment: RD follow-up  Diagnosis: (COVID)  Relevant Medical History: seizure disorder, BPH, DM, stroke, CAD  Interdisciplinary Rounds: did not attend  General Information Comments: Remote assessment completed. Pt is Chilean-speaking only, unable to call via telephone. Pt with 50-75% meal intake, stable wt since admission. Due to recent hospital wide restrictions to limit the transfer of (COVID-19), we are not performing any physical exams at this time. All S/S will be observational; NFPE to be performed at a future date.  Nutrition Discharge Planning: Pt to follow low sodium, low saturated fat diet.    Nutrition Risk Screen    Nutrition Risk Screen: cultural or Yazdanism food preferences    Nutrition/Diet History    Food Preferences: unable to assess  Factors Affecting Nutritional Intake: decreased appetite    Anthropometrics    Temp: 97.8 °F (36.6 °C)  Height: 5' 5" (165.1 cm)  Height (inches): 65 in  Weight Method: Bed Scale  Weight: 86.2 kg (190 lb 0.6 oz)  Weight (lb): 190.04 lb  Ideal Body Weight (IBW), Male: 136 lb  % Ideal Body Weight, Male (lb): 139.74 %  BMI (Calculated): 31.6  BMI Grade: 30 - 34.9- obesity - grade I       Lab/Procedures/Meds    Pertinent Labs Reviewed: reviewed  Pertinent Labs Comments: A1c 5.7%, Na 134, .8  Pertinent Medications Reviewed: reviewed  Pertinent Medications Comments: statin, senna, phenytoin    Estimated/Assessed Needs    Weight Used For Calorie Calculations: 86.2 kg (190 lb 0.6 oz)  Energy Calorie Requirements (kcal): 2155 (25 kcal/kg)  Energy Need Method: Kcal/kg  Protein Requirements: 69g (0.8g/kg)  Weight Used For Protein Calculations: 86.2 " kg (190 lb 0.6 oz)     Estimated Fluid Requirement Method: RDA Method  RDA Method (mL): 2155         Nutrition Prescription Ordered    Current Diet Order: Cardiac    Evaluation of Received Nutrient/Fluid Intake    I/O: +1.5L since admission  Energy Calories Required: not meeting needs  Protein Required: not meeting needs  Fluid Required: not meeting needs  Comments: LBM 4/11  % Intake of Estimated Energy Needs: 50 - 75 %  % Meal Intake: 50 - 75 %    Nutrition Risk    Level of Risk/Frequency of Follow-up: low     Assessment and Plan    * COVID-19 virus infection  Contributing Nutrition Diagnosis  Inadequate energy intake    Related to (etiology):   Decreased appetite, COVID    Signs and Symptoms (as evidenced by):   Poor intake at meals    Interventions:  Commercial beverage    Nutrition Diagnosis Status:   New           Monitor and Evaluation    Food and Nutrient Intake: energy intake, food and beverage intake  Food and Nutrient Adminstration: diet order  Physical Activity and Function: nutrition-related ADLs and IADLs  Anthropometric Measurements: weight  Biochemical Data, Medical Tests and Procedures: electrolyte and renal panel  Nutrition-Focused Physical Findings: overall appearance     Malnutrition Assessment     Unable to complete NFPE due to COVID-19 restrictions.        Nutrition Follow-Up    RD Follow-up?: Yes

## 2020-04-13 NOTE — NURSING
1930: [facility] arrived [facility] via stretcher in stable condition x2 EMTs. Alert and oriented. [place] speaking only. Patient on oxygen via 2L/NC. No s/s of distress noted. Denies any pain at this time. Urinal within reach. Oriented patient to room and call bell. Bed locked in lowest position. Will continue to monitor.    2300: Patient placed on telemetry. SCD's applied.

## 2020-04-13 NOTE — PT/OT/SLP EVAL
"Physical Therapy Evaluation    Patient Name:  Vern Lr   MRN:  [id number]    Recommendations:     Discharge Recommendations:  nursing facility, skilled   Discharge Equipment Recommendations: walker, rolling, bedside commode   Barriers to discharge: Inaccessible home and Decreased caregiver support    Assessment:     Vern Lr is a 80 y.o. male admitted with a medical diagnosis of COVID-19 virus infection.  He presents with the following impairments/functional limitations:  weakness, impaired endurance, impaired self care skills, impaired functional mobilty, gait instability, impaired balance, impaired cognition, decreased coordination, decreased upper extremity function, decreased lower extremity function, decreased safety awareness, impaired fine motor, impaired cardiopulmonary response to activity. Pt would benefit from a skilled nursing facility to address the above listed deficits and improve functional mobility to ensure safe DC home.    Rehab Prognosis: Good; patient would benefit from acute skilled PT services to address these deficits and reach maximum level of function.    Recent Surgery: * No surgery found *      Plan:     During this hospitalization, patient to be seen 3 x/week to address the identified rehab impairments via gait training, therapeutic activities, therapeutic exercises, neuromuscular re-education and progress toward the following goals:    · Plan of Care Expires:  05/13/20    Subjective     Chief Complaint: none reported  Patient/Family Comments/goals: return to PLOF  Pain/Comfort:  · Pain Rating 1: 0/10  · Pain Rating Post-Intervention 1: 0/10    Patients cultural, spiritual, Hoahaoism conflicts given the current situation: no   Language: [place] Islander; session completed with use of language line (Scotty ID#[id number])    Living Environment: Per OT note (per niece)  "Living Environment: Pt lives alone in [facility] with 0 LION. Tub/shower combo.   Previous level of function: on disability for seizures; " "does not drive. Wears glasses. Hearing impaired. Mobility with cane-- increased falls over past few weeks. Mod(I) with self feeding and ADLs.   Roles and Routines: brother, family man, care taker to self, home dweller  Equipment Used at Home:  cane, straight  Assistance upon Discharge: limited 24 hour"    Objective:     Communicated with nursing prior to session.  Patient found HOB elevated with bed alarm, telemetry, SCD, pulse ox (continuous), peripheral IV, oxygen  upon PT entry to room.    General Precautions: Standard, airborne, aspiration, contact, diabetic, droplet, fall, seizure, hearing impaired   Orthopedic Precautions:N/A   Braces: N/A     Exams:  · Cognitive Exam:  Patient is oriented to Person and Place  · Fine Motor Coordination:    · -       Impaired: pt unable to complete BLE heel/shin  · Gross Motor Coordination:  WFL  · Postural Exam:  Patient presented with the following abnormalities:    · -       Rounded shoulders  · -       Forward head  · Sensation: N/T  · RLE ROM: WFL  · RLE Strength: deficits: at least 3+/5  · LLE ROM: WFL  · LLE Strength: deficits: at least 3+/5    Functional Mobility:  · Bed Mobility:     · Rolling Left:  moderate assistance  · Rolling Right: moderate assistance  · Scooting: maximal assistance and of 2 persons  · Supine to Sit: moderate assistance  · Sit to Supine: maximal assistance  · Transfers:     · Sit to Stand:  maximal assistance with BUE hand-held assist, WBOS noted, x 2 trials  · Gait: 2 side steps L with maxA x2 and BUE support  · Balance:   · Static Sitting: SBA-CGA  · Dynamic Sitting: CGA  · Static Standing: modA, WBOS  · Dynamic Standing: max A x 2 for side steps      Therapeutic Activities and Exercises:   Patient educated on role of therapy, goals of session, and benefits of mobilizing. Discussed PT plan of care during hospitalization. Patient educated on calling for assistance. Patient educated on how their diagnosis impacts their mobility within PT scope of " practice.   Communication board updated.  All questions answered within PT scope of practice.      AM-PAC 6 CLICK MOBILITY  Total Score:8     Patient left HOB elevated with all lines intact, call button in reach, bed alarm on and nursing notified.    GOALS:   Multidisciplinary Problems     Physical Therapy Goals        Problem: Physical Therapy Goal    Goal Priority Disciplines Outcome Goal Variances Interventions   Physical Therapy Goal     PT, PT/OT Ongoing, Progressing     Description:  Goals to be met by: 2020    Patient will increase functional independence with mobility by performin. Pt will perform bed mobility (rolling L/R, scooting, and bridging) with Nawaf.  2. Pt will perform supine to/from sit with Nawaf.  3. Pt will sit EOB x 10 mins with B UE support with Supervision assistance.  4. Pt with perform sit to stand with modA assistance and straight cane.  5. Pt will ambulate 30 feet with mod assistance and straight cane.  6. Pt will perform there-ex from handout x 15 reps to improve strength for functional mobility.                        History:     Past Medical History:   Diagnosis Date    Abdominal hernia     BPH (benign prostatic hyperplasia)     Coronary artery disease     Non-obstructive CAD on [facility], 2019. Performed for NSTEMI.    Diabetes mellitus     sister states he is borderline    Dyslipidemia     Hypercholesteremia     Hypertension     Seizure disorder     Seizures     Sinus disorder     Stroke        Past Surgical History:   Procedure Laterality Date    KNEE SURGERY      LEFT HEART CATHETERIZATION Left 2019    Procedure: Left heart cath, radial. 11 am;  Surgeon: Zahra Roca MD;  [facility]: [facility] C[facility] LAB;  Service: Cardiology;  Laterality: Left;    NASAL SINUS SURGERY      NOSE SURGERY         Time Tracking:     PT Received On: 20  PT Start Time: 1505     PT Stop Time: 1535  PT Total Time (min): 30 min co-eval with OT    Billable Minutes: Evaluation  15 and Neuromuscular Re-education 15      Grecia Colby, PT  04/13/2020

## 2020-04-13 NOTE — PLAN OF CARE
04/13/20 1455   Discharge Reassessment   Assessment Type Discharge Planning Reassessment   Provided patient/caregiver education on the expected discharge date and the discharge plan No   Do you have any problems affording any of your prescribed medications? No   Discharge Plan A Home;Home Health   Discharge Plan B Skilled Nursing Facility   DME Needed Upon Discharge    (tbd)   Patient choice form signed by patient/caregiver N/A   Anticipated Discharge Disposition Home-Health

## 2020-04-13 NOTE — PLAN OF CARE
POC reviewed with patient. Patient Mauritian speaking only.  utilized. Fall/safety precautions implemented and maintained. No acute events noted this shift. Please see flow sheet for full assessment and vitals. Bed locked in lowest position. Call bell within reach. Will continue to monitor.

## 2020-04-13 NOTE — NURSING
Awaiting on continuous pulse ox connector. War room notified and will send to unit when available.

## 2020-04-13 NOTE — PLAN OF CARE
Problem: Oral Intake Inadequate  Goal: Improved Oral Intake  Outcome: Ongoing, Progressing   Recommendations      1. Continue Cardiac diet. 2. Add Boost Plus TID.  Goals: Pt will consume at least 50% intake at meals  Nutrition Goal Status: goal met  Communication of RD Recs: reviewed with physician

## 2020-04-13 NOTE — PLAN OF CARE
Problem: Fall Injury Risk  Goal: Absence of Fall and Fall-Related Injury  4/13/2020 0443 by Va Hein RN  Outcome: Ongoing, Progressing  4/13/2020 0441 by Va Hein RN  Reactivated  Intervention: Promote Injury-Free Environment  Flowsheets (Taken 4/13/2020 0443)  Safety Promotion/Fall Prevention: assistive device/personal item within reach;bed alarm set;nonskid shoes/socks when out of bed;medications reviewed;lighting adjusted;side rails raised x 2;supervised activity  Environmental Safety Modification: assistive device/personal items within reach;clutter free environment maintained;lighting adjusted     Problem: Adult Inpatient Plan of Care  Goal: Plan of Care Review  4/13/2020 0446 by Va Hein RN  Outcome: Ongoing, Progressing  Flowsheets (Taken 4/13/2020 0446)  Plan of Care Reviewed With: patient  4/13/2020 0443 by Va Hein RN  Outcome: Ongoing, Progressing  Flowsheets (Taken 4/13/2020 0443)  Plan of Care Reviewed With: patient

## 2020-04-13 NOTE — PLAN OF CARE
OT antonella completed; rec SNF at this time. POC 3x/w. DREAD Arredondo 4/13/2020   Problem: Occupational Therapy Goal  Goal: Occupational Therapy Goal  Description  Goals to be met by: 4/27     Patient will increase functional independence with ADLs by performing:    Bed mobility and supine to sit with CGA and bed rail as needed.  UE Dressing while seated EOB with Set-up Assistance and Stand-by Assistance.  Grooming while standing with Minimal Assistance.  Toileting from toilet with Minimal Assistance for hygiene and clothing management.   Toilet transfer to toilet with Minimal Assistance.  Functional mobility at household distance for ADL task with min(A) and AD as needed.   Pt will report daily orientation x4 with added time and 0 added cues.     Outcome: Ongoing, Progressing

## 2020-04-13 NOTE — PHARMACY MED REC
"  Admission Medication Reconciliation - Pharmacy Consult Note    The home medication history was taken by Bhargavi Sol PharmD.  Based on information gathered and subsequent review by the clinical pharmacist, the items below may need attention.     You may go to "Admission" then "Reconcile Home Medications" tabs to review and/or act upon these items.     Potentially problematic discrepancies with current MAR  o None    Please address this information as you see fit.  Feel free to contact us if you have any questions or require assistance.    Bhragavi Sol PharmD  [id number]                .  .          "

## 2020-04-13 NOTE — MEDICAL/APP STUDENT
"I spoke with pt's sister "Gianna" at [phone] (preferred number - mobile). I told her that the pt is on 4L NC and stable. Also informed her that the plan is for 3-5 day hospital course from here to ensure clinical improvement. Pt's sister had no concerns and no additional questions. Will call back tomorrow with updates.      Pedro Pinedo, MS3  "

## 2020-04-13 NOTE — PT/OT/SLP EVAL
Occupational Therapy   Evaluation    Name: Vern Lr  MRN: [id number]  Admitting Diagnosis:  COVID-19 virus infection      Recommendations:     Discharge Recommendations: nursing facility, skilled  Discharge Equipment Recommendations:  walker, rolling, bedside commode  Barriers to discharge:  Decreased caregiver support(lives alone; COVID+; level of skilled assistance required)    Assessment:     Vern Lr is a 80 y.o. male with a medical diagnosis of COVID-19 virus infection.  He presents on 5L at this time. He demo mild cognitive deficits and hearing impairment. He demo increased need for physical assistance for all functional tasks and activities. Moreover, he demo high fall history and remains a fall risk at this time. OT to follow up 3x/w at this time pending further progression in care. Performance deficits affecting function: weakness, impaired endurance, impaired self care skills, impaired functional mobilty, gait instability, impaired balance, impaired cognition, decreased safety awareness, decreased upper extremity function, decreased lower extremity function, impaired cardiopulmonary response to activity.      Rehab Prognosis: Good; patient would benefit from acute skilled OT services to address these deficits and reach maximum level of function.       Plan:     Patient to be seen 3 x/week to address the above listed problems via self-care/home management, therapeutic activities, therapeutic exercises, neuromuscular re-education  · Plan of Care Expires: 05/12/20  · Plan of Care Reviewed with: patient, family    Subjective     Chief Complaint: did not report  Patient/Family Comments/goals: to get better    Occupational Profile: per niece  Living Environment: Pt lives alone in [facility] with 0 LION. Tub/shower combo.   Previous level of function: on disability for seizures; does not drive. Wears glasses. Hearing impaired. Mobility with cane-- increased falls over past few weeks. Mod(I) with self feeding and  ADLs.   Roles and Routines: brother, family man, care taker to self, home dweller  Equipment Used at Home:  cane, straight  Assistance upon Discharge: limited 24 hour    Pain/Comfort:  · Pain Rating 1: 0/10  · Pain Rating Post-Intervention 1: 0/10    Patients cultural, spiritual, Anabaptism conflicts given the current situation: yes([place]) session completed with rere Scotty diggs ID#[id number]    Objective:     Communicated with: RN prior to session. Completed with PT.  Patient found HOB elevated with bed alarm, SCD, telemetry, peripheral IV, oxygen(5L) upon OT entry to room.    General Precautions: Standard, airborne, aspiration, contact, diabetic, droplet, fall, seizure, hearing impaired   Orthopedic Precautions:N/A   Braces: N/A     Occupational Performance:    Bed Mobility:    · Patient completed Rolling/Turning to Left with  moderate assistance  · Patient completed Scooting/Bridging with maximal assistance and 2 persons  · Patient completed Supine to Sit with moderate assistance  · Patient completed Sit to Supine with maximal assistance    Functional Mobility/Transfers:  · Patient completed Sit <> Stand Transfer with maximal assistance  with  hand-held assist    · x2 trials from EOB  · Functional Mobility: side steps to L with max(A)X2 persons    Activities of Daily Living:  · Grooming: set up and supervision while seated EOB    · Upper Body Dressing: minimum assistance EOB  · Lower Body Dressing: maximal assistance EOB  · Toileting: maximal assistance standing for use of urinal    Cognitive/Visual Perceptual:  Cognitive/Psychosocial Skills:     -       Oriented to: Person and Place   -       Follows Commands/attention:Easily distracted  -       Communication: dysarthria  -       Memory: impaired  -       Safety awareness/insight to disability: impaired   -       Mood/Affect/Coping skills/emotional control: Cooperative  Visual/Perceptual:      -Impaired  sustained visual attention      Physical Exam:  Postural  examination/scapula alignment:    -       Rounded shoulders  -       Forward head  Skin integrity: Dry  Edema:  None noted  Dominant hand:    -       R  Upper Extremity Range of Motion:     -       Right Upper Extremity: WFL  -       Left Upper Extremity: WFL  Upper Extremity Strength:    -       Right Upper Extremity: 4+/5  -       Left Upper Extremity: 4+/5   Strength: unable to assess 2/2 cognition   Gross and Fine Motor Coordination: unable to assess 2/2 cognition    AMPAC 6 Click ADL:  AMPA Total Score: 14   Body mass index is 31.62 kg/m².  Vitals:    04/13/20 1515   BP:    Pulse: 85   Resp:    Temp:        Treatment & Education:  -Pt alert and agreeable to therapy session; edu on OT role in care  -EOB with CGA-Nawaf for postural control- due in part to cognition   -return to supine in upright position; provided telephone for personal use  -Communication board updated; questions/concerns addressed within OT scope of practice    Education:    Patient left HOB elevated with all lines intact, call button in reach and bed alarm on; RN notified    GOALS:   Multidisciplinary Problems     Occupational Therapy Goals        Problem: Occupational Therapy Goal    Goal Priority Disciplines Outcome Interventions   Occupational Therapy Goal     OT, PT/OT Ongoing, Progressing    Description:  Goals to be met by: 4/27     Patient will increase functional independence with ADLs by performing:    Bed mobility and supine to sit with CGA and bed rail as needed.  UE Dressing while seated EOB with Set-up Assistance and Stand-by Assistance.  Grooming while standing with Minimal Assistance.  Toileting from toilet with Minimal Assistance for hygiene and clothing management.   Toilet transfer to toilet with Minimal Assistance.  Functional mobility at household distance for ADL task with min(A) and AD as needed.   Pt will report daily orientation x4 with added time and 0 added cues.                      History:     Past Medical  History:   Diagnosis Date    Abdominal hernia     BPH (benign prostatic hyperplasia)     Coronary artery disease     Non-obstructive CAD on [facility], Sept 2019. Performed for NSTEMI.    Diabetes mellitus     sister states he is borderline    Dyslipidemia     Hypercholesteremia     Hypertension     Seizure disorder     Seizures     Sinus disorder     Stroke        Past Surgical History:   Procedure Laterality Date    KNEE SURGERY      LEFT HEART CATHETERIZATION Left 9/26/2019    Procedure: Left heart cath, radial. 11 am;  Surgeon: Zahra Roca MD;  Location: [facility] CATH LAB;  Service: Cardiology;  Laterality: Left;    NASAL SINUS SURGERY      NOSE SURGERY         Time Tracking:     OT Date of Treatment: 04/13/20  OT Start Time: 1504  OT Stop Time: 1533  OT Total Time (min): 29 min    Billable Minutes:Evaluation 15  Therapeutic Activity 10    DREAD Arredondo  4/13/2020

## 2020-04-14 PROCEDURE — 85379 FIBRIN DEGRADATION QUANT: CPT

## 2020-04-14 PROCEDURE — 11000001 HC ACUTE MED/SURG PRIVATE ROOM

## 2020-04-14 PROCEDURE — 27000221 HC OXYGEN, UP TO 24 HOURS

## 2020-04-14 PROCEDURE — 25000003 PHARM REV CODE 250: Performed by: INTERNAL MEDICINE

## 2020-04-14 PROCEDURE — 25000003 PHARM REV CODE 250: Performed by: EMERGENCY MEDICINE

## 2020-04-14 PROCEDURE — 99232 SBSQ HOSP IP/OBS MODERATE 35: CPT | Mod: ,,, | Performed by: HOSPITALIST

## 2020-04-14 PROCEDURE — 99900035 HC TECH TIME PER 15 MIN (STAT)

## 2020-04-14 PROCEDURE — 63600175 PHARM REV CODE 636 W HCPCS: Performed by: INTERNAL MEDICINE

## 2020-04-14 PROCEDURE — 36415 COLL VENOUS BLD VENIPUNCTURE: CPT

## 2020-04-14 PROCEDURE — 86140 C-REACTIVE PROTEIN: CPT

## 2020-04-14 PROCEDURE — 94761 N-INVAS EAR/PLS OXIMETRY MLT: CPT

## 2020-04-14 PROCEDURE — 94640 AIRWAY INHALATION TREATMENT: CPT

## 2020-04-14 PROCEDURE — 80053 COMPREHEN METABOLIC PANEL: CPT

## 2020-04-14 PROCEDURE — 99232 PR SUBSEQUENT HOSPITAL CARE,LEVL II: ICD-10-PCS | Mod: ,,, | Performed by: HOSPITALIST

## 2020-04-14 PROCEDURE — 25000003 PHARM REV CODE 250: Performed by: HOSPITALIST

## 2020-04-14 RX ADMIN — BISACODYL 10 MG: 10 SUPPOSITORY RECTAL at 11:04

## 2020-04-14 RX ADMIN — ALBUTEROL SULFATE 2 PUFF: 90 AEROSOL, METERED RESPIRATORY (INHALATION) at 12:04

## 2020-04-14 RX ADMIN — PHENOBARBITAL 90 MG: 30 TABLET ORAL at 08:04

## 2020-04-14 RX ADMIN — LISINOPRIL 10 MG: 5 TABLET ORAL at 08:04

## 2020-04-14 RX ADMIN — PHENYTOIN SODIUM 200 MG: 100 CAPSULE ORAL at 08:04

## 2020-04-14 RX ADMIN — TAMSULOSIN HYDROCHLORIDE 0.4 MG: 0.4 CAPSULE ORAL at 08:04

## 2020-04-14 RX ADMIN — ASPIRIN 81 MG: 81 TABLET, COATED ORAL at 08:04

## 2020-04-14 RX ADMIN — Medication 6 MG: at 08:04

## 2020-04-14 RX ADMIN — STANDARDIZED SENNA CONCENTRATE AND DOCUSATE SODIUM 1 TABLET: 8.6; 5 TABLET ORAL at 08:04

## 2020-04-14 RX ADMIN — ALBUTEROL SULFATE 2 PUFF: 90 AEROSOL, METERED RESPIRATORY (INHALATION) at 04:04

## 2020-04-14 RX ADMIN — ENOXAPARIN SODIUM 40 MG: 100 INJECTION SUBCUTANEOUS at 08:04

## 2020-04-14 RX ADMIN — CLOPIDOGREL BISULFATE 75 MG: 75 TABLET ORAL at 08:04

## 2020-04-14 RX ADMIN — MONTELUKAST 10 MG: 10 TABLET, FILM COATED ORAL at 08:04

## 2020-04-14 RX ADMIN — FINASTERIDE 5 MG: 5 TABLET, FILM COATED ORAL at 08:04

## 2020-04-14 RX ADMIN — ATORVASTATIN CALCIUM 40 MG: 40 TABLET, FILM COATED ORAL at 08:04

## 2020-04-14 RX ADMIN — METOPROLOL TARTRATE 50 MG: 50 TABLET, FILM COATED ORAL at 08:04

## 2020-04-14 NOTE — PROGRESS NOTES
Hospital Medicine  Progress Note  [facility]      Patient Name: Vern Lr  MRN:  [id number]  Hospital Medicine Team: [facility] [place] Aga Baron MD  Date of Admission:  4/9/2020     Length of Stay:  LOS: 5 days       Principal Problem:  COVID-19 virus infection      HPI:  80/M admitted on 4/9/20 for falls and PNA 2/2 COVID infection. Positive testing in ED (4/9/20). Patient speaks only Macanese and language line used to communicate. Spoke to patient's sister who admits patient is hard of hearing and likely has mild dementia so history is somewhat limited as a result. Patient notes constipation without bm in a week. Sister states he often complains of this but patient actually was brought in for falls. He fell twice in past couple of days. She admits he has looked fatigued recently and she is actually recovering from COVID infection herself. He normally ambulates with assistance of cane. After first fall he was brought to [facility] and went home. Second fall while in bathroom yesterday and was subsequently brought here to [facility]. Seizure hx but sister did not see anything to suggest seizure and patient has been on meds. Patient has no recent issues with passing out. Hx of NSTEMI with non-obstructive CAD in Sept 2019. Largest occulusion noted on LHC was 20% at that time. Has been on asa, Plavix, and statin since. Normally walks with cane and was actually walking yesterday without issue per sister. Patient admits to some shortness of breath and cough as well as nasal congestion.     Overview/Hospital Course:  80/M admitted 4/9/20 for PNA 2/2 COVID-19. Weakness with 2 recent falls and mild shortness were main presenting symptoms prompting family to bring patient to hospital. Pt is Macanese speaking with poor hearing and likely mild dementia. Abrasion on scalp with negative CT head. Otherwise no concerning findings on exam with regard to falls and potential fracture. CT abdomen noted  unchanged previously present compressions fractures on thoracolumbar spine.  Saturating well on room air then desaturating and requiring 5L NC on 4/10. Constipation without bm in a week per patient. Benign abdominal exam and moderate stool without acute abnormality on imaging.    TRANSFER: [facility]  79 y/o Swedish speaking male with previous stroke, Seizure disorder, HTN, HLP, BPH and non-obstructive CAD admitted to [facility] on 4/9 after multiple falls at home with generalized weakness and fatigue. CT scan done on admit of chest and abdomen showed diffuse bilateral ground-glass opacities affecting all lobes concerning for underlying infectious process in the appropriate clinical setting with additional differential considerations including pulmonary edema or non infectious inflammatory process. but no acute intra-abdominal abnormality identified. OVID test done and patient positive. Patient also noted to be hypoxic and placed on 5 liters of oxygen. Since admit patient has been weaned to 4 liters but CRP has increased from 269 to 312. Procal is normal. BNP normal. 2 D echo showed normal EF 60% with no wall motion abnormalities. Patient requiring continued hospitalization for hypoxia and elevated CRP. [facility] at capacity so transfer for continued care. Patient on Rocephin/Azithromycin and Plaquenil as per protocol and on      Interval History:   Resp status stable maintained on 4 L NC overnight.     Review of Systems:  Respiratory: + sob  Cardiovascular: denies chest pain  GI: no diarrhea.     Inpatient Medications:    Current Facility-Administered Medications:     acetaminophen tablet 650 mg, 650 mg, Oral, Q4H PRN, Rojas Daniels MD, 650 mg at 04/11/20 0258    acetaminophen tablet 650 mg, 650 mg, Oral, Q6H PRN, Aga Baron MD    albuterol inhaler 2 puff, 2 puff, Inhalation, Q8H, 2 puff at 04/14/20 0046 **AND** MDI Q6H PRN, , , Q6H PRN, Rojas Daniels MD    aluminum & magnesium  hydroxide-simethicone 400-400-40 mg/5 mL suspension 30 mL, 30 mL, Oral, Q6H PRN, Aga Baron MD    aspirin EC tablet 81 mg, 81 mg, Oral, Daily, Ehsan Mendez MD, 81 mg at 04/14/20 0820    atorvastatin tablet 40 mg, 40 mg, Oral, Daily, Rojas Daniels MD, 40 mg at 04/14/20 0820    bisacodyL suppository 10 mg, 10 mg, Rectal, Daily PRN, Aga Baron MD    clopidogreL tablet 75 mg, 75 mg, Oral, Daily, Ehsan Mendez MD, 75 mg at 04/14/20 0819    enoxaparin injection 40 mg, 40 mg, Subcutaneous, Daily, Rojas Daniels MD, 40 mg at 04/13/20 2145    finasteride tablet 5 mg, 5 mg, Oral, Daily, Tello Arana MD, 5 mg at 04/14/20 0821    lisinopriL tablet 10 mg, 10 mg, Oral, Daily, Tello Arana MD, 10 mg at 04/14/20 0820    melatonin tablet 6 mg, 6 mg, Oral, Nightly PRN, Aga Baron MD, 6 mg at 04/13/20 2145    metoprolol tartrate (LOPRESSOR) tablet 50 mg, 50 mg, Oral, Daily, Ehsan Mendez MD, 50 mg at 04/14/20 0820    montelukast tablet 10 mg, 10 mg, Oral, QHS, Tello Arana MD, 10 mg at 04/13/20 2144    ondansetron tablet 4 mg, 4 mg, Oral, Q6H PRN, Aga Baron MD    PHENobarbitaL tablet 90 mg, 90 mg, Oral, Daily, Ehsan Mendez MD, 90 mg at 04/14/20 0820    phenytoin (DILANTIN) ER capsule 200 mg, 200 mg, Oral, BID, Ehsan Mendez MD, 200 mg at 04/14/20 0820    senna-docusate 8.6-50 mg per tablet 1 tablet, 1 tablet, Oral, BID, Rojas Daniels MD, 1 tablet at 04/14/20 0820    sodium chloride 0.9% flush 10 mL, 10 mL, Intravenous, PRN, Aga Baron MD    tamsulosin 24 hr capsule 0.4 mg, 0.4 mg, Oral, Daily, Rojas Daniels MD, 0.4 mg at 04/14/20 0820      Physical Exam:      Intake/Output Summary (Last 24 hours) at 4/14/2020 1014  Last data filed at 4/14/2020 0855  Gross per 24 hour   Intake 1300 ml   Output 1075 ml   Net 225 ml     Wt Readings from Last 3 Encounters:   04/11/20 86.2 kg (190 lb 0.6 oz)   04/12/20 86.2 kg (190 lb 0.6 oz)  "  09/27/19 86.2 kg (190 lb 0.6 oz)       /72 (BP Location: Left arm, Patient Position: Lying)   Pulse 86   Temp 97.7 °F (36.5 °C) (Oral)   Resp 18   Ht 5' 5" (1.651 m)   Wt 86.2 kg (190 lb 0.6 oz)   SpO2 (!) 94%   BMI 31.62 kg/m²     GEN: NAD, conversant  Resp: coarse bilateral breath sounds, no wheezes or rales, normal work of breathing   CV: RRR, no m/r/g, no edema  GI: soft, NTND  Skin: no rash  Neuro: AAOx3, CN grossly intact, no focal neurologic deficits    Laboratory:  Lab Results   Component Value Date    [id number] Positive (A) 04/09/2020       Recent Labs   Lab 04/11/20  0833 04/12/20 0457 04/13/20  0357   WBC 7.03 7.09 8.18   LYMPH 13.2*  0.9* 11.0*  0.8* 9.8*  0.8*   HGB 13.6* 12.5* 12.0*   HCT 40.7 37.1* 37.2*    283 335     Recent Labs   Lab 04/09/20  0405  04/10/20  0749 04/11/20  0833 04/12/20  0457 04/13/20  1220   *  --  134*  --   --  135*   K 3.9  --  3.5  --   --  3.2*   CL 97  --  98  --   --  98   CO2 26  --  23  --   --  29   BUN 17  --  10  --   --  13   CREATININE 0.8  --  0.7  --   --  0.6   *  --  97  --   --  119*   CALCIUM 8.2*  --  7.8*  --   --  8.4*   MG  --   --  2.1 2.1 2.1  --    PHOS  --    < > 2.8 3.0 2.8 3.0   LIPASE 77*  --   --   --   --   --     < > = values in this interval not displayed.     Recent Labs   Lab 04/09/20  0405 04/10/20  0749 04/13/20  1220   ALKPHOS 43* 48*  --    ALT 46* 44  --    AST 99* 80*  --    ALBUMIN 3.2* 2.7* 2.3*   PROT 7.7 7.0  --    BILITOT 0.5 0.4  --         Recent Labs     04/12/20  2355   .8*       All labs within the last 24 hours were reviewed.     Microbiology:  Microbiology Results (last 7 days)     Procedure Component Value Units Date/Time    Blood Culture #1 **CANNOT BE ORDERED STAT** [[id number]] Collected:  04/09/20 0650    Order Status:  Completed Specimen:  Blood from Peripheral, Hand, Left Updated:  04/13/20 0903     Blood Culture, Routine No Growth after 4 days.     Blood Culture #2 " **CANNOT BE ORDERED STAT** [[id number]] Collected:  04/09/20 0655    Order Status:  Completed Specimen:  Blood from Peripheral, Antecubital, Left Updated:  04/13/20 0903     Blood Culture, Routine No Growth after 4 days.             Imaging  ECG Results          EKG 12-lead (Final result)  Result time 04/09/20 20:43:37    Final result by Interface, Lab In [facility] (04/09/20 20:43:37)                 Narrative:    Test Reason : R06.02,    Vent. Rate : 090 BPM     Atrial Rate : 090 BPM     P-R Int : 128 ms          QRS Dur : 108 ms      QT Int : 372 ms       P-R-T Axes : 050 -44 077 degrees     QTc Int : 455 ms    Sinus rhythm with Premature supraventricular complexes  Left axis deviation  Incomplete right bundle branch block  LVH with repolarization abnormality  Cannot rule out Septal infarct ,age undetermined  Abnormal ECG  When compared with ECG of 24-SEP-2019 04:34,  No significant change was found  Confirmed by Zahra Roca MD ([id number]) on 4/9/2020 8:43:23 PM    Referred By: [email or web address]   SELF           Confirmed By:Zahra Roca MD                             EKG 12-LEAD (Final result)  Result time 04/10/20 10:22:35    Final result by Unknown User (04/10/20 10:22:35)                                  Results for orders placed during the hospital encounter of 04/09/20   Echo Color Flow Doppler? Yes    Narrative · Normal left ventricular systolic function. The estimated ejection   fraction is 60%.  · Moderate concentric left ventricular hypertrophy.  · Normal right ventricular systolic function.  · Moderate left atrial enlargement.  · Grade II (moderate) left ventricular diastolic dysfunction consistent   with pseudonormalization.  · Mild right atrial enlargement.          X-Ray Chest AP Portable  Narrative: EXAMINATION:  XR CHEST AP PORTABLE    CLINICAL HISTORY:  Hypoxia;    TECHNIQUE:  Single frontal view of the chest was performed.    COMPARISON:  CT from 04/09/2020.    FINDINGS:  Heart is stable in size.  Lungs are  hypoinflated.  Diffuse mixed airspace and interstitial opacities are seen, similar to previous CT.  No evidence of pneumothorax or significant pleural effusion.  Impression: As above.    Electronically signed by: Jenny Patrick MD  Date:    04/10/2020  Time:    21:26      All imaging within the last 24 hours was reviewed.     Assessment and Plan:    Active Hospital Problems    Diagnosis  POA    *COVID-19 virus infection [U07.1]  Yes    Acute respiratory failure with hypoxia [J96.01]  Yes    Constipation [K59.00]  Yes    Fall [W19.XXXA]  Yes    Coronary artery disease involving native coronary artery of native heart without angina pectoris [I25.10]  Yes    Elevated troponin [R79.89]  Yes    Seizure disorder [G40.909]  Yes    Essential hypertension, benign [I10]  Yes    BPH (benign prostatic hyperplasia) [N40.0]  Yes    Dyslipidemia [E78.5]  Yes      Resolved Hospital Problems   No resolved problems to display.       Suspected Covid-19 Virus Infection  Person Under Investigation (PUI) for COVID-19  - COVID-19 testing:   - Infection Control notified    - Isolation:   - Airborne and Droplet Precautions  - Surgical mask on patient   - N95 masks must be fit tested, wear eye protection  - 20 second hand hygiene   - Limit visitors per hospital policy   - Consolidate lab draws, nursing care, and interventions    - Diagnostics: (Lymphopenia, hyponatremia, hyperferritinemia, elevated troponin, elevated d-dimer, age, and comorbidities are significant predictors of poor clinical outcome)   - CBC:   trend Q48hrs  - CMP:        trend Q48hrs  - Procalcitonin: 0.12  - D-dimer:  repeat prior to discharge  - Ferritin:  repeat prior to discharge   - CRP:  269 -> 213 -> 337      trend Q48hrs  - LDH:   repeat prior to discharge  - BNP: 66  - Troponin: 0.18    - ECG:   - rapid Flu:   - RIP only if BMT/solid transplant:   - Legionella antigen:   - Blood culture x2: no growth    - Sputum culture:   - CXR:   - UA and culture: no  consistent with infection   - CPK:    - Management:   Bundle care as able to maintain isolation & minimize in/out of room   - Supplemental O2 to maintain SpO2 92%-96%   if requiring 6L NC or higher, place on nonrebreather and discuss case with MICU   - Telemetry & continuous pulse oximetry    - If wheezing   - albuterol inhaler 2-4 puff Q6hr PRN    - ipratropium daily    - acetaminophen 650mg PO Q6hr PRN fever   - loperamide PRN for viral diarrhea  - Empiric antibiotics per likely source & patient allergies    - CAP: x 5 day course (d/c early if low concern for bacterial co-infection)  Ceftriaxone 1g IV Q24hrs            Azithromycin 500mg IV day #1, then 250mg PO daily x4 days     If azithromycin is not available, start doxycycline                 If MRSA risk factors, add Vancomycin IV (PharmD consult)     - Investigational Treatment Protocol: (if patient meets criteria)   [email or web address]     - statin: atorvastatin 40mg po daily (if CPK WNL)    - start HCQ 400mg PO BID x1 day, then 400mg PO daily x 4 days   (check glucose 6 phosphate dehydrogenase (NOT G6PD Quant), ECG on start & @48hrs, and start Qshift POCT glucose)    Safety notes:   - Avoid NIPPV (CPAP/BiPAP) to prevent aerosolization, use on a case-by-case basis if in neg pressure room   - Cautious use of NSAIDS for fever per WHO recommendations (3/16/2020)   - No new ACEi/ARB start or discontinuation of chronic med unless hypotensive (Esler et al. Journal of Hypertension 2020, 38:000-000)   - Careful use of steroids in the absence of other indications (shock, ARDS)   - Fluid sparing resuscitation, avoid maintenance fluids       Coronary artery disease  [facility] with non-obstructive dz, Sept 2019. On home asa and statin plus Plavix for NSTEMI at that time.  Mild trop elevation in setting of acute illness stabilized consistent with demand ischemia in setting of  "acute illness. BNP normal. 2 D echo showed normal EF 60% with no wall motion abnormalities. Continuing home meds.     Fall  2 falls in setting of COVID infection and baseline gait instability requiring cane.  CT head and abdomen/pelvis show no new fractures. Nothing on exam to specify areas of fracture concern. Does have hx of osteoporosis and on anti-epileptic so will have very low threshold to add additional imaging if new symptoms arise. Has been appropriately maintained on anti-epileptics so very unlikely seizures involved. Will continue assessment with PT/OT.         Constipation  Continue daily bowel regimen and progressive mobility protocil while inpatient.        Elevated troponin  Hx of NSTEMI with [facility] showing non-obstructive CAD in Sept 2019.   Mild, stable. Likely related to acute illness. ACS unlikely. Cards has evaluated and signed off. Appreciate reccs. BNP normal. 2 D echo showed normal EF 60% with no wall motion abnormalities.  On asa, Plavix, statin at home; will continue inpatient.     Seizure disorder  Controlled. Continue home Dilantin and valproic acid.        Dyslipidemia  Continue home statin.     BPH (benign prostatic hyperplasia)  Controlled. Continue home Flomax.    Advance Care Planning  Goals of care, counseling/discussion Palliative consult begun. Currently FULL   Advance Care Planning       If patient transitions to Comfort-Focused Care, please place "Nurse Communication: End of Life Care, family members allowed to visit, including spouse/partner and adult children [please list names]. Please ask family to visit as a group and leave as a group.       VTE High Risk Prophylaxis:   VTE Risk Mitigation (From admission, onward)         Ordered     enoxaparin injection 40 mg  Daily      04/09/20 0909     IP VTE HIGH RISK PATIENT  Once      04/09/20 0914     Place sequential compression device  Until discontinued      04/09/20 0914                  Patient's chronic/stable medical conditions " noted in the problem list above will be managed with the patient's home medications as tolerated.         Subsequent Inpatient Hospital Care  Level 3 [id number] Total visit time was 35 minutes or greater with greater than 50% of time spent in counseling and coordination of care.     Aga Baron

## 2020-04-14 NOTE — PLAN OF CARE
Problem: Fall Injury Risk  Goal: Absence of Fall and Fall-Related Injury  Outcome: Ongoing, Progressing     Problem: Adult Inpatient Plan of Care  Goal: Plan of Care Review  Outcome: Ongoing, Progressing  Goal: Patient-Specific Goal (Individualization)  Outcome: Ongoing, Progressing  Goal: Absence of Hospital-Acquired Illness or Injury  Outcome: Ongoing, Progressing  Goal: Optimal Comfort and Wellbeing  Outcome: Ongoing, Progressing  Goal: Readiness for Transition of Care  Outcome: Ongoing, Progressing     Problem: Skin Injury Risk Increased  Goal: Skin Health and Integrity  Outcome: Ongoing, Progressing     Problem: Social Isolation  Goal: Increased Social Interaction  Outcome: Ongoing, Progressing     Problem: Infection  Goal: Infection Symptom Resolution  Outcome: Ongoing, Progressing     Problem: Oral Intake Inadequate  Goal: Improved Oral Intake  Outcome: Ongoing, Progressing     Problem: Fall Injury Risk  Goal: Absence of Fall and Fall-Related Injury  Outcome: Ongoing, Progressing     Problem: Coping Ineffective  Goal: Effective Coping  Outcome: Ongoing, Progressing    Pt a&ox3, disoriented to time. [place] speaking only, family utilized to interpret education and plan of care. VSS on 4LO2 NC. Denies pain. Voiding, LBM 4/11. Will monitor.

## 2020-04-14 NOTE — PLAN OF CARE
04/14/20 1107   Post-Acute Status   Post-Acute Authorization Placement   Post-Acute Placement Status Referrals Sent     Referrals sent to [facility], [facility], and [facility].

## 2020-04-14 NOTE — PROGRESS NOTES
Palliative Medicine Covid 19 Goals of Care Progress Note     Advance Care Planning       Impression:    Patient is a 81 y/o Georgian speaking male with previous stroke in late 2019, Seizure disorder, HTN, HLD, BPH and non-obstructive CAD w/ NSTEMI in Sept 2019 admitted to [facility] on 4/9 after multiple falls at home with generalized weakness and fatigue, found to be COVID positive. Patient was hypoxemic on admission and has required 4-5L NC throughout stay. Patient was transferred to [facility] on 4/12 due to bed shortage. Patient has mild dementia at baseline and is very hard of hearing. Palliative care is involved for advance care planning and goals of care.        Advance Care Planning /Goals of care      Living Will: no  LaPOST: no  Do Not Resuscitate Status: yes  Medical Power of : no  Next of kin for medical decision making: sister Gianna Smith and brother Johnathon Lr.         4/14  I had a discussion with the patient's sister Ms Smith, telephone number [phone], speaks English. This was a follow up to our conversation on 4/13.     I gave her an update on his clinical course, that he is requiring about the same amount of oxygen support as yesterday, on 4.5L NC. I did follow up on our conversation regarding goals of care in the event of respiratory decompensation. She said that she and her brothers did discuss this and they have elected that they would want Mr Lr to be a DNR/DNI. She stated that she thinks intubation would only prolong his suffering. I agree with her and told her this is a very reasonable decision in this case. I told her that we will continue to treat him and provide the best possible supportive care through noninvasive means. I told her that in the event he does decompensate and would otherwise merit intubation, then the care team would contact her at that time regarding a transition to comfort care for Mr Lr, and that family would be able to visit per hospital  policy. She was agreeable, and stated that, in that situation, their main priority would be for him not to suffer.         Recommendations:   · Continue current plan of care through noninvasive means   · DNR/DNI  · LaPOST on discharge  · Primary team to continue to update sister Ms Smith daily with patient's clinical status  · Call sister Ms Smith in the event of decompensation and logistics for transition to comfort measures, with opportunity for family to visit.  · Will sign off. Please reach out to palliative care if we can be of further assistance.     Recommendations discussed with Dr Baron, hospital medicine, via secure chat.     Stacey Galan MD  PGY-1  Palliative Medicine ext [phone]

## 2020-04-14 NOTE — PROGRESS NOTES
Hospital Medicine  Progress Note  [facility]      Patient Name: Vern Lr  MRN:  [id number]  Hospital Medicine Team: [facility] HOSP MED K Aga Baron MD  Date of Admission:  4/9/2020     Length of Stay:  LOS: 5 days       Principal Problem:  COVID-19 virus infection      HPI:  80/M admitted on 4/9/20 for falls and PNA 2/2 COVID infection. Positive testing in ED (4/9/20). Patient speaks only Gabonese and language line used to communicate. Spoke to patient's sister who admits patient is hard of hearing and likely has mild dementia so history is somewhat limited as a result. Patient notes constipation without bm in a week. Sister states he often complains of this but patient actually was brought in for falls. He fell twice in past couple of days. She admits he has looked fatigued recently and she is actually recovering from COVID infection herself. He normally ambulates with assistance of cane. After first fall he was brought to [facility] and went home. Second fall while in bathroom yesterday and was subsequently brought here to [facility]. Seizure hx but sister did not see anything to suggest seizure and patient has been on meds. Patient has no recent issues with passing out. Hx of NSTEMI with non-obstructive CAD in Sept 2019. Largest occulusion noted on LHC was 20% at that time. Has been on asa, Plavix, and statin since. Normally walks with cane and was actually walking yesterday without issue per sister. Patient admits to some shortness of breath and cough as well as nasal congestion.     Overview/Hospital Course:  80/M admitted 4/9/20 for PNA 2/2 COVID-19. Weakness with 2 recent falls and mild shortness were main presenting symptoms prompting family to bring patient to hospital. Pt is Gabonese speaking with poor hearing and likely mild dementia. Abrasion on scalp with negative CT head. Otherwise no concerning findings on exam with regard to falls and potential fracture. CT abdomen noted  unchanged previously present compressions fractures on thoracolumbar spine.  Saturating well on room air then desaturating and requiring 5L NC on 4/10. Constipation without bm in a week per patient. Benign abdominal exam and moderate stool without acute abnormality on imaging.    TRANSFER: [facility]  81 y/o Turkish speaking male with previous stroke, Seizure disorder, HTN, HLP, BPH and non-obstructive CAD admitted to [facility] on 4/9 after multiple falls at home with generalized weakness and fatigue. CT scan done on admit of chest and abdomen showed diffuse bilateral ground-glass opacities affecting all lobes concerning for underlying infectious process in the appropriate clinical setting with additional differential considerations including pulmonary edema or non infectious inflammatory process. but no acute intra-abdominal abnormality identified. OVID test done and patient positive. Patient also noted to be hypoxic and placed on 5 liters of oxygen. Since admit patient has been weaned to 4 liters but CRP has increased from 269 to 312. Procal is normal. BNP normal. 2 D echo showed normal EF 60% with no wall motion abnormalities. Patient requiring continued hospitalization for hypoxia and elevated CRP. [facility] at capacity so transfer for continued care. Patient on Rocephin/Azithromycin and Plaquenil as per protocol and on      Interval History:   Resp status stable maintained on 4 L NC overnight. Patient resting comfortably.     Review of Systems:  Respiratory: + sob  Cardiovascular: denies chest pain  GI: no diarrhea.     Inpatient Medications:    Current Facility-Administered Medications:     acetaminophen tablet 650 mg, 650 mg, Oral, Q4H PRN, Rojas Daniels MD, 650 mg at 04/11/20 0258    acetaminophen tablet 650 mg, 650 mg, Oral, Q6H PRN, Aga Baron MD    albuterol inhaler 2 puff, 2 puff, Inhalation, Q8H, 2 puff at 04/14/20 0046 **AND** MDI Q6H PRN, , , Q6H PRN, Rojas Daniels MD     aluminum & magnesium hydroxide-simethicone 400-400-40 mg/5 mL suspension 30 mL, 30 mL, Oral, Q6H PRN, Aga Baron MD    aspirin EC tablet 81 mg, 81 mg, Oral, Daily, Ehsan Mendez MD, 81 mg at 04/14/20 0820    atorvastatin tablet 40 mg, 40 mg, Oral, Daily, Rojas Daniels MD, 40 mg at 04/14/20 0820    bisacodyL suppository 10 mg, 10 mg, Rectal, Daily PRN, Aga Baron MD    clopidogreL tablet 75 mg, 75 mg, Oral, Daily, Ehsan Mendez MD, 75 mg at 04/14/20 0819    enoxaparin injection 40 mg, 40 mg, Subcutaneous, Daily, Rojas Daniels MD, 40 mg at 04/13/20 2145    finasteride tablet 5 mg, 5 mg, Oral, Daily, Tello Arana MD, 5 mg at 04/14/20 0821    lisinopriL tablet 10 mg, 10 mg, Oral, Daily, Tello Arana MD, 10 mg at 04/14/20 0820    melatonin tablet 6 mg, 6 mg, Oral, Nightly PRN, Aga Baron MD, 6 mg at 04/13/20 2145    metoprolol tartrate (LOPRESSOR) tablet 50 mg, 50 mg, Oral, Daily, Ehsan Mendez MD, 50 mg at 04/14/20 0820    montelukast tablet 10 mg, 10 mg, Oral, QHS, Tello Arana MD, 10 mg at 04/13/20 2144    ondansetron tablet 4 mg, 4 mg, Oral, Q6H PRN, Aga Baron MD    PHENobarbitaL tablet 90 mg, 90 mg, Oral, Daily, Ehsan Mendez MD, 90 mg at 04/14/20 0820    phenytoin (DILANTIN) ER capsule 200 mg, 200 mg, Oral, BID, Ehsan Mendez MD, 200 mg at 04/14/20 0820    senna-docusate 8.6-50 mg per tablet 1 tablet, 1 tablet, Oral, BID, Rojas Daniels MD, 1 tablet at 04/14/20 0820    sodium chloride 0.9% flush 10 mL, 10 mL, Intravenous, PRN, Aga Baron MD    tamsulosin 24 hr capsule 0.4 mg, 0.4 mg, Oral, Daily, Rojas Daniels MD, 0.4 mg at 04/14/20 0820      Physical Exam:      Intake/Output Summary (Last 24 hours) at 4/14/2020 1230  Last data filed at 4/14/2020 0855  Gross per 24 hour   Intake 940 ml   Output 875 ml   Net 65 ml     Wt Readings from Last 3 Encounters:   04/11/20 86.2 kg (190 lb 0.6 oz)   04/12/20 86.2  "kg (190 lb 0.6 oz)   09/27/19 86.2 kg (190 lb 0.6 oz)       /72 (BP Location: Left arm, Patient Position: Lying)   Pulse 75   Temp 97.7 °F (36.5 °C) (Oral)   Resp 18   Ht 5' 5" (1.651 m)   Wt 86.2 kg (190 lb 0.6 oz)   SpO2 (!) 94%   BMI 31.62 kg/m²     GEN: NAD, conversant  Resp: coarse bilateral breath sounds, no wheezes or rales, normal work of breathing   CV: RRR, no m/r/g, no edema  GI: soft, NTND  Skin: no rash  Neuro: AAOx3, CN grossly intact, no focal neurologic deficits    Laboratory:  Lab Results   Component Value Date    [id number] Positive (A) 04/09/2020       Recent Labs   Lab 04/11/20 0833 04/12/20 0457 04/13/20  0357   WBC 7.03 7.09 8.18   LYMPH 13.2*  0.9* 11.0*  0.8* 9.8*  0.8*   HGB 13.6* 12.5* 12.0*   HCT 40.7 37.1* 37.2*    283 335     Recent Labs   Lab 04/09/20  0405  04/10/20  0749 04/11/20  0833 04/12/20 0457 04/13/20  1220   *  --  134*  --   --  135*   K 3.9  --  3.5  --   --  3.2*   CL 97  --  98  --   --  98   CO2 26  --  23  --   --  29   BUN 17  --  10  --   --  13   CREATININE 0.8  --  0.7  --   --  0.6   *  --  97  --   --  119*   CALCIUM 8.2*  --  7.8*  --   --  8.4*   MG  --   --  2.1 2.1 2.1  --    PHOS  --    < > 2.8 3.0 2.8 3.0   LIPASE 77*  --   --   --   --   --     < > = values in this interval not displayed.     Recent Labs   Lab 04/09/20  0405 04/10/20  0749 04/13/20  1220   ALKPHOS 43* 48*  --    ALT 46* 44  --    AST 99* 80*  --    ALBUMIN 3.2* 2.7* 2.3*   PROT 7.7 7.0  --    BILITOT 0.5 0.4  --         Recent Labs     04/12/20  2355   .8*       All labs within the last 24 hours were reviewed.     Microbiology:  Microbiology Results (last 7 days)     Procedure Component Value Units Date/Time    Blood Culture #1 **CANNOT BE ORDERED STAT** [[id number]] Collected:  04/09/20 0650    Order Status:  Completed Specimen:  Blood from Peripheral, Hand, Left Updated:  04/13/20 0903     Blood Culture, Routine No Growth after 4 days.     " Blood Culture #2 **CANNOT BE ORDERED STAT** [[id number]] Collected:  04/09/20 0655    Order Status:  Completed Specimen:  Blood from Peripheral, Antecubital, Left Updated:  04/13/20 0903     Blood Culture, Routine No Growth after 4 days.             Imaging  ECG Results          EKG 12-lead (Final result)  Result time 04/09/20 20:43:37    Final result by Interface, Lab In [facility] (04/09/20 20:43:37)                 Narrative:    Test Reason : R06.02,    Vent. Rate : 090 BPM     Atrial Rate : 090 BPM     P-R Int : 128 ms          QRS Dur : 108 ms      QT Int : 372 ms       P-R-T Axes : 050 -44 077 degrees     QTc Int : 455 ms    Sinus rhythm with Premature supraventricular complexes  Left axis deviation  Incomplete right bundle branch block  LVH with repolarization abnormality  Cannot rule out Septal infarct ,age undetermined  Abnormal ECG  When compared with ECG of 24-SEP-2019 04:34,  No significant change was found  Confirmed by Abelino CAMEJO, Zahra LAZARO ([id number]) on 4/9/2020 8:43:23 PM    Referred By: [email or web address]   SELF           Confirmed By:Zahra Roca MD                             EKG 12-LEAD (Final result)  Result time 04/10/20 10:22:35    Final result by Unknown User (04/10/20 10:22:35)                                  Results for orders placed during the hospital encounter of 04/09/20   Echo Color Flow Doppler? Yes    Narrative · Normal left ventricular systolic function. The estimated ejection   fraction is 60%.  · Moderate concentric left ventricular hypertrophy.  · Normal right ventricular systolic function.  · Moderate left atrial enlargement.  · Grade II (moderate) left ventricular diastolic dysfunction consistent   with pseudonormalization.  · Mild right atrial enlargement.          X-Ray Chest AP Portable  Narrative: EXAMINATION:  XR CHEST AP PORTABLE    CLINICAL HISTORY:  Hypoxia;    TECHNIQUE:  Single frontal view of the chest was performed.    COMPARISON:  CT from 04/09/2020.    FINDINGS:  Heart is stable in  size.  Lungs are hypoinflated.  Diffuse mixed airspace and interstitial opacities are seen, similar to previous CT.  No evidence of pneumothorax or significant pleural effusion.  Impression: As above.    Electronically signed by: Jenny Patrick MD  Date:    04/10/2020  Time:    21:26      All imaging within the last 24 hours was reviewed.     Assessment and Plan:    Active Hospital Problems    Diagnosis  POA    *COVID-19 virus infection [U07.1]  Yes    Acute respiratory failure with hypoxia [J96.01]  Yes    Constipation [K59.00]  Yes    Fall [W19.XXXA]  Yes    Coronary artery disease involving native coronary artery of native heart without angina pectoris [I25.10]  Yes    Elevated troponin [R79.89]  Yes    Seizure disorder [G40.909]  Yes    Essential hypertension, benign [I10]  Yes    BPH (benign prostatic hyperplasia) [N40.0]  Yes    Dyslipidemia [E78.5]  Yes      Resolved Hospital Problems   No resolved problems to display.       Suspected Covid-19 Virus Infection  Person Under Investigation (PUI) for COVID-19  - COVID-19 testing:   - Infection Control notified    - Isolation:   - Airborne and Droplet Precautions  - Surgical mask on patient   - N95 masks must be fit tested, wear eye protection  - 20 second hand hygiene   - Limit visitors per hospital policy   - Consolidate lab draws, nursing care, and interventions    - Diagnostics: (Lymphopenia, hyponatremia, hyperferritinemia, elevated troponin, elevated d-dimer, age, and comorbidities are significant predictors of poor clinical outcome)   - CBC:   trend Q48hrs  - CMP:        trend Q48hrs  - Procalcitonin: 0.12  - D-dimer:  repeat prior to discharge  - Ferritin:  repeat prior to discharge   - CRP:  269 -> 213 -> 337      trend Q48hrs  - LDH:   repeat prior to discharge  - BNP: 66  - Troponin: 0.18    - ECG:   - rapid Flu:   - RIP only if BMT/solid transplant:   - Legionella antigen:   - Blood culture x2: no growth    - Sputum culture:   - CXR:   - UA  and culture: no consistent with infection   - CPK:    - Management:   Bundle care as able to maintain isolation & minimize in/out of room   - Supplemental O2 to maintain SpO2 92%-96%   if requiring 6L NC or higher, place on nonrebreather and discuss case with MICU   - Telemetry & continuous pulse oximetry    - If wheezing   - albuterol inhaler 2-4 puff Q6hr PRN    - ipratropium daily    - acetaminophen 650mg PO Q6hr PRN fever   - loperamide PRN for viral diarrhea  - Empiric antibiotics per likely source & patient allergies    - CAP: x 5 day course (d/c early if low concern for bacterial co-infection)  Ceftriaxone 1g IV Q24hrs            Azithromycin 500mg IV day #1, then 250mg PO daily x4 days     If azithromycin is not available, start doxycycline                 If MRSA risk factors, add Vancomycin IV (PharmD consult)     - Investigational Treatment Protocol: (if patient meets criteria)   [email or web address]     - statin: atorvastatin 40mg po daily (if CPK WNL)    - start HCQ 400mg PO BID x1 day, then 400mg PO daily x 4 days   (check glucose 6 phosphate dehydrogenase (NOT G6PD Quant), ECG on start & @48hrs, and start Qshift POCT glucose)    Safety notes:   - Avoid NIPPV (CPAP/BiPAP) to prevent aerosolization, use on a case-by-case basis if in neg pressure room   - Cautious use of NSAIDS for fever per WHO recommendations (3/16/2020)   - No new ACEi/ARB start or discontinuation of chronic med unless hypotensive (Esler et al. Journal of Hypertension 2020, 38:000-000)   - Careful use of steroids in the absence of other indications (shock, ARDS)   - Fluid sparing resuscitation, avoid maintenance fluids       Coronary artery disease  [facility] with non-obstructive dz, Sept 2019. On home asa and statin plus Plavix for NSTEMI at that time.  Mild trop elevation in setting of acute illness stabilized consistent with demand ischemia in  "setting of acute illness. BNP normal. 2 D echo showed normal EF 60% with no wall motion abnormalities. Continuing home meds.     Fall  2 falls in setting of COVID infection and baseline gait instability requiring cane.  CT head and abdomen/pelvis show no new fractures. Nothing on exam to specify areas of fracture concern. Does have hx of osteoporosis and on anti-epileptic so will have very low threshold to add additional imaging if new symptoms arise. Has been appropriately maintained on anti-epileptics so very unlikely seizures involved. Will continue assessment with PT/OT.         Constipation  Continue daily bowel regimen and progressive mobility protocil while inpatient.        Elevated troponin  Hx of NSTEMI with [facility] showing non-obstructive CAD in Sept 2019.   Mild, stable. Likely related to acute illness. ACS unlikely. Cards has evaluated and signed off. Appreciate reccs. BNP normal. 2 D echo showed normal EF 60% with no wall motion abnormalities.  On asa, Plavix, statin at home; will continue inpatient.     Seizure disorder  Controlled. Continue home Dilantin and valproic acid.        Dyslipidemia  Continue home statin.     BPH (benign prostatic hyperplasia)  Controlled. Continue home Flomax.    Advance Care Planning  Goals of care, counseling/discussion Palliative consult begun. Currently FULL   Advance Care Planning       If patient transitions to Comfort-Focused Care, please place "Nurse Communication: End of Life Care, family members allowed to visit, including spouse/partner and adult children [please list names]. Please ask family to visit as a group and leave as a group.       VTE High Risk Prophylaxis:   VTE Risk Mitigation (From admission, onward)         Ordered     enoxaparin injection 40 mg  Daily      04/09/20 0909     IP VTE HIGH RISK PATIENT  Once      04/09/20 0914     Place sequential compression device  Until discontinued      04/09/20 0914                  Patient's chronic/stable medical " conditions noted in the problem list above will be managed with the patient's home medications as tolerated.         Subsequent Inpatient Hospital Care  Level 3 [id number] Total visit time was 35 minutes or greater with greater than 50% of time spent in counseling and coordination of care.     Aga Baron

## 2020-04-14 NOTE — NURSING
Pt niece Sherin called to update about pt and clarify education or if pt has any needs as he is Macedonian speaking only. Per day RN pt responds well to interpretation via family but interpretation via language line was difficult. POC discussed with pt and family. Pt agreeable. Will monitor.

## 2020-04-14 NOTE — ACP (ADVANCE CARE PLANNING)
4/14  I had a discussion with the patient's sister Ms Smith, telephone number [phone], speaks English. This was a follow up to our conversation on 4/13.     I gave her an update on his clinical course, that he is requiring about the same amount of oxygen support as yesterday, on 4.5L NC. I did follow up on our conversation regarding goals of care in the event of respiratory decompensation. She said that she and her brothers did discuss this and they have elected that they would want Mr Lr to be a DNR/DNI. She stated that she thinks intubation would only prolong his suffering. I agree with her and told her this is a very reasonable decision in this case. I told her that we will continue to treat him and provide the best possible supportive care through noninvasive means. I told her that in the event he does decompensate and would otherwise merit intubation, then the care team would contact her at that time regarding a transition to comfort care for Mr Lr, and that family would be able to visit per hospital policy. She was agreeable, and stated that, in that situation, their main priority would be for him not to suffer.       Please see full progress note dated 4/14/2020.

## 2020-04-14 NOTE — MEDICAL/APP STUDENT
"Attempted to contact pt's sister "Gianna" at [phone]. No answer x3. Unable to leave voicemails.        Pedro Pinedo, MS3  "

## 2020-04-15 LAB
ALBUMIN SERPL BCP-MCNC: 2.2 G/DL (ref 3.5–5.2)
ALBUMIN SERPL BCP-MCNC: 2.2 G/DL (ref 3.5–5.2)
ALP SERPL-CCNC: 55 U/L (ref 55–135)
ALT SERPL W/O P-5'-P-CCNC: 48 U/L (ref 10–44)
ANION GAP SERPL CALC-SCNC: 10 MMOL/L (ref 8–16)
ANION GAP SERPL CALC-SCNC: 13 MMOL/L (ref 8–16)
AST SERPL-CCNC: 90 U/L (ref 10–40)
BASOPHILS # BLD AUTO: 0.03 K/UL (ref 0–0.2)
BASOPHILS NFR BLD: 0.4 % (ref 0–1.9)
BILIRUB SERPL-MCNC: 0.4 MG/DL (ref 0.1–1)
BUN SERPL-MCNC: 10 MG/DL (ref 8–23)
BUN SERPL-MCNC: 11 MG/DL (ref 8–23)
CALCIUM SERPL-MCNC: 8.4 MG/DL (ref 8.7–10.5)
CALCIUM SERPL-MCNC: 8.7 MG/DL (ref 8.7–10.5)
CHLORIDE SERPL-SCNC: 97 MMOL/L (ref 95–110)
CHLORIDE SERPL-SCNC: 99 MMOL/L (ref 95–110)
CO2 SERPL-SCNC: 22 MMOL/L (ref 23–29)
CO2 SERPL-SCNC: 28 MMOL/L (ref 23–29)
CREAT SERPL-MCNC: 0.6 MG/DL (ref 0.5–1.4)
CREAT SERPL-MCNC: 0.6 MG/DL (ref 0.5–1.4)
CRP SERPL-MCNC: 295.6 MG/L (ref 0–8.2)
D DIMER PPP IA.FEU-MCNC: 7.97 MG/L FEU
DIFFERENTIAL METHOD: ABNORMAL
EOSINOPHIL # BLD AUTO: 0 K/UL (ref 0–0.5)
EOSINOPHIL NFR BLD: 0.5 % (ref 0–8)
ERYTHROCYTE [DISTWIDTH] IN BLOOD BY AUTOMATED COUNT: 12.3 % (ref 11.5–14.5)
EST. GFR  (AFRICAN AMERICAN): >60 ML/MIN/1.73 M^2
EST. GFR  (AFRICAN AMERICAN): >60 ML/MIN/1.73 M^2
EST. GFR  (NON AFRICAN AMERICAN): >60 ML/MIN/1.73 M^2
EST. GFR  (NON AFRICAN AMERICAN): >60 ML/MIN/1.73 M^2
FERRITIN SERPL-MCNC: 1852 NG/ML (ref 20–300)
GLUCOSE SERPL-MCNC: 119 MG/DL (ref 70–110)
GLUCOSE SERPL-MCNC: 95 MG/DL (ref 70–110)
HCT VFR BLD AUTO: 39 % (ref 40–54)
HGB BLD-MCNC: 13.2 G/DL (ref 14–18)
IMM GRANULOCYTES # BLD AUTO: 0.05 K/UL (ref 0–0.04)
IMM GRANULOCYTES NFR BLD AUTO: 0.6 % (ref 0–0.5)
LYMPHOCYTES # BLD AUTO: 0.8 K/UL (ref 1–4.8)
LYMPHOCYTES NFR BLD: 9.3 % (ref 18–48)
MCH RBC QN AUTO: 31.1 PG (ref 27–31)
MCHC RBC AUTO-ENTMCNC: 33.8 G/DL (ref 32–36)
MCV RBC AUTO: 92 FL (ref 82–98)
MONOCYTES # BLD AUTO: 0.6 K/UL (ref 0.3–1)
MONOCYTES NFR BLD: 7 % (ref 4–15)
NEUTROPHILS # BLD AUTO: 6.9 K/UL (ref 1.8–7.7)
NEUTROPHILS NFR BLD: 82.2 % (ref 38–73)
NRBC BLD-RTO: 0 /100 WBC
PHOSPHATE SERPL-MCNC: 2.4 MG/DL (ref 2.7–4.5)
PLATELET # BLD AUTO: 391 K/UL (ref 150–350)
PMV BLD AUTO: 9.5 FL (ref 9.2–12.9)
POTASSIUM SERPL-SCNC: 3.2 MMOL/L (ref 3.5–5.1)
POTASSIUM SERPL-SCNC: 3.8 MMOL/L (ref 3.5–5.1)
PROT SERPL-MCNC: 7.3 G/DL (ref 6–8.4)
RBC # BLD AUTO: 4.25 M/UL (ref 4.6–6.2)
SODIUM SERPL-SCNC: 134 MMOL/L (ref 136–145)
SODIUM SERPL-SCNC: 135 MMOL/L (ref 136–145)
WBC # BLD AUTO: 8.39 K/UL (ref 3.9–12.7)

## 2020-04-15 PROCEDURE — 25000003 PHARM REV CODE 250: Performed by: HOSPITALIST

## 2020-04-15 PROCEDURE — 99900035 HC TECH TIME PER 15 MIN (STAT)

## 2020-04-15 PROCEDURE — 97530 THERAPEUTIC ACTIVITIES: CPT

## 2020-04-15 PROCEDURE — 11000001 HC ACUTE MED/SURG PRIVATE ROOM

## 2020-04-15 PROCEDURE — 82728 ASSAY OF FERRITIN: CPT

## 2020-04-15 PROCEDURE — 97535 SELF CARE MNGMENT TRAINING: CPT

## 2020-04-15 PROCEDURE — 63600175 PHARM REV CODE 636 W HCPCS: Performed by: INTERNAL MEDICINE

## 2020-04-15 PROCEDURE — 25000003 PHARM REV CODE 250: Performed by: INTERNAL MEDICINE

## 2020-04-15 PROCEDURE — 25000003 PHARM REV CODE 250: Performed by: PHYSICIAN ASSISTANT

## 2020-04-15 PROCEDURE — 85025 COMPLETE CBC W/AUTO DIFF WBC: CPT

## 2020-04-15 PROCEDURE — 94761 N-INVAS EAR/PLS OXIMETRY MLT: CPT

## 2020-04-15 PROCEDURE — 25000003 PHARM REV CODE 250: Performed by: EMERGENCY MEDICINE

## 2020-04-15 PROCEDURE — 27000221 HC OXYGEN, UP TO 24 HOURS

## 2020-04-15 PROCEDURE — 94640 AIRWAY INHALATION TREATMENT: CPT

## 2020-04-15 PROCEDURE — 80069 RENAL FUNCTION PANEL: CPT

## 2020-04-15 PROCEDURE — 36415 COLL VENOUS BLD VENIPUNCTURE: CPT

## 2020-04-15 RX ORDER — ALBUTEROL SULFATE 90 UG/1
2 AEROSOL, METERED RESPIRATORY (INHALATION)
Status: DISCONTINUED | OUTPATIENT
Start: 2020-04-16 | End: 2020-05-11 | Stop reason: HOSPADM

## 2020-04-15 RX ORDER — POTASSIUM CHLORIDE 20 MEQ/1
40 TABLET, EXTENDED RELEASE ORAL ONCE
Status: COMPLETED | OUTPATIENT
Start: 2020-04-15 | End: 2020-04-15

## 2020-04-15 RX ORDER — DOCUSATE SODIUM 283 MG/5ML
1 LIQUID RECTAL ONCE
Status: COMPLETED | OUTPATIENT
Start: 2020-04-15 | End: 2020-04-15

## 2020-04-15 RX ADMIN — MONTELUKAST 10 MG: 10 TABLET, FILM COATED ORAL at 10:04

## 2020-04-15 RX ADMIN — Medication 6 MG: at 10:04

## 2020-04-15 RX ADMIN — DOCUSATE SODIUM 1 ENEMA: 283 LIQUID RECTAL at 02:04

## 2020-04-15 RX ADMIN — LISINOPRIL 10 MG: 5 TABLET ORAL at 10:04

## 2020-04-15 RX ADMIN — ASPIRIN 81 MG: 81 TABLET, COATED ORAL at 10:04

## 2020-04-15 RX ADMIN — PHENYTOIN SODIUM 200 MG: 100 CAPSULE ORAL at 10:04

## 2020-04-15 RX ADMIN — ENOXAPARIN SODIUM 40 MG: 100 INJECTION SUBCUTANEOUS at 10:04

## 2020-04-15 RX ADMIN — CLOPIDOGREL BISULFATE 75 MG: 75 TABLET ORAL at 10:04

## 2020-04-15 RX ADMIN — POTASSIUM CHLORIDE 40 MEQ: 1500 TABLET, EXTENDED RELEASE ORAL at 05:04

## 2020-04-15 RX ADMIN — METOPROLOL TARTRATE 50 MG: 50 TABLET, FILM COATED ORAL at 09:04

## 2020-04-15 RX ADMIN — PHENOBARBITAL 90 MG: 30 TABLET ORAL at 10:04

## 2020-04-15 RX ADMIN — TAMSULOSIN HYDROCHLORIDE 0.4 MG: 0.4 CAPSULE ORAL at 09:04

## 2020-04-15 RX ADMIN — ATORVASTATIN CALCIUM 40 MG: 40 TABLET, FILM COATED ORAL at 10:04

## 2020-04-15 RX ADMIN — ALBUTEROL SULFATE 2 PUFF: 90 AEROSOL, METERED RESPIRATORY (INHALATION) at 07:04

## 2020-04-15 RX ADMIN — STANDARDIZED SENNA CONCENTRATE AND DOCUSATE SODIUM 1 TABLET: 8.6; 5 TABLET ORAL at 09:04

## 2020-04-15 RX ADMIN — FINASTERIDE 5 MG: 5 TABLET, FILM COATED ORAL at 10:04

## 2020-04-15 RX ADMIN — STANDARDIZED SENNA CONCENTRATE AND DOCUSATE SODIUM 1 TABLET: 8.6; 5 TABLET ORAL at 10:04

## 2020-04-15 NOTE — NURSING
Pt nephew Bacilio called into pt room and with pt permission discussed POC with Bacilio. Bacilio clarified education and POC for pt in Colombian. Pt and family agreeable. Will continue to monitor and utilize  phone as necessary.

## 2020-04-15 NOTE — PROGRESS NOTES
Hospital Medicine  Progress Note  [facility]      Patient Name: Vern Lr  MRN:  [id number]  Hospital Medicine Team: [facility]IT[facility]  Date of Admission:  4/9/2020     Length of Stay:  LOS: 6 days       Principal Problem:  COVID-19 virus infection      HPI:  79 yo M admitted on 4/9/20 for falls and PNA 2/2 COVID infection. Positive testing in ED (4/9/20). Patient speaks only Slovak and language line used to communicate. Spoke to patient's sister who admits patient is hard of hearing and likely has mild dementia so history is somewhat limited as a result. Patient notes constipation without bm in a week. Sister states he often complains of this but patient actually was brought in for falls. He fell twice in past couple of days. She admits he has looked fatigued recently and she is actually recovering from COVID infection herself. He normally ambulates with assistance of cane. After first fall he was brought to [facility] and went home. Second fall while in bathroom yesterday and was subsequently brought here to [facility]. Seizure hx but sister did not see anything to suggest seizure and patient has been on meds. Patient has no recent issues with passing out. Hx of NSTEMI with non-obstructive CAD in Sept 2019. Largest occulusion noted on LHC was 20% at that time. Has been on asa, Plavix, and statin since. Normally walks with cane and was actually walking yesterday without issue per sister. Patient admits to some shortness of breath and cough as well as nasal congestion.     Overview/Hospital Course:  80/M admitted 4/9/20 for PNA 2/2 COVID-19. Weakness with 2 recent falls and mild shortness were main presenting symptoms prompting family to bring patient to hospital. Pt is Slovak speaking with poor hearing and likely mild dementia. Abrasion on scalp with negative CT head. Otherwise no concerning findings on exam with regard to falls and potential fracture. CT abdomen noted  "unchanged previously present compressions fractures on thoracolumbar spine.  Saturating well on room air then desaturating and requiring 5L NC on 4/10. Constipation without bm in a week per patient. Benign abdominal exam and moderate stool without acute abnormality on imaging.    TRANSFER: [facility]  79 y/o Georgian speaking male with previous stroke, Seizure disorder, HTN, HLP, BPH and non-obstructive CAD admitted to [facility] on 4/9 after multiple falls at home with generalized weakness and fatigue. CT scan done on admit of chest and abdomen showed diffuse bilateral ground-glass opacities affecting all lobes concerning for underlying infectious process in the appropriate clinical setting with additional differential considerations including pulmonary edema or non infectious inflammatory process. but no acute intra-abdominal abnormality identified. COVID test done and patient positive. Patient also noted to be hypoxic and placed on 5 liters of oxygen. Since admit patient has been weaned to 4 liters but CRP has increased from 269 to 312. Procal is normal. BNP normal. 2 D echo showed normal EF 60% with no wall motion abnormalities. Patient requiring continued hospitalization for hypoxia and elevated CRP. [facility] at capacity so transfer for continued care. Patient on Rocephin/Azithromycin and Plaquenil as per protocol and on      Interval History:   Resp status stable maintained on 4 L NC overnight. Patient resting comfortably. Language line used for history today.  He reports persistent cough and mild shortness of breath with exertion.  He also reports that when he urinates he has "something come out of the rectum" as well, denies bowel movement.  Per rehab services a "mucous like" rectal discharge has been noted today and on 4/13 when standing the patient to allow him to urinate.     Review of Systems:  Constitutional: Positive fatigue, negative fever or chills  Respiratory: + sob and " cough  Cardiovascular: denies chest pain, palpitations  GI: Positive constipation, admits to rectal discharge, no diarrhea.      Inpatient Medications:    Current Facility-Administered Medications:     acetaminophen tablet 650 mg, 650 mg, Oral, Q4H PRN, Rojas Daniels MD, 650 mg at 04/11/20 0258    acetaminophen tablet 650 mg, 650 mg, Oral, Q6H PRN, Aga Baron MD    albuterol inhaler 2 puff, 2 puff, Inhalation, Q8H, 2 puff at 04/15/20 0758 **AND** MDI Q6H PRN, , , Q6H PRN, Rojas Daniels MD    aluminum & magnesium hydroxide-simethicone 400-400-40 mg/5 mL suspension 30 mL, 30 mL, Oral, Q6H PRN, Aga Baron MD    aspirin EC tablet 81 mg, 81 mg, Oral, Daily, Ehsan Mendez MD, 81 mg at 04/15/20 1000    atorvastatin tablet 40 mg, 40 mg, Oral, Daily, Rojas Daniels MD, 40 mg at 04/15/20 1000    bisacodyL suppository 10 mg, 10 mg, Rectal, Daily PRN, Aga Baron MD, 10 mg at 04/14/20 1133    clopidogreL tablet 75 mg, 75 mg, Oral, Daily, Ehsan Mendez MD, 75 mg at 04/15/20 1000    docusate sodium 1 enema, 1 enema, Rectal, Once, Aga Baron MD    enoxaparin injection 40 mg, 40 mg, Subcutaneous, Daily, Rojas Daniels MD, 40 mg at 04/14/20 2053    finasteride tablet 5 mg, 5 mg, Oral, Daily, Tello Arana MD, 5 mg at 04/15/20 1000    lisinopriL tablet 10 mg, 10 mg, Oral, Daily, Tello Arana MD, 10 mg at 04/15/20 1000    melatonin tablet 6 mg, 6 mg, Oral, Nightly PRN, Aga Baron MD, 6 mg at 04/14/20 2053    metoprolol tartrate (LOPRESSOR) tablet 50 mg, 50 mg, Oral, Daily, Ehsan Mendez MD, 50 mg at 04/15/20 0959    montelukast tablet 10 mg, 10 mg, Oral, QHS, Tello Arana MD, 10 mg at 04/14/20 2053    ondansetron tablet 4 mg, 4 mg, Oral, Q6H PRN, Aga Baron MD    PHENobarbitaL tablet 90 mg, 90 mg, Oral, Daily, Ehsan Mendez MD, 90 mg at 04/15/20 1000    phenytoin (DILANTIN) ER capsule 200 mg, 200 mg, Oral, BID, Ehsan L.  "MD Andrea, 200 mg at 04/15/20 1000    senna-docusate 8.6-50 mg per tablet 1 tablet, 1 tablet, Oral, BID, Rojas Daniels MD, 1 tablet at 04/15/20 0959    sodium chloride 0.9% flush 10 mL, 10 mL, Intravenous, PRN, Aga Barno MD    tamsulosin 24 hr capsule 0.4 mg, 0.4 mg, Oral, Daily, Rojas Daniels MD, 0.4 mg at 04/15/20 0959      Physical Exam:      Intake/Output Summary (Last 24 hours) at 4/15/2020 1358  Last data filed at 4/15/2020 1000  Gross per 24 hour   Intake 950 ml   Output 1075 ml   Net -125 ml     Wt Readings from Last 3 Encounters:   04/11/20 86.2 kg (190 lb 0.6 oz)   04/12/20 86.2 kg (190 lb 0.6 oz)   09/27/19 86.2 kg (190 lb 0.6 oz)       /88   Pulse 103   Temp 98.3 °F (36.8 °C)   Resp 20   Ht 5' 5" (1.651 m)   Wt 86.2 kg (190 lb 0.6 oz)   SpO2 (!) 90%   BMI 31.62 kg/m²     GEN: NAD, conversant with use of language line  Resp: coarse bilateral breath sounds, no wheezes or rales, normal work of breathing on 4L NC  CV: RRR, no m/r/g, no edema  GI: soft, NTND  Skin: no rash  Neuro: AAOx3, CN grossly intact, no focal neurologic deficits    Laboratory:  Lab Results   Component Value Date    [id number] Positive (A) 04/09/2020       Recent Labs   Lab 04/12/20  0457 04/13/20  0357 04/15/20  0426   WBC 7.09 8.18 8.39   LYMPH 11.0*  0.8* 9.8*  0.8* 9.3*  0.8*   HGB 12.5* 12.0* 13.2*   HCT 37.1* 37.2* 39.0*    335 391*     Recent Labs   Lab 04/09/20  0405  04/10/20  0749 04/11/20  0833 04/12/20  0457 04/13/20  1220 04/14/20  2334 04/15/20  1119   *  --  134*  --   --  135* 134* 135*   K 3.9  --  3.5  --   --  3.2* 3.8 3.2*   CL 97  --  98  --   --  98 99 97   CO2 26  --  23  --   --  29 22* 28   BUN 17  --  10  --   --  13 11 10   CREATININE 0.8  --  0.7  --   --  0.6 0.6 0.6   *  --  97  --   --  119* 95 119*   CALCIUM 8.2*  --  7.8*  --   --  8.4* 8.4* 8.7   MG  --   --  2.1 2.1 2.1  --   --   --    PHOS  --    < > 2.8 3.0 2.8 3.0  --  2.4*   LIPASE 77*  --  "  --   --   --   --   --   --     < > = values in this interval not displayed.     Recent Labs   Lab 04/09/20  0405 04/10/20  0749 04/13/20  1220 04/14/20  2334 04/15/20  1119   ALKPHOS 43* 48*  --  55  --    ALT 46* 44  --  48*  --    AST 99* 80*  --  90*  --    ALBUMIN 3.2* 2.7* 2.3* 2.2* 2.2*   PROT 7.7 7.0  --  7.3  --    BILITOT 0.5 0.4  --  0.4  --         Recent Labs     04/12/20  2355 04/14/20  2334   DDIMER  --  7.97*   .8* 295.6*       All labs within the last 24 hours were reviewed.     Microbiology:  Microbiology Results (last 7 days)     Procedure Component Value Units Date/Time    Blood Culture #1 **CANNOT BE ORDERED STAT** [[id number]] Collected:  04/09/20 0650    Order Status:  Completed Specimen:  Blood from Peripheral, Hand, Left Updated:  04/13/20 0903     Blood Culture, Routine No Growth after 4 days.     Blood Culture #2 **CANNOT BE ORDERED STAT** [[id number]] Collected:  04/09/20 0655    Order Status:  Completed Specimen:  Blood from Peripheral, Antecubital, Left Updated:  04/13/20 0903     Blood Culture, Routine No Growth after 4 days.             Imaging  ECG Results          EKG 12-lead (Final result)  Result time 04/09/20 20:43:37    Final result by Interface, Lab In [facility] (04/09/20 20:43:37)                 Narrative:    Test Reason : R06.02,    Vent. Rate : 090 BPM     Atrial Rate : 090 BPM     P-R Int : 128 ms          QRS Dur : 108 ms      QT Int : 372 ms       P-R-T Axes : 050 -44 077 degrees     QTc Int : 455 ms    Sinus rhythm with Premature supraventricular complexes  Left axis deviation  Incomplete right bundle branch block  LVH with repolarization abnormality  Cannot rule out Septal infarct ,age undetermined  Abnormal ECG  When compared with ECG of 24-SEP-2019 04:34,  No significant change was found  Confirmed by Abelino CAMEJO, Zahra LAZARO ([id number]) on 4/9/2020 8:43:23 PM    Referred By: [email or web address]   SELF           Confirmed By:Zahra Roca MD                             EKG 12-LEAD  (Final result)  Result time 04/10/20 10:22:35    Final result by Unknown User (04/10/20 10:22:35)                                  Results for orders placed during the hospital encounter of 04/09/20   Echo Color Flow Doppler? Yes    Narrative · Normal left ventricular systolic function. The estimated ejection   fraction is 60%.  · Moderate concentric left ventricular hypertrophy.  · Normal right ventricular systolic function.  · Moderate left atrial enlargement.  · Grade II (moderate) left ventricular diastolic dysfunction consistent   with pseudonormalization.  · Mild right atrial enlargement.          X-Ray Abdomen AP 1 View  Narrative: EXAMINATION:  XR ABDOMEN AP 1 VIEW    CLINICAL HISTORY:  abdominal pain;    TECHNIQUE:  AP View(s) of the abdomen was performed.    COMPARISON:  Multiple priors, most recent 04/09/2020    FINDINGS:  Patchy airspace opacities at the lung bases, similar to prior exams.  No bowel dilatation.  No radiopaque calculi.  No acute osseous abnormality.  Impression: Nonobstructive bowel gas pattern.    Electronically signed by: Maria Dolores Oliver  Date:    04/14/2020  Time:    15:46      All imaging within the last 24 hours was reviewed.     Assessment and Plan:    Active Hospital Problems    Diagnosis  POA    *COVID-19 virus infection [U07.1]  Yes    Acute respiratory failure with hypoxia [J96.01]  Yes    Constipation [K59.00]  Yes    Fall [W19.XXXA]  Yes    Coronary artery disease involving native coronary artery of native heart without angina pectoris [I25.10]  Yes    Elevated troponin [R79.89]  Yes    Seizure disorder [G40.909]  Yes    Essential hypertension, benign [I10]  Yes    BPH (benign prostatic hyperplasia) [N40.0]  Yes    Dyslipidemia [E78.5]  Yes      Resolved Hospital Problems   No resolved problems to display.       Suspected Covid-19 Virus Infection  Person Under Investigation (PUI) for COVID-19  - COVID-19 testing: POSITIVE  - Infection Control notified    - Isolation:   -  Airborne and Droplet Precautions  - Surgical mask on patient   - N95 masks must be fit tested, wear eye protection  - 20 second hand hygiene   - Limit visitors per hospital policy   - Consolidate lab draws, nursing care, and interventions    - Diagnostics: (Lymphopenia, hyponatremia, hyperferritinemia, elevated troponin, elevated d-dimer, age, and comorbidities are significant predictors of poor clinical outcome)   - CBC: Lymph# 0.8 -> 0.8  trend Q48hrs  - CMP:        trend Q48hrs  - Procalcitonin: 0.12  - D-dimer: 7.97  repeat prior to discharge  - Ferritin:  repeat prior to discharge   - CRP:  269 -> 312 -> 337 -> 295    trend Q48hrs  - LDH:   repeat prior to discharge  - BNP: 66  - Troponin: 0.18    - ECG:   - rapid Flu:   - RIP only if BMT/solid transplant:   - Legionella antigen:   - Blood culture x2: no growth    - Sputum culture:   - CXR:   - UA and culture: no consistent with infection   - CPK:    - Management:   Bundle care as able to maintain isolation & minimize in/out of room   - Supplemental O2 to maintain SpO2 92%-96%   if requiring 6L NC or higher, place on nonrebreather and discuss case with MICU   - Telemetry & continuous pulse oximetry    - If wheezing   - albuterol inhaler 2-4 puff Q6hr PRN    - ipratropium daily    - acetaminophen 650mg PO Q6hr PRN fever   - loperamide PRN for viral diarrhea  - Empiric antibiotics per likely source & patient allergies    - CAP: x 5 day course   Ceftriaxone 1g IV Q24hrs (completed 4 day course on 4/12)            Azithromycin 500mg IV day #1, then 250mg PO daily x4 days (completed 4/13)      If azithromycin is not available, start doxycycline                 If MRSA risk factors, add Vancomycin IV (PharmD consult)     - Investigational Treatment Protocol: (if patient meets criteria)   [email or web address]     - statin: atorvastatin 40mg po daily     - HCQ 400mg PO BID  x1 day, then 400mg PO daily x 4 days (Completed 4/13)  (check glucose 6 phosphate dehydrogenase (NOT G6PD Quant), ECG on start & @48hrs, and start Qshift POCT glucose)    Safety notes:   - Avoid NIPPV (CPAP/BiPAP) to prevent aerosolization, use on a case-by-case basis if in neg pressure room   - Cautious use of NSAIDS for fever per WHO recommendations (3/16/2020)   - No new ACEi/ARB start or discontinuation of chronic med unless hypotensive (Esler et al. Journal of Hypertension 2020, 38:000-000)   - Careful use of steroids in the absence of other indications (shock, ARDS)   - Fluid sparing resuscitation, avoid maintenance fluids       Coronary artery disease  [facility] with non-obstructive dz, Sept 2019. On home asa and statin plus Plavix for NSTEMI at that time.  Mild trop elevation in setting of acute illness stabilized consistent with demand ischemia in setting of acute illness. BNP normal. 2 D echo showed normal EF 60% with no wall motion abnormalities. Continuing home meds.     Fall  2 falls in setting of COVID infection and baseline gait instability requiring cane.  CT head and abdomen/pelvis show no new fractures. Nothing on exam to specify areas of fracture concern. Does have hx of osteoporosis and on anti-epileptic so will have very low threshold to add additional imaging if new symptoms arise. Has been appropriately maintained on anti-epileptics so very unlikely seizures involved. Will continue assessment with PT/OT.         Constipation  Continue daily bowel regimen and progressive mobility protocol while inpatient.  Abd XRay 4/14: No bowel dilation, nonobstructive bowel gas pattern.  4/15: Possible impacted stool; Enema ordered        Elevated troponin  Hx of NSTEMI with [facility] showing non-obstructive CAD in Sept 2019.   Mild, stable. Likely related to acute illness. ACS unlikely. Cards has evaluated and signed off. Appreciate reccs. BNP normal. 2 D echo showed normal EF 60% with no wall motion abnormalities.  On  "asa, Plavix, statin at home; will continue inpatient.     Seizure disorder  Controlled. Continue home Dilantin and valproic acid.        Dyslipidemia  Continue home statin.     BPH (benign prostatic hyperplasia)  Controlled. Continue home Flomax.    Dispo: Use of language line to communicate. Enema ordered today due to continued issue with BM/rectal discharge. Labs scheduled. Continue PT/OT.      Advance Care Planning  Goals of care, counseling/discussion Palliative consult 4/14/2020, patient is DNR/DNI  Advance Care Planning       If patient transitions to Comfort-Focused Care, please place "Nurse Communication: End of Life Care, family members allowed to visit, including spouse/partner and adult children [please list names]. Please ask family to visit as a group and leave as a group.       VTE High Risk Prophylaxis:   VTE Risk Mitigation (From admission, onward)         Ordered     enoxaparin injection 40 mg  Daily      04/09/20 0909     IP VTE HIGH RISK PATIENT  Once      04/09/20 0914     Place sequential compression device  Until discontinued      04/09/20 0914                  Patient's chronic/stable medical conditions noted in the problem list above will be managed with the patient's home medications as tolerated.         Subsequent Inpatient Hospital Care  Level 3 [id number] Total visit time was 35 minutes or greater with greater than 50% of time spent in counseling and coordination of care.     ROLANDO Olson MD    "

## 2020-04-15 NOTE — PT/OT/SLP PROGRESS
Physical Therapy Treatment    Patient Name:  Vern Lr   MRN:  [id number]  Admit Date: 2020  Admitting Diagnosis:  COVID-19 virus infection   Length of Stay: 6 days  Recent Surgery: * No surgery found *      Recommendations:     Discharge Recommendations:  nursing facility, skilled   Discharge Equipment Recommendations: walker, rolling, bedside commode   Barriers to discharge: Decreased caregiver support    Plan:     During this hospitalization, patient to be seen 3 x/week to address the listed problems via gait training, therapeutic activities, therapeutic exercises, neuromuscular re-education  · Plan of Care Expires:  20   Plan of Care Reviewed with: patient    Assessment:     Vern Lr is a 80 y.o. male admitted with a medical diagnosis of COVID-19 virus infection.   Patient is making progress towards goals, participating well in this session. Pt continues to require significant assist with safe ambulation. Education was provided to patient regarding importance of continued participation in therapy, patient's progress and discharge disposition. Pt continues to benefit from a collaborative PT/OT/SLP program to improve quality of life and focus on recovery of impairments.     Problem List: weakness, impaired endurance, impaired balance, gait instability, visual deficits, impaired self care skills, impaired functional mobilty, impaired cardiopulmonary response to activity.  Rehab Prognosis: Good     GOALS:   Multidisciplinary Problems     Physical Therapy Goals        Problem: Physical Therapy Goal    Goal Priority Disciplines Outcome Goal Variances Interventions   Physical Therapy Goal     PT, PT/OT Ongoing, Progressing     Description:  Goals to be met by: 2020    Patient will increase functional independence with mobility by performin. Pt will perform bed mobility (rolling L/R, scooting, and bridging) with Nawaf.  2. Pt will perform supine to/from sit with Nawaf.  3. Pt will sit EOB x 10  "mins with B UE support with Supervision assistance.  4. Pt with perform sit to stand with modA assistance and straight cane.  5. Pt will ambulate 30 feet with mod assistance and straight cane.  6. Pt will perform there-ex from handout x 15 reps to improve strength for functional mobility.                        Subjective   Communicated with RN prior to session.  Patient found supine upon PT entry to room, agreeable to evaluation. Vern Lr's alone present during session.    Patient/Family Comments/goals: via telephone : "They keep the urinal too far away."  Pain/Comfort:  · Pain Rating 1: 0/10  · Pain Rating Post-Intervention 1: 0/10    Objective:   Patient found with: telemetry, oxygen   General Precautions: Standard, Cardiac airborne, fall, droplet, contact, NPO   Orthopedic Precautions:N/A   Braces: N/A   Oxygen Device: Nasal Cannula 4L  Vitals: /88   Pulse 103   Temp 98.3 °F (36.8 °C)   Resp 20   Ht 5' 5" (1.651 m)   Wt 86.2 kg (190 lb 0.6 oz)   SpO2 (!) 90%   BMI 31.62 kg/m²     Outcome Measures:  AM-PAC 6 CLICK MOBILITY  Turning over in bed (including adjusting bedclothes, sheets and blankets)?: 2  Sitting down on and standing up from a chair with arms (e.g., wheelchair, bedside commode, etc.): 1  Moving from lying on back to sitting on the side of the bed?: 2  Moving to and from a bed to a chair (including a wheelchair)?: 1  Need to walk in hospital room?: 1  Climbing 3-5 steps with a railing?: 1  Basic Mobility Total Score: 8     Cognition:   · Alert and Cooperative  · AxOx3  · Command following: Follows one-step commands  · Fluency: clear/fluent    Functional Mobility:  Additional staff present: OT  Bed Mobility:    Supine to Sit: minimum assistance; HOB flat and from left side of bed   Scooting anteriorly to EOB to have both feet planted on floor: contact guard assistance    Transfers:    Sit <> Stand Transfer: minimum assistance with hand-held assist    Stand <> Sit " Transfer: minimum assistance with hand-held assist   o [id number] from EOB and [id number] from bedside commode   Bed <> Chair Transfer: Step Transfer technique with moderate assistance with hand-held assist  o Chair on patient's left      Gait:  · Patient ambulated: 10 steps at bedside   · Patient required: moderate assist  · Patient used: hand-held assist  · Gait Pattern observed: 2-point gait  · Gait Deviation(s): unsteady gait, shuffle gait, flexed posture and decreased sylvia  · Impairments due to: impaired balance, decreased strength, decreased endurance and impaired postural control  · Comments: pt using b/l hands for support    Therapeutic Activities & Exercises:   *standing at bedside to use urinal, gelatinous substance excreted from rectum. PA present and aware.    Education:  Patient provided with daily orientation and goals of this PT session. Patient agreed to participate in session. Patient aware of patient's deficits and therapy progression. They were educated to transfer to bedside chair/bedside commode/bathroom with RN/PCT present. Patient educated on energy conservation, Fall risk, home safety and Home exercise program by explanation. Patient was receptive to education and verbalizes understanding. Encouraged patient to perform daily exercises & mobility to increase endurance and decrease effects of bedrest.Time provided for therapeutic counseling and discussion of health disposition. All questions answered to patient's satisfaction, within scope of PT practice; voiced no other concerns. White board updated in patient's room, RN notified of session.    Patient left up in chair, with head in midline, neutral pelvis & heels floated for skin protection with all lines intact, call button in reach and RN notified  Time Tracking:     PT Received On: 04/15/20  PT Start Time: 1122     PT Stop Time: 1150  PT Total Time (min): 28 min   CO-treat with OT    Billable Minutes:   · Therapeutic Activity  25    Treatment Type: Treatment  PT/PTA: PT       Marycruz Antoine PT, DPT  04/15/2020  Pager: [phone]

## 2020-04-15 NOTE — PT/OT/SLP PROGRESS
Occupational Therapy   Treatment    Name: Vern Lr  MRN: [id number]  Admitting Diagnosis:  COVID-19 virus infection       Recommendations:     Discharge Recommendations: nursing facility, skilled  Discharge Equipment Recommendations:  walker, rolling, bedside commode  Barriers to discharge:  Decreased caregiver support(lives alone; COVID+; level of skilled assistance required)    Assessment:     Vern Lr is a 80 y.o. male with a medical diagnosis of COVID-19 virus infection.  He presents on 4L O2 this date. Cont to demo coughing with mobility tasks and requiring added physical assistance for all functional tasks/activities. Moreover, he demo cognitive deficits with attention and insight. Cont to rec SNF for post acute level of care. Performance deficits affecting function are weakness, impaired endurance, impaired self care skills, impaired functional mobilty, gait instability, impaired balance, impaired cognition, decreased safety awareness, decreased upper extremity function, decreased lower extremity function, impaired cardiopulmonary response to activity.     Rehab Prognosis:  Fair; patient would benefit from acute skilled OT services to address these deficits and reach maximum level of function.       Plan:     Patient to be seen 3 x/week to address the above listed problems via self-care/home management, therapeutic activities, therapeutic exercises, neuromuscular re-education  · Plan of Care Expires: 05/12/20  · Plan of Care Reviewed with: patient, family    Subjective     Pain/Comfort:  · Pain Rating 1: 0/10  · Pain Rating Post-Intervention 1: 0/10    Objective:     Communicated with: RN prior to session. Completed with PT. PA present upon entrance into room.  Patient found HOB elevated with bed alarm, SCD, telemetry, peripheral IV, oxygen upon OT entry to room.    General Precautions: Standard, airborne, aspiration, contact, diabetic, droplet, fall, seizure, hearing impaired   Orthopedic  Precautions:N/A   Braces: N/A     Occupational Performance:     Bed Mobility:    · Patient completed Rolling/Turning to Left with  moderate assistance  · Patient completed Supine to Sit with moderate assistance     Functional Mobility/Transfers:  · Patient completed Sit <> Stand Transfer with moderate assistance and of 2 persons  with  hand-held assist   · Patient completed Bed <> Chair Transfer using Step Transfer technique with moderate assistance and of 2 persons with hand-held assist  · Patient completed Toilet Transfer Step Transfer technique (to bedside commode) with moderate assistance and of 2 persons with  hand-held assist    Activities of Daily Living:  · Feeding:  modified independence with set up for lunch while seated in chair  · Grooming: minimum assistance and set up while seated in chair; unable to complete in standing  · Toileting: maximal assistance perineal care from bedside commode    [facility] 6 Click ADL: 14    Treatment & Education:  -Pt alert and agreeable to therapy session; utilized language line via pt's telephone throughout the session  -EOB with CGA  -bedside commode obtained for pt use and adjusted for best ergonomics  -request PA to change language to Romansh in Epic  -Communication board updated; questions/concerns addressed within OT scope of practice  -no family at bedside for education     Patient left up in chair with all lines intact and call button in reachEducation:      GOALS:   Multidisciplinary Problems     Occupational Therapy Goals        Problem: Occupational Therapy Goal    Goal Priority Disciplines Outcome Interventions   Occupational Therapy Goal     OT, PT/OT Ongoing, Progressing    Description:  Goals to be met by: 4/27     Patient will increase functional independence with ADLs by performing:    Bed mobility and supine to sit with CGA and bed rail as needed.  UE Dressing while seated EOB with Set-up Assistance and Stand-by Assistance.  Grooming while standing with  Minimal Assistance.  Toileting from toilet with Minimal Assistance for hygiene and clothing management.   Toilet transfer to toilet with Minimal Assistance.  Functional mobility at household distance for ADL task with min(A) and AD as needed.   Pt will report daily orientation x4 with added time and 0 added cues.                      Time Tracking:     OT Date of Treatment: 04/15/20  OT Start Time: 1122  OT Stop Time: 1149  OT Total Time (min): 27 min    Billable Minutes:Self Care/Home Management 23    DREAD Arredondo  4/15/2020

## 2020-04-15 NOTE — PLAN OF CARE
Problem: Fall Injury Risk  Goal: Absence of Fall and Fall-Related Injury  Outcome: Ongoing, Progressing     Problem: Adult Inpatient Plan of Care  Goal: Plan of Care Review  Outcome: Ongoing, Progressing  Goal: Patient-Specific Goal (Individualization)  Outcome: Ongoing, Progressing  Goal: Absence of Hospital-Acquired Illness or Injury  Outcome: Ongoing, Progressing  Goal: Optimal Comfort and Wellbeing  Outcome: Ongoing, Progressing  Goal: Readiness for Transition of Care  Outcome: Ongoing, Progressing     Problem: Skin Injury Risk Increased  Goal: Skin Health and Integrity  Outcome: Ongoing, Progressing     Problem: Social Isolation  Goal: Increased Social Interaction  Outcome: Ongoing, Progressing     Problem: Infection  Goal: Infection Symptom Resolution  Outcome: Ongoing, Progressing     Problem: Oral Intake Inadequate  Goal: Improved Oral Intake  Outcome: Ongoing, Progressing     Problem: Fall Injury Risk  Goal: Absence of Fall and Fall-Related Injury  Outcome: Ongoing, Progressing     Problem: Coping Ineffective  Goal: Effective Coping  Outcome: Ongoing, Progressing    Pt a&ox3, Amharic speaking only, denies pain. POC discussed with pt and family, as well as with pt via phone . Pt sometimes yells out for bedpan/urinal, pt encouraged to use call bell. VSS on 4LO2 NC. Dry, unproductive cough. Lungs clear/dim. Voids via urinal, lbm 4/14, has tried to use the bedpan but no bm overnight. Fluids encouraged.  Will continue to monitor.

## 2020-04-15 NOTE — PLAN OF CARE
Goals remain appropriate. DREAD Arredondo 4/15/2020   Problem: Occupational Therapy Goal  Goal: Occupational Therapy Goal  Description  Goals to be met by: 4/27     Patient will increase functional independence with ADLs by performing:    Bed mobility and supine to sit with CGA and bed rail as needed.  UE Dressing while seated EOB with Set-up Assistance and Stand-by Assistance.  Grooming while standing with Minimal Assistance.  Toileting from toilet with Minimal Assistance for hygiene and clothing management.   Toilet transfer to toilet with Minimal Assistance.  Functional mobility at household distance for ADL task with min(A) and AD as needed.   Pt will report daily orientation x4 with added time and 0 added cues.     Outcome: Ongoing, Progressing

## 2020-04-15 NOTE — NURSING
Language line  Lexi used to clarify pt needs. Pt needed bedpan. No other needs at this time. Will monitor.

## 2020-04-15 NOTE — MEDICAL/APP STUDENT
"Attempted to contact pt's sister "Gianna" at [phone]. No answer x3. Unable to leave voicemails.    I spoke with "Sherin" (pt's niece) at [phone]. I told them that the patient is doing well with current management and on 4L via NC. Family had concerns about pt's discharge plan after treatment because everyone that typically cares for pt at home is currently positive for covid and were concerned he would get sick again if he is discharged home. Will call back tomorrow with updates.       Pedro Pinedo, MS3  "

## 2020-04-15 NOTE — NURSING
Language line  Lexi called to clarify pt needs as pt is yelling out from room, unable to console at this time. Per Lexi pt is yelling because when he uses the urinal he also has to have a bowel movement, he needs the bedpan. Pt reeducated on how to use the call light and encouraged to use rather than yelling. Pt agreeable. Will monitor.

## 2020-04-15 NOTE — CARE UPDATE
Rapid Response Nurse Chart Check     Chart check completed. Please call [phone] for further concerns or assistance.

## 2020-04-15 NOTE — PLAN OF CARE
Plan of Care:  Discharge Recommendation: SNF.  Please continue Progressive Mobility Protocol as appropriate.  Appropriate transfer level with nursing staff: Bed <> Chair:  Stand Pivot with moderate assistance with No Assistive Device.    Marycruz Antoine PT, DPT  4/15/2020  Pager: [phone]    Physical Therapy Treatment Goals:  Multidisciplinary Problems       Physical Therapy Goals          Problem: Physical Therapy Goal    Goal Priority Disciplines Outcome Goal Variances Interventions   Physical Therapy Goal     PT, PT/OT Ongoing, Progressing     Description:  Goals to be met by: 2020    Patient will increase functional independence with mobility by performin. Pt will perform bed mobility (rolling L/R, scooting, and bridging) with Nawaf.  2. Pt will perform supine to/from sit with Nawaf.  3. Pt will sit EOB x 10 mins with B UE support with Supervision assistance.  4. Pt with perform sit to stand with modA assistance and straight cane.  5. Pt will ambulate 30 feet with mod assistance and straight cane.  6. Pt will perform there-ex from handout x 15 reps to improve strength for functional mobility.

## 2020-04-16 LAB — D DIMER PPP IA.FEU-MCNC: 4.91 MG/L FEU

## 2020-04-16 PROCEDURE — 25000003 PHARM REV CODE 250: Performed by: INTERNAL MEDICINE

## 2020-04-16 PROCEDURE — 99233 PR SUBSEQUENT HOSPITAL CARE,LEVL III: ICD-10-PCS | Mod: ,,, | Performed by: HOSPITALIST

## 2020-04-16 PROCEDURE — 99233 SBSQ HOSP IP/OBS HIGH 50: CPT | Mod: ,,, | Performed by: HOSPITALIST

## 2020-04-16 PROCEDURE — 25000003 PHARM REV CODE 250: Performed by: HOSPITALIST

## 2020-04-16 PROCEDURE — 25000003 PHARM REV CODE 250: Performed by: EMERGENCY MEDICINE

## 2020-04-16 PROCEDURE — 97110 THERAPEUTIC EXERCISES: CPT

## 2020-04-16 PROCEDURE — 36415 COLL VENOUS BLD VENIPUNCTURE: CPT

## 2020-04-16 PROCEDURE — 85379 FIBRIN DEGRADATION QUANT: CPT

## 2020-04-16 PROCEDURE — 97530 THERAPEUTIC ACTIVITIES: CPT

## 2020-04-16 PROCEDURE — 11000001 HC ACUTE MED/SURG PRIVATE ROOM

## 2020-04-16 PROCEDURE — 63600175 PHARM REV CODE 636 W HCPCS: Performed by: INTERNAL MEDICINE

## 2020-04-16 RX ADMIN — ASPIRIN 81 MG: 81 TABLET, COATED ORAL at 09:04

## 2020-04-16 RX ADMIN — TAMSULOSIN HYDROCHLORIDE 0.4 MG: 0.4 CAPSULE ORAL at 09:04

## 2020-04-16 RX ADMIN — STANDARDIZED SENNA CONCENTRATE AND DOCUSATE SODIUM 1 TABLET: 8.6; 5 TABLET ORAL at 09:04

## 2020-04-16 RX ADMIN — ATORVASTATIN CALCIUM 40 MG: 40 TABLET, FILM COATED ORAL at 09:04

## 2020-04-16 RX ADMIN — MONTELUKAST 10 MG: 10 TABLET, FILM COATED ORAL at 10:04

## 2020-04-16 RX ADMIN — ALBUTEROL SULFATE 2 PUFF: 90 AEROSOL, METERED RESPIRATORY (INHALATION) at 04:04

## 2020-04-16 RX ADMIN — PHENYTOIN SODIUM 200 MG: 100 CAPSULE ORAL at 09:04

## 2020-04-16 RX ADMIN — PHENYTOIN SODIUM 200 MG: 100 CAPSULE ORAL at 10:04

## 2020-04-16 RX ADMIN — Medication 6 MG: at 10:04

## 2020-04-16 RX ADMIN — LISINOPRIL 10 MG: 5 TABLET ORAL at 09:04

## 2020-04-16 RX ADMIN — CLOPIDOGREL BISULFATE 75 MG: 75 TABLET ORAL at 09:04

## 2020-04-16 RX ADMIN — ALBUTEROL SULFATE 2 PUFF: 90 AEROSOL, METERED RESPIRATORY (INHALATION) at 09:04

## 2020-04-16 RX ADMIN — PHENOBARBITAL 90 MG: 30 TABLET ORAL at 09:04

## 2020-04-16 RX ADMIN — ENOXAPARIN SODIUM 40 MG: 100 INJECTION SUBCUTANEOUS at 10:04

## 2020-04-16 RX ADMIN — METOPROLOL TARTRATE 50 MG: 50 TABLET, FILM COATED ORAL at 09:04

## 2020-04-16 RX ADMIN — FINASTERIDE 5 MG: 5 TABLET, FILM COATED ORAL at 09:04

## 2020-04-16 NOTE — PLAN OF CARE
ALLYSON called [facility] to follow up on referral.  [facility] does not have any beds at this time and is working the referrals as they came in.  [facility] declined as can't meet needs.  Patient will likely need to dc to [facility].

## 2020-04-16 NOTE — MEDICAL/APP STUDENT
"Attempted to contact pt's sister "Gianna" at [phone] and pt's niece "Sherin" at [phone]. No answer x3. Unable to leave voicemails.        Pedro Pinedo, MS3  "

## 2020-04-16 NOTE — PLAN OF CARE
Problem: Physical Therapy Goal  Goal: Physical Therapy Goal  Description  Goals to be met by: 2020    Patient will increase functional independence with mobility by performin. Pt will perform bed mobility (rolling L/R, scooting, and bridging) with Nawaf. - GOAL MET  REVISED: with CGA  2. Pt will perform supine to/from sit with Nawaf. - GOAL MET  REVISED: with CGA  3. Pt will sit EOB x 10 mins with B UE support with Supervision assistance.  4. Pt with perform sit to stand with modA assistance and straight cane.  5. Pt will ambulate 30 feet with mod assistance and straight cane.  6. Pt will perform there-ex from handout x 15 reps to improve strength for functional mobility.         2020 1549 by Allyssa Menezes, PT  Outcome: Ongoing, Progressing    Pt achieved 2 goals.

## 2020-04-16 NOTE — PT/OT/SLP PROGRESS
Physical Therapy Treatment    Patient Name:  Vern Lr   MRN:  [id number]    Recommendations:     Discharge Recommendations:  nursing facility, skilled   Discharge Equipment Recommendations: (TBD)   Barriers to discharge: Inaccessible home and Decreased caregiver support    Assessment:     Vern Lr is a 80 y.o. male admitted with a medical diagnosis of COVID-19 virus infection.  He presents with the following impairments/functional limitations:  weakness, impaired endurance, impaired functional mobilty, gait instability, impaired balance, impaired self care skills, decreased safety awareness, impaired cognition, decreased coordination, impaired cardiopulmonary response to activity.      Currently requires Danielle to perform all bed mobility.  Pt refused standing activities, including transfer training and gait training.  Therefore established exercise program to be performed in bed, 3xs/day.  Pt requires a fluent [place]an speaking PT as pt does not understand English.    Rehab Prognosis: Good; patient would benefit from acute skilled PT services to address these deficits and reach maximum level of function.    Recent Surgery: * No surgery found *      Plan:     During this hospitalization, patient to be seen 4 x/week to address the identified rehab impairments via gait training, therapeutic activities, therapeutic exercises, neuromuscular re-education and progress toward the following goals:    · Plan of Care Expires:  05/13/20    Subjective     Chief Complaint: No complaints  Patient/Family Comments/goals: Pt stated, in [place], that he needed a new urinal  Pain/Comfort:  · Pain Rating 1: 0/10      Objective:     Communicated with nursing prior to session.  Patient found supine with telemetry, pulse ox (continuous), peripheral IV, oxygen upon PT entry to room.     General Precautions: Standard, fall, droplet, contact, airborne, respiratory   Orthopedic Precautions:N/A   Braces: N/A     Functional Mobility:  · Bed  Mobility:     · Rolling Left:  minimum assistance  · Rolling Right: minimum assistance  · Scooting: CGA: to scoot ant sitting EOB; Nawaf: to scoot to HOB in supine with A for positioning and hand placement  · Supine to Sit: minimum assistance  · Sit to Supine: minimum assistance    · Balance: SBA: static sitting balance EOB      AM-PAC 6 CLICK MOBILITY  Turning over in bed (including adjusting bedclothes, sheets and blankets)?: 3  Sitting down on and standing up from a chair with arms (e.g., wheelchair, bedside commode, etc.): 1  Moving from lying on back to sitting on the side of the bed?: 3  Moving to and from a bed to a chair (including a wheelchair)?: 1  Need to walk in hospital room?: 1  Climbing 3-5 steps with a railing?: 1  Basic Mobility Total Score: 10       Therapeutic Activities and Exercises:   Whiteboard updated  Ankle Pumps: 20 reps, in supine  Heel slides: 20 reps, each LE performed individually, in supine  Hip abd/add: 20 reps, in supine  SAQs: 20 reps, each LE performed individually, in supine      Patient left supine with all lines intact and call button in reach.    GOALS:   Multidisciplinary Problems     Physical Therapy Goals        Problem: Physical Therapy Goal    Goal Priority Disciplines Outcome Goal Variances Interventions   Physical Therapy Goal     PT, PT/OT Ongoing, Progressing     Description:  Goals to be met by: 2020    Patient will increase functional independence with mobility by performin. Pt will perform bed mobility (rolling L/R, scooting, and bridging) with Nawaf. - GOAL MET  REVISED: with CGA  2. Pt will perform supine to/from sit with Nawaf. - GOAL MET  REVISED: with CGA  3. Pt will sit EOB x 10 mins with B UE support with Supervision assistance.  4. Pt with perform sit to stand with modA assistance and straight cane.  5. Pt will ambulate 30 feet with mod assistance and straight cane.  6. Pt will perform there-ex from handout x 15 reps to improve strength for  functional mobility.                          Time Tracking:     PT Received On: 04/16/20  PT Start Time: 1214     PT Stop Time: 1237  PT Total Time (min): 23 min     Billable Minutes: Therapeutic Activity 11 and Therapeutic Exercise 12    Treatment Type: Treatment  PT/PTA: PT           Allyssa Menezes, PT  04/16/2020

## 2020-04-16 NOTE — PLAN OF CARE
04/16/20 1549   Discharge Reassessment   Assessment Type Discharge Planning Reassessment   Provided patient/caregiver education on the expected discharge date and the discharge plan No   Do you have any problems affording any of your prescribed medications? No   Discharge Plan A Skilled Nursing Facility   Discharge Plan B Home Health;Home with family   DME Needed Upon Discharge    (tbd)   Patient choice form signed by patient/caregiver N/A   Anticipated Discharge Disposition SNF

## 2020-04-16 NOTE — PLAN OF CARE
Problem: Fall Injury Risk  Goal: Absence of Fall and Fall-Related Injury  Outcome: Ongoing, Progressing     Problem: Adult Inpatient Plan of Care  Goal: Plan of Care Review  Outcome: Ongoing, Progressing     Problem: Adult Inpatient Plan of Care  Goal: Optimal Comfort and Wellbeing  Outcome: Ongoing, Progressing     Problem: Adult Inpatient Plan of Care  Goal: Readiness for Transition of Care  Outcome: Ongoing, Progressing  Patient remains free from injury or fall. Cardiac rhythm and pulse ox monitored. Oxygen therapy continued. No significant changes noted today. Will continue to monitor.

## 2020-04-17 LAB
ALBUMIN SERPL BCP-MCNC: 2.1 G/DL (ref 3.5–5.2)
ALP SERPL-CCNC: 62 U/L (ref 55–135)
ALT SERPL W/O P-5'-P-CCNC: 46 U/L (ref 10–44)
ANION GAP SERPL CALC-SCNC: 8 MMOL/L (ref 8–16)
AST SERPL-CCNC: 65 U/L (ref 10–40)
BASOPHILS # BLD AUTO: 0.06 K/UL (ref 0–0.2)
BASOPHILS NFR BLD: 0.7 % (ref 0–1.9)
BILIRUB SERPL-MCNC: 0.5 MG/DL (ref 0.1–1)
BUN SERPL-MCNC: 14 MG/DL (ref 8–23)
CALCIUM SERPL-MCNC: 8.6 MG/DL (ref 8.7–10.5)
CHLORIDE SERPL-SCNC: 98 MMOL/L (ref 95–110)
CO2 SERPL-SCNC: 29 MMOL/L (ref 23–29)
CREAT SERPL-MCNC: 0.6 MG/DL (ref 0.5–1.4)
CRP SERPL-MCNC: 290.6 MG/L (ref 0–8.2)
DIFFERENTIAL METHOD: ABNORMAL
EOSINOPHIL # BLD AUTO: 0 K/UL (ref 0–0.5)
EOSINOPHIL NFR BLD: 0.4 % (ref 0–8)
ERYTHROCYTE [DISTWIDTH] IN BLOOD BY AUTOMATED COUNT: 12.4 % (ref 11.5–14.5)
EST. GFR  (AFRICAN AMERICAN): >60 ML/MIN/1.73 M^2
EST. GFR  (NON AFRICAN AMERICAN): >60 ML/MIN/1.73 M^2
GLUCOSE SERPL-MCNC: 108 MG/DL (ref 70–110)
HCT VFR BLD AUTO: 40.7 % (ref 40–54)
HGB BLD-MCNC: 13.1 G/DL (ref 14–18)
IMM GRANULOCYTES # BLD AUTO: 0.04 K/UL (ref 0–0.04)
IMM GRANULOCYTES NFR BLD AUTO: 0.5 % (ref 0–0.5)
LYMPHOCYTES # BLD AUTO: 1.1 K/UL (ref 1–4.8)
LYMPHOCYTES NFR BLD: 12.9 % (ref 18–48)
MCH RBC QN AUTO: 30.3 PG (ref 27–31)
MCHC RBC AUTO-ENTMCNC: 32.2 G/DL (ref 32–36)
MCV RBC AUTO: 94 FL (ref 82–98)
MONOCYTES # BLD AUTO: 0.6 K/UL (ref 0.3–1)
MONOCYTES NFR BLD: 6.9 % (ref 4–15)
NEUTROPHILS # BLD AUTO: 6.7 K/UL (ref 1.8–7.7)
NEUTROPHILS NFR BLD: 78.6 % (ref 38–73)
NRBC BLD-RTO: 0 /100 WBC
PLATELET # BLD AUTO: 450 K/UL (ref 150–350)
PMV BLD AUTO: 9.6 FL (ref 9.2–12.9)
POTASSIUM SERPL-SCNC: 3.5 MMOL/L (ref 3.5–5.1)
PROT SERPL-MCNC: 7.7 G/DL (ref 6–8.4)
RBC # BLD AUTO: 4.33 M/UL (ref 4.6–6.2)
SODIUM SERPL-SCNC: 135 MMOL/L (ref 136–145)
WBC # BLD AUTO: 8.46 K/UL (ref 3.9–12.7)

## 2020-04-17 PROCEDURE — 99233 SBSQ HOSP IP/OBS HIGH 50: CPT | Mod: ,,, | Performed by: HOSPITALIST

## 2020-04-17 PROCEDURE — 99233 PR SUBSEQUENT HOSPITAL CARE,LEVL III: ICD-10-PCS | Mod: ,,, | Performed by: HOSPITALIST

## 2020-04-17 PROCEDURE — 86140 C-REACTIVE PROTEIN: CPT

## 2020-04-17 PROCEDURE — 97535 SELF CARE MNGMENT TRAINING: CPT

## 2020-04-17 PROCEDURE — 80053 COMPREHEN METABOLIC PANEL: CPT

## 2020-04-17 PROCEDURE — 97530 THERAPEUTIC ACTIVITIES: CPT

## 2020-04-17 PROCEDURE — 25000003 PHARM REV CODE 250: Performed by: EMERGENCY MEDICINE

## 2020-04-17 PROCEDURE — 25000003 PHARM REV CODE 250: Performed by: INTERNAL MEDICINE

## 2020-04-17 PROCEDURE — 11000001 HC ACUTE MED/SURG PRIVATE ROOM

## 2020-04-17 PROCEDURE — 25000003 PHARM REV CODE 250: Performed by: HOSPITALIST

## 2020-04-17 PROCEDURE — 36415 COLL VENOUS BLD VENIPUNCTURE: CPT

## 2020-04-17 PROCEDURE — 63600175 PHARM REV CODE 636 W HCPCS: Performed by: INTERNAL MEDICINE

## 2020-04-17 PROCEDURE — 27000221 HC OXYGEN, UP TO 24 HOURS

## 2020-04-17 PROCEDURE — 85025 COMPLETE CBC W/AUTO DIFF WBC: CPT

## 2020-04-17 PROCEDURE — 94640 AIRWAY INHALATION TREATMENT: CPT

## 2020-04-17 PROCEDURE — 94761 N-INVAS EAR/PLS OXIMETRY MLT: CPT

## 2020-04-17 RX ADMIN — ALBUTEROL SULFATE 2 PUFF: 90 AEROSOL, METERED RESPIRATORY (INHALATION) at 08:04

## 2020-04-17 RX ADMIN — PHENYTOIN SODIUM 200 MG: 100 CAPSULE ORAL at 08:04

## 2020-04-17 RX ADMIN — METOPROLOL TARTRATE 50 MG: 50 TABLET, FILM COATED ORAL at 08:04

## 2020-04-17 RX ADMIN — STANDARDIZED SENNA CONCENTRATE AND DOCUSATE SODIUM 1 TABLET: 8.6; 5 TABLET ORAL at 09:04

## 2020-04-17 RX ADMIN — PHENYTOIN SODIUM 200 MG: 100 CAPSULE ORAL at 09:04

## 2020-04-17 RX ADMIN — STANDARDIZED SENNA CONCENTRATE AND DOCUSATE SODIUM 1 TABLET: 8.6; 5 TABLET ORAL at 08:04

## 2020-04-17 RX ADMIN — ENOXAPARIN SODIUM 40 MG: 100 INJECTION SUBCUTANEOUS at 09:04

## 2020-04-17 RX ADMIN — PHENOBARBITAL 90 MG: 30 TABLET ORAL at 08:04

## 2020-04-17 RX ADMIN — BISACODYL 10 MG: 10 SUPPOSITORY RECTAL at 11:04

## 2020-04-17 RX ADMIN — ASPIRIN 81 MG: 81 TABLET, COATED ORAL at 08:04

## 2020-04-17 RX ADMIN — ATORVASTATIN CALCIUM 40 MG: 40 TABLET, FILM COATED ORAL at 08:04

## 2020-04-17 RX ADMIN — ALBUTEROL SULFATE 2 PUFF: 90 AEROSOL, METERED RESPIRATORY (INHALATION) at 05:04

## 2020-04-17 RX ADMIN — FINASTERIDE 5 MG: 5 TABLET, FILM COATED ORAL at 08:04

## 2020-04-17 RX ADMIN — CLOPIDOGREL BISULFATE 75 MG: 75 TABLET ORAL at 08:04

## 2020-04-17 RX ADMIN — LISINOPRIL 10 MG: 5 TABLET ORAL at 08:04

## 2020-04-17 RX ADMIN — TAMSULOSIN HYDROCHLORIDE 0.4 MG: 0.4 CAPSULE ORAL at 08:04

## 2020-04-17 RX ADMIN — MONTELUKAST 10 MG: 10 TABLET, FILM COATED ORAL at 09:04

## 2020-04-17 NOTE — PLAN OF CARE
Goals remain appropriate. DREAD Arredondo 4/17/2020   Problem: Occupational Therapy Goal  Goal: Occupational Therapy Goal  Description  Goals to be met by: 4/27     Patient will increase functional independence with ADLs by performing:    Bed mobility and supine to sit with CGA and bed rail as needed.  UE Dressing while seated EOB with Set-up Assistance and Stand-by Assistance.  Grooming while standing with Minimal Assistance.  Toileting from toilet with Minimal Assistance for hygiene and clothing management.   Toilet transfer to toilet with Minimal Assistance.  Functional mobility at household distance for ADL task with min(A) and AD as needed.   Pt will report daily orientation x4 with added time and 0 added cues.     Outcome: Ongoing, Progressing

## 2020-04-17 NOTE — PT/OT/SLP PROGRESS
"Occupational Therapy   Treatment    Name: Vern Lr  MRN: [id number]  Admitting Diagnosis:  COVID-19 virus infection       Recommendations:     Discharge Recommendations: rehab  Discharge Equipment Recommendations:  walker, rolling, bedside commode  Barriers to discharge:  Decreased caregiver support(lives alone; COVID+; level of skilled assistance required)    Assessment:     Vern Lr is a 80 y.o. male with a medical diagnosis of COVID-19 virus infection.  He presents on 4L O2 at this time. He cont to demo need for added physical assistance for ADLs/self care/mobility and remains a high fall risk. He is unsafe to return home alone at this time. Performance deficits affecting function are weakness, impaired endurance, impaired self care skills, impaired functional mobilty, gait instability, impaired balance, impaired cognition, decreased safety awareness, decreased upper extremity function, decreased lower extremity function, impaired cardiopulmonary response to activity.     Rehab Prognosis:  Fair+; patient would benefit from acute skilled OT services to address these deficits and reach maximum level of function.       Plan:     Patient to be seen 3 x/week to address the above listed problems via self-care/home management, therapeutic activities, therapeutic exercises, neuromuscular re-education  · Plan of Care Expires: 05/12/20  · Plan of Care Reviewed with: patient, family    Subjective     Pain/Comfort:  · Pain Rating 1: 0/10(c/o "feeling bad")  · Pain Rating Post-Intervention 1: 0/10    Objective:     Communicated with: RN prior to session.  Patient found HOB elevated with bed alarm, SCD, telemetry, peripheral IV, oxygen(5L) upon OT entry to room.    General Precautions: Standard, airborne, aspiration, contact, diabetic, droplet, fall, seizure, hearing impaired   Orthopedic Precautions:N/A   Braces: N/A     Occupational Performance:     Bed Mobility:    · Patient completed Rolling/Turning to Left with  " minimum assistance and with side rail  · Patient completed Supine to Sit with moderate assistance     Functional Mobility/Transfers:  · Patient completed Sit <> Stand Transfer with maximal assistance  with  no assistive device from EOB x1  · 2nd trial sit<>stand EOB with mod(A)  · Sit<>stand from chair with minimal assistance; pt to self assist with arm rest  · Patient completed Bed <> Chair Transfer using Step Transfer technique with moderate assistance with no assistive device  · Functional Mobility: 5 steps to chair with max(A) and no AD    Activities of Daily Living:  · Grooming: unable to complete in standing; set up and supervision while seated in chair    · Upper Body Dressing: moderate assistance EOB  · Lower Body Dressing: maximal assistance EOB  · Toileting: maximal assistance standing for urinal use in standing    [facility] 6 Click ADL: 14    Treatment & Education:  -Pt alert and agreeable to therapy session; re edu on OT role in care; CATY utilized for Divehi translation throughout session   -edu on coughing/spitting protection/shielding with need for reinforcement throughout  -edu on importance of activity and mobility for healing process  -edu on use of call light for staff assistance with demo understanding  -Communication board updated; questions/concerns addressed within OT scope of practice    Patient left up in chair with all lines intact and call button in reachEducation:      GOALS:   Multidisciplinary Problems     Occupational Therapy Goals        Problem: Occupational Therapy Goal    Goal Priority Disciplines Outcome Interventions   Occupational Therapy Goal     OT, PT/OT Ongoing, Progressing    Description:  Goals to be met by: 4/27     Patient will increase functional independence with ADLs by performing:    Bed mobility and supine to sit with CGA and bed rail as needed.  UE Dressing while seated EOB with Set-up Assistance and Stand-by Assistance.  Grooming while standing with Minimal  Assistance.  Toileting from toilet with Minimal Assistance for hygiene and clothing management.   Toilet transfer to toilet with Minimal Assistance.  Functional mobility at household distance for ADL task with min(A) and AD as needed.   Pt will report daily orientation x4 with added time and 0 added cues.                      Time Tracking:     OT Date of Treatment: 04/17/20  OT Start Time: 1454  OT Stop Time: 1520  OT Total Time (min): 26 min    Billable Minutes:Self Care/Home Management 10  Therapeutic Activity 16    DREAD Arredondo  4/17/2020

## 2020-04-17 NOTE — CONSULTS
Vern Lr  has been accepted for transfer to [facility] and will be followed through telemedicine services beginning 04/18/20  at 7am.    Jennifer Bergman

## 2020-04-17 NOTE — PROGRESS NOTES
Hospital Medicine  Progress Note  [facility]      Patient Name: Vern Lr  MRN:  [id number]  Hospital Medicine Team: [facility] HOSP MED KK WHITNEY Orellana  Date of Admission:  4/9/2020     Length of Stay:  LOS: 8 days       Principal Problem:  COVID-19 virus infection      HPI:.  79 yo M admitted on 4/9/20 for falls and PNA 2/2 COVID infection. Positive testing in ED (4/9/20). Patient speaks only Vietnamese and language line used to communicate. Spoke to patient's sister who admits patient is hard of hearing and likely has mild dementia so history is somewhat limited as a result. Patient notes constipation without bm in a week. Sister states he often complains of this but patient actually was brought in for falls. He fell twice in past couple of days. She admits he has looked fatigued recently and she is actually recovering from COVID infection herself. He normally ambulates with assistance of cane. After first fall he was brought to [facility] and went home. Second fall while in bathroom yesterday and was subsequently brought here to [facility]. Seizure hx but sister did not see anything to suggest seizure and patient has been on meds. Patient has no recent issues with passing out. Hx of NSTEMI with non-obstructive CAD in Sept 2019. Largest occulusion noted on LHC was 20% at that time. Has been on asa, Plavix, and statin since. Normally walks with cane and was actually walking yesterday without issue per sister. Patient admits to some shortness of breath and cough as well as nasal congestion..     Overview/Hospital Course:  80/M admitted 4/9/20 for PNA 2/2 COVID-19. Weakness with 2 recent falls and mild shortness were main presenting symptoms prompting family to bring patient to hospital. Pt is Vietnamese speaking with poor hearing and likely mild dementia. Abrasion on scalp with negative CT head. Otherwise no concerning findings on exam with regard to falls and potential fracture. CT abdomen noted  "unchanged previously present compressions fractures on thoracolumbar spine.  Saturating well on room air then desaturating and requiring 5L NC on 4/10. Constipation without bm in a week per patient. Benign abdominal exam and moderate stool without acute abnormality on imaging.    TRANSFER: [facility]  79 y/o Danish speaking male with previous stroke, Seizure disorder, HTN, HLP, BPH and non-obstructive CAD admitted to [facility] on 4/9 after multiple falls at home with generalized weakness and fatigue. CT scan done on admit of chest and abdomen showed diffuse bilateral ground-glass opacities affecting all lobes concerning for underlying infectious process in the appropriate clinical setting with additional differential considerations including pulmonary edema or non infectious inflammatory process. but no acute intra-abdominal abnormality identified. COVID test done and patient positive. Patient also noted to be hypoxic and placed on 5 liters of oxygen. Since admit patient has been weaned to 4 liters but CRP has increased from 269 to 312. Procal is normal. BNP normal. 2 D echo showed normal EF 60% with no wall motion abnormalities. Patient requiring continued hospitalization for hypoxia and elevated CRP. [facility] at capacity so transfer for continued care. Patient on Rocephin/Azithromycin and Plaquenil as per protocol     Interval History:   SpO2 maintained on 4L NC. Patient resting comfortably. jobandtalent  used for history today.  He reports intermittent improving cough and mild shortness of breath with exertion. Fair appetite reported, he is eating regularly.  He had an enema 4/15, reports that he did have a BM that evening. Nurse reports he had another BM yesterday. Reports feeling better today.  Nursing and rehab services report a "mucous like" rectal discharge has been noted 4/16, 4/15, and on 4/13. None reported today.     Review of Systems:  Constitutional: Positive fatigue, negative fever " or chills  Respiratory: + mild sob and cough  Cardiovascular: denies chest pain, palpitations  GI: Improved constipation, admits to rectal discharge and BM yesterday, no diarrhea.      Inpatient Medications:    Current Facility-Administered Medications:     acetaminophen tablet 650 mg, 650 mg, Oral, Q4H PRN, Rojas Daniels MD, 650 mg at 04/11/20 0258    acetaminophen tablet 650 mg, 650 mg, Oral, Q6H PRN, Aga Baron MD    albuterol inhaler 2 puff, 2 puff, Inhalation, Q8H WA, 2 puff at 04/17/20 0808 **AND** MDI Q8H WAKE, , , Q8H WAKE, WHITNEY Orellana    aluminum & magnesium hydroxide-simethicone 400-400-40 mg/5 mL suspension 30 mL, 30 mL, Oral, Q6H PRN, Aga Baron MD    aspirin EC tablet 81 mg, 81 mg, Oral, Daily, Ehsan Mendez MD, 81 mg at 04/17/20 0812    atorvastatin tablet 40 mg, 40 mg, Oral, Daily, Rojas Daniels MD, 40 mg at 04/17/20 0804    bisacodyL suppository 10 mg, 10 mg, Rectal, Daily PRN, Aga Baron MD, 10 mg at 04/14/20 1133    clopidogreL tablet 75 mg, 75 mg, Oral, Daily, Ehsan Mendez MD, 75 mg at 04/17/20 0804    enoxaparin injection 40 mg, 40 mg, Subcutaneous, Daily, Rojas Daniels MD, 40 mg at 04/16/20 2221    finasteride tablet 5 mg, 5 mg, Oral, Daily, Tello Arana MD, 5 mg at 04/17/20 0805    lisinopriL tablet 10 mg, 10 mg, Oral, Daily, Tello Arana MD, 10 mg at 04/17/20 0802    melatonin tablet 6 mg, 6 mg, Oral, Nightly PRN, Aga Baron MD, 6 mg at 04/16/20 2221    metoprolol tartrate (LOPRESSOR) tablet 50 mg, 50 mg, Oral, Daily, Ehsan Mendez MD, 50 mg at 04/17/20 0805    montelukast tablet 10 mg, 10 mg, Oral, QHS, Tello Arana MD, 10 mg at 04/16/20 2222    ondansetron tablet 4 mg, 4 mg, Oral, Q6H PRN, Aga Baron MD    PHENobarbitaL tablet 90 mg, 90 mg, Oral, Daily, Ehsan Mendez MD, 90 mg at 04/17/20 0824    phenytoin (DILANTIN) ER capsule 200 mg, 200 mg, Oral, BID, Ehsan Mendez MD,  "200 mg at 04/17/20 0803    senna-docusate 8.6-50 mg per tablet 1 tablet, 1 tablet, Oral, BID, Rojas Daniels MD, 1 tablet at 04/17/20 0812    sodium chloride 0.9% flush 10 mL, 10 mL, Intravenous, PRN, Aga Baron MD    tamsulosin 24 hr capsule 0.4 mg, 0.4 mg, Oral, Daily, Rojas Daniels MD, 0.4 mg at 04/17/20 0804      Physical Exam:      Intake/Output Summary (Last 24 hours) at 4/17/2020 1045  Last data filed at 4/17/2020 0600  Gross per 24 hour   Intake 480 ml   Output 502 ml   Net -22 ml     Wt Readings from Last 3 Encounters:   04/11/20 86.2 kg (190 lb 0.6 oz)   04/12/20 86.2 kg (190 lb 0.6 oz)   09/27/19 86.2 kg (190 lb 0.6 oz)       /84 (BP Location: Right arm, Patient Position: Lying)   Pulse 97   Temp 98.3 °F (36.8 °C) (Oral)   Resp 20   Ht 5' 5" (1.651 m)   Wt 86.2 kg (190 lb 0.6 oz)   SpO2 95%   BMI 31.62 kg/m²     GEN: NAD, conversant with use of language line (MARTTI),  does have difficulty communicating with patient at times.  Resp: coarse bilateral breath sounds, no wheezes or rales, normal work of breathing on 4 L NC  CV: RRR, no m/r/g, no edema  GI: soft, NTND  Skin: no rash  Neuro: AAOx3, CN grossly intact, no focal neurologic deficits    Laboratory:  Lab Results   Component Value Date    [id number] Positive (A) 04/09/2020       Recent Labs   Lab 04/13/20  0357 04/15/20  0426 04/17/20  0435   WBC 8.18 8.39 8.46   LYMPH 9.8*  0.8* 9.3*  0.8* 12.9*  1.1   HGB 12.0* 13.2* 13.1*   HCT 37.2* 39.0* 40.7    391* 450*     Recent Labs   Lab 04/11/20  0833 04/12/20  0457  04/13/20  1220 04/14/20  2334 04/15/20  1119 04/17/20  0435   NA  --   --    < > 135* 134* 135* 135*   K  --   --    < > 3.2* 3.8 3.2* 3.5   CL  --   --    < > 98 99 97 98   CO2  --   --    < > 29 22* 28 29   BUN  --   --    < > 13 11 10 14   CREATININE  --   --    < > 0.6 0.6 0.6 0.6   GLU  --   --    < > 119* 95 119* 108   CALCIUM  --   --    < > 8.4* 8.4* 8.7 8.6*   MG 2.1 2.1  --   --   " --   --   --    PHOS 3.0 2.8  --  3.0  --  2.4*  --     < > = values in this interval not displayed.     Recent Labs   Lab 04/14/20  2334 04/15/20  1119 04/17/20  0435   ALKPHOS 55  --  62   ALT 48*  --  46*   AST 90*  --  65*   ALBUMIN 2.2* 2.2* 2.1*   PROT 7.3  --  7.7   BILITOT 0.4  --  0.5        Recent Labs     04/14/20  2334 04/15/20  1119 04/16/20  0859 04/17/20  0435   DDIMER 7.97*  --  4.91*  --    FERRITIN  --  1,852*  --   --    .6*  --   --  290.6*       All labs within the last 24 hours were reviewed.     Microbiology:  Microbiology Results (last 7 days)     Procedure Component Value Units Date/Time    Blood Culture #1 **CANNOT BE ORDERED STAT** [[id number]] Collected:  04/09/20 0650    Order Status:  Completed Specimen:  Blood from Peripheral, Hand, Left Updated:  04/13/20 0903     Blood Culture, Routine No Growth after 4 days.     Blood Culture #2 **CANNOT BE ORDERED STAT** [[id number]] Collected:  04/09/20 0655    Order Status:  Completed Specimen:  Blood from Peripheral, Antecubital, Left Updated:  04/13/20 0903     Blood Culture, Routine No Growth after 4 days.             Imaging  ECG Results          EKG 12-lead (Final result)  Result time 04/09/20 20:43:37    Final result by Interface, Lab In [facility] (04/09/20 20:43:37)                 Narrative:    Test Reason : R06.02,    Vent. Rate : 090 BPM     Atrial Rate : 090 BPM     P-R Int : 128 ms          QRS Dur : 108 ms      QT Int : 372 ms       P-R-T Axes : 050 -44 077 degrees     QTc Int : 455 ms    Sinus rhythm with Premature supraventricular complexes  Left axis deviation  Incomplete right bundle branch block  LVH with repolarization abnormality  Cannot rule out Septal infarct ,age undetermined  Abnormal ECG  When compared with ECG of 24-SEP-2019 04:34,  No significant change was found  Confirmed by Abelino CAMEJO, Zahra LAZARO ([id number]) on 4/9/2020 8:43:23 PM    Referred By: [email or web address]   SELF           Confirmed By:Zahra Roca MD                              EKG 12-LEAD (Final result)  Result time 04/10/20 10:22:35    Final result by Unknown User (04/10/20 10:22:35)                                  Results for orders placed during the hospital encounter of 04/09/20   Echo Color Flow Doppler? Yes    Narrative · Normal left ventricular systolic function. The estimated ejection   fraction is 60%.  · Moderate concentric left ventricular hypertrophy.  · Normal right ventricular systolic function.  · Moderate left atrial enlargement.  · Grade II (moderate) left ventricular diastolic dysfunction consistent   with pseudonormalization.  · Mild right atrial enlargement.          X-Ray Abdomen AP 1 View  Narrative: EXAMINATION:  XR ABDOMEN AP 1 VIEW    CLINICAL HISTORY:  abdominal pain;    TECHNIQUE:  AP View(s) of the abdomen was performed.    COMPARISON:  Multiple priors, most recent 04/09/2020    FINDINGS:  Patchy airspace opacities at the lung bases, similar to prior exams.  No bowel dilatation.  No radiopaque calculi.  No acute osseous abnormality.  Impression: Nonobstructive bowel gas pattern.    Electronically signed by: Maria Dolores Oliver  Date:    04/14/2020  Time:    15:46      All imaging within the last 24 hours was reviewed.     Assessment and Plan:    Active Hospital Problems    Diagnosis  POA    *COVID-19 virus infection [U07.1]  Yes    Acute respiratory failure with hypoxia [J96.01]  Yes    Constipation [K59.00]  Yes    Fall [W19.XXXA]  Yes    Coronary artery disease involving native coronary artery of native heart without angina pectoris [I25.10]  Yes    Elevated troponin [R79.89]  Yes    Seizure disorder [G40.909]  Yes    Essential hypertension, benign [I10]  Yes    BPH (benign prostatic hyperplasia) [N40.0]  Yes    Dyslipidemia [E78.5]  Yes      Resolved Hospital Problems   No resolved problems to display.       Suspected Covid-19 Virus Infection  Person Under Investigation (PUI) for COVID-19  - COVID-19 testing: POSITIVE  - Infection Control  notified    - Isolation:   - Airborne and Droplet Precautions  - Surgical mask on patient   - N95 masks must be fit tested, wear eye protection  - 20 second hand hygiene   - Limit visitors per hospital policy   - Consolidate lab draws, nursing care, and interventions    - Diagnostics: (Lymphopenia, hyponatremia, hyperferritinemia, elevated troponin, elevated d-dimer, age, and comorbidities are significant predictors of poor clinical outcome)   - CBC: Lymph# 0.8 -> 0.8-->1.1  trend Q48hrs  - CMP:        trend Q48hrs  - Procalcitonin: 0.12  - D-dimer: 7.97-->4.9  repeat prior to discharge  - Ferritin: 1852  repeat prior to discharge   - CRP:  269 -> 312 -> 337 -> 295-->290    trend Q48hrs  - LDH:   repeat prior to discharge  - BNP: 66  - Troponin: 0.18    - ECG:   - rapid Flu:   - RIP only if BMT/solid transplant:   - Legionella antigen:   - Blood culture x2: no growth    - Sputum culture:   - CXR:   - UA and culture: no consistent with infection   - CPK:    - Management:   Bundle care as able to maintain isolation & minimize in/out of room   - Supplemental O2 to maintain SpO2 92%-96%   if requiring 6L NC or higher, place on nonrebreather and discuss case with MICU   - Telemetry & continuous pulse oximetry    - If wheezing   - albuterol inhaler 2-4 puff Q6hr PRN    - ipratropium daily    - acetaminophen 650mg PO Q6hr PRN fever   - loperamide PRN for viral diarrhea  - Empiric antibiotics per likely source & patient allergies    - CAP: x 5 day course   Ceftriaxone 1g IV Q24hrs (completed 4 day course on 4/12)            Azithromycin 500mg IV day #1, then 250mg PO daily x4 days (completed 4/13)      If azithromycin is not available, start doxycycline                 If MRSA risk factors, add Vancomycin IV (PharmD consult)     - Investigational Treatment Protocol: (if patient meets criteria)   [email or web address]     - statin:  atorvastatin 40mg po daily     - HCQ 400mg PO BID x1 day, then 400mg PO daily x 4 days (Completed 4/13)  (check glucose 6 phosphate dehydrogenase (NOT G6PD Quant), ECG on start & @48hrs, and start Qshift POCT glucose)    Safety notes:   - Avoid NIPPV (CPAP/BiPAP) to prevent aerosolization, use on a case-by-case basis if in neg pressure room   - Cautious use of NSAIDS for fever per WHO recommendations (3/16/2020)   - No new ACEi/ARB start or discontinuation of chronic med unless hypotensive (Esler et al. Journal of Hypertension 2020, 38:000-000)   - Careful use of steroids in the absence of other indications (shock, ARDS)   - Fluid sparing resuscitation, avoid maintenance fluids       Coronary artery disease  [facility] with non-obstructive dz, Sept 2019. On home asa and statin plus Plavix for NSTEMI at that time.  Mild trop elevation in setting of acute illness stabilized consistent with demand ischemia in setting of acute illness. BNP normal. 2 D echo showed normal EF 60% with no wall motion abnormalities. Continuing home meds.     Fall  2 falls in setting of COVID infection and baseline gait instability requiring cane.  CT head and abdomen/pelvis show no new fractures. Nothing on exam to specify areas of fracture concern. Does have hx of osteoporosis and on anti-epileptic so will have very low threshold to add additional imaging if new symptoms arise. Has been appropriately maintained on anti-epileptics so very unlikely seizures involved. Will continue assessment/work with PT/OT. PT/OT recommends rehab facility at discharge if possible based on insurance benefits.        Constipation  Continue daily bowel regimen and progressive mobility protocol while inpatient.  Abd XRay 4/14: No bowel dilation, nonobstructive bowel gas pattern.  4/15: Possible impacted stool; Enema ordered  4/16: Enema on 4/15 and patient reports BM. Patient feeling better, continue scheduled senna-docusate.  4/17: Reported BM yesterday, continue  "scheduled senna-docusate         Elevated troponin  Hx of NSTEMI with [facility] showing non-obstructive CAD in Sept 2019.   Mild, stable. Likely related to acute illness. ACS unlikely. Cards has evaluated and signed off. Appreciate reccs. BNP normal. 2 D echo showed normal EF 60% with no wall motion abnormalities.  On asa, Plavix, statin at home; will continue inpatient.     Seizure disorder  Controlled. Continue home Dilantin and valproic acid.        Dyslipidemia  Continue home statin.     BPH (benign prostatic hyperplasia)  Controlled. Continue home Flomax.    Dispo: Stable on 4L supplemental O2, wean down as tolerated. Use of MARTTI to communicate.  Continue PT/OT- encourage patient to participate fully.  Per CM attempting referral to rehab facility, but may need to go to [facility] center for discharge, family unable to care for him at home due to family also being COVID+. He did have 2 falls at outside hospitals. Doing better today, improved bowel function.      Advance Care Planning  Goals of care, counseling/discussion Palliative consult 4/14/2020, patient is DNR/DNI  Advance Care Planning       If patient transitions to [facility], please place "Nurse Communication: End of Life Care, family members allowed to visit, including spouse/partner and adult children [please list names]. Please ask family to visit as a group and leave as a group.       VTE High Risk Prophylaxis:   VTE Risk Mitigation (From admission, onward)         Ordered     enoxaparin injection 40 mg  Daily      04/09/20 0909     IP VTE HIGH RISK PATIENT  Once      04/09/20 0914     Place sequential compression device  Until discontinued      04/09/20 0914                  Patient's chronic/stable medical conditions noted in the problem list above will be managed with the patient's home medications as tolerated.         Subsequent Inpatient Hospital Care  Level 3 04370 Total visit time was 35 minutes or greater with greater than 50% of time " spent in counseling and coordination of care.     ROLANDO Olson MD

## 2020-04-17 NOTE — PLAN OF CARE
ALLYSON had conversation with patients elise Saldana who states that she is agreeable to the patient going to the [facility] if necessary.  She wants him to go somewhere to get some therapy prior to his returning home.  CM advised that facilities are limited and we would do the best that we could with getting him to a facility for therapy, otherwise, patient would have to go to the [facility] before going home.

## 2020-04-18 PROBLEM — U07.1 LOWER RESPIRATORY TRACT INFECTION DUE TO COVID-19 VIRUS: Status: ACTIVE | Noted: 2020-04-18

## 2020-04-18 PROBLEM — J22 LOWER RESPIRATORY TRACT INFECTION DUE TO COVID-19 VIRUS: Status: ACTIVE | Noted: 2020-04-18

## 2020-04-18 PROCEDURE — 94640 AIRWAY INHALATION TREATMENT: CPT

## 2020-04-18 PROCEDURE — 25000003 PHARM REV CODE 250: Performed by: EMERGENCY MEDICINE

## 2020-04-18 PROCEDURE — 99232 SBSQ HOSP IP/OBS MODERATE 35: CPT | Mod: 95,,, | Performed by: INTERNAL MEDICINE

## 2020-04-18 PROCEDURE — 99900035 HC TECH TIME PER 15 MIN (STAT)

## 2020-04-18 PROCEDURE — 25000003 PHARM REV CODE 250: Performed by: HOSPITALIST

## 2020-04-18 PROCEDURE — 25000003 PHARM REV CODE 250: Performed by: INTERNAL MEDICINE

## 2020-04-18 PROCEDURE — 94761 N-INVAS EAR/PLS OXIMETRY MLT: CPT

## 2020-04-18 PROCEDURE — 27000221 HC OXYGEN, UP TO 24 HOURS

## 2020-04-18 PROCEDURE — 99232 PR SUBSEQUENT HOSPITAL CARE,LEVL II: ICD-10-PCS | Mod: 95,,, | Performed by: INTERNAL MEDICINE

## 2020-04-18 PROCEDURE — 11000001 HC ACUTE MED/SURG PRIVATE ROOM

## 2020-04-18 PROCEDURE — 63600175 PHARM REV CODE 636 W HCPCS: Performed by: INTERNAL MEDICINE

## 2020-04-18 RX ADMIN — Medication 6 MG: at 09:04

## 2020-04-18 RX ADMIN — TAMSULOSIN HYDROCHLORIDE 0.4 MG: 0.4 CAPSULE ORAL at 09:04

## 2020-04-18 RX ADMIN — CLOPIDOGREL BISULFATE 75 MG: 75 TABLET ORAL at 09:04

## 2020-04-18 RX ADMIN — ASPIRIN 81 MG: 81 TABLET, COATED ORAL at 09:04

## 2020-04-18 RX ADMIN — MONTELUKAST 10 MG: 10 TABLET, FILM COATED ORAL at 09:04

## 2020-04-18 RX ADMIN — FINASTERIDE 5 MG: 5 TABLET, FILM COATED ORAL at 09:04

## 2020-04-18 RX ADMIN — METOPROLOL TARTRATE 50 MG: 50 TABLET, FILM COATED ORAL at 09:04

## 2020-04-18 RX ADMIN — PHENYTOIN SODIUM 200 MG: 100 CAPSULE ORAL at 09:04

## 2020-04-18 RX ADMIN — LISINOPRIL 10 MG: 5 TABLET ORAL at 09:04

## 2020-04-18 RX ADMIN — ALBUTEROL SULFATE 2 PUFF: 90 AEROSOL, METERED RESPIRATORY (INHALATION) at 08:04

## 2020-04-18 RX ADMIN — ATORVASTATIN CALCIUM 40 MG: 40 TABLET, FILM COATED ORAL at 09:04

## 2020-04-18 RX ADMIN — STANDARDIZED SENNA CONCENTRATE AND DOCUSATE SODIUM 1 TABLET: 8.6; 5 TABLET ORAL at 09:04

## 2020-04-18 RX ADMIN — ENOXAPARIN SODIUM 40 MG: 100 INJECTION SUBCUTANEOUS at 09:04

## 2020-04-18 RX ADMIN — ALBUTEROL SULFATE 2 PUFF: 90 AEROSOL, METERED RESPIRATORY (INHALATION) at 04:04

## 2020-04-18 RX ADMIN — PHENOBARBITAL 90 MG: 30 TABLET ORAL at 09:04

## 2020-04-18 NOTE — PLAN OF CARE
Problem: Fall Injury Risk  Goal: Absence of Fall and Fall-Related Injury  Outcome: Ongoing, Progressing     Problem: Adult Inpatient Plan of Care  Goal: Plan of Care Review  Outcome: Ongoing, Progressing     Patient sat up in chair for a total of 6hrs today, tolerated well. Diet encouraged, tolerating well. O2 weaned to 2L NC, tolerating well.     Interpretation services used for all patient care.     Education and care plan reviewed with patient and family via phone.

## 2020-04-19 LAB
ALBUMIN SERPL BCP-MCNC: 2 G/DL (ref 3.5–5.2)
ALP SERPL-CCNC: 64 U/L (ref 55–135)
ALT SERPL W/O P-5'-P-CCNC: 49 U/L (ref 10–44)
ANION GAP SERPL CALC-SCNC: 11 MMOL/L (ref 8–16)
AST SERPL-CCNC: 67 U/L (ref 10–40)
BASOPHILS # BLD AUTO: 0.06 K/UL (ref 0–0.2)
BASOPHILS NFR BLD: 0.8 % (ref 0–1.9)
BILIRUB SERPL-MCNC: 0.4 MG/DL (ref 0.1–1)
BUN SERPL-MCNC: 17 MG/DL (ref 8–23)
CALCIUM SERPL-MCNC: 8.6 MG/DL (ref 8.7–10.5)
CHLORIDE SERPL-SCNC: 98 MMOL/L (ref 95–110)
CO2 SERPL-SCNC: 28 MMOL/L (ref 23–29)
CREAT SERPL-MCNC: 0.6 MG/DL (ref 0.5–1.4)
DIFFERENTIAL METHOD: ABNORMAL
EOSINOPHIL # BLD AUTO: 0 K/UL (ref 0–0.5)
EOSINOPHIL NFR BLD: 0.4 % (ref 0–8)
ERYTHROCYTE [DISTWIDTH] IN BLOOD BY AUTOMATED COUNT: 12.4 % (ref 11.5–14.5)
EST. GFR  (AFRICAN AMERICAN): >60 ML/MIN/1.73 M^2
EST. GFR  (NON AFRICAN AMERICAN): >60 ML/MIN/1.73 M^2
GLUCOSE SERPL-MCNC: 92 MG/DL (ref 70–110)
HCT VFR BLD AUTO: 39.8 % (ref 40–54)
HGB BLD-MCNC: 12.8 G/DL (ref 14–18)
IMM GRANULOCYTES # BLD AUTO: 0.05 K/UL (ref 0–0.04)
IMM GRANULOCYTES NFR BLD AUTO: 0.7 % (ref 0–0.5)
LYMPHOCYTES # BLD AUTO: 1.1 K/UL (ref 1–4.8)
LYMPHOCYTES NFR BLD: 14.8 % (ref 18–48)
MCH RBC QN AUTO: 30.8 PG (ref 27–31)
MCHC RBC AUTO-ENTMCNC: 32.2 G/DL (ref 32–36)
MCV RBC AUTO: 96 FL (ref 82–98)
MONOCYTES # BLD AUTO: 0.6 K/UL (ref 0.3–1)
MONOCYTES NFR BLD: 8.8 % (ref 4–15)
NEUTROPHILS # BLD AUTO: 5.3 K/UL (ref 1.8–7.7)
NEUTROPHILS NFR BLD: 74.5 % (ref 38–73)
NRBC BLD-RTO: 0 /100 WBC
PLATELET # BLD AUTO: 421 K/UL (ref 150–350)
PMV BLD AUTO: 9.8 FL (ref 9.2–12.9)
POTASSIUM SERPL-SCNC: 3.6 MMOL/L (ref 3.5–5.1)
PROT SERPL-MCNC: 7.3 G/DL (ref 6–8.4)
RBC # BLD AUTO: 4.15 M/UL (ref 4.6–6.2)
SODIUM SERPL-SCNC: 137 MMOL/L (ref 136–145)
WBC # BLD AUTO: 7.15 K/UL (ref 3.9–12.7)

## 2020-04-19 PROCEDURE — 99233 PR SUBSEQUENT HOSPITAL CARE,LEVL III: ICD-10-PCS | Mod: ,,, | Performed by: INTERNAL MEDICINE

## 2020-04-19 PROCEDURE — 25000003 PHARM REV CODE 250: Performed by: INTERNAL MEDICINE

## 2020-04-19 PROCEDURE — 99233 SBSQ HOSP IP/OBS HIGH 50: CPT | Mod: ,,, | Performed by: INTERNAL MEDICINE

## 2020-04-19 PROCEDURE — 63600175 PHARM REV CODE 636 W HCPCS: Performed by: INTERNAL MEDICINE

## 2020-04-19 PROCEDURE — 80053 COMPREHEN METABOLIC PANEL: CPT

## 2020-04-19 PROCEDURE — 36415 COLL VENOUS BLD VENIPUNCTURE: CPT

## 2020-04-19 PROCEDURE — 25000003 PHARM REV CODE 250: Performed by: EMERGENCY MEDICINE

## 2020-04-19 PROCEDURE — 94761 N-INVAS EAR/PLS OXIMETRY MLT: CPT

## 2020-04-19 PROCEDURE — 25000003 PHARM REV CODE 250: Performed by: HOSPITALIST

## 2020-04-19 PROCEDURE — 94640 AIRWAY INHALATION TREATMENT: CPT

## 2020-04-19 PROCEDURE — 11000001 HC ACUTE MED/SURG PRIVATE ROOM

## 2020-04-19 PROCEDURE — 85025 COMPLETE CBC W/AUTO DIFF WBC: CPT

## 2020-04-19 PROCEDURE — 27000221 HC OXYGEN, UP TO 24 HOURS

## 2020-04-19 RX ADMIN — ENOXAPARIN SODIUM 40 MG: 100 INJECTION SUBCUTANEOUS at 08:04

## 2020-04-19 RX ADMIN — ALBUTEROL SULFATE 2 PUFF: 90 AEROSOL, METERED RESPIRATORY (INHALATION) at 04:04

## 2020-04-19 RX ADMIN — ATORVASTATIN CALCIUM 40 MG: 40 TABLET, FILM COATED ORAL at 08:04

## 2020-04-19 RX ADMIN — TAMSULOSIN HYDROCHLORIDE 0.4 MG: 0.4 CAPSULE ORAL at 10:04

## 2020-04-19 RX ADMIN — PHENOBARBITAL 90 MG: 30 TABLET ORAL at 10:04

## 2020-04-19 RX ADMIN — ACETAMINOPHEN 650 MG: 325 TABLET ORAL at 12:04

## 2020-04-19 RX ADMIN — ALUMINUM HYDROXIDE, MAGNESIUM HYDROXIDE, AND DIMETHICONE 30 ML: 400; 400; 40 SUSPENSION ORAL at 08:04

## 2020-04-19 RX ADMIN — Medication 6 MG: at 08:04

## 2020-04-19 RX ADMIN — PHENYTOIN SODIUM 200 MG: 100 CAPSULE ORAL at 08:04

## 2020-04-19 RX ADMIN — ACETAMINOPHEN 650 MG: 325 TABLET ORAL at 08:04

## 2020-04-19 RX ADMIN — MONTELUKAST 10 MG: 10 TABLET, FILM COATED ORAL at 08:04

## 2020-04-19 RX ADMIN — LISINOPRIL 10 MG: 5 TABLET ORAL at 08:04

## 2020-04-19 RX ADMIN — STANDARDIZED SENNA CONCENTRATE AND DOCUSATE SODIUM 1 TABLET: 8.6; 5 TABLET ORAL at 08:04

## 2020-04-19 RX ADMIN — FINASTERIDE 5 MG: 5 TABLET, FILM COATED ORAL at 08:04

## 2020-04-19 RX ADMIN — METOPROLOL TARTRATE 50 MG: 50 TABLET, FILM COATED ORAL at 08:04

## 2020-04-19 RX ADMIN — CLOPIDOGREL BISULFATE 75 MG: 75 TABLET ORAL at 08:04

## 2020-04-19 RX ADMIN — ALBUTEROL SULFATE 2 PUFF: 90 AEROSOL, METERED RESPIRATORY (INHALATION) at 07:04

## 2020-04-19 RX ADMIN — ASPIRIN 81 MG: 81 TABLET, COATED ORAL at 08:04

## 2020-04-19 NOTE — PROGRESS NOTES
[facility] Medicine Progress Note  Virtual Medicine Team 7  04/18/2020       Vern Lr  YOB: 1940  Medical Record Number:  [id number]  Attending Physician:  Garrett Kinney MD   Date of Admission: 4/9/2020       Hospital Day:  10  Current Principal Problem:  COVID-19 virus infection      History     Cc: Dsypnea    HPI  80/M admitted on 4/9/20 for falls and PNA 2/2 COVID infection. Positive testing in ED (4/9/20). Patient speaks only Barbadian and language line used to communicate. Spoke to patient's sister who admits patient is hard of hearing and likely has mild dementia so history is somewhat limited as a result. Patient notes constipation without bm in a week. Sister states he often complains of this but patient actually was brought in for falls. He fell twice in past couple of days. She admits he has looked fatigued recently and she is actually recovering from COVID infection herself. He normally ambulates with assistance of cane. After first fall he was brought to [facility] and went home. Second fall while in bathroom yesterday and was subsequently brought here to [facility]. Seizure hx but sister did not see anything to suggest seizure and patient has been on meds. Patient has no recent issues with passing out. Hx of NSTEMI with non-obstructive CAD in Sept 2019. Largest occulusion noted on LHC was 20% at that time. Has been on asa, Plavix, and statin since. Normally walks with cane and was actually walking yesterday without issue per sister. Patient admits to some shortness of breath and cough as well as nasal congestion.     Hospital Course  80/M admitted 4/9/20 for PNA 2/2 COVID-19. Weakness with 2 recent falls and mild shortness were main presenting symptoms prompting family to bring patient to hospital. Pt is Barbadian speaking with poor hearing and likely mild dementia.   Abrasion on scalp with negative CT head. Otherwise no concerning findings on exam with  regard to falls and potential fracture. CT abdomen noted unchanged previously present compressions fractures on thoracolumbar spine.  Saturating well on room air then desaturating and requiring 5L NC on 4/10.   Constipation without bm in a week per patient. Benign abdominal exam and moderate stool without acute abnormality on imaging.    Interval History  Today, the patient complains of dyspnea, improved with O2, worse with exertion.  It is improved since yesterday.  It is mild to moderate.        Medications  Scheduled Meds:   albuterol  2 puff Inhalation Q8H WA    aspirin  81 mg Oral Daily    atorvastatin  40 mg Oral Daily    clopidogreL  75 mg Oral Daily    enoxaparin  40 mg Subcutaneous Daily    finasteride  5 mg Oral Daily    lisinopriL  10 mg Oral Daily    metoprolol tartrate  50 mg Oral Daily    montelukast  10 mg Oral QHS    PHENobarbitaL  90 mg Oral Daily    phenytoin  200 mg Oral BID    senna-docusate 8.6-50 mg  1 tablet Oral BID    tamsulosin  0.4 mg Oral Daily     Continuous Infusions:  PRN Meds:.acetaminophen, acetaminophen, aluminum & magnesium hydroxide-simethicone, bisacodyL, melatonin, ondansetron, sodium chloride 0.9%      PSFH:  Please see admission note and assessment and plan below.      ROS   Admits Denies   Constitutional Malaise Chills, diaphoresis   Eyes  Visual changes   ENMT  Dysphagia, Epistaxis, nasal congestion, hearing loss   Cardiovascular  Chest pain, palpitations, edema   Respiratory  Wheezing   Gastrointestinal  Nausea, vomiting, diarrhea, anorexia.     Genitourinary  Dysuria, incontinence   Musculoskeletal  Myalgias, joint pain, joint swelling   Integumentary  Rash, inflammation, burning   Neruo-Psychiatric  Anxiety, insomnia.  Changes in speech, strength, sensation.     Endocrine     Hematologic  Abnormal bruising, bleeding   Immunologic  Inflammation, pain at IV sites.  Pruritis.         Physical Examination       Vital Signs  Vitals  Temp: 98 °F (36.7 °C)  Temp  src: Oral  Pulse: 89  Heart Rate Source: Monitor  Resp: 18  SpO2: 96 %  Pulse Oximetry Type: Continuous  Flow (L/min): 2  O2 Device (Oxygen Therapy): nasal cannula  BP: 114/61  MAP (mmHg): 80  BP Location: Right arm  BP Method: Automatic  Patient Position: Sitting          24 Hour VS Range    Temp:  [97.8 °F (36.6 °C)-98.3 °F (36.8 °C)]   Pulse:  []   Resp:  [17-20]   BP: (113-136)/(61-92)   SpO2:  [93 %-98 %]     Intake/Output Summary (Last 24 hours) at 4/18/2020 2017  Last data filed at 4/18/2020 1800  Gross per 24 hour   Intake 940 ml   Output 350 ml   Net 590 ml         Telephone interview.  No examination performed.      Data       Recent Labs   Lab 04/12/20  0457 04/13/20  0357 04/15/20  0426 04/17/20  0435   WBC 7.09 8.18 8.39 8.46   HGB 12.5* 12.0* 13.2* 13.1*   HCT 37.1* 37.2* 39.0* 40.7    335 391* 450*        Lymph # (K/uL)   Date Value   04/17/2020 1.1   04/15/2020 0.8 (L)   04/13/2020 0.8 (L)   04/12/2020 0.8 (L)   04/11/2020 0.9 (L)     Lymph% (%)   Date Value   04/17/2020 12.9 (L)   04/15/2020 9.3 (L)   04/13/2020 9.8 (L)   04/12/2020 11.0 (L)   04/11/2020 13.2 (L)       Recent Labs   Lab 04/13/20  1220 04/14/20  2334 04/15/20  1119 04/17/20  0435   * 134* 135* 135*   K 3.2* 3.8 3.2* 3.5   CL 98 99 97 98   CO2 29 22* 28 29   BUN 13 11 10 14   CREATININE 0.6 0.6 0.6 0.6   ANIONGAP 8 13 10 8   CALCIUM 8.4* 8.4* 8.7 8.6*        Recent Labs   Lab 04/13/20  1220 04/14/20  2334 04/15/20  1119 04/17/20  0435   PROT  --  7.3  --  7.7   ALBUMIN 2.3* 2.2* 2.2* 2.1*   BILITOT  --  0.4  --  0.5   ALKPHOS  --  55  --  62   AST  --  90*  --  65*   ALT  --  48*  --  46*        Recent Labs   Lab 04/12/20  2355 04/14/20  2334 04/16/20  0859 04/17/20  0435   .8* 295.6*  --  290.6*   DDIMER  --  7.97* 4.91*  --         BNP (pg/mL)   Date Value   04/09/2020 66   04/09/2020 46   11/29/2015 34     CRP (mg/L)   Date Value   04/17/2020 290.6 (H)   04/14/2020 295.6 (H)   04/12/2020 337.8 (H)    04/11/2020 312.0 (H)   04/09/2020 269.6 (H)     D-Dimer (mg/L FEU)   Date Value   04/16/2020 4.91 (H)   04/14/2020 7.97 (H)   11/29/2015 3.25 (H)     Ferritin (ng/mL)   Date Value   04/15/2020 1,852 (H)     No results found for: LDH  Troponin I (ng/mL)   Date Value   04/09/2020 0.108 (H)   04/09/2020 0.049 (H)   04/09/2020 0.049 (H)   04/09/2020 0.120 (H)   09/24/2019 0.099 (H)   09/24/2019 0.099 (H)       SARS-CoV-2 RNA, Amplification, Qual (no units)   Date Value   04/09/2020 Positive (A)       Blood Culture, Routine (no units)   Date Value   04/09/2020 No Growth after 4 days.    04/09/2020 No Growth after 4 days.           Assessment & Plan     Covid-19 Virus Infection  Continuing, unstable.  Improving  O2 requirements, but inflammatory markers remain elevated.    Initial empiric therapy completed.  Continue supportive care.                     Coronary artery disease  [facility] with non-obstructive dz, Sept 2019. On home asa and statin plus Plavix for NSTEMI at that time.  Mild trop elevation in setting of acute illness stabilized consistent with demand ischemia in setting of acute illness. BNP normal. 2 D echo showed normal EF 60% with no wall motion abnormalities. Continuing home meds.     Fall  2 falls in setting of COVID infection and baseline gait instability requiring cane.  CT head and abdomen/pelvis show no new fractures. Nothing on exam to specify areas of fracture concern. Does have hx of osteoporosis and on anti-epileptic so will have very low threshold to add additional imaging if new symptoms arise. Has been appropriately maintained on anti-epileptics so very unlikely seizures involved. Will continue assessment/work with PT/OT. PT/OT recommends rehab facility at discharge if possible based on insurance benefits.        Constipation  Improved on current bowel regimen.        Elevated troponin  Hx of NSTEMI with [facility] showing non-obstructive CAD in Sept 2019.   Mild, stable. Likely related to acute illness.  ACS unlikely. Cards has evaluated and signed off. Appreciate reccs. BNP normal. 2 D echo showed normal EF 60% with no wall motion abnormalities.  On asa, Plavix, statin at home; will continue inpatient.     Seizure disorder  Controlled. Continue home Dilantin and valproic acid.       Dyslipidemia  Continue home statin.     BPH (benign prostatic hyperplasia)  Controlled. Continue home Flomax.     Dispo: Stable on 2L supplemental O2, wean down as tolerated. Use of MARTTI to communicate.  Continue PT/OT- encourage patient to participate fully.  Per CM attempting referral to rehab facility, but may need to go to [facility] for discharge, family unable to care for him at home due to family also being COVID+. He did have 2 falls at outside hospitals. Doing better today, improved bowel function.        This service was provided through telemedicine.  Chief complaint:  None.    This evaluation, treatment, and documentation was performed by me, and was conducted via a combined audiovisual link, with the assistance of the patient's primary nurse.    Start time: 17:40, end time: 17:50, total time with patient: 10 minutes.    Garrett Kinney MD  Physician, Cardiology  [facility]

## 2020-04-19 NOTE — PLAN OF CARE
Reviewed plan of care with pt at the beginning of the shift. Pt reported pain once throughout the shift. PRN tylenol given. Pt reported no n/v throughout the shift. Vital signs were stable throughout the shift. Fall precautions continued for pt. Bed locked and at lowest position. Call light within reach. Pt knows to call if assistance is needed.
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
## 2020-04-20 PROBLEM — E44.0 MODERATE MALNUTRITION: Status: ACTIVE | Noted: 2020-04-20

## 2020-04-20 LAB
ALBUMIN SERPL BCP-MCNC: 2.1 G/DL (ref 3.5–5.2)
ALP SERPL-CCNC: 65 U/L (ref 55–135)
ALT SERPL W/O P-5'-P-CCNC: 45 U/L (ref 10–44)
ANION GAP SERPL CALC-SCNC: 8 MMOL/L (ref 8–16)
AST SERPL-CCNC: 56 U/L (ref 10–40)
BASOPHILS # BLD AUTO: 0.05 K/UL (ref 0–0.2)
BASOPHILS NFR BLD: 0.8 % (ref 0–1.9)
BILIRUB SERPL-MCNC: 0.3 MG/DL (ref 0.1–1)
BUN SERPL-MCNC: 17 MG/DL (ref 8–23)
CALCIUM SERPL-MCNC: 8.6 MG/DL (ref 8.7–10.5)
CHLORIDE SERPL-SCNC: 99 MMOL/L (ref 95–110)
CK SERPL-CCNC: 42 U/L (ref 20–200)
CO2 SERPL-SCNC: 31 MMOL/L (ref 23–29)
CREAT SERPL-MCNC: 0.6 MG/DL (ref 0.5–1.4)
CRP SERPL-MCNC: 139 MG/L (ref 0–8.2)
D DIMER PPP IA.FEU-MCNC: 5.1 MG/L FEU
DIFFERENTIAL METHOD: ABNORMAL
EOSINOPHIL # BLD AUTO: 0 K/UL (ref 0–0.5)
EOSINOPHIL NFR BLD: 0.6 % (ref 0–8)
ERYTHROCYTE [DISTWIDTH] IN BLOOD BY AUTOMATED COUNT: 12.3 % (ref 11.5–14.5)
EST. GFR  (AFRICAN AMERICAN): >60 ML/MIN/1.73 M^2
EST. GFR  (NON AFRICAN AMERICAN): >60 ML/MIN/1.73 M^2
FERRITIN SERPL-MCNC: 1279 NG/ML (ref 20–300)
GLUCOSE SERPL-MCNC: 95 MG/DL (ref 70–110)
HCT VFR BLD AUTO: 38.2 % (ref 40–54)
HGB BLD-MCNC: 12.3 G/DL (ref 14–18)
IMM GRANULOCYTES # BLD AUTO: 0.02 K/UL (ref 0–0.04)
IMM GRANULOCYTES NFR BLD AUTO: 0.3 % (ref 0–0.5)
LYMPHOCYTES # BLD AUTO: 1.1 K/UL (ref 1–4.8)
LYMPHOCYTES NFR BLD: 17 % (ref 18–48)
MCH RBC QN AUTO: 30.5 PG (ref 27–31)
MCHC RBC AUTO-ENTMCNC: 32.2 G/DL (ref 32–36)
MCV RBC AUTO: 95 FL (ref 82–98)
MONOCYTES # BLD AUTO: 0.6 K/UL (ref 0.3–1)
MONOCYTES NFR BLD: 9.2 % (ref 4–15)
NEUTROPHILS # BLD AUTO: 4.8 K/UL (ref 1.8–7.7)
NEUTROPHILS NFR BLD: 72.1 % (ref 38–73)
NRBC BLD-RTO: 0 /100 WBC
PLATELET # BLD AUTO: 434 K/UL (ref 150–350)
PMV BLD AUTO: 9.9 FL (ref 9.2–12.9)
POTASSIUM SERPL-SCNC: 3.7 MMOL/L (ref 3.5–5.1)
PROCALCITONIN SERPL IA-MCNC: 0.05 NG/ML
PROT SERPL-MCNC: 7.6 G/DL (ref 6–8.4)
RBC # BLD AUTO: 4.03 M/UL (ref 4.6–6.2)
SODIUM SERPL-SCNC: 138 MMOL/L (ref 136–145)
WBC # BLD AUTO: 6.65 K/UL (ref 3.9–12.7)

## 2020-04-20 PROCEDURE — 85025 COMPLETE CBC W/AUTO DIFF WBC: CPT

## 2020-04-20 PROCEDURE — 97530 THERAPEUTIC ACTIVITIES: CPT

## 2020-04-20 PROCEDURE — 99232 SBSQ HOSP IP/OBS MODERATE 35: CPT | Mod: 95,,, | Performed by: INTERNAL MEDICINE

## 2020-04-20 PROCEDURE — 86140 C-REACTIVE PROTEIN: CPT

## 2020-04-20 PROCEDURE — 99900035 HC TECH TIME PER 15 MIN (STAT)

## 2020-04-20 PROCEDURE — 99232 PR SUBSEQUENT HOSPITAL CARE,LEVL II: ICD-10-PCS | Mod: 95,,, | Performed by: INTERNAL MEDICINE

## 2020-04-20 PROCEDURE — 11000001 HC ACUTE MED/SURG PRIVATE ROOM

## 2020-04-20 PROCEDURE — 27000221 HC OXYGEN, UP TO 24 HOURS

## 2020-04-20 PROCEDURE — 84145 PROCALCITONIN (PCT): CPT

## 2020-04-20 PROCEDURE — 97535 SELF CARE MNGMENT TRAINING: CPT

## 2020-04-20 PROCEDURE — 36415 COLL VENOUS BLD VENIPUNCTURE: CPT

## 2020-04-20 PROCEDURE — 80053 COMPREHEN METABOLIC PANEL: CPT

## 2020-04-20 PROCEDURE — 63600175 PHARM REV CODE 636 W HCPCS: Performed by: INTERNAL MEDICINE

## 2020-04-20 PROCEDURE — 94761 N-INVAS EAR/PLS OXIMETRY MLT: CPT

## 2020-04-20 PROCEDURE — 85379 FIBRIN DEGRADATION QUANT: CPT

## 2020-04-20 PROCEDURE — 82728 ASSAY OF FERRITIN: CPT

## 2020-04-20 PROCEDURE — 25000003 PHARM REV CODE 250: Performed by: EMERGENCY MEDICINE

## 2020-04-20 PROCEDURE — 82550 ASSAY OF CK (CPK): CPT

## 2020-04-20 PROCEDURE — 94640 AIRWAY INHALATION TREATMENT: CPT

## 2020-04-20 PROCEDURE — 25000003 PHARM REV CODE 250: Performed by: HOSPITALIST

## 2020-04-20 PROCEDURE — 25000003 PHARM REV CODE 250: Performed by: INTERNAL MEDICINE

## 2020-04-20 RX ADMIN — STANDARDIZED SENNA CONCENTRATE AND DOCUSATE SODIUM 1 TABLET: 8.6; 5 TABLET ORAL at 09:04

## 2020-04-20 RX ADMIN — METOPROLOL TARTRATE 50 MG: 50 TABLET, FILM COATED ORAL at 09:04

## 2020-04-20 RX ADMIN — LISINOPRIL 10 MG: 5 TABLET ORAL at 09:04

## 2020-04-20 RX ADMIN — ENOXAPARIN SODIUM 40 MG: 100 INJECTION SUBCUTANEOUS at 09:04

## 2020-04-20 RX ADMIN — CLOPIDOGREL BISULFATE 75 MG: 75 TABLET ORAL at 09:04

## 2020-04-20 RX ADMIN — PHENYTOIN SODIUM 200 MG: 100 CAPSULE ORAL at 09:04

## 2020-04-20 RX ADMIN — ASPIRIN 81 MG: 81 TABLET, COATED ORAL at 09:04

## 2020-04-20 RX ADMIN — ATORVASTATIN CALCIUM 40 MG: 40 TABLET, FILM COATED ORAL at 09:04

## 2020-04-20 RX ADMIN — ACETAMINOPHEN 650 MG: 325 TABLET ORAL at 02:04

## 2020-04-20 RX ADMIN — MONTELUKAST 10 MG: 10 TABLET, FILM COATED ORAL at 09:04

## 2020-04-20 RX ADMIN — FINASTERIDE 5 MG: 5 TABLET, FILM COATED ORAL at 09:04

## 2020-04-20 RX ADMIN — PHENOBARBITAL 90 MG: 30 TABLET ORAL at 09:04

## 2020-04-20 RX ADMIN — Medication 6 MG: at 09:04

## 2020-04-20 RX ADMIN — ACETAMINOPHEN 650 MG: 325 TABLET ORAL at 03:04

## 2020-04-20 RX ADMIN — ALBUTEROL SULFATE 2 PUFF: 90 AEROSOL, METERED RESPIRATORY (INHALATION) at 04:04

## 2020-04-20 RX ADMIN — ALBUTEROL SULFATE 2 PUFF: 90 AEROSOL, METERED RESPIRATORY (INHALATION) at 08:04

## 2020-04-20 RX ADMIN — TAMSULOSIN HYDROCHLORIDE 0.4 MG: 0.4 CAPSULE ORAL at 09:04

## 2020-04-20 NOTE — PROGRESS NOTES
"[facility]  Adult Nutrition  Progress Note    SUMMARY       Recommendations    1. Continue Cardiac diet with Boost Plus TID.  Goals: Pt will consume at least 50% intake at meals  Nutrition Goal Status: progressing towards goal  Communication of RD Recs: POC    Reason for Assessment    Reason For Assessment: RD follow-up  Diagnosis: (COVID)  Relevant Medical History: seizure disorder, BPH, DM, stroke, CAD  Interdisciplinary Rounds: did not attend  General Information Comments: Remote assessment completed. Pt Uzbek-speaking only, unable to call via telephone and unable to reach nurse due to phones not working. Pt noted by MD to be depressed, miserable with COVID symptoms, unhappy with home situation. Pt with poor intake PTA, has 50-75% intake meals but now noted with 10 lb (5%) wt loss and meets criteria for acute moderate malnutrition. Due to recent hospital wide restrictions to limit the transfer of (COVID-19), we are not performing any physical exams at this time. All S/S will be observational; NFPE to be performed at a future date.    Nutrition Discharge Planning: Pt to follow low sodium, low saturated fat diet.    Nutrition Risk Screen    Nutrition Risk Screen: no indicators present    Nutrition/Diet History    Food Preferences: unable to assess  Spiritual, Cultural Beliefs, Bahai Practices, Values that Affect Care: no  Factors Affecting Nutritional Intake: decreased appetite    Anthropometrics    Temp: 97.5 °F (36.4 °C)  Height: 5' 5" (165.1 cm)  Height (inches): 65 in  Weight Method: Bed Scale  Weight: 82 kg (180 lb 12.4 oz)  Weight (lb): 180.78 lb  Ideal Body Weight (IBW), Male: 136 lb  % Ideal Body Weight, Male (lb): 139.74 %  BMI (Calculated): 30.1  BMI Grade: 30 - 34.9- obesity - grade I       Lab/Procedures/Meds    Pertinent Labs Reviewed: reviewed  Pertinent Labs Comments: A1c 5.7%, CO2 31, Alb 2.1,   Pertinent Medications Reviewed: reviewed  Pertinent Medications " Comments: statin, senna, phenytoin      Estimated/Assessed Needs    Weight Used For Calorie Calculations: 86.2 kg (190 lb 0.6 oz)  Energy Calorie Requirements (kcal): 2155 (25 kcal/kg)  Energy Need Method: Kcal/kg  Protein Requirements: 69g (0.8g/kg)  Weight Used For Protein Calculations: 86.2 kg (190 lb 0.6 oz)     Estimated Fluid Requirement Method: RDA Method  RDA Method (mL): 2155         Nutrition Prescription Ordered    Current Diet Order: Cardiac  Oral Nutrition Supplement: Boost Plus TID    Evaluation of Received Nutrient/Fluid Intake    I/O: +3.5L since admission  Energy Calories Required: not meeting needs  Protein Required: not meeting needs  Fluid Required: not meeting needs  Comments: LBM 4/19  % Intake of Estimated Energy Needs: 50 - 75 %  % Meal Intake: 50 - 75 %    Nutrition Risk    Level of Risk/Frequency of Follow-up: moderate     Assessment and Plan   Nutrition Problem:  Moderate Protein-Calorie Malnutrition  Malnutrition in the context of Acute Illness/Injury    Related to (etiology):  COVID-19 infection    Signs and Symptoms (as evidenced by):  Energy Intake: <50% of estimated energy requirement for 2 weeks     Weight Loss: 5.2% x 1 month     Interventions(treatment strategy):  Collaboration of care with providers.  Commercial beverage: Boost Plus TID    Nutrition Diagnosis Status:  New    Monitor and Evaluation    Food and Nutrient Intake: energy intake, food and beverage intake  Food and Nutrient Adminstration: diet order  Physical Activity and Function: nutrition-related ADLs and IADLs  Anthropometric Measurements: weight  Biochemical Data, Medical Tests and Procedures: electrolyte and renal panel  Nutrition-Focused Physical Findings: overall appearance     Malnutrition Assessment     Unable to complete NFPE due to COVID-19 restrictions.    Nutrition Follow-Up    RD Follow-up?: Yes
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
## 2020-04-20 NOTE — PLAN OF CARE
Problem: Malnutrition  Goal: Improved Nutritional Intake  Outcome: Ongoing, Progressing   Recommendations     1. Continue Cardiac diet with Boost Plus TID.  Goals: Pt will consume at least 50% intake at meals  Nutrition Goal Status: progressing towards goal  Communication of RD Recs: POC

## 2020-04-20 NOTE — PLAN OF CARE
Problem: Fall Injury Risk  Goal: Absence of Fall and Fall-Related Injury  Outcome: Ongoing, Progressing     Problem: Adult Inpatient Plan of Care  Goal: Plan of Care Review  Outcome: Ongoing, Progressing  Goal: Patient-Specific Goal (Individualization)  Outcome: Ongoing, Progressing  Goal: Absence of Hospital-Acquired Illness or Injury  Outcome: Ongoing, Progressing  Goal: Optimal Comfort and Wellbeing  Outcome: Ongoing, Progressing  Goal: Readiness for Transition of Care  Outcome: Ongoing, Progressing  Goal: Rounds/Family Conference  Outcome: Ongoing, Progressing     Problem: Skin Injury Risk Increased  Goal: Skin Health and Integrity  Outcome: Ongoing, Progressing     Problem: Social Isolation  Goal: Increased Social Interaction  Outcome: Ongoing, Progressing     Problem: Infection  Goal: Infection Symptom Resolution  Outcome: Ongoing, Progressing     Problem: Fall Injury Risk  Goal: Absence of Fall and Fall-Related Injury  Outcome: Ongoing, Progressing     Problem: Coping Ineffective  Goal: Effective Coping  Outcome: Ongoing, Progressing    A&oX4, Swedish speaking only, /martti ipad used prn. Tylenol prn for generalized body aches/shoulder soreness. VSS on 2LO2 NC. Voiding via urinal. LBM 4/19. 1x assist to chair or BSC. Plan to dc to snf when able. Will continue to monitor.

## 2020-04-20 NOTE — PT/OT/SLP PROGRESS
Physical Therapy Treatment    Patient Name:  Vern Lr   MRN:  [id number]    Recommendations:     Discharge Recommendations:  nursing facility, skilled   Discharge Equipment Recommendations: (TBD by next level of care)   Barriers to discharge: Inaccessible home and Decreased caregiver support    Assessment:     Vern Lr is a 80 y.o. male admitted with a medical diagnosis of COVID-19 virus infection.  He presents with the following impairments/functional limitations:  weakness, impaired endurance, impaired functional mobilty, gait instability, impaired balance, impaired cognition, decreased coordination, decreased upper extremity function, decreased lower extremity function, decreased safety awareness, impaired cardiopulmonary response to activity, impaired self care skills. Pt tolerated session well. Pt required modA for ambulation with RW to BSC and bedside chair. Pt would benefit from skilled nursing facility to address the above listed deficits and improve overall functional mobility to ensure safe DC home.    Pt safe for OOB to chair with RW with nursing staff (patient requires modA).    Rehab Prognosis: Good; patient would benefit from acute skilled PT services to address these deficits and reach maximum level of function.    Recent Surgery: * No surgery found *      Plan:     During this hospitalization, patient to be seen 4 x/week to address the identified rehab impairments via gait training, therapeutic activities, therapeutic exercises, neuromuscular re-education and progress toward the following goals:    · Plan of Care Expires:  05/13/20    Subjective     Chief Complaint: pain, pt reporting everything hurts  Patient/Family Comments/goals: none verbalized  Pain/Comfort:  · Pain Rating 1: (pt reporting everything hurts)      Objective:     Communicated with nursing prior to session.  Patient found HOB elevated with bed alarm, telemetry, pulse ox (continuous), peripheral IV, oxygen upon PT entry to room.      General Precautions: Standard, droplet, fall, contact, airborne, respiratory   Orthopedic Precautions:N/A   Braces: N/A   Language: Hungarian-speaking, CATY used in patient's room    Functional Mobility:  · Bed Mobility:     · Rolling Left:  minimum assistance  · Scooting: minimum assistance  · Supine to Sit: minimum assistance  · Sit to Supine: N/T this date, pt left up in chair  · Transfers:     · Sit to Stand:  moderate assistance with rolling walker, tactile cuing provided  · Gait:   · 3 ft to BSC with RW, modA  · 4ft to bedside chair, modA, RW  · Balance:   · Static Sitting: SBA to CGA with BUE support  · Dynamic Sitting: CGA  · Static Standing: Nawaf with RW  · Dynamic Standing: modA with RW for ambulation      AM-PAC 6 CLICK MOBILITY  Turning over in bed (including adjusting bedclothes, sheets and blankets)?: 3  Sitting down on and standing up from a chair with arms (e.g., wheelchair, bedside commode, etc.): 2  Moving from lying on back to sitting on the side of the bed?: 3  Moving to and from a bed to a chair (including a wheelchair)?: 2  Need to walk in hospital room?: 2  Climbing 3-5 steps with a railing?: 1  Basic Mobility Total Score: 13       Therapeutic Activities and Exercises:  Patient educated on role of therapy, goals of session, and benefits of mobilizing.   Discussed PT plan of care during hospitalization.   Patient educated that they need to call for assistance to mobilize out of bed.   Patient educated on how their diagnosis impacts their mobility within PT scope of practice.   Communication board updated.  All questions answered within PT scope of practice.           Patient left up in chair with all lines intact, call button in reach and nursing notified..    GOALS:   Multidisciplinary Problems     Physical Therapy Goals        Problem: Physical Therapy Goal    Goal Priority Disciplines Outcome Goal Variances Interventions   Physical Therapy Goal     PT, PT/OT Ongoing, Progressing      Description:  Goals to be met by: 2020    Patient will increase functional independence with mobility by performin. Pt will perform bed mobility (rolling L/R, scooting, and bridging) with Nawaf. - GOAL MET  REVISED: with CGA  2. Pt will perform supine to/from sit with Nawaf. - GOAL MET  REVISED: with CGA  3. Pt will sit EOB x 10 mins with B UE support with Supervision assistance.  4. Pt with perform sit to stand with modA assistance and straight cane.  5. Pt will ambulate 30 feet with mod assistance and straight cane.  6. Pt will perform there-ex from handout x 15 reps to improve strength for functional mobility.                          Time Tracking:     PT Received On: 20  PT Start Time: 1131     PT Stop Time: 1150  PT Total Time (min): 19 min (co-treat with OT)    Billable Minutes: Therapeutic Activity 15    Treatment Type: Treatment  PT/PTA: PT           Grecia Colby, PT  2020

## 2020-04-20 NOTE — PLAN OF CARE
Pt is progressing towards goals as expected. Goals remain appropriate continue with current POC.     Grecia Colby, PT, DPT  2020      Problem: Physical Therapy Goal  Goal: Physical Therapy Goal  Description  Goals to be met by: 2020    Patient will increase functional independence with mobility by performin. Pt will perform bed mobility (rolling L/R, scooting, and bridging) with Nawaf. - GOAL MET  REVISED: with CGA  2. Pt will perform supine to/from sit with Nawaf. - GOAL MET  REVISED: with CGA  3. Pt will sit EOB x 10 mins with B UE support with Supervision assistance.  4. Pt with perform sit to stand with modA assistance and straight cane.  5. Pt will ambulate 30 feet with mod assistance and straight cane.  6. Pt will perform there-ex from handout x 15 reps to improve strength for functional mobility.         Outcome: Ongoing, Progressing

## 2020-04-20 NOTE — PLAN OF CARE
Goals remain appropriate. DREAD Arredonod 4/20/2020   Problem: Occupational Therapy Goal  Goal: Occupational Therapy Goal  Description  Goals to be met by: 4/27     Patient will increase functional independence with ADLs by performing:    Bed mobility and supine to sit with CGA and bed rail as needed.  UE Dressing while seated EOB with Set-up Assistance and Stand-by Assistance.  Grooming while standing with Minimal Assistance.  Toileting from toilet with Minimal Assistance for hygiene and clothing management.   Toilet transfer to toilet with Minimal Assistance.  Functional mobility at household distance for ADL task with min(A) and AD as needed.   Pt will report daily orientation x4 with added time and 0 added cues.     Outcome: Ongoing, Progressing

## 2020-04-20 NOTE — PLAN OF CARE
Problem: Adult Inpatient Plan of Care  Goal: Plan of Care Review  Outcome: Ongoing, Progressing     Problem: Adult Inpatient Plan of Care  Goal: Absence of Hospital-Acquired Illness or Injury  Outcome: Ongoing, Progressing     Problem: Adult Inpatient Plan of Care  Goal: Optimal Comfort and Wellbeing  Outcome: Ongoing, Progressing

## 2020-04-21 PROCEDURE — 99900035 HC TECH TIME PER 15 MIN (STAT)

## 2020-04-21 PROCEDURE — 63600175 PHARM REV CODE 636 W HCPCS: Performed by: INTERNAL MEDICINE

## 2020-04-21 PROCEDURE — 27000221 HC OXYGEN, UP TO 24 HOURS

## 2020-04-21 PROCEDURE — 97530 THERAPEUTIC ACTIVITIES: CPT

## 2020-04-21 PROCEDURE — 11000001 HC ACUTE MED/SURG PRIVATE ROOM

## 2020-04-21 PROCEDURE — 97535 SELF CARE MNGMENT TRAINING: CPT

## 2020-04-21 PROCEDURE — 99232 PR SUBSEQUENT HOSPITAL CARE,LEVL II: ICD-10-PCS | Mod: 95,,, | Performed by: INTERNAL MEDICINE

## 2020-04-21 PROCEDURE — 99232 SBSQ HOSP IP/OBS MODERATE 35: CPT | Mod: 95,,, | Performed by: INTERNAL MEDICINE

## 2020-04-21 PROCEDURE — 25000003 PHARM REV CODE 250: Performed by: EMERGENCY MEDICINE

## 2020-04-21 PROCEDURE — 94761 N-INVAS EAR/PLS OXIMETRY MLT: CPT

## 2020-04-21 PROCEDURE — 25000003 PHARM REV CODE 250: Performed by: INTERNAL MEDICINE

## 2020-04-21 PROCEDURE — 94640 AIRWAY INHALATION TREATMENT: CPT

## 2020-04-21 RX ADMIN — ALBUTEROL SULFATE 2 PUFF: 90 AEROSOL, METERED RESPIRATORY (INHALATION) at 08:04

## 2020-04-21 RX ADMIN — LISINOPRIL 10 MG: 5 TABLET ORAL at 08:04

## 2020-04-21 RX ADMIN — CLOPIDOGREL BISULFATE 75 MG: 75 TABLET ORAL at 08:04

## 2020-04-21 RX ADMIN — ASPIRIN 81 MG: 81 TABLET, COATED ORAL at 08:04

## 2020-04-21 RX ADMIN — PHENYTOIN SODIUM 200 MG: 100 CAPSULE ORAL at 08:04

## 2020-04-21 RX ADMIN — ATORVASTATIN CALCIUM 40 MG: 40 TABLET, FILM COATED ORAL at 08:04

## 2020-04-21 RX ADMIN — STANDARDIZED SENNA CONCENTRATE AND DOCUSATE SODIUM 1 TABLET: 8.6; 5 TABLET ORAL at 08:04

## 2020-04-21 RX ADMIN — ACETAMINOPHEN 650 MG: 325 TABLET ORAL at 12:04

## 2020-04-21 RX ADMIN — MONTELUKAST 10 MG: 10 TABLET, FILM COATED ORAL at 08:04

## 2020-04-21 RX ADMIN — ACETAMINOPHEN 650 MG: 325 TABLET ORAL at 10:04

## 2020-04-21 RX ADMIN — ALBUTEROL SULFATE 2 PUFF: 90 AEROSOL, METERED RESPIRATORY (INHALATION) at 04:04

## 2020-04-21 RX ADMIN — PHENOBARBITAL 90 MG: 30 TABLET ORAL at 08:04

## 2020-04-21 RX ADMIN — ENOXAPARIN SODIUM 40 MG: 100 INJECTION SUBCUTANEOUS at 08:04

## 2020-04-21 RX ADMIN — FINASTERIDE 5 MG: 5 TABLET, FILM COATED ORAL at 08:04

## 2020-04-21 RX ADMIN — TAMSULOSIN HYDROCHLORIDE 0.4 MG: 0.4 CAPSULE ORAL at 08:04

## 2020-04-21 RX ADMIN — METOPROLOL TARTRATE 50 MG: 50 TABLET, FILM COATED ORAL at 08:04

## 2020-04-21 NOTE — PROGRESS NOTES
[facility] Medicine Progress Note  Virtual Medicine Team 7  04/21/2020       Vern Lr  YOB: 1940  Medical Record Number:  [id number]  Attending Physician:  Garrett Kinney MD   Date of Admission: 4/9/2020     Hospital Day:  12  Current Principal Problem:  COVID-19 virus infection      This service was provided through telemedicine.  Chief complaint:  None.    This evaluation, treatment, and documentation was performed by me, and was conducted via telephone, with the assistance of the patient's primary nurse.  Telephone interview with remote .    Start time: 3:27 PM , end time: 3:38 PM , total time with patient: 11 minutes.      History     Cc: Dyspnea    HPI  80 year-old man with a hypertension, seizure disorder, non-obstructive CAD admitted to [facility] following multiple falls.     At home, the patient lived with a sister who had COVID-19.  He was brought to the ED following two falls.   After first fall he was brought to [facility] and was discharged. Second fall while in bathroom on the day prior to admission.  Patient admits to some shortness of breath and cough as well as nasal congestion.  On presentation he admitted to cough and dyspnea.      In the ED, T was 97.9, R 18, P 90,  / 90, SaO2 90%.  He was in no distress.  Exam revealed a right scalp abrasion and RLQ tenderness.  SARS-CoV2 RNA was positive.  CRP was 269.6. CXR showed diffuse bilateral ground-glass opacities affecting all lobes.  Abdominal CT scan was consistent with constipation.  Head CT showed no acute intracranial abnormality.  The patient was admitted to the telemetry unit.      Hospital Course  The patient was placed on supplemental oxygen, 5 L NC.  Azithromycin, ceftriaxone, and hydroxychloroquine, and tamsulosin were started per protocol.  His oxygenation gradually improved.  On hospital day 5, he was transferred to this hospital due to capacity issues at  [facility].  Here, oxygenation continued to improve, but the patient has continued to have constitutional symptoms.      Interval History  Today, he reports less distress than previously.  Mild shortness of breath.  No cough.       Medications  Scheduled Meds:   albuterol  2 puff Inhalation Q8H WA    aspirin  81 mg Oral Daily    atorvastatin  40 mg Oral Daily    clopidogreL  75 mg Oral Daily    enoxaparin  40 mg Subcutaneous Daily    finasteride  5 mg Oral Daily    lisinopriL  10 mg Oral Daily    metoprolol tartrate  50 mg Oral Daily    montelukast  10 mg Oral QHS    PHENobarbitaL  90 mg Oral Daily    phenytoin  200 mg Oral BID    senna-docusate 8.6-50 mg  1 tablet Oral BID    tamsulosin  0.4 mg Oral Daily     Continuous Infusions:  PRN Meds:.acetaminophen, acetaminophen, aluminum & magnesium hydroxide-simethicone, artificial tears, bisacodyL, melatonin, ondansetron, sodium chloride 0.9%      PSFH:  Please see admission note and assessment and plan below.      ROS   Admits Denies   Constitutional Malaise, improving Chills, diaphoresis   Eyes  Visual changes   ENMT Hearing loss, chronic Dysphagia, Epistaxis, nasal congestion   Cardiovascular  Chest pain, palpitations, edema   Respiratory  Cough, dyspnea, wheezing   Gastrointestinal Intermittent constipation Nausea, vomiting, diarrhea, anorexia.     Genitourinary  Dysuria, incontinence   Musculoskeletal  Joint pain, joint swelling   Integumentary  Rash, inflammation, burning   Neruo-Psychiatric Anxiety, dysphoria Insomnia.  Changes in speech, strength, sensation.     Endocrine     Hematologic  Abnormal bruising, bleeding   Immunologic  Inflammation, pain at IV sites.  Pruritis.         Physical Examination       Vital Signs  Vitals  Temp: 97.8 °F (36.6 °C)  Temp src: Oral  Pulse: 68  Heart Rate Source: Monitor  Resp: 18  SpO2: (!) 93 %  Pulse Oximetry Type: Intermittent  Flow (L/min): 1  O2 Device (Oxygen Therapy): nasal cannula  BP: (!) 144/76  MAP (mmHg):  69  BP Location: Right arm  BP Method: Automatic  Patient Position: Lying           Most recent as of 12:02 PM      24 Hour VS Range    Temp:  [97.7 °F (36.5 °C)-98.5 °F (36.9 °C)]   Pulse:  [68-97]   Resp:  [16-18]   BP: (109-144)/(55-76)   SpO2:  [92 %-98 %]     Intake/Output Summary (Last 24 hours) at 4/21/2020 1351  Last data filed at 4/21/2020 1200  Gross per 24 hour   Intake 800 ml   Output 1175 ml   Net -375 ml         Telephone encounter, a physical examination was not performed.      Data       Recent Labs   Lab 04/15/20  0426 04/17/20  0435 04/19/20  0455 04/20/20  0604   WBC 8.39 8.46 7.15 6.65   HGB 13.2* 13.1* 12.8* 12.3*   HCT 39.0* 40.7 39.8* 38.2*   * 450* 421* 434*        Lymph # (K/uL)   Date Value   04/20/2020 1.1   04/19/2020 1.1   04/17/2020 1.1   04/15/2020 0.8 (L)   04/13/2020 0.8 (L)     Lymph% (%)   Date Value   04/20/2020 17.0 (L)   04/19/2020 14.8 (L)   04/17/2020 12.9 (L)   04/15/2020 9.3 (L)   04/13/2020 9.8 (L)         Recent Labs   Lab 04/14/20  2334 04/15/20  1119 04/17/20  0435 04/19/20  0455 04/20/20  0604   * 135* 135* 137 138   K 3.8 3.2* 3.5 3.6 3.7   CL 99 97 98 98 99   CO2 22* 28 29 28 31*   BUN 11 10 14 17 17   CREATININE 0.6 0.6 0.6 0.6 0.6   GLU 95 119* 108 92 95   ANIONGAP 13 10 8 11 8   CALCIUM 8.4* 8.7 8.6* 8.6* 8.6*        Recent Labs   Lab 04/14/20  2334 04/15/20  1119 04/17/20  0435 04/19/20  0455 04/20/20  0604   PROT 7.3  --  7.7 7.3 7.6   ALBUMIN 2.2* 2.2* 2.1* 2.0* 2.1*   BILITOT 0.4  --  0.5 0.4 0.3   ALKPHOS 55  --  62 64 65   AST 90*  --  65* 67* 56*   ALT 48*  --  46* 49* 45*        Recent Labs   Lab 04/14/20  2334 04/16/20  0859 04/17/20  0435 04/20/20  0604   .6*  --  290.6* 139.0*   DDIMER 7.97* 4.91*  --  5.10*        BNP (pg/mL)   Date Value   04/09/2020 66   04/09/2020 46   11/29/2015 34     CRP (mg/L)   Date Value   04/20/2020 139.0 (H)   04/17/2020 290.6 (H)   04/14/2020 295.6 (H)   04/12/2020 337.8 (H)   04/11/2020 312.0 (H)      D-Dimer (mg/L FEU)   Date Value   04/20/2020 5.10 (H)   04/16/2020 4.91 (H)   04/14/2020 7.97 (H)   11/29/2015 3.25 (H)     Ferritin (ng/mL)   Date Value   04/20/2020 1,279 (H)   04/15/2020 1,852 (H)     No results found for: LDH  Troponin I (ng/mL)   Date Value   04/09/2020 0.108 (H)   04/09/2020 0.049 (H)   04/09/2020 0.049 (H)   04/09/2020 0.120 (H)   09/24/2019 0.099 (H)   09/24/2019 0.099 (H)       SARS-CoV-2 RNA, Amplification, Qual (no units)   Date Value   04/09/2020 Positive (A)       Blood Culture, Routine (no units)   Date Value   04/09/2020 No Growth after 4 days.    04/09/2020 No Growth after 4 days.           Assessment & Plan       Hospital Problems   *COVID-19 virus infection [U07.1]  Lower respiratory tract infection due to COVID-19 virus [U07.1, J22]  Acute respiratory failure with hypoxia [J96.01]    Improving.  Decreasing oxygen requirements, but markers remain elevated.  Too soon to test for viral clearing.      Continue supportive care.          Constipation [K59.00]  Responding to current bowel regimen.        Fall [W19.XXXA]  Precautions in place.    Will encourage PT/OT and reevaluation of DME needs.        Coronary artery disease involving native coronary artery of native heart without angina pectoris [I25.10]    Non-obstructive CAD by history.  No evidence for acute coronary syndrome.  Elevated troponin non-specific in the setting of this infection.  No chest pain, ECG without ischemic changes.          Seizure disorder [G40.909]  Stable on current regimen.        Essential hypertension, benign [I10]  Controlled.             Additional Hospital Problems   Moderate malnutrition [E44.0]     Elevated troponin [R79.89]     BPH (benign prostatic hyperplasia) [N40.0]     Dyslipidemia [E78.5]        Discharge Plan:  Weak, likely shedding virus, but otherwise medically stable for transfer to SNF / LTAC.         Garrett Kinney MD  Physician, Cardiology  [facility]  [place]

## 2020-04-21 NOTE — PT/OT/SLP PROGRESS
Physical Therapy Treatment    Patient Name:  Vern Lr   MRN:  [id number]    Recommendations:     Discharge Recommendations:  nursing facility, skilled   Discharge Equipment Recommendations: walker, rolling(TBD by next level of care)   Barriers to discharge: Inaccessible home and Decreased caregiver support    Assessment:     Vern Lr is a 80 y.o. male admitted with a medical diagnosis of COVID-19 virus infection.  He presents with the following impairments/functional limitations:  weakness, impaired endurance, impaired self care skills, impaired functional mobilty, gait instability, impaired balance, impaired cognition, decreased coordination, decreased upper extremity function, decreased lower extremity function, decreased safety awareness, impaired cardiopulmonary response to activity. Pt c/o of pain throughout session. Patient primarily limited by L sided hip, back, and leg pain. Pt mobilized from bedside chair back to bed with mod/max A. Pt educated on call button use and positioned on R for comfort. Pt would benefit from skilled nursing facility to address the above listed deficits and ensure safe DC home.    Rehab Prognosis: Good; patient would benefit from acute skilled PT services to address these deficits and reach maximum level of function.    Recent Surgery: * No surgery found *      Plan:     During this hospitalization, patient to be seen 4 x/week to address the identified rehab impairments via gait training, therapeutic activities, therapeutic exercises, neuromuscular re-education and progress toward the following goals:    · Plan of Care Expires:  05/13/20    Subjective     Chief Complaint: L hip, back, and leg pain  Pain/Comfort:  · Pain Rating 1: (no rated)  · Location - Side 1: Left  · Location - Orientation 1: generalized  · Location 1: hip(hip, back, leg)  · Pain Addressed 1: Distraction, Reposition, Nurse notified, Cessation of Activity  · Pain Rating Post-Intervention 1: (not  rated)      Objective:     Communicated with nursing prior to session.  Patient found up in chair with telemetry, SCD, pulse ox (continuous), oxygen upon PT entry to room.     General Precautions: Standard, droplet, fall, contact, airborne, respiratory   Orthopedic Precautions:N/A   Braces: N/A   Language: CATY crouch, Uzbek speaking,  ID number #[id number]    Functional Mobility:  · Bed Mobility:     · Rolling Right: minimum assistance  · Scooting L on EOB: moderate assistance and maximal assistance  · Supine to Sit: N/T  · Sit to Supine: minimum assistance  · Transfers:     · Sit to Stand:  moderate assistance and maximal assistance with rolling walker, x 2 trials  · Chair to bed: moderate assistance and maximal assistance with  rolling walker  using  Step Transfer  · Gait: 2 ft, RW, mod/maxA (bedside chair to bed). Further mobility declined 2/2 pain  · EOB x 30 minutes  · Balance:   · Static Sitting: SBA  · Dynamic Sitting: SBA-CGA  · Static Standing: modA with RW  · Dynamic Standing: steps with RW, modA to maxA      AM-PAC 6 CLICK MOBILITY  Turning over in bed (including adjusting bedclothes, sheets and blankets)?: 3  Sitting down on and standing up from a chair with arms (e.g., wheelchair, bedside commode, etc.): 2  Moving to and from a bed to a chair (including a wheelchair)?: 2  Need to walk in hospital room?: 2  Climbing 3-5 steps with a railing?: 1       Therapeutic Activities and Exercises:  Pt educated on call button use.  Pt reoriented to date/time after endorsing confusion of day/time while in the hospital.  Patient educated on role of therapy, goals of session, and benefits of mobilizing.   Discussed PT plan of care during hospitalization.   Patient educated that they need to call for assistance to mobilize out of bed.   Patient educated on how their diagnosis impacts their mobility within PT scope of practice.   Communication board updated. Pt's niece's number written on board.   Pt  assisted by OT in contacting family via telephone in room.  All questions answered within PT scope of practice.      Patient left right sidelying with all lines intact, call button in reach, bed alarm on and nursing notified..    GOALS:   Multidisciplinary Problems     Physical Therapy Goals        Problem: Physical Therapy Goal    Goal Priority Disciplines Outcome Goal Variances Interventions   Physical Therapy Goal     PT, PT/OT Ongoing, Progressing     Description:  Goals to be met by: 2020    Patient will increase functional independence with mobility by performin. Pt will perform bed mobility (rolling L/R, scooting, and bridging) with Nawaf. - GOAL MET  REVISED: with CGA  2. Pt will perform supine to/from sit with Nawaf. - GOAL MET  REVISED: with CGA  3. Pt will sit EOB x 10 mins with B UE support with Supervision assistance.  4. Pt with perform sit to stand with modA assistance and straight cane.  5. Pt will ambulate 30 feet with mod assistance and straight cane.  6. Pt will perform there-ex from handout x 15 reps to improve strength for functional mobility.                          Time Tracking:     PT Received On: 20  PT Start Time:      PT Stop Time: 1524  PT Total Time (min): 60 min (co-treat with OT)    Billable Minutes: Therapeutic Activity 60    Treatment Type: Treatment  PT/PTA: PT           Grecia Colby, PT  2020

## 2020-04-21 NOTE — PLAN OF CARE
Problem: Adult Inpatient Plan of Care  Goal: Plan of Care Review  Outcome: Ongoing, Progressing     Problem: Adult Inpatient Plan of Care  Goal: Readiness for Transition of Care  Outcome: Ongoing, Progressing     Problem: Oral Intake Inadequate  Goal: Improved Oral Intake  Outcome: Ongoing, Progressing
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
## 2020-04-21 NOTE — PLAN OF CARE
Problem: Occupational Therapy Goal  Goal: Occupational Therapy Goal  Description  Goals to be met by: 4/27     Patient will increase functional independence with ADLs by performing:    Bed mobility and supine to sit with CGA and bed rail as needed.  UE Dressing while seated EOB with Set-up Assistance and Stand-by Assistance.  Grooming while standing with Minimal Assistance.  Toileting from toilet with Minimal Assistance for hygiene and clothing management.   Toilet transfer to toilet with Minimal Assistance.  Functional mobility at household distance for ADL task with min(A) and AD as needed.   Pt will report daily orientation x4 with added time and 0 added cues.     Outcome: Ongoing, Progressing     Pt progressing towards goals. Cont OT POC  Kalani Roe, LOTR  04/21/2020

## 2020-04-21 NOTE — PLAN OF CARE
Problem: Adult Inpatient Plan of Care  Goal: Plan of Care Review  Outcome: Ongoing, Progressing  Flowsheets (Taken 4/21/2020 0423)  Plan of Care Reviewed With: patient     Problem: Fall Injury Risk  Goal: Absence of Fall and Fall-Related Injury  Outcome: Ongoing, Progressing     Problem: Coping Ineffective  Goal: Effective Coping  Outcome: Ongoing, Progressing   POC reviewed with patient, voiced understanding. Pt Barbadian speaking; Libby translation device at bedside. Patient aaox4 and forgetful sometimes. On 2L NC, respirations unlabored. Reports generalized body pain, medicated with prn tylenol. Turn q2. Fall precautions in place; bed alarm armed. Call light and personal belongings within reach. See chart for full assessment; WCTM.

## 2020-04-21 NOTE — PLAN OF CARE
04/21/20 0839   Post-Acute Status   Post-Acute Authorization Placement   Post-Acute Placement Status Referrals Sent       Pt only has Medicaid. So patient doesn't have SNF benefits. Referrals sent to:      Referral sent to:      1. [facility]  2. [facility]  3. [facility]:  4.  [facility]:    Awaiting acceptance. SW in contact with CM and Medical staff. Will continue to follow and offer support as needed.     Quang Shelby, TOMAS  [facility]   Ext. [phone]

## 2020-04-22 PROCEDURE — 25000003 PHARM REV CODE 250: Performed by: INTERNAL MEDICINE

## 2020-04-22 PROCEDURE — 63600175 PHARM REV CODE 636 W HCPCS: Performed by: INTERNAL MEDICINE

## 2020-04-22 PROCEDURE — 99232 SBSQ HOSP IP/OBS MODERATE 35: CPT | Mod: 95,,, | Performed by: INTERNAL MEDICINE

## 2020-04-22 PROCEDURE — 97535 SELF CARE MNGMENT TRAINING: CPT

## 2020-04-22 PROCEDURE — 94761 N-INVAS EAR/PLS OXIMETRY MLT: CPT

## 2020-04-22 PROCEDURE — 25000003 PHARM REV CODE 250: Performed by: HOSPITALIST

## 2020-04-22 PROCEDURE — 27000221 HC OXYGEN, UP TO 24 HOURS

## 2020-04-22 PROCEDURE — 99232 PR SUBSEQUENT HOSPITAL CARE,LEVL II: ICD-10-PCS | Mod: 95,,, | Performed by: INTERNAL MEDICINE

## 2020-04-22 PROCEDURE — 11000001 HC ACUTE MED/SURG PRIVATE ROOM

## 2020-04-22 PROCEDURE — 94640 AIRWAY INHALATION TREATMENT: CPT

## 2020-04-22 PROCEDURE — 25000003 PHARM REV CODE 250: Performed by: EMERGENCY MEDICINE

## 2020-04-22 PROCEDURE — 97116 GAIT TRAINING THERAPY: CPT

## 2020-04-22 RX ADMIN — STANDARDIZED SENNA CONCENTRATE AND DOCUSATE SODIUM 1 TABLET: 8.6; 5 TABLET ORAL at 08:04

## 2020-04-22 RX ADMIN — ENOXAPARIN SODIUM 40 MG: 100 INJECTION SUBCUTANEOUS at 08:04

## 2020-04-22 RX ADMIN — ASPIRIN 81 MG: 81 TABLET, COATED ORAL at 08:04

## 2020-04-22 RX ADMIN — FINASTERIDE 5 MG: 5 TABLET, FILM COATED ORAL at 08:04

## 2020-04-22 RX ADMIN — CLOPIDOGREL BISULFATE 75 MG: 75 TABLET ORAL at 08:04

## 2020-04-22 RX ADMIN — LISINOPRIL 10 MG: 5 TABLET ORAL at 08:04

## 2020-04-22 RX ADMIN — ATORVASTATIN CALCIUM 40 MG: 40 TABLET, FILM COATED ORAL at 08:04

## 2020-04-22 RX ADMIN — ACETAMINOPHEN 650 MG: 325 TABLET ORAL at 08:04

## 2020-04-22 RX ADMIN — ALBUTEROL SULFATE 2 PUFF: 90 AEROSOL, METERED RESPIRATORY (INHALATION) at 04:04

## 2020-04-22 RX ADMIN — METOPROLOL TARTRATE 50 MG: 50 TABLET, FILM COATED ORAL at 08:04

## 2020-04-22 RX ADMIN — TAMSULOSIN HYDROCHLORIDE 0.4 MG: 0.4 CAPSULE ORAL at 08:04

## 2020-04-22 RX ADMIN — MONTELUKAST 10 MG: 10 TABLET, FILM COATED ORAL at 08:04

## 2020-04-22 RX ADMIN — PHENOBARBITAL 90 MG: 30 TABLET ORAL at 08:04

## 2020-04-22 RX ADMIN — ALBUTEROL SULFATE 2 PUFF: 90 AEROSOL, METERED RESPIRATORY (INHALATION) at 07:04

## 2020-04-22 RX ADMIN — PHENYTOIN SODIUM 200 MG: 100 CAPSULE ORAL at 08:04

## 2020-04-22 RX ADMIN — Medication 6 MG: at 08:04

## 2020-04-22 NOTE — PLAN OF CARE
Problem: Fall Injury Risk  Goal: Absence of Fall and Fall-Related Injury  Outcome: Ongoing, Progressing     Problem: Adult Inpatient Plan of Care  Goal: Plan of Care Review  Outcome: Ongoing, Progressing       Plan of care reviewed with pt. Pt voiced understanding. Pt AAOx4. Pt c/o right hip during the shift. Repositioned pt on wedge to relieve pressure. No apparent distress noted. Pt on NC @ 1L. Fall precautions maintained. Bed in lowest position, and locked. Call light within reach and advised to call for assistance. Side rails x 2 and slip resistant socks on at this time. [facility]

## 2020-04-22 NOTE — PLAN OF CARE
CM has calls in to various facilities taking COVID and is waiting for return call.  ALLYSON spoke with [facility] (Cindy [phone])  who states they are reviewing referrals and will advise.

## 2020-04-22 NOTE — PT/OT/SLP PROGRESS
Occupational Therapy   Treatment    Name: Vern Lr  MRN: [id number]  Admitting Diagnosis:  COVID-19 virus infection       Recommendations:     Discharge Recommendations: nursing facility, skilled  Discharge Equipment Recommendations:  other (see comments)(TBD)  Barriers to discharge:  Inaccessible home environment, Decreased caregiver support    Assessment:     Vern Lr is a 80 y.o. male with a medical diagnosis of COVID-19 virus infection.  He presents with gains towards OT goals with added push for increased activity and mobility. OT to cont to follow up 3x/w. Pt would greatly benefit from increased activity and mobility outside of therapy sessions for overall endurance mgmt. Performance deficits affecting function are weakness, impaired endurance, impaired self care skills, impaired functional mobilty, gait instability, impaired balance, impaired cognition, decreased safety awareness, decreased upper extremity function, decreased lower extremity function, impaired cardiopulmonary response to activity.     Rehab Prognosis:  Fair; patient would benefit from acute skilled OT services to address these deficits and reach maximum level of function.       Plan:     Patient to be seen 3 x/week to address the above listed problems via self-care/home management, therapeutic activities, therapeutic exercises, neuromuscular re-education  · Plan of Care Expires: 05/12/20  · Plan of Care Reviewed with: patient    Subjective     Pain/Comfort:  · Pain Rating 1: (c/o pain all over; also complains of R side pain and pain in R hand with weight bearing)  · Pain Rating Post-Intervention 1: 0/10    Objective:     Communicated with: RN prior to session.  Completed with PT. Completed session with [facility]- ID# [id number].  Patient found HOB elevated with telemetry, peripheral IV, pulse ox (continuous), oxygen(1L) upon OT entry to room.    General Precautions: Standard, airborne, aspiration, fall, contact, droplet(delirium)   Orthopedic  Precautions:N/A   Braces: N/A     Occupational Performance:     Bed Mobility:    · Patient completed Rolling/Turning to Left with  moderate assistance  · Patient completed Supine to Sit with moderate assistance     Functional Mobility/Transfers:  · Patient completed Sit <> Stand Transfer with minimum assistance  with  rolling walker   · Patient completed Bed <> Chair Transfer using Step Transfer technique with minimum assistance with rolling walker  · Patient completed Toilet Transfer Step Transfer technique with minimum assistance with  rolling walker to bedside commode in restroom  · Functional Mobility: household distance to bathroom <> return to room to chair with rolling walker and min(A)  · O2 doffed for task; O2 to 89 following mobility to bathroom; able to self recover     Activities of Daily Living:  · Feeding:  independence with setup  · Grooming: contact guard assistance standing at sink for hand hygiene; L UE support briefly  · Upper Body Dressing: minimum assistance EOB  · Toileting: maximal assistance perineal care    [facility] 6 Click ADL: 16    Treatment & Education:  -Pt alert and agreeable to therapy session; re edu on importance of activity and mobility for healing process  -provided pt with verbal daily orientation   -Communication board updated; questions/concerns addressed within OT scope of practice    Patient left up in chair with all lines intact, call button in reach and RN notifiedEducation:      GOALS:   Multidisciplinary Problems     Occupational Therapy Goals        Problem: Occupational Therapy Goal    Goal Priority Disciplines Outcome Interventions   Occupational Therapy Goal     OT, PT/OT Ongoing, Progressing    Description:  Goals to be met by: 4/27     Patient will increase functional independence with ADLs by performing:    Bed mobility and supine to sit with CGA and bed rail as needed.  UE Dressing while seated EOB with Set-up Assistance and Stand-by Assistance.  Grooming while  standing with Minimal Assistance. Met; goal to remain for consistency   Toileting from toilet with Minimal Assistance for hygiene and clothing management. Met  *revised: with CGA  Toilet transfer to toilet with Minimal Assistance.  Functional mobility at household distance for ADL task with min(A) and AD as needed. Met  *revised: with RW and CGA  Pt will report daily orientation x4 with added time and 0 added cues.                       Time Tracking:     OT Date of Treatment: 04/22/20  OT Start Time: 1123  OT Stop Time: 1154  OT Total Time (min): 31 min    Billable Minutes:Self Care/Home Management 25    DREAD Arredondo  4/22/2020

## 2020-04-22 NOTE — PLAN OF CARE
Goals remain appropriate. DREAD Arredondo 4/22/2020   Problem: Occupational Therapy Goal  Goal: Occupational Therapy Goal  Description  Goals to be met by: 4/27     Patient will increase functional independence with ADLs by performing:    Bed mobility and supine to sit with CGA and bed rail as needed.  UE Dressing while seated EOB with Set-up Assistance and Stand-by Assistance.  Grooming while standing with Minimal Assistance. Met; goal to remain for consistency   Toileting from toilet with Minimal Assistance for hygiene and clothing management. Met  *revised: with CGA  Toilet transfer to toilet with Minimal Assistance.  Functional mobility at household distance for ADL task with min(A) and AD as needed. Met  *revised: with RW and CGA  Pt will report daily orientation x4 with added time and 0 added cues.      Outcome: Ongoing, Progressing

## 2020-04-22 NOTE — PLAN OF CARE
Plan of Care:  Discharge Recommendation: SNF.  Please continue Progressive Mobility Protocol as appropriate.  Appropriate transfer level with nursing staff: Bed <> Chair:  Stand Pivot with minimum assistance with Rolling Walker.    Marycruz Antoine PT, DPT  2020  Pager: [phone]    Physical Therapy Treatment Goals:  Multidisciplinary Problems       Physical Therapy Goals          Problem: Physical Therapy Goal    Goal Priority Disciplines Outcome Goal Variances Interventions   Physical Therapy Goal     PT, PT/OT Ongoing, Progressing     Description:  Goals to be met by: 2020    Patient will increase functional independence with mobility by performin. Pt will perform bed mobility (rolling L/R, scooting, and bridging) with Nawaf. - GOAL MET  REVISED: with CGA  2. Pt will perform supine to/from sit with Nawaf. - GOAL MET  REVISED: with CGA  3. Pt will sit EOB x 10 mins with B UE support with Supervision assistance.  4. Pt with perform sit to stand with modA assistance and straight cane.  5. Pt will ambulate 30 feet with mod assistance and straight cane.  6. Pt will perform there-ex from handout x 15 reps to improve strength for functional mobility.

## 2020-04-22 NOTE — PT/OT/SLP PROGRESS
Physical Therapy Treatment    Patient Name:  Vern Lr   MRN:  [id number]  Admit Date: 2020  Admitting Diagnosis:  COVID-19 virus infection   Length of Stay: 13 days  Recent Surgery: * No surgery found *      Recommendations:     Discharge Recommendations:  nursing facility, skilled   Discharge Equipment Recommendations: walker, rolling   Barriers to discharge: Decreased caregiver support    Plan:     During this hospitalization, patient to be seen 4 x/week to address the listed problems via gait training, therapeutic activities, therapeutic exercises, neuromuscular re-education  · Plan of Care Expires:  20   Plan of Care Reviewed with: patient    Assessment:     Vern Lr is a 80 y.o. male admitted with a medical diagnosis of COVID-19 virus infection.   Patient is making progress towards goals, participating well in this session. Pt with noted improvement with ambulation and mobility. Pt c/o pain throughout all mobility. Education was provided to patient regarding importance of continued participation in therapy, patient's progress and discharge disposition. Pt continues to benefit from a collaborative PT/OT/SLP program to improve quality of life and focus on recovery of impairments.     Problem List: weakness, impaired functional mobilty, impaired endurance, impaired self care skills, gait instability, decreased upper extremity function, decreased lower extremity function, impaired cardiopulmonary response to activity, decreased safety awareness, pain.  Rehab Prognosis: Good     GOALS:   Multidisciplinary Problems     Physical Therapy Goals        Problem: Physical Therapy Goal    Goal Priority Disciplines Outcome Goal Variances Interventions   Physical Therapy Goal     PT, PT/OT Ongoing, Progressing     Description:  Goals to be met by: 2020    Patient will increase functional independence with mobility by performin. Pt will perform bed mobility (rolling L/R, scooting, and bridging) with  "Nawaf. - GOAL MET  REVISED: with CGA  2. Pt will perform supine to/from sit with Nawaf. - GOAL MET  REVISED: with CGA  3. Pt will sit EOB x 10 mins with B UE support with Supervision assistance.  4. Pt with perform sit to stand with modA assistance and straight cane.  5. Pt will ambulate 30 feet with mod assistance and straight cane.  6. Pt will perform there-ex from handout x 15 reps to improve strength for functional mobility.                           Subjective   Communicated with RN prior to session.  Patient found supine upon PT entry to room, agreeable to evaluation. Vern Lr's alone present during session.    Patient/Family Comments/goals: "My whole body hurts" via [employer]  in room.  Pain/Comfort:  · Pain Rating 1: ("My whole body" did not grade)    Objective:   Patient found with: telemetry, pulse ox (continuous), oxygen   General Precautions: Standard, Cardiac airborne, contact, droplet, fall   Orthopedic Precautions:N/A   Braces: N/A   Oxygen Device: Nasal Cannula 1L, pt with SaO2 99%, removed O2 for mobility.  Vitals: /64   Pulse 80   Temp 97.8 °F (36.6 °C) (Oral)   Resp 17   Ht 5' 5" (1.651 m)   Wt 82 kg (180 lb 12.4 oz)   SpO2 95%   BMI 30.08 kg/m²     Outcome Measures:  AM-PAC 6 CLICK MOBILITY  Turning over in bed (including adjusting bedclothes, sheets and blankets)?: 3  Sitting down on and standing up from a chair with arms (e.g., wheelchair, bedside commode, etc.): 3  Moving from lying on back to sitting on the side of the bed?: 3  Moving to and from a bed to a chair (including a wheelchair)?: 3  Need to walk in hospital room?: 2  Climbing 3-5 steps with a railing?: 1  Basic Mobility Total Score: 15     Cognition:   · Alert and Cooperative  · AxOx1 (person only)  · Command following: Follows one-step commands  · Fluency: clear/fluent    Functional Mobility:  Additional staff present: OT, virtual    Bed Mobility:    Supine to Sit: moderate " assistance; HOB flat and from left side of bed    Transfers:    Sit <> Stand Transfer: minimum assistance with rolling walker    Stand <> Sit Transfer: minimum assistance with rolling walker   o [id number] from EOB and [id number] from bedside commode  o Vc's for hand placement      Gait:  · Patient ambulated: 12ft x 2 trials with seated rest break   · Patient required: moderate assist  · Patient used: rolling walker  · Gait Pattern observed: 3-point gait  · Gait Deviation(s): narrow base of support, decreased weight shift, shuffle gait, flexed posture and decreased sylvia  · Impairments due to: impaired balance and decreased endurance  · Comments: pt c/o throughout movement, requires vc's for sequencing and upright posture.    Therapeutic Activities & Exercises:   *pt using bedside commode in bathroom, required assist with perineal care.  *pt able to stand at sink to wash hands with CGA for balance.    Education:  Patient provided with daily orientation and goals of this PT session. Patient agreed to participate in session. Patient aware of patient's deficits and therapy progression. They were educated to transfer to bedside chair/bedside commode/bathroom with RN/PCT present. Patient educated on Fall risk, gait training, home safety and Home exercise program by explanation. Patient was receptive to education and verbalizes understanding. Encouraged patient to perform daily exercises & mobility to increase endurance and decrease effects of bedrest.Time provided for therapeutic counseling and discussion of health disposition. All questions answered to patient's satisfaction, within scope of PT practice; voiced no other concerns. White board updated in patient's room, RN notified of session.    Patient left up in chair, with head in midline, neutral pelvis & heels floated for skin protection with all lines intact, call button in reach and RN notified  Time Tracking:     PT Received On: 04/22/20  PT Start Time: 4833      PT Stop Time: 1157  PT Total Time (min): 33 min   Co-treat with OT    Billable Minutes:   · Gait Training 23    Treatment Type: Treatment  PT/PTA: PT       Marycruz Antoine PT, DPT  04/22/2020  Pager: [phone]

## 2020-04-22 NOTE — PROGRESS NOTES
[facility] Medicine Progress Note  Virtual Medicine Team 7  04/22/2020       Vern Lr  YOB: 1940  Medical Record Number:  [id number]  Attending Physician:  Garrett Kinney MD   Date of Admission: 4/9/2020     Hospital Day:  14    Current Principal Problem:  COVID-19 virus infection      This service was provided through telemedicine.  Chief complaint:  None.    This evaluation, treatment, and documentation was performed by me, and was conducted via telephone, with the assistance of the patient's primary nurse Telephone interview with remote .    Start time: 1:37 PM, end time:  1:46 PM , total time with patient: 9 minutes.      History     Cc: Pain with movement    HPI  80 year-old man with a hypertension, seizure disorder, non-obstructive CAD admitted to [facility] following multiple falls.     At home, the patient lived with a sister who had COVID-19.  He was brought to the ED following two falls.   After first fall he was brought to [facility] and was discharged. Second fall while in bathroom on the day prior to admission.  Patient admits to some shortness of breath and cough as well as nasal congestion.  On presentation he admitted to cough and dyspnea.      In the ED, T was 97.9, R 18, P 90,  / 90, SaO2 90%.  He was in no distress.  Exam revealed a right scalp abrasion and RLQ tenderness.  SARS-CoV2 RNA was positive.  CRP was 269.6. CXR showed diffuse bilateral ground-glass opacities affecting all lobes.  Abdominal CT scan was consistent with constipation.  Head CT showed no acute intracranial abnormality.  The patient was admitted to the telemetry unit.      Hospital Course  The patient was placed on supplemental oxygen, 5 L NC.  Azithromycin, ceftriaxone, and hydroxychloroquine, and tamsulosin were started per protocol.  His oxygenation gradually improved.  On hospital day 5, he was transferred to this hospital due to capacity  "issues at [facility].  Here, oxygenation continued to improve, but the patient has continued to have constitutional symptoms.      Interval History  The patient reports that he is uncomfortable with movement and with sitting in the chair for prolonged periods of time.  Breathing is "bad," mainly because of nasal congestions.  No true dyspnea.  No cough.        Medications  Scheduled Meds:   albuterol  2 puff Inhalation Q8H WA    aspirin  81 mg Oral Daily    atorvastatin  40 mg Oral Daily    clopidogreL  75 mg Oral Daily    enoxaparin  40 mg Subcutaneous Daily    finasteride  5 mg Oral Daily    lisinopriL  10 mg Oral Daily    metoprolol tartrate  50 mg Oral Daily    montelukast  10 mg Oral QHS    PHENobarbitaL  90 mg Oral Daily    phenytoin  200 mg Oral BID    senna-docusate 8.6-50 mg  1 tablet Oral BID    tamsulosin  0.4 mg Oral Daily     Continuous Infusions:  PRN Meds:.acetaminophen, acetaminophen, aluminum & magnesium hydroxide-simethicone, artificial tears, bisacodyL, melatonin, ondansetron, sodium chloride 0.9%      PSFH:  Please see admission note and assessment and plan below.      ROS   Admits Denies   Constitutional Malaise, improving Chills, diaphoresis   Eyes  Visual changes   ENMT Hearing loss, chronic Dysphagia, Epistaxis, nasal congestion   Cardiovascular  Chest pain, palpitations, edema   Respiratory  Cough, dyspnea, wheezing   Gastrointestinal Intermittent constipation Nausea, vomiting, diarrhea, anorexia.     Genitourinary  Dysuria, incontinence   Musculoskeletal  Joint pain, joint swelling   Integumentary  Rash, inflammation, burning   Neruo-Psychiatric  Insomnia, anxiety. Changes in speech, strength, sensation.     Endocrine     Hematologic  Abnormal bruising, bleeding   Immunologic  Inflammation, pain at IV sites.  Pruritis.         Physical Examination       Vital Signs  Vitals  Temp: 97.8 °F (36.6 °C)  Temp src: Oral  Pulse: 80  Heart Rate Source: Monitor  Resp: 17  SpO2: 95 %  Pulse " Oximetry Type: Intermittent  Flow (L/min): 1  O2 Device (Oxygen Therapy): nasal cannula  BP: 103/64  MAP (mmHg): 79  BP Location: Right arm  BP Method: Automatic  Patient Position: Lying           Most recent as of 12:02 PM      24 Hour VS Range    Temp:  [96.1 °F (35.6 °C)-98.1 °F (36.7 °C)]   Pulse:  [69-92]   Resp:  [16-20]   BP: (103-140)/(64-84)   SpO2:  [94 %-98 %]     Intake/Output Summary (Last 24 hours) at 4/22/2020 1236  Last data filed at 4/22/2020 1000  Gross per 24 hour   Intake 480 ml   Output 600 ml   Net -120 ml         Telephone encounter, a physical examination was not performed.      Data       Recent Labs   Lab 04/17/20 0435 04/19/20 0455 04/20/20  0604   WBC 8.46 7.15 6.65   HGB 13.1* 12.8* 12.3*   HCT 40.7 39.8* 38.2*   * 421* 434*        Lymph # (K/uL)   Date Value   04/20/2020 1.1   04/19/2020 1.1   04/17/2020 1.1   04/15/2020 0.8 (L)   04/13/2020 0.8 (L)     Lymph% (%)   Date Value   04/20/2020 17.0 (L)   04/19/2020 14.8 (L)   04/17/2020 12.9 (L)   04/15/2020 9.3 (L)   04/13/2020 9.8 (L)         Recent Labs   Lab 04/17/20 0435 04/19/20  0455 04/20/20  0604   * 137 138   K 3.5 3.6 3.7   CL 98 98 99   CO2 29 28 31*   BUN 14 17 17   CREATININE 0.6 0.6 0.6    92 95   ANIONGAP 8 11 8   CALCIUM 8.6* 8.6* 8.6*        Recent Labs   Lab 04/17/20 0435 04/19/20  0455 04/20/20  0604   PROT 7.7 7.3 7.6   ALBUMIN 2.1* 2.0* 2.1*   BILITOT 0.5 0.4 0.3   ALKPHOS 62 64 65   AST 65* 67* 56*   ALT 46* 49* 45*        Recent Labs   Lab 04/16/20  0859 04/17/20  0435 04/20/20  0604   CRP  --  290.6* 139.0*   DDIMER 4.91*  --  5.10*        BNP (pg/mL)   Date Value   04/09/2020 66   04/09/2020 46   11/29/2015 34     CRP (mg/L)   Date Value   04/20/2020 139.0 (H)   04/17/2020 290.6 (H)   04/14/2020 295.6 (H)   04/12/2020 337.8 (H)   04/11/2020 312.0 (H)     D-Dimer (mg/L FEU)   Date Value   04/20/2020 5.10 (H)   04/16/2020 4.91 (H)   04/14/2020 7.97 (H)   11/29/2015 3.25 (H)     Ferritin  (ng/mL)   Date Value   04/20/2020 1,279 (H)   04/15/2020 1,852 (H)     No results found for: LDH  Troponin I (ng/mL)   Date Value   04/09/2020 0.108 (H)   04/09/2020 0.049 (H)   04/09/2020 0.049 (H)   04/09/2020 0.120 (H)   09/24/2019 0.099 (H)   09/24/2019 0.099 (H)       SARS-CoV-2 RNA, Amplification, Qual (no units)   Date Value   04/09/2020 Positive (A)       Blood Culture, Routine (no units)   Date Value   04/09/2020 No Growth after 4 days.    04/09/2020 No Growth after 4 days.           Assessment & Plan       Hospital Problems   *COVID-19 virus infection [U07.1]  Lower respiratory tract infection due to COVID-19 virus [U07.1, J22]  Acute respiratory failure with hypoxia [J96.01]    Improving.  Decreasing oxygen requirements, but markers remain elevated.  Retest for viral clearing 4/29 or sooner if markers warrant.    Continue supportive care.          Constipation [K59.00]  Responding to current bowel regimen.        Fall [W19.XXXA]  Precautions in place.    Will encourage PT/OT and reevaluation of DME needs.        Coronary artery disease involving native coronary artery of native heart without angina pectoris [I25.10]    Non-obstructive CAD by history.  No evidence for acute coronary syndrome.  Elevated troponin non-specific in the setting of this infection.  No chest pain, ECG without ischemic changes.          Seizure disorder [G40.909]  Stable on current regimen.        Essential hypertension, benign [I10]  Controlled.             Additional Hospital Problems   Moderate malnutrition [E44.0]     Elevated troponin [R79.89]     BPH (benign prostatic hyperplasia) [N40.0]     Dyslipidemia [E78.5]        Discharge Plan:  Weak, likely shedding virus, but otherwise medically stable for transfer to SNF / LTAC.         Garrett Kinney MD  Physician, Cardiology  [facility]

## 2020-04-23 LAB
ALBUMIN SERPL BCP-MCNC: 2.2 G/DL (ref 3.5–5.2)
ALP SERPL-CCNC: 80 U/L (ref 55–135)
ALT SERPL W/O P-5'-P-CCNC: 68 U/L (ref 10–44)
ANION GAP SERPL CALC-SCNC: 7 MMOL/L (ref 8–16)
AST SERPL-CCNC: 89 U/L (ref 10–40)
BASOPHILS # BLD AUTO: 0.08 K/UL (ref 0–0.2)
BASOPHILS NFR BLD: 1.4 % (ref 0–1.9)
BILIRUB SERPL-MCNC: 0.2 MG/DL (ref 0.1–1)
BUN SERPL-MCNC: 13 MG/DL (ref 8–23)
CALCIUM SERPL-MCNC: 8.4 MG/DL (ref 8.7–10.5)
CHLORIDE SERPL-SCNC: 103 MMOL/L (ref 95–110)
CO2 SERPL-SCNC: 27 MMOL/L (ref 23–29)
CREAT SERPL-MCNC: 0.6 MG/DL (ref 0.5–1.4)
CRP SERPL-MCNC: 91.9 MG/L (ref 0–8.2)
DIFFERENTIAL METHOD: ABNORMAL
EOSINOPHIL # BLD AUTO: 0.1 K/UL (ref 0–0.5)
EOSINOPHIL NFR BLD: 1 % (ref 0–8)
ERYTHROCYTE [DISTWIDTH] IN BLOOD BY AUTOMATED COUNT: 12.4 % (ref 11.5–14.5)
EST. GFR  (AFRICAN AMERICAN): >60 ML/MIN/1.73 M^2
EST. GFR  (NON AFRICAN AMERICAN): >60 ML/MIN/1.73 M^2
FERRITIN SERPL-MCNC: 1118 NG/ML (ref 20–300)
GLUCOSE SERPL-MCNC: 96 MG/DL (ref 70–110)
HCT VFR BLD AUTO: 38.4 % (ref 40–54)
HGB BLD-MCNC: 12.2 G/DL (ref 14–18)
IMM GRANULOCYTES # BLD AUTO: 0.03 K/UL (ref 0–0.04)
IMM GRANULOCYTES NFR BLD AUTO: 0.5 % (ref 0–0.5)
LYMPHOCYTES # BLD AUTO: 1.3 K/UL (ref 1–4.8)
LYMPHOCYTES NFR BLD: 22.1 % (ref 18–48)
MCH RBC QN AUTO: 30 PG (ref 27–31)
MCHC RBC AUTO-ENTMCNC: 31.8 G/DL (ref 32–36)
MCV RBC AUTO: 95 FL (ref 82–98)
MONOCYTES # BLD AUTO: 0.6 K/UL (ref 0.3–1)
MONOCYTES NFR BLD: 10.3 % (ref 4–15)
NEUTROPHILS # BLD AUTO: 3.8 K/UL (ref 1.8–7.7)
NEUTROPHILS NFR BLD: 64.7 % (ref 38–73)
NRBC BLD-RTO: 0 /100 WBC
PLATELET # BLD AUTO: 458 K/UL (ref 150–350)
PMV BLD AUTO: 9.6 FL (ref 9.2–12.9)
POTASSIUM SERPL-SCNC: 3.6 MMOL/L (ref 3.5–5.1)
PROT SERPL-MCNC: 7.8 G/DL (ref 6–8.4)
RBC # BLD AUTO: 4.06 M/UL (ref 4.6–6.2)
SARS-COV-2 RNA RESP QL NAA+PROBE: DETECTED
SODIUM SERPL-SCNC: 137 MMOL/L (ref 136–145)
WBC # BLD AUTO: 5.84 K/UL (ref 3.9–12.7)

## 2020-04-23 PROCEDURE — 25000003 PHARM REV CODE 250: Performed by: EMERGENCY MEDICINE

## 2020-04-23 PROCEDURE — 86140 C-REACTIVE PROTEIN: CPT

## 2020-04-23 PROCEDURE — 99232 PR SUBSEQUENT HOSPITAL CARE,LEVL II: ICD-10-PCS | Mod: ,,, | Performed by: INTERNAL MEDICINE

## 2020-04-23 PROCEDURE — 97803 MED NUTRITION INDIV SUBSEQ: CPT

## 2020-04-23 PROCEDURE — 80053 COMPREHEN METABOLIC PANEL: CPT

## 2020-04-23 PROCEDURE — 11000001 HC ACUTE MED/SURG PRIVATE ROOM

## 2020-04-23 PROCEDURE — U0002 COVID-19 LAB TEST NON-CDC: HCPCS

## 2020-04-23 PROCEDURE — 36415 COLL VENOUS BLD VENIPUNCTURE: CPT

## 2020-04-23 PROCEDURE — 82728 ASSAY OF FERRITIN: CPT

## 2020-04-23 PROCEDURE — 25000003 PHARM REV CODE 250: Performed by: INTERNAL MEDICINE

## 2020-04-23 PROCEDURE — 85025 COMPLETE CBC W/AUTO DIFF WBC: CPT

## 2020-04-23 PROCEDURE — 94761 N-INVAS EAR/PLS OXIMETRY MLT: CPT

## 2020-04-23 PROCEDURE — 25000003 PHARM REV CODE 250: Performed by: STUDENT IN AN ORGANIZED HEALTH CARE EDUCATION/TRAINING PROGRAM

## 2020-04-23 PROCEDURE — 63600175 PHARM REV CODE 636 W HCPCS: Performed by: INTERNAL MEDICINE

## 2020-04-23 PROCEDURE — 94640 AIRWAY INHALATION TREATMENT: CPT

## 2020-04-23 PROCEDURE — 99232 SBSQ HOSP IP/OBS MODERATE 35: CPT | Mod: ,,, | Performed by: INTERNAL MEDICINE

## 2020-04-23 RX ORDER — OXYMETAZOLINE HCL 0.05 %
2 SPRAY, NON-AEROSOL (ML) NASAL 2 TIMES DAILY
Status: COMPLETED | OUTPATIENT
Start: 2020-04-23 | End: 2020-04-25

## 2020-04-23 RX ADMIN — ACETAMINOPHEN 650 MG: 325 TABLET ORAL at 12:04

## 2020-04-23 RX ADMIN — Medication 2 SPRAY: at 08:04

## 2020-04-23 RX ADMIN — STANDARDIZED SENNA CONCENTRATE AND DOCUSATE SODIUM 1 TABLET: 8.6; 5 TABLET ORAL at 08:04

## 2020-04-23 RX ADMIN — FINASTERIDE 5 MG: 5 TABLET, FILM COATED ORAL at 08:04

## 2020-04-23 RX ADMIN — Medication 2 SPRAY: at 02:04

## 2020-04-23 RX ADMIN — ALBUTEROL SULFATE 2 PUFF: 90 AEROSOL, METERED RESPIRATORY (INHALATION) at 07:04

## 2020-04-23 RX ADMIN — ALBUTEROL SULFATE 2 PUFF: 90 AEROSOL, METERED RESPIRATORY (INHALATION) at 04:04

## 2020-04-23 RX ADMIN — METOPROLOL TARTRATE 50 MG: 50 TABLET, FILM COATED ORAL at 08:04

## 2020-04-23 RX ADMIN — CLOPIDOGREL BISULFATE 75 MG: 75 TABLET ORAL at 08:04

## 2020-04-23 RX ADMIN — MONTELUKAST 10 MG: 10 TABLET, FILM COATED ORAL at 08:04

## 2020-04-23 RX ADMIN — ASPIRIN 81 MG: 81 TABLET, COATED ORAL at 09:04

## 2020-04-23 RX ADMIN — ENOXAPARIN SODIUM 40 MG: 100 INJECTION SUBCUTANEOUS at 08:04

## 2020-04-23 RX ADMIN — LISINOPRIL 10 MG: 5 TABLET ORAL at 08:04

## 2020-04-23 RX ADMIN — PHENYTOIN SODIUM 200 MG: 100 CAPSULE ORAL at 08:04

## 2020-04-23 RX ADMIN — TAMSULOSIN HYDROCHLORIDE 0.4 MG: 0.4 CAPSULE ORAL at 08:04

## 2020-04-23 RX ADMIN — ATORVASTATIN CALCIUM 40 MG: 40 TABLET, FILM COATED ORAL at 08:04

## 2020-04-23 RX ADMIN — PHENOBARBITAL 90 MG: 30 TABLET ORAL at 08:04

## 2020-04-23 NOTE — NURSING
The patient spoke to Dr. Ruby Via Martti translation the POC discussed with the patient the patient voiced understanding and reports feeling congested no other questions or concerns were noted

## 2020-04-23 NOTE — PROGRESS NOTES
[facility] Medicine Progress Note  Virtual Medicine Team 7  04/23/2020       Vern Lr  YOB: 1940  Medical Record Number:  [id number]  Attending Physician:  Praveen Fine MD   Date of Admission: 4/9/2020   [facility] Medicine Team: [facility] VIRTUAL TEAM 7 Andre Ruby MD  Hospital Day:  14    Current Principal Problem:  COVID-19 virus infection      This service was provided through telemedicine.  Chief complaint:  None.    This evaluation, treatment, and documentation was performed by me, and was conducted via telephone, with the assistance of the patient's primary nurse Telephone interview with remote .    Start time: 12.30 PM, end time: 12.37 PM , total time with patient: 7 minutes.      History     Cc: Pain with movement    HPI  80 year-old man with a hypertension, seizure disorder, non-obstructive CAD admitted to [facility] following multiple falls.     At home, the patient lived with a sister who had COVID-19.  He was brought to the ED following two falls.   After first fall he was brought to [facility] and was discharged. Second fall while in bathroom on the day prior to admission.  Patient admits to some shortness of breath and cough as well as nasal congestion.  On presentation he admitted to cough and dyspnea.      In the ED, T was 97.9, R 18, P 90,  / 90, SaO2 90%.  He was in no distress.  Exam revealed a right scalp abrasion and RLQ tenderness.  SARS-CoV2 RNA was positive.  CRP was 269.6. CXR showed diffuse bilateral ground-glass opacities affecting all lobes.  Abdominal CT scan was consistent with constipation.  Head CT showed no acute intracranial abnormality.  The patient was admitted to the telemetry unit.      Hospital Course  The patient was placed on supplemental oxygen, 5 L NC.  Azithromycin, ceftriaxone, and hydroxychloroquine, and tamsulosin were started per protocol.  His oxygenation gradually improved.  On  hospital day 5, he was transferred to this hospital due to capacity issues at [facility].  Here, oxygenation continued to improve, but the patient has continued to have constitutional symptoms.    Pending SNF placement    Interval History  Per RN communication and using   CLAUDIA, no new symptoms  Having regular urination and defecation  Persistent c/o nasal congestion feeling something stuck in nose.   Patient medically stable, vitally stable, on Room air, pending SNF placement      Medications  Scheduled Meds:   albuterol  2 puff Inhalation Q8H WA    aspirin  81 mg Oral Daily    atorvastatin  40 mg Oral Daily    clopidogreL  75 mg Oral Daily    enoxaparin  40 mg Subcutaneous Daily    finasteride  5 mg Oral Daily    lisinopriL  10 mg Oral Daily    metoprolol tartrate  50 mg Oral Daily    montelukast  10 mg Oral QHS    PHENobarbitaL  90 mg Oral Daily    phenytoin  200 mg Oral BID    senna-docusate 8.6-50 mg  1 tablet Oral BID    tamsulosin  0.4 mg Oral Daily     Continuous Infusions:  PRN Meds:.acetaminophen, acetaminophen, aluminum & magnesium hydroxide-simethicone, artificial tears, bisacodyL, melatonin, ondansetron, sodium chloride 0.9%      PSFH:  Please see admission note and assessment and plan below.      ROS   Admits Denies   Constitutional Malaise, improving Chills, diaphoresis   Eyes  Visual changes   ENMT Hearing loss, chronic Dysphagia, Epistaxis, nasal congestion   Cardiovascular  Chest pain, palpitations, edema   Respiratory  Cough, dyspnea, wheezing   Gastrointestinal  Nausea, vomiting, diarrhea, anorexia.     Genitourinary  Dysuria, incontinence   Musculoskeletal  Joint pain, joint swelling   Integumentary  Rash, inflammation, burning   Neruo-Psychiatric  Insomnia, anxiety. Changes in speech, strength, sensation.     Endocrine     Hematologic  Abnormal bruising, bleeding   Immunologic  Inflammation, pain at IV sites.  Pruritis.         Physical Examination       Vital  Signs  Vitals  Temp: 98.4 °F (36.9 °C)  Temp src: Oral  Pulse: 76  Heart Rate Source: Monitor  Resp: 16  SpO2: 98 %  Pulse Oximetry Type: Continuous  Flow (L/min): 1  O2 Device (Oxygen Therapy): room air  BP: 135/72  MAP (mmHg): 98  BP Location: Right arm  BP Method: Automatic  Patient Position: Lying           Most recent as of 12:02 PM      24 Hour VS Range    Temp:  [97.4 °F (36.3 °C)-98.4 °F (36.9 °C)]   Pulse:  [67-93]   Resp:  [16-18]   BP: (103-135)/(64-80)   SpO2:  [91 %-98 %]     Intake/Output Summary (Last 24 hours) at 4/23/2020 1058  Last data filed at 4/23/2020 0800  Gross per 24 hour   Intake 960 ml   Output 900 ml   Net 60 ml         Telephone encounter, a physical examination was not performed.  Per RN: Physical exam unchanged, grossly normal    Data       Recent Labs   Lab 04/17/20 0435 04/19/20 0455 04/20/20  0604 04/23/20  0336   WBC 8.46 7.15 6.65 5.84   HGB 13.1* 12.8* 12.3* 12.2*   HCT 40.7 39.8* 38.2* 38.4*   * 421* 434* 458*        Lymph # (K/uL)   Date Value   04/23/2020 1.3   04/20/2020 1.1   04/19/2020 1.1   04/17/2020 1.1   04/15/2020 0.8 (L)     Lymph% (%)   Date Value   04/23/2020 22.1   04/20/2020 17.0 (L)   04/19/2020 14.8 (L)   04/17/2020 12.9 (L)   04/15/2020 9.3 (L)         Recent Labs   Lab 04/17/20 0435 04/19/20 0455 04/20/20  0604 04/23/20  0336   * 137 138 137   K 3.5 3.6 3.7 3.6   CL 98 98 99 103   CO2 29 28 31* 27   BUN 14 17 17 13   CREATININE 0.6 0.6 0.6 0.6    92 95 96   ANIONGAP 8 11 8 7*   CALCIUM 8.6* 8.6* 8.6* 8.4*        Recent Labs   Lab 04/17/20  0435 04/19/20  0455 04/20/20  0604 04/23/20  0336   PROT 7.7 7.3 7.6 7.8   ALBUMIN 2.1* 2.0* 2.1* 2.2*   BILITOT 0.5 0.4 0.3 0.2   ALKPHOS 62 64 65 80   AST 65* 67* 56* 89*   ALT 46* 49* 45* 68*        Recent Labs   Lab 04/17/20  0435 04/20/20  0604 04/23/20  0336   .6* 139.0* 91.9*   DDIMER  --  5.10*  --         BNP (pg/mL)   Date Value   04/09/2020 66   04/09/2020 46   11/29/2015 34     CRP  (mg/L)   Date Value   04/23/2020 91.9 (H)   04/20/2020 139.0 (H)   04/17/2020 290.6 (H)   04/14/2020 295.6 (H)   04/12/2020 337.8 (H)     D-Dimer (mg/L FEU)   Date Value   04/20/2020 5.10 (H)   04/16/2020 4.91 (H)   04/14/2020 7.97 (H)   11/29/2015 3.25 (H)     Ferritin (ng/mL)   Date Value   04/23/2020 1,118 (H)   04/20/2020 1,279 (H)   04/15/2020 1,852 (H)     No results found for: LDH  Troponin I (ng/mL)   Date Value   04/09/2020 0.108 (H)   04/09/2020 0.049 (H)   04/09/2020 0.049 (H)   04/09/2020 0.120 (H)   09/24/2019 0.099 (H)   09/24/2019 0.099 (H)       SARS-CoV-2 RNA, Amplification, Qual (no units)   Date Value   04/09/2020 Positive (A)       Blood Culture, Routine (no units)   Date Value   04/09/2020 No Growth after 4 days.    04/09/2020 No Growth after 4 days.           Assessment & Plan       Hospital Problems   *COVID-19 virus infection [U07.1]  Lower respiratory tract infection due to COVID-19 virus [U07.1, J22]  Acute respiratory failure with hypoxia [J96.01]    Improving.  Retest for viral clearing 4/29 or sooner if markers warrant.  Off supplemental oxygen 4/23  CRP 91  Ferritin 1100s  Will retest today as markers decreasing and patient awaiting SNF placement. (first positive 4/09)    Continue supportive care.          Constipation [K59.00]  Responding to current bowel regimen.        Fall [W19.XXXA]  Precautions in place.    Will encourage PT/OT and reevaluation of DME needs.        Coronary artery disease involving native coronary artery of native heart without angina pectoris [I25.10]    Non-obstructive CAD by history.  No evidence for acute coronary syndrome.  Elevated troponin non-specific in the setting of this infection.  No chest pain, ECG without ischemic changes.          Seizure disorder [G40.909]  Stable on current regimen.        Essential hypertension, benign [I10]  Controlled.             Additional Hospital Problems   Moderate malnutrition [E44.0]     Elevated troponin  [R79.89]     BPH (benign prostatic hyperplasia) [N40.0]     Dyslipidemia [E78.5]        Discharge Plan: Weak, likely shedding virus, but otherwise medically stable for transfer to SNF / LTAC.    Repeat COVID testing today 4/23  Pending SNF placement - updated the sister Ms Smith about the plan (4/23)    Andre Ruby MD  Internal Medicine PGY1  [address] [place] [address]

## 2020-04-23 NOTE — CARE UPDATE
Called the sister Ms Smith and updated her with the plan of care that patient is improving and medically stable. Informed we are waiting on SNF acceptance as of now and we will repeat COVID testing for clearance. Family did not have any additional questions at this point. Informed we will call with next update.     Andre Ruby MD  Internal Medicine PGY1  [facility], [place], [id number]

## 2020-04-23 NOTE — PROGRESS NOTES
"[facility]  Adult Nutrition  Progress Note    SUMMARY       Recommendations    1. Continue with Cardiac diet and Boost Plus TID   2. RD to continue to monitor    Goals: Pt will consume at least 50% intake at meals  Nutrition Goal Status: progressing towards goal  Communication of RD Recs: other (comment)    Reason for Assessment    Reason For Assessment: RD follow-up  Diagnosis: (COVID)  Relevant Medical History: seizure disorder, BPH, DM, stroke, CAD  Interdisciplinary Rounds: did not attend  General Information Comments: RD working remotely. Could not reach pt but spoke with nurse. Nurse has cared for pt for a few days now. She stated that pt normally eats 50-75% of all meals. This am, he did not eat breakfast but did drink all of his Boost. Pt has no n/v/d. Pt's UBW per charts is 86kg so pt has a 10lbs (5%) wt loss and meets criteria for acute moderate malnutrition. Unable to do NFPE due to covid restrictions.  Nutrition Discharge Planning: Pt to follow low sodium, low saturated fat diet.    Nutrition Risk Screen    Nutrition Risk Screen: no indicators present    Nutrition/Diet History    Food Preferences: unable to assess  Spiritual, Cultural Beliefs, Presybeterian Practices, Values that Affect Care: yes(Irish)  Factors Affecting Nutritional Intake: decreased appetite    Anthropometrics    Temp: 98 °F (36.7 °C)  Height: 5' 5" (165.1 cm)  Height (inches): 65 in  Weight Method: Bed Scale  Weight: 82 kg (180 lb 12.4 oz)  Weight (lb): 180.78 lb  Ideal Body Weight (IBW), Male: 136 lb  % Ideal Body Weight, Male (lb): 139.74 %  BMI (Calculated): 30.1  BMI Grade: 30 - 34.9- obesity - grade I       Lab/Procedures/Meds    Pertinent Labs Reviewed: reviewed  Pertinent Labs Comments: Alb 2.2  Pertinent Medications Reviewed: reviewed  Pertinent Medications Comments: Senna    Physical Findings/Assessment         Estimated/Assessed Needs    Weight Used For Calorie Calculations: 86.2 kg (190 lb 0.6 oz)  Energy " Calorie Requirements (kcal): 2155 (25 kcal/kg)  Energy Need Method: Kcal/kg  Protein Requirements: 69g (0.8g/kg)  Weight Used For Protein Calculations: 86.2 kg (190 lb 0.6 oz)     Estimated Fluid Requirement Method: RDA Method  RDA Method (mL): 2155         Nutrition Prescription Ordered    Current Diet Order: Cardiac  Oral Nutrition Supplement: Boost Plus TID    Evaluation of Received Nutrient/Fluid Intake    I/O: +3L since admit  Energy Calories Required: not meeting needs  Protein Required: not meeting needs  Fluid Required: not meeting needs  Comments: LBM 4/22  Tolerance: tolerating  % Intake of Estimated Energy Needs: 50-75%  % Meal Intake: 50-75%    Nutrition Risk    Level of Risk/Frequency of Follow-up: low(1xweek)     Assessment and Plan      Contributing Nutrition Diagnosis  Inadequate energy intake    Related to (etiology):   Decreased appetite, COVID    Signs and Symptoms (as evidenced by):   Poor intake at meals    Interventions:  Collaboration with other providers  Commercial beverage- Boost Plus    Nutrition Diagnosis Status:   Continues             Monitor and Evaluation    Food and Nutrient Intake: energy intake, food and beverage intake  Food and Nutrient Adminstration: diet order  Physical Activity and Function: nutrition-related ADLs and IADLs  Anthropometric Measurements: weight  Biochemical Data, Medical Tests and Procedures: electrolyte and renal panel  Nutrition-Focused Physical Findings: overall appearance     Malnutrition Assessment                                       Nutrition Follow-Up    RD Follow-up?: Yes

## 2020-04-24 LAB
POCT GLUCOSE: 120 MG/DL (ref 70–110)
POCT GLUCOSE: 75 MG/DL (ref 70–110)
POCT GLUCOSE: 89 MG/DL (ref 70–110)

## 2020-04-24 PROCEDURE — 97530 THERAPEUTIC ACTIVITIES: CPT

## 2020-04-24 PROCEDURE — 94761 N-INVAS EAR/PLS OXIMETRY MLT: CPT

## 2020-04-24 PROCEDURE — 97110 THERAPEUTIC EXERCISES: CPT

## 2020-04-24 PROCEDURE — 25000003 PHARM REV CODE 250: Performed by: HOSPITALIST

## 2020-04-24 PROCEDURE — 25000003 PHARM REV CODE 250: Performed by: EMERGENCY MEDICINE

## 2020-04-24 PROCEDURE — 99232 PR SUBSEQUENT HOSPITAL CARE,LEVL II: ICD-10-PCS | Mod: ,,, | Performed by: INTERNAL MEDICINE

## 2020-04-24 PROCEDURE — 94640 AIRWAY INHALATION TREATMENT: CPT

## 2020-04-24 PROCEDURE — 25000003 PHARM REV CODE 250: Performed by: INTERNAL MEDICINE

## 2020-04-24 PROCEDURE — 99232 SBSQ HOSP IP/OBS MODERATE 35: CPT | Mod: ,,, | Performed by: INTERNAL MEDICINE

## 2020-04-24 PROCEDURE — 11000001 HC ACUTE MED/SURG PRIVATE ROOM

## 2020-04-24 PROCEDURE — 63600175 PHARM REV CODE 636 W HCPCS: Performed by: INTERNAL MEDICINE

## 2020-04-24 RX ADMIN — Medication 6 MG: at 09:04

## 2020-04-24 RX ADMIN — MONTELUKAST 10 MG: 10 TABLET, FILM COATED ORAL at 09:04

## 2020-04-24 RX ADMIN — Medication 2 SPRAY: at 08:04

## 2020-04-24 RX ADMIN — ENOXAPARIN SODIUM 40 MG: 100 INJECTION SUBCUTANEOUS at 09:04

## 2020-04-24 RX ADMIN — CLOPIDOGREL BISULFATE 75 MG: 75 TABLET ORAL at 08:04

## 2020-04-24 RX ADMIN — Medication 2 SPRAY: at 09:04

## 2020-04-24 RX ADMIN — PHENYTOIN SODIUM 200 MG: 100 CAPSULE ORAL at 08:04

## 2020-04-24 RX ADMIN — FINASTERIDE 5 MG: 5 TABLET, FILM COATED ORAL at 08:04

## 2020-04-24 RX ADMIN — STANDARDIZED SENNA CONCENTRATE AND DOCUSATE SODIUM 1 TABLET: 8.6; 5 TABLET ORAL at 08:04

## 2020-04-24 RX ADMIN — ATORVASTATIN CALCIUM 40 MG: 40 TABLET, FILM COATED ORAL at 08:04

## 2020-04-24 RX ADMIN — STANDARDIZED SENNA CONCENTRATE AND DOCUSATE SODIUM 1 TABLET: 8.6; 5 TABLET ORAL at 09:04

## 2020-04-24 RX ADMIN — ALBUTEROL SULFATE 2 PUFF: 90 AEROSOL, METERED RESPIRATORY (INHALATION) at 03:04

## 2020-04-24 RX ADMIN — ASPIRIN 81 MG: 81 TABLET, COATED ORAL at 08:04

## 2020-04-24 RX ADMIN — ACETAMINOPHEN 650 MG: 325 TABLET ORAL at 09:04

## 2020-04-24 RX ADMIN — PHENYTOIN SODIUM 200 MG: 100 CAPSULE ORAL at 09:04

## 2020-04-24 RX ADMIN — Medication 6 MG: at 12:04

## 2020-04-24 RX ADMIN — PHENOBARBITAL 90 MG: 30 TABLET ORAL at 08:04

## 2020-04-24 RX ADMIN — TAMSULOSIN HYDROCHLORIDE 0.4 MG: 0.4 CAPSULE ORAL at 08:04

## 2020-04-24 RX ADMIN — ACETAMINOPHEN 650 MG: 325 TABLET ORAL at 01:04

## 2020-04-24 RX ADMIN — LISINOPRIL 10 MG: 5 TABLET ORAL at 08:04

## 2020-04-24 RX ADMIN — METOPROLOL TARTRATE 50 MG: 50 TABLET, FILM COATED ORAL at 08:04

## 2020-04-24 RX ADMIN — ALBUTEROL SULFATE 2 PUFF: 90 AEROSOL, METERED RESPIRATORY (INHALATION) at 07:04

## 2020-04-24 NOTE — PLAN OF CARE
POC reviewed with patient. Patient verbalized understanding via . All questions and concerns addressed. No acute events during shift. VSS. Isolation and safety precautions maintained. Will continue to monitor.

## 2020-04-24 NOTE — PROGRESS NOTES
Pt sitting up in chair for most of day. Tolerating meals and denies pain and SOB. Large BM today. On RA. Pt talked with family today. No questions or concerns.

## 2020-04-24 NOTE — PT/OT/SLP PROGRESS
"Physical Therapy Treatment    Patient Name:  Vern Lr   MRN:  [id number]    Recommendations:     Discharge Recommendations:  nursing facility, skilled   Discharge Equipment Recommendations: walker, rolling   Barriers to discharge: Inaccessible home and Decreased caregiver support    Assessment:     Vern Lr is a 80 y.o. male admitted with a medical diagnosis of COVID-19 virus infection.  He presents with the following impairments/functional limitations:  weakness, impaired endurance, impaired self care skills, impaired functional mobilty, gait instability, impaired balance, impaired cognition, decreased coordination, decreased upper extremity function, decreased lower extremity function, decreased safety awareness, impaired cardiopulmonary response to activity.    Rehab Prognosis: Good; patient would benefit from acute skilled PT services to address these deficits and reach maximum level of function.    Recent Surgery: * No surgery found *      Plan:     During this hospitalization, patient to be seen 4 x/week to address the identified rehab impairments via gait training, therapeutic activities, therapeutic exercises, neuromuscular re-education and progress toward the following goals:    · Plan of Care Expires:  05/13/20    Subjective     Chief Complaint: pain seated EOB  Patient/Family Comments/goals: "I haven't talked to my sister in 3 days" - pt assisted patient in contacting family number written on board  Pain/Comfort:  · Pain Rating 1: (c/o L low back pain)  · Location - Side 1: Left  · Location - Orientation 1: lower  · Location 1: back  · Pain Addressed 1: Reposition, Distraction  · Pain Rating Post-Intervention 1: 0/10      Objective:     Communicated with nursing prior to session.  Patient found HOB elevated with telemetry, peripheral IV, pulse ox (continuous), oxygen (.5 L via Nasal Cannula), Condom Catheter, bed alarm upon PT entry to room.     General Precautions: Standard, airborne, contact, " droplet, fall   Orthopedic Precautions:N/A   Braces: N/A   Language: Polish Speak, CATY used  Oxygen: Pt O2 saturation levels at 93% on room air during therapeutic exercises and ambulation.    Functional Mobility:  · Bed Mobility:     · Scooting: contact guard assistance  · Supine to Sit: contact guard assistance  · Transfers:     · Sit to Stand:  minimum assistance with rolling walker  · Gait:   · Trial 1: 12 ft, RW, CGA, VCing provided  · Trial 2: 16ft, RW, CGA, VCing provided  · Balance:   · Static Sitting: SBA  · Dynamic Sitting: SBA  · Static Standing: CGA  · Dynamic Standing: CGA for ambulation      AM-PAC 6 CLICK MOBILITY  Turning over in bed (including adjusting bedclothes, sheets and blankets)?: 3  Sitting down on and standing up from a chair with arms (e.g., wheelchair, bedside commode, etc.): 3  Moving from lying on back to sitting on the side of the bed?: 3  Moving to and from a bed to a chair (including a wheelchair)?: 3  Need to walk in hospital room?: 3  Climbing 3-5 steps with a railing?: 1  Basic Mobility Total Score: 16       Therapeutic Activities and Exercises:  BLE, 1 x 10 each: LAQs, and hip flexion, verbal instructions provided via WebPay , and tactile cuing also provided.  PT assisted pt in contacting family via hospital telephone.  Patient educated on role of therapy, goals of session, and benefits of mobilizing.   Discussed PT plan of care during hospitalization.   Patient educated that they need to call for assistance to mobilize out of bed.   Patient educated on how their diagnosis impacts their mobility within PT scope of practice.   Communication board updated.  All questions answered within PT scope of practice.      Patient left up in chair with all lines intact, call button in reach and nursing notified..    GOALS:   Multidisciplinary Problems     Physical Therapy Goals        Problem: Physical Therapy Goal    Goal Priority Disciplines Outcome Goal Variances Interventions    Physical Therapy Goal     PT, PT/OT Ongoing, Progressing     Description:  Goals to be met by: 2020    Patient will increase functional independence with mobility by performin. Pt will perform bed mobility (rolling L/R, scooting, and bridging) with Nawaf. - GOAL MET  REVISED: with CGA  2. Pt will perform supine to/from sit with Nawaf. - GOAL MET  REVISED: with CGA  3. Pt will sit EOB x 10 mins with B UE support with Supervision assistance.  4. Pt with perform sit to stand with modA assistance and straight cane.  5. Pt will ambulate 30 feet with mod assistance and straight cane.  6. Pt will perform there-ex from handout x 15 reps to improve strength for functional mobility.                           Time Tracking:     PT Received On: 20  PT Start Time: 1556     PT Stop Time: 1620  PT Total Time (min): 24 min     Billable Minutes: Therapeutic Activity 12 and Therapeutic Exercise 12    Treatment Type: Treatment  PT/PTA: PT           Grecia Colby, PT  2020

## 2020-04-24 NOTE — PROGRESS NOTES
[facility] Medicine Progress Note  Virtual Medicine Team 7  04/24/2020       Vern Lr  YOB: 1940  Medical Record Number:  [id number]  Attending Physician:  Praveen Fine MD   Date of Admission: 4/9/2020   [facility] Medicine Team: [facility] VIRTUAL TEAM 7 Andre Ruby MD  Hospital Day:  14    Current Principal Problem:  COVID-19 virus infection    USING a  and help of RN  This service was provided through telemedicine.  Chief complaint:  None.    This evaluation, treatment, and documentation was performed by me, and was conducted via telephone, with the assistance of the patient's primary nurse Telephone interview with remote .    Start time: 11.00 am, end time: 11.04 am , total time with patient: 4 minutes.      History     Cc: Pain with movement    HPI  80 year-old man with a hypertension, seizure disorder, non-obstructive CAD admitted to [facility] following multiple falls.     At home, the patient lived with a sister who had COVID-19.  He was brought to the ED following two falls.   After first fall he was brought to [facility] and was discharged. Second fall while in bathroom on the day prior to admission.  Patient admits to some shortness of breath and cough as well as nasal congestion.  On presentation he admitted to cough and dyspnea.      In the ED, T was 97.9, R 18, P 90,  / 90, SaO2 90%.  He was in no distress.  Exam revealed a right scalp abrasion and RLQ tenderness.  SARS-CoV2 RNA was positive.  CRP was 269.6. CXR showed diffuse bilateral ground-glass opacities affecting all lobes.  Abdominal CT scan was consistent with constipation.  Head CT showed no acute intracranial abnormality.  The patient was admitted to the telemetry unit.      Hospital Course  The patient was placed on supplemental oxygen, 5 L NC.  Azithromycin, ceftriaxone, and hydroxychloroquine, and tamsulosin were started per protocol.   His oxygenation gradually improved.  On hospital day 5, he was transferred to this hospital due to capacity issues at [facility].  Here, oxygenation continued to improve, but the patient has continued to have constitutional symptoms.    Pending SNF placement    Interval History  COVID-19 positive 4/24  Per RN communication and using   CLAUDIA, no new symptoms  Having regular urination and defecation  Nasal congestion resolved, provided eye drops for dry eyes.   Patient medically stable, vitally stable, on Room air, pending SNF placement      Medications  Scheduled Meds:   albuterol  2 puff Inhalation Q8H WA    aspirin  81 mg Oral Daily    atorvastatin  40 mg Oral Daily    clopidogreL  75 mg Oral Daily    enoxaparin  40 mg Subcutaneous Daily    finasteride  5 mg Oral Daily    lisinopriL  10 mg Oral Daily    metoprolol tartrate  50 mg Oral Daily    montelukast  10 mg Oral QHS    oxymetazoline  2 spray Each Nostril BID    PHENobarbitaL  90 mg Oral Daily    phenytoin  200 mg Oral BID    senna-docusate 8.6-50 mg  1 tablet Oral BID    tamsulosin  0.4 mg Oral Daily     Continuous Infusions:  PRN Meds:.acetaminophen, acetaminophen, aluminum & magnesium hydroxide-simethicone, artificial tears, bisacodyL, melatonin, ondansetron, sodium chloride 0.9%      PSFH:  Please see admission note and assessment and plan below.      ROS   Admits Denies   Constitutional Malaise, improving Chills, diaphoresis   Eyes  Visual changes   ENMT Hearing loss, chronic Dysphagia, Epistaxis, nasal congestion   Cardiovascular  Chest pain, palpitations, edema   Respiratory  Cough, dyspnea, wheezing   Gastrointestinal  Nausea, vomiting, diarrhea, anorexia.     Genitourinary  Dysuria, incontinence   Musculoskeletal  Joint pain, joint swelling   Integumentary  Rash, inflammation, burning   Neruo-Psychiatric  Insomnia, anxiety. Changes in speech, strength, sensation.     Endocrine     Hematologic  Abnormal bruising, bleeding    Immunologic  Inflammation, pain at IV sites.  Pruritis.         Physical Examination       Vital Signs  Vitals  Temp: 96.9 °F (36.1 °C)  Temp src: Oral  Pulse: 89  Heart Rate Source: Continuous  Resp: 18  SpO2: (!) 94 %  Pulse Oximetry Type: Continuous  Flow (L/min): 1  O2 Device (Oxygen Therapy): room air  BP: 121/75  MAP (mmHg): 95  BP Location: Right arm  BP Method: cNIBP  Patient Position: Lying           Most recent as of 12:02 PM      24 Hour VS Range    Temp:  [96.9 °F (36.1 °C)-98 °F (36.7 °C)]   Pulse:  []   Resp:  [15-26]   BP: (117-142)/(64-76)   SpO2:  [91 %-98 %]     Intake/Output Summary (Last 24 hours) at 4/24/2020 1113  Last data filed at 4/24/2020 0600  Gross per 24 hour   Intake 360 ml   Output 200 ml   Net 160 ml         Telephone encounter, a physical examination was not performed.  Per RN: Physical exam unchanged, grossly normal    Data       Recent Labs   Lab 04/19/20 0455 04/20/20  0604 04/23/20  0336   WBC 7.15 6.65 5.84   HGB 12.8* 12.3* 12.2*   HCT 39.8* 38.2* 38.4*   * 434* 458*        Lymph # (K/uL)   Date Value   04/23/2020 1.3   04/20/2020 1.1   04/19/2020 1.1   04/17/2020 1.1   04/15/2020 0.8 (L)     Lymph% (%)   Date Value   04/23/2020 22.1   04/20/2020 17.0 (L)   04/19/2020 14.8 (L)   04/17/2020 12.9 (L)   04/15/2020 9.3 (L)         Recent Labs   Lab 04/19/20  0455 04/20/20  0604 04/23/20  0336    138 137   K 3.6 3.7 3.6   CL 98 99 103   CO2 28 31* 27   BUN 17 17 13   CREATININE 0.6 0.6 0.6   GLU 92 95 96   ANIONGAP 11 8 7*   CALCIUM 8.6* 8.6* 8.4*        Recent Labs   Lab 04/19/20  0455 04/20/20  0604 04/23/20  0336   PROT 7.3 7.6 7.8   ALBUMIN 2.0* 2.1* 2.2*   BILITOT 0.4 0.3 0.2   ALKPHOS 64 65 80   AST 67* 56* 89*   ALT 49* 45* 68*        Recent Labs   Lab 04/20/20  0604 04/23/20  0336   .0* 91.9*   DDIMER 5.10*  --         BNP (pg/mL)   Date Value   04/09/2020 66   04/09/2020 46   11/29/2015 34     CRP (mg/L)   Date Value   04/23/2020 91.9 (H)    04/20/2020 139.0 (H)   04/17/2020 290.6 (H)   04/14/2020 295.6 (H)   04/12/2020 337.8 (H)     D-Dimer (mg/L FEU)   Date Value   04/20/2020 5.10 (H)   04/16/2020 4.91 (H)   04/14/2020 7.97 (H)   11/29/2015 3.25 (H)     Ferritin (ng/mL)   Date Value   04/23/2020 1,118 (H)   04/20/2020 1,279 (H)   04/15/2020 1,852 (H)     No results found for: LDH  Troponin I (ng/mL)   Date Value   04/09/2020 0.108 (H)   04/09/2020 0.049 (H)   04/09/2020 0.049 (H)   04/09/2020 0.120 (H)   09/24/2019 0.099 (H)   09/24/2019 0.099 (H)       SARS-CoV2 (COVID-19) Qualitative PCR (no units)   Date Value   04/23/2020 Detected (A)     SARS-CoV-2 RNA, Amplification, Qual (no units)   Date Value   04/09/2020 Positive (A)       Blood Culture, Routine (no units)   Date Value   04/09/2020 No Growth after 4 days.    04/09/2020 No Growth after 4 days.           Assessment & Plan       Hospital Problems   *COVID-19 virus infection [U07.1]  Lower respiratory tract infection due to COVID-19 virus [U07.1, J22]  Acute respiratory failure with hypoxia [J96.01]    Improving.  Retest for viral clearing 4/29 or sooner if markers warrant.  Off supplemental oxygen 4/23  CRP 91 4/23  Ferritin 1100s  COVID retest positive on 4/23, will retest in 5 days (4/28) and patient awaiting SNF placement. (first positive 4/09)    Continue supportive care.          Constipation [K59.00]  Responding to current bowel regimen.        Fall [W19.XXXA]  Precautions in place.    Will encourage PT/OT and reevaluation of DME needs.        Coronary artery disease involving native coronary artery of native heart without angina pectoris [I25.10]    Non-obstructive CAD by history.  No evidence for acute coronary syndrome.  Elevated troponin non-specific in the setting of this infection.  No chest pain, ECG without ischemic changes.          Seizure disorder [G40.909]  Stable on current regimen.        Essential hypertension, benign [I10]  Controlled.             Additional  Hospital Problems   Moderate malnutrition [E44.0]     Elevated troponin [R79.89]     BPH (benign prostatic hyperplasia) [N40.0]     Dyslipidemia [E78.5]        Discharge Plan: Working hard with PT/OT -medically stable for transfer to SNF / LTAC.    Repeat COVID testing positive on 4/23  Pending SNF placement - updated the sister Ms Smith about the plan (4/24)    Andre Ruby MD  Internal Medicine PGY1  [address] [place] [address]

## 2020-04-25 PROCEDURE — 63600175 PHARM REV CODE 636 W HCPCS: Performed by: INTERNAL MEDICINE

## 2020-04-25 PROCEDURE — 94640 AIRWAY INHALATION TREATMENT: CPT

## 2020-04-25 PROCEDURE — 25000003 PHARM REV CODE 250: Performed by: HOSPITALIST

## 2020-04-25 PROCEDURE — 25000003 PHARM REV CODE 250: Performed by: INTERNAL MEDICINE

## 2020-04-25 PROCEDURE — 99232 SBSQ HOSP IP/OBS MODERATE 35: CPT | Mod: ,,, | Performed by: INTERNAL MEDICINE

## 2020-04-25 PROCEDURE — 11000001 HC ACUTE MED/SURG PRIVATE ROOM

## 2020-04-25 PROCEDURE — 27100098 HC SPACER

## 2020-04-25 PROCEDURE — 99232 PR SUBSEQUENT HOSPITAL CARE,LEVL II: ICD-10-PCS | Mod: ,,, | Performed by: INTERNAL MEDICINE

## 2020-04-25 PROCEDURE — 25000003 PHARM REV CODE 250: Performed by: EMERGENCY MEDICINE

## 2020-04-25 RX ADMIN — MONTELUKAST 10 MG: 10 TABLET, FILM COATED ORAL at 08:04

## 2020-04-25 RX ADMIN — LISINOPRIL 10 MG: 5 TABLET ORAL at 08:04

## 2020-04-25 RX ADMIN — Medication 2 SPRAY: at 08:04

## 2020-04-25 RX ADMIN — PHENYTOIN SODIUM 200 MG: 100 CAPSULE ORAL at 08:04

## 2020-04-25 RX ADMIN — Medication 6 MG: at 08:04

## 2020-04-25 RX ADMIN — ENOXAPARIN SODIUM 40 MG: 100 INJECTION SUBCUTANEOUS at 08:04

## 2020-04-25 RX ADMIN — CLOPIDOGREL BISULFATE 75 MG: 75 TABLET ORAL at 08:04

## 2020-04-25 RX ADMIN — ACETAMINOPHEN 650 MG: 325 TABLET ORAL at 10:04

## 2020-04-25 RX ADMIN — Medication 2 SPRAY: at 09:04

## 2020-04-25 RX ADMIN — METOPROLOL TARTRATE 50 MG: 50 TABLET, FILM COATED ORAL at 08:04

## 2020-04-25 RX ADMIN — ASPIRIN 81 MG: 81 TABLET, COATED ORAL at 08:04

## 2020-04-25 RX ADMIN — ATORVASTATIN CALCIUM 40 MG: 40 TABLET, FILM COATED ORAL at 08:04

## 2020-04-25 RX ADMIN — ALBUTEROL SULFATE 2 PUFF: 90 AEROSOL, METERED RESPIRATORY (INHALATION) at 08:04

## 2020-04-25 RX ADMIN — PHENOBARBITAL 90 MG: 30 TABLET ORAL at 08:04

## 2020-04-25 RX ADMIN — TAMSULOSIN HYDROCHLORIDE 0.4 MG: 0.4 CAPSULE ORAL at 08:04

## 2020-04-25 RX ADMIN — FINASTERIDE 5 MG: 5 TABLET, FILM COATED ORAL at 08:04

## 2020-04-25 RX ADMIN — ALBUTEROL SULFATE 2 PUFF: 90 AEROSOL, METERED RESPIRATORY (INHALATION) at 02:04

## 2020-04-25 RX ADMIN — STANDARDIZED SENNA CONCENTRATE AND DOCUSATE SODIUM 1 TABLET: 8.6; 5 TABLET ORAL at 08:04

## 2020-04-25 NOTE — PLAN OF CARE
Problem: Adult Inpatient Plan of Care  Goal: Plan of Care Review  Outcome: Ongoing, Progressing     POC reviewed this shift.  Pt A/O x2 to self and place.  British speaking only.  Made use of bedside  without difficulty.  Respirations unlabored.  Continues on RA with O2 sat >92%.  Skin w/d.  Tolerated meds whole with water without difficulty.  C/o general pain at beginning of shift with PRN Tylenol given.  No further complaints voiced.  VSS.  See flowsheet for full assessment.  WCTM.

## 2020-04-25 NOTE — PROGRESS NOTES
[facility] Medicine Progress Note  Virtual Medicine Team 7  04/25/2020       Vern Lr  YOB: 1940  Medical Record Number:  [id number]  Attending Physician:  Praveen Fine MD   Date of Admission: 4/9/2020   [facility] Medicine Team: [facility] VIRTUAL TEAM 7 Andre Ruby MD  Hospital Day:  14    Current Principal Problem:  COVID-19 virus infection    USING a  and help of RN  This service was provided through telemedicine.  Chief complaint:  None.    This evaluation, treatment, and documentation was performed by me, and was conducted via telephone, with the assistance of the patient's primary nurse Telephone interview with remote .    Start time: 10.00 am, end time: 10.04 am , total time with patient: 4 minutes.      History     Cc: Pain with movement    HPI  80 year-old man with a hypertension, seizure disorder, non-obstructive CAD admitted to [facility] following multiple falls.     At home, the patient lived with a sister who had COVID-19.  He was brought to the ED following two falls.   After first fall he was brought to [facility] and was discharged. Second fall while in bathroom on the day prior to admission.  Patient admits to some shortness of breath and cough as well as nasal congestion.  On presentation he admitted to cough and dyspnea.      In the ED, T was 97.9, R 18, P 90,  / 90, SaO2 90%.  He was in no distress.  Exam revealed a right scalp abrasion and RLQ tenderness.  SARS-CoV2 RNA was positive.  CRP was 269.6. CXR showed diffuse bilateral ground-glass opacities affecting all lobes.  Abdominal CT scan was consistent with constipation.  Head CT showed no acute intracranial abnormality.  The patient was admitted to the telemetry unit.      Hospital Course  The patient was placed on supplemental oxygen, 5 L NC.  Azithromycin, ceftriaxone, and hydroxychloroquine, and tamsulosin were started per protocol.   His oxygenation gradually improved.  On hospital day 5, he was transferred to this hospital due to capacity issues at [facility].  Here, oxygenation continued to improve, but the patient has continued to have constitutional symptoms.    Pending SNF placement    Interval History  COVID-19 positive 4/24  Per RN communication and using   CLAUDIA, no new symptoms  Having regular urination and large BM yesterday  Still needs bedside assistance, significant debility, unable to get up and out of bed without assistance  Patient medically stable, vitally stable, on Room air, pending SNF placement      Medications  Scheduled Meds:   albuterol  2 puff Inhalation Q8H WA    aspirin  81 mg Oral Daily    atorvastatin  40 mg Oral Daily    clopidogreL  75 mg Oral Daily    enoxaparin  40 mg Subcutaneous Daily    finasteride  5 mg Oral Daily    lisinopriL  10 mg Oral Daily    metoprolol tartrate  50 mg Oral Daily    montelukast  10 mg Oral QHS    oxymetazoline  2 spray Each Nostril BID    PHENobarbitaL  90 mg Oral Daily    phenytoin  200 mg Oral BID    senna-docusate 8.6-50 mg  1 tablet Oral BID    tamsulosin  0.4 mg Oral Daily     Continuous Infusions:  PRN Meds:.acetaminophen, acetaminophen, aluminum & magnesium hydroxide-simethicone, artificial tears, bisacodyL, melatonin, ondansetron, sodium chloride 0.9%      PSFH:  Please see admission note and assessment and plan below.      ROS   Admits Denies   Constitutional Malaise, improving Chills, diaphoresis   Eyes  Visual changes   ENMT Hearing loss, chronic Dysphagia, Epistaxis, nasal congestion   Cardiovascular  Chest pain, palpitations, edema   Respiratory  Cough, dyspnea, wheezing   Gastrointestinal  Nausea, vomiting, diarrhea, anorexia.     Genitourinary  Dysuria, incontinence   Musculoskeletal  Joint pain, joint swelling   Integumentary  Rash, inflammation, burning   Neruo-Psychiatric  Insomnia, anxiety. Changes in speech, strength, sensation.      Endocrine     Hematologic  Abnormal bruising, bleeding   Immunologic  Inflammation, pain at IV sites.  Pruritis.         Physical Examination       Vital Signs  Vitals  Temp: 98.6 °F (37 °C)  Temp src: Oral  Pulse: 94  Heart Rate Source: Monitor  Resp: 20  SpO2: 96 %  Pulse Oximetry Type: Continuous  Flow (L/min): 1  O2 Device (Oxygen Therapy): room air  BP: 126/82  MAP (mmHg): 118  BP Location: Right arm  BP Method: Automatic  Patient Position: Lying           Most recent as of 12:02 PM      24 Hour VS Range    Temp:  [96.9 °F (36.1 °C)-98.6 °F (37 °C)]   Pulse:  [75-97]   Resp:  [16-25]   BP: (115-149)/(74-91)   SpO2:  [92 %-98 %]     Intake/Output Summary (Last 24 hours) at 4/25/2020 1008  Last data filed at 4/24/2020 2125  Gross per 24 hour   Intake 120 ml   Output 600 ml   Net -480 ml         Telephone encounter, a physical examination was not performed.  Per RN: Physical exam unchanged, grossly normal    Data       Recent Labs   Lab 04/19/20 0455 04/20/20  0604 04/23/20  0336   WBC 7.15 6.65 5.84   HGB 12.8* 12.3* 12.2*   HCT 39.8* 38.2* 38.4*   * 434* 458*        Lymph # (K/uL)   Date Value   04/23/2020 1.3   04/20/2020 1.1   04/19/2020 1.1   04/17/2020 1.1   04/15/2020 0.8 (L)     Lymph% (%)   Date Value   04/23/2020 22.1   04/20/2020 17.0 (L)   04/19/2020 14.8 (L)   04/17/2020 12.9 (L)   04/15/2020 9.3 (L)         Recent Labs   Lab 04/19/20  0455 04/20/20  0604 04/23/20  0336    138 137   K 3.6 3.7 3.6   CL 98 99 103   CO2 28 31* 27   BUN 17 17 13   CREATININE 0.6 0.6 0.6   GLU 92 95 96   ANIONGAP 11 8 7*   CALCIUM 8.6* 8.6* 8.4*        Recent Labs   Lab 04/19/20  0455 04/20/20  0604 04/23/20  0336   PROT 7.3 7.6 7.8   ALBUMIN 2.0* 2.1* 2.2*   BILITOT 0.4 0.3 0.2   ALKPHOS 64 65 80   AST 67* 56* 89*   ALT 49* 45* 68*        Recent Labs   Lab 04/20/20  0604 04/23/20  0336   .0* 91.9*   DDIMER 5.10*  --         BNP (pg/mL)   Date Value   04/09/2020 66   04/09/2020 46   11/29/2015 34      CRP (mg/L)   Date Value   04/23/2020 91.9 (H)   04/20/2020 139.0 (H)   04/17/2020 290.6 (H)   04/14/2020 295.6 (H)   04/12/2020 337.8 (H)     D-Dimer (mg/L FEU)   Date Value   04/20/2020 5.10 (H)   04/16/2020 4.91 (H)   04/14/2020 7.97 (H)   11/29/2015 3.25 (H)     Ferritin (ng/mL)   Date Value   04/23/2020 1,118 (H)   04/20/2020 1,279 (H)   04/15/2020 1,852 (H)     No results found for: LDH  Troponin I (ng/mL)   Date Value   04/09/2020 0.108 (H)   04/09/2020 0.049 (H)   04/09/2020 0.049 (H)   04/09/2020 0.120 (H)   09/24/2019 0.099 (H)   09/24/2019 0.099 (H)       SARS-CoV2 (COVID-19) Qualitative PCR (no units)   Date Value   04/23/2020 Detected (A)     SARS-CoV-2 RNA, Amplification, Qual (no units)   Date Value   04/09/2020 Positive (A)       Blood Culture, Routine (no units)   Date Value   04/09/2020 No Growth after 4 days.    04/09/2020 No Growth after 4 days.           Assessment & Plan       Hospital Problems   *COVID-19 virus infection [U07.1]  Lower respiratory tract infection due to COVID-19 virus [U07.1, J22]  Acute respiratory failure with hypoxia [J96.01]    Improving.  Retest for viral clearing 4/29 or sooner if markers warrant.  Off supplemental oxygen 4/23  CRP 91 4/23  Ferritin 1100s  COVID retest positive on 4/23, will retest in 5 days (4/28) and patient awaiting SNF placement. (first positive 4/09)    Continue supportive care.          Constipation [K59.00]  Responding to current bowel regimen.        Fall [W19.XXXA]  Precautions in place.    Will encourage PT/OT and reevaluation of DME needs.        Coronary artery disease involving native coronary artery of native heart without angina pectoris [I25.10]    Non-obstructive CAD by history.  No evidence for acute coronary syndrome.  Elevated troponin non-specific in the setting of this infection.  No chest pain, ECG without ischemic changes.          Seizure disorder [G40.909]  Stable on current regimen.        Essential hypertension,  benign [I10]  Controlled.             Additional Hospital Problems   Moderate malnutrition [E44.0]     Elevated troponin [R79.89]     BPH (benign prostatic hyperplasia) [N40.0]     Dyslipidemia [E78.5]        Discharge Plan: Working hard with PT/OT -medically stable for transfer to SNF / LTAC.    Repeat COVID testing positive on 4/23  Pending SNF placement - updated the sister Ms Smith about the plan (4/24)    Andre Ruby MD  Internal Medicine PGY1  [address] [place] [address]

## 2020-04-26 LAB
ALBUMIN SERPL BCP-MCNC: 2.6 G/DL (ref 3.5–5.2)
ALP SERPL-CCNC: 93 U/L (ref 55–135)
ALT SERPL W/O P-5'-P-CCNC: 62 U/L (ref 10–44)
ANION GAP SERPL CALC-SCNC: 8 MMOL/L (ref 8–16)
AST SERPL-CCNC: 62 U/L (ref 10–40)
BASOPHILS # BLD AUTO: 0.08 K/UL (ref 0–0.2)
BASOPHILS NFR BLD: 1.5 % (ref 0–1.9)
BILIRUB SERPL-MCNC: 0.3 MG/DL (ref 0.1–1)
BUN SERPL-MCNC: 17 MG/DL (ref 8–23)
CALCIUM SERPL-MCNC: 9.4 MG/DL (ref 8.7–10.5)
CHLORIDE SERPL-SCNC: 103 MMOL/L (ref 95–110)
CO2 SERPL-SCNC: 26 MMOL/L (ref 23–29)
CREAT SERPL-MCNC: 0.7 MG/DL (ref 0.5–1.4)
CRP SERPL-MCNC: 35.3 MG/L (ref 0–8.2)
DIFFERENTIAL METHOD: ABNORMAL
EOSINOPHIL # BLD AUTO: 0.2 K/UL (ref 0–0.5)
EOSINOPHIL NFR BLD: 2.9 % (ref 0–8)
ERYTHROCYTE [DISTWIDTH] IN BLOOD BY AUTOMATED COUNT: 12.9 % (ref 11.5–14.5)
EST. GFR  (AFRICAN AMERICAN): >60 ML/MIN/1.73 M^2
EST. GFR  (NON AFRICAN AMERICAN): >60 ML/MIN/1.73 M^2
FERRITIN SERPL-MCNC: 815 NG/ML (ref 20–300)
GLUCOSE SERPL-MCNC: 80 MG/DL (ref 70–110)
HCT VFR BLD AUTO: 48.4 % (ref 40–54)
HGB BLD-MCNC: 14.6 G/DL (ref 14–18)
IMM GRANULOCYTES # BLD AUTO: 0.03 K/UL (ref 0–0.04)
IMM GRANULOCYTES NFR BLD AUTO: 0.6 % (ref 0–0.5)
LYMPHOCYTES # BLD AUTO: 1.4 K/UL (ref 1–4.8)
LYMPHOCYTES NFR BLD: 26.8 % (ref 18–48)
MCH RBC QN AUTO: 30.1 PG (ref 27–31)
MCHC RBC AUTO-ENTMCNC: 30.2 G/DL (ref 32–36)
MCV RBC AUTO: 100 FL (ref 82–98)
MONOCYTES # BLD AUTO: 0.5 K/UL (ref 0.3–1)
MONOCYTES NFR BLD: 10.1 % (ref 4–15)
NEUTROPHILS # BLD AUTO: 3.1 K/UL (ref 1.8–7.7)
NEUTROPHILS NFR BLD: 58.1 % (ref 38–73)
NRBC BLD-RTO: 0 /100 WBC
PLATELET # BLD AUTO: 442 K/UL (ref 150–350)
PMV BLD AUTO: 9.3 FL (ref 9.2–12.9)
POTASSIUM SERPL-SCNC: 4.3 MMOL/L (ref 3.5–5.1)
PROT SERPL-MCNC: 8.5 G/DL (ref 6–8.4)
RBC # BLD AUTO: 4.85 M/UL (ref 4.6–6.2)
SODIUM SERPL-SCNC: 137 MMOL/L (ref 136–145)
WBC # BLD AUTO: 5.26 K/UL (ref 3.9–12.7)

## 2020-04-26 PROCEDURE — 11000001 HC ACUTE MED/SURG PRIVATE ROOM

## 2020-04-26 PROCEDURE — 82728 ASSAY OF FERRITIN: CPT

## 2020-04-26 PROCEDURE — 99232 SBSQ HOSP IP/OBS MODERATE 35: CPT | Mod: ,,, | Performed by: INTERNAL MEDICINE

## 2020-04-26 PROCEDURE — 85025 COMPLETE CBC W/AUTO DIFF WBC: CPT

## 2020-04-26 PROCEDURE — 25000003 PHARM REV CODE 250: Performed by: HOSPITALIST

## 2020-04-26 PROCEDURE — 94761 N-INVAS EAR/PLS OXIMETRY MLT: CPT

## 2020-04-26 PROCEDURE — 25000003 PHARM REV CODE 250: Performed by: INTERNAL MEDICINE

## 2020-04-26 PROCEDURE — 99232 PR SUBSEQUENT HOSPITAL CARE,LEVL II: ICD-10-PCS | Mod: ,,, | Performed by: INTERNAL MEDICINE

## 2020-04-26 PROCEDURE — 80053 COMPREHEN METABOLIC PANEL: CPT

## 2020-04-26 PROCEDURE — 63600175 PHARM REV CODE 636 W HCPCS: Performed by: INTERNAL MEDICINE

## 2020-04-26 PROCEDURE — 94640 AIRWAY INHALATION TREATMENT: CPT

## 2020-04-26 PROCEDURE — 25000003 PHARM REV CODE 250: Performed by: EMERGENCY MEDICINE

## 2020-04-26 PROCEDURE — 86140 C-REACTIVE PROTEIN: CPT

## 2020-04-26 PROCEDURE — 36415 COLL VENOUS BLD VENIPUNCTURE: CPT

## 2020-04-26 RX ADMIN — ACETAMINOPHEN 650 MG: 325 TABLET ORAL at 08:04

## 2020-04-26 RX ADMIN — PHENOBARBITAL 90 MG: 30 TABLET ORAL at 08:04

## 2020-04-26 RX ADMIN — PHENYTOIN SODIUM 200 MG: 100 CAPSULE ORAL at 08:04

## 2020-04-26 RX ADMIN — STANDARDIZED SENNA CONCENTRATE AND DOCUSATE SODIUM 1 TABLET: 8.6; 5 TABLET ORAL at 08:04

## 2020-04-26 RX ADMIN — ATORVASTATIN CALCIUM 40 MG: 40 TABLET, FILM COATED ORAL at 08:04

## 2020-04-26 RX ADMIN — ASPIRIN 81 MG: 81 TABLET, COATED ORAL at 08:04

## 2020-04-26 RX ADMIN — CLOPIDOGREL BISULFATE 75 MG: 75 TABLET ORAL at 08:04

## 2020-04-26 RX ADMIN — TAMSULOSIN HYDROCHLORIDE 0.4 MG: 0.4 CAPSULE ORAL at 08:04

## 2020-04-26 RX ADMIN — ENOXAPARIN SODIUM 40 MG: 100 INJECTION SUBCUTANEOUS at 08:04

## 2020-04-26 RX ADMIN — LISINOPRIL 10 MG: 5 TABLET ORAL at 08:04

## 2020-04-26 RX ADMIN — METOPROLOL TARTRATE 50 MG: 50 TABLET, FILM COATED ORAL at 08:04

## 2020-04-26 RX ADMIN — MONTELUKAST 10 MG: 10 TABLET, FILM COATED ORAL at 08:04

## 2020-04-26 RX ADMIN — ALBUTEROL SULFATE 2 PUFF: 90 AEROSOL, METERED RESPIRATORY (INHALATION) at 04:04

## 2020-04-26 RX ADMIN — Medication 6 MG: at 08:04

## 2020-04-26 RX ADMIN — FINASTERIDE 5 MG: 5 TABLET, FILM COATED ORAL at 08:04

## 2020-04-26 NOTE — PLAN OF CARE
Problem: Adult Inpatient Plan of Care  Goal: Plan of Care Review  Outcome: Ongoing, Progressing     POC reviewed this shift.  Pt remains alert but oriented only to self/place.  Respirations unlabored.  Skin w/d.  Pt did c/o gen back pain at beginning of shift with PRN Tylenol given.  No further complaints voiced.  This nurse assisted patient in calling sister this shift.  Tolerated meds whole with water without difficulty.  VSS.  See flowsheet for full assessment.

## 2020-04-26 NOTE — PROGRESS NOTES
[facility] Medicine Progress Note  Virtual Medicine Team 7  04/26/2020       Vern Lr  YOB: 1940  Medical Record Number:  [id number]  Attending Physician:  Praveen Fine MD   Date of Admission: 4/9/2020   [facility] VIRTUAL TEAM 7 Andre Ruby MD  Hospital Day:  14    Current Principal Problem:  COVID-19 virus infection    USING a  and help of RN  This service was provided through telemedicine.  Chief complaint:  None.    This evaluation, treatment, and documentation was performed by me, and was conducted via telephone, with the assistance of the patient's primary nurse Telephone interview with remote .    Start time: 10.35 am, end time: 10.39 am , total time with patient: 4 minutes.      History     Cc: Pain with movement    HPI  80 year-old man with a hypertension, seizure disorder, non-obstructive CAD admitted to [facility] following multiple falls.     At home, the patient lived with a sister who had COVID-19.  He was brought to the ED following two falls.   After first fall he was brought to [facility] and was discharged. Second fall while in bathroom on the day prior to admission.  Patient admits to some shortness of breath and cough as well as nasal congestion.  On presentation he admitted to cough and dyspnea.      In the ED, T was 97.9, R 18, P 90,  / 90, SaO2 90%.  He was in no distress.  Exam revealed a right scalp abrasion and RLQ tenderness.  SARS-CoV2 RNA was positive.  CRP was 269.6. CXR showed diffuse bilateral ground-glass opacities affecting all lobes.  Abdominal CT scan was consistent with constipation.  Head CT showed no acute intracranial abnormality.  The patient was admitted to the telemetry unit.      Hospital Course  The patient was placed on supplemental oxygen, 5 L NC.  Azithromycin, ceftriaxone, and hydroxychloroquine, and tamsulosin were started per protocol.   His oxygenation gradually improved.  On hospital day 5, he was transferred to this hospital due to capacity issues at [facility].  Here, oxygenation continued to improve, but the patient has continued to have constitutional symptoms.    Pending SNF placement  COVID-19 positive 4/24    Interval History    Per RN communication  CLAUDIA, no new symptoms  Still needs bedside assistance, significant debility, unable to get up and out of bed without assistance  Patient cheered up after being able to talk to the family yesterday  Patient medically stable, vitally stable, on Room air, pending SNF placement      Medications  Scheduled Meds:   albuterol  2 puff Inhalation Q8H WA    aspirin  81 mg Oral Daily    atorvastatin  40 mg Oral Daily    clopidogreL  75 mg Oral Daily    enoxaparin  40 mg Subcutaneous Daily    finasteride  5 mg Oral Daily    lisinopriL  10 mg Oral Daily    metoprolol tartrate  50 mg Oral Daily    montelukast  10 mg Oral QHS    PHENobarbitaL  90 mg Oral Daily    phenytoin  200 mg Oral BID    senna-docusate 8.6-50 mg  1 tablet Oral BID    tamsulosin  0.4 mg Oral Daily     Continuous Infusions:  PRN Meds:.acetaminophen, acetaminophen, aluminum & magnesium hydroxide-simethicone, artificial tears, bisacodyL, melatonin, ondansetron, sodium chloride 0.9%      PSFH:  Please see admission note and assessment and plan below.      ROS   Admits Denies   Constitutional Malaise, improving Chills, diaphoresis   Eyes  Visual changes   ENMT Hearing loss, chronic Dysphagia, Epistaxis, nasal congestion   Cardiovascular  Chest pain, palpitations, edema   Respiratory  Cough, dyspnea, wheezing   Gastrointestinal  Nausea, vomiting, diarrhea, anorexia.     Genitourinary  Dysuria, incontinence   Musculoskeletal  Joint pain, joint swelling   Integumentary  Rash, inflammation, burning   Neruo-Psychiatric  Insomnia, anxiety. Changes in speech, strength, sensation.     Endocrine     Hematologic  Abnormal bruising, bleeding    Immunologic  Inflammation, pain at IV sites.  Pruritis.         Physical Examination       Vital Signs  Vitals  Temp: 97.7 °F (36.5 °C)  Temp src: Oral  Pulse: 81  Heart Rate Source: Continuous  Resp: 12  SpO2: 96 %  Pulse Oximetry Type: Continuous  Flow (L/min): 1  O2 Device (Oxygen Therapy): room air  BP: 137/84  MAP (mmHg): 109  BP Location: Right arm  BP Method: cNIBP  Patient Position: Lying           Most recent as of 12:02 PM      24 Hour VS Range    Temp:  [96.7 °F (35.9 °C)-98.6 °F (37 °C)]   Pulse:  [68-94]   Resp:  [12-21]   BP: (102-137)/(68-86)   SpO2:  [93 %-98 %]     Intake/Output Summary (Last 24 hours) at 4/26/2020 1044  Last data filed at 4/25/2020 2223  Gross per 24 hour   Intake 240 ml   Output 800 ml   Net -560 ml         Telephone encounter, a physical examination was not performed.  Per RN: Physical exam unchanged, grossly normal    Data       Recent Labs   Lab 04/20/20  0604 04/23/20  0336 04/26/20  0601   WBC 6.65 5.84 5.26   HGB 12.3* 12.2* 14.6   HCT 38.2* 38.4* 48.4   * 458* 442*        Lymph # (K/uL)   Date Value   04/26/2020 1.4   04/23/2020 1.3   04/20/2020 1.1   04/19/2020 1.1   04/17/2020 1.1     Lymph% (%)   Date Value   04/26/2020 26.8   04/23/2020 22.1   04/20/2020 17.0 (L)   04/19/2020 14.8 (L)   04/17/2020 12.9 (L)         Recent Labs   Lab 04/20/20  0604 04/23/20  0336 04/26/20  0601    137 137   K 3.7 3.6 4.3   CL 99 103 103   CO2 31* 27 26   BUN 17 13 17   CREATININE 0.6 0.6 0.7   GLU 95 96 80   ANIONGAP 8 7* 8   CALCIUM 8.6* 8.4* 9.4        Recent Labs   Lab 04/20/20  0604 04/23/20  0336 04/26/20  0601   PROT 7.6 7.8 8.5*   ALBUMIN 2.1* 2.2* 2.6*   BILITOT 0.3 0.2 0.3   ALKPHOS 65 80 93   AST 56* 89* 62*   ALT 45* 68* 62*        Recent Labs   Lab 04/20/20  0604 04/23/20  0336 04/26/20  0601   .0* 91.9* 35.3*   DDIMER 5.10*  --   --         BNP (pg/mL)   Date Value   04/09/2020 66   04/09/2020 46   11/29/2015 34     CRP (mg/L)   Date Value   04/26/2020  35.3 (H)   04/23/2020 91.9 (H)   04/20/2020 139.0 (H)   04/17/2020 290.6 (H)   04/14/2020 295.6 (H)     D-Dimer (mg/L FEU)   Date Value   04/20/2020 5.10 (H)   04/16/2020 4.91 (H)   04/14/2020 7.97 (H)   11/29/2015 3.25 (H)     Ferritin (ng/mL)   Date Value   04/26/2020 815 (H)   04/23/2020 1,118 (H)   04/20/2020 1,279 (H)   04/15/2020 1,852 (H)     No results found for: LDH  Troponin I (ng/mL)   Date Value   04/09/2020 0.108 (H)   04/09/2020 0.049 (H)   04/09/2020 0.049 (H)   04/09/2020 0.120 (H)   09/24/2019 0.099 (H)   09/24/2019 0.099 (H)       SARS-CoV2 (COVID-19) Qualitative PCR (no units)   Date Value   04/23/2020 Detected (A)     SARS-CoV-2 RNA, Amplification, Qual (no units)   Date Value   04/09/2020 Positive (A)       Blood Culture, Routine (no units)   Date Value   04/09/2020 No Growth after 4 days.    04/09/2020 No Growth after 4 days.           Assessment & Plan       Hospital Problems   *COVID-19 virus infection [U07.1]  Lower respiratory tract infection due to COVID-19 virus [U07.1, J22]  Acute respiratory failure with hypoxia [J96.01]    Improving.  Retest for viral clearing 4/29 or sooner if markers warrant.  Off supplemental oxygen 4/23  CRP 35 4/26  Ferritin 800s  COVID retest positive on 4/23, will retest in 5 days (4/28) and patient awaiting SNF placement. (first positive 4/09)    Continue supportive care.          Constipation [K59.00]  Responding to current bowel regimen.        Fall [W19.XXXA]  Precautions in place.    Will encourage PT/OT and reevaluation of DME needs.        Coronary artery disease involving native coronary artery of native heart without angina pectoris [I25.10]    Non-obstructive CAD by history.  No evidence for acute coronary syndrome.  Elevated troponin non-specific in the setting of this infection.  No chest pain, ECG without ischemic changes.          Seizure disorder [G40.909]  Stable on current regimen.        Essential hypertension, benign [I10]  Controlled.              Additional Hospital Problems   Moderate malnutrition [E44.0]     Elevated troponin [R79.89]     BPH (benign prostatic hyperplasia) [N40.0]     Dyslipidemia [E78.5]        Discharge Plan: Working hard with PT/OT -medically stable for transfer to SNF / LTAC.    Repeat COVID testing positive on 4/23  Pending SNF placement - updated the sister Ms Smith about the plan (4/26)    Andre Ruby MD  Internal Medicine PGY1  [address] [place] [address]

## 2020-04-27 ENCOUNTER — PATIENT MESSAGE (OUTPATIENT)
Dept: CARDIOLOGY | Facility: CLINIC | Age: 80
End: 2020-04-27

## 2020-04-27 PROCEDURE — 25000242 PHARM REV CODE 250 ALT 637 W/ HCPCS: Performed by: PHYSICIAN ASSISTANT

## 2020-04-27 PROCEDURE — 99232 PR SUBSEQUENT HOSPITAL CARE,LEVL II: ICD-10-PCS | Mod: ,,, | Performed by: INTERNAL MEDICINE

## 2020-04-27 PROCEDURE — 30200315 PPD INTRADERMAL TEST REV CODE 302: Performed by: INTERNAL MEDICINE

## 2020-04-27 PROCEDURE — 94761 N-INVAS EAR/PLS OXIMETRY MLT: CPT

## 2020-04-27 PROCEDURE — 94640 AIRWAY INHALATION TREATMENT: CPT

## 2020-04-27 PROCEDURE — 25000003 PHARM REV CODE 250: Performed by: EMERGENCY MEDICINE

## 2020-04-27 PROCEDURE — 11000001 HC ACUTE MED/SURG PRIVATE ROOM

## 2020-04-27 PROCEDURE — 63600175 PHARM REV CODE 636 W HCPCS: Performed by: INTERNAL MEDICINE

## 2020-04-27 PROCEDURE — 25000003 PHARM REV CODE 250: Performed by: HOSPITALIST

## 2020-04-27 PROCEDURE — 99232 SBSQ HOSP IP/OBS MODERATE 35: CPT | Mod: ,,, | Performed by: INTERNAL MEDICINE

## 2020-04-27 PROCEDURE — 86580 TB INTRADERMAL TEST: CPT | Performed by: INTERNAL MEDICINE

## 2020-04-27 PROCEDURE — 25000003 PHARM REV CODE 250: Performed by: INTERNAL MEDICINE

## 2020-04-27 RX ADMIN — ALBUTEROL SULFATE 2 PUFF: 90 AEROSOL, METERED RESPIRATORY (INHALATION) at 05:04

## 2020-04-27 RX ADMIN — TUBERCULIN PURIFIED PROTEIN DERIVATIVE 5 UNITS: 5 INJECTION, SOLUTION INTRADERMAL at 03:04

## 2020-04-27 RX ADMIN — PHENYTOIN SODIUM 200 MG: 100 CAPSULE ORAL at 09:04

## 2020-04-27 RX ADMIN — Medication 6 MG: at 09:04

## 2020-04-27 RX ADMIN — ASPIRIN 81 MG: 81 TABLET, COATED ORAL at 08:04

## 2020-04-27 RX ADMIN — PHENOBARBITAL 90 MG: 30 TABLET ORAL at 08:04

## 2020-04-27 RX ADMIN — CLOPIDOGREL BISULFATE 75 MG: 75 TABLET ORAL at 08:04

## 2020-04-27 RX ADMIN — TAMSULOSIN HYDROCHLORIDE 0.4 MG: 0.4 CAPSULE ORAL at 08:04

## 2020-04-27 RX ADMIN — PHENYTOIN SODIUM 200 MG: 100 CAPSULE ORAL at 08:04

## 2020-04-27 RX ADMIN — STANDARDIZED SENNA CONCENTRATE AND DOCUSATE SODIUM 1 TABLET: 8.6; 5 TABLET ORAL at 08:04

## 2020-04-27 RX ADMIN — ALBUTEROL SULFATE 2 PUFF: 90 AEROSOL, METERED RESPIRATORY (INHALATION) at 07:04

## 2020-04-27 RX ADMIN — FINASTERIDE 5 MG: 5 TABLET, FILM COATED ORAL at 08:04

## 2020-04-27 RX ADMIN — MONTELUKAST 10 MG: 10 TABLET, FILM COATED ORAL at 09:04

## 2020-04-27 RX ADMIN — ATORVASTATIN CALCIUM 40 MG: 40 TABLET, FILM COATED ORAL at 08:04

## 2020-04-27 RX ADMIN — ENOXAPARIN SODIUM 40 MG: 100 INJECTION SUBCUTANEOUS at 09:04

## 2020-04-27 RX ADMIN — METOPROLOL TARTRATE 50 MG: 50 TABLET, FILM COATED ORAL at 08:04

## 2020-04-27 RX ADMIN — LISINOPRIL 10 MG: 5 TABLET ORAL at 08:04

## 2020-04-27 NOTE — PLAN OF CARE
4/26 Interval History; Per RN communication. NAEON, no new symptoms. Still needs bedside assistance, significant debility, unable to get up and out of bed without assistance. Patient cheered up after being able to talk to the family yesterday. Patient medically stable, vitally stable, on Room air, pending SNF placement.        04/27/20 1006   Discharge Reassessment   Assessment Type Discharge Planning Reassessment   Provided patient/caregiver education on the expected discharge date and the discharge plan No   Do you have any problems affording any of your prescribed medications? No   Discharge Plan A Skilled Nursing Facility   Discharge Plan B Home with family;Home Health   DME Needed Upon Discharge  other (see comments)  (tbd)   Patient choice form signed by patient/caregiver N/A   Anticipated Discharge Disposition SNF   Can the patient/caregiver answer the patient profile reliably? Yes, cognitively intact   How does the patient rate their overall health at the present time? Fair   Describe the patient's ability to walk at the present time. Minor restrictions or changes   How often would a person be available to care for the patient? Occasionally   Post-Acute Status   Post-Acute Authorization Placement   Post-Acute Placement Status Referrals Sent   Discharge Delays (!) Change in Medical Condition     Amanda Gomez RN  Case Management  Ext: [phone]  04/27/2020  10:08 AM

## 2020-04-27 NOTE — ASSESSMENT & PLAN NOTE
PNA with COVID + on 4/9. Presenting symptoms of weakness, falls, mild sob.   Saturating well on RA initially, put on 5L NC today after desat to 70's. CXR b/l infiltrates, CRP over 200 and mildly elevated trop on admission.   COVID protocol with isolation and 02 supplementation. Albuterol inhaler prn. Continuing course of HCQ, Azithro and Rocephin.   Oxygenation improving  Awaiting SNF placement - positive covid test 4/23

## 2020-04-27 NOTE — SUBJECTIVE & OBJECTIVE
Interval History: Feels improved. No new concerns. Wants to shave    Review of Systems   Constitutional: Negative for chills and fever.   Respiratory: Negative for cough and shortness of breath.      Objective:     Vital Signs (Most Recent):  Temp: 97.6 °F (36.4 °C) (04/27/20 1127)  Pulse: 105 (04/27/20 1127)  Resp: 20 (04/27/20 1127)  BP: 112/78 (04/27/20 1127)  SpO2: 95 % (04/27/20 1127) Vital Signs (24h Range):  Temp:  [97.6 °F (36.4 °C)-98.1 °F (36.7 °C)] 97.6 °F (36.4 °C)  Pulse:  [] 105  Resp:  [16-20] 20  SpO2:  [93 %-97 %] 95 %  BP: (112-136)/(73-83) 112/78     Weight: 82 kg (180 lb 12.4 oz)  Body mass index is 30.08 kg/m².    Intake/Output Summary (Last 24 hours) at 4/27/2020 1425  Last data filed at 4/27/2020 1300  Gross per 24 hour   Intake 600 ml   Output 650 ml   Net -50 ml      Physical Exam Per RN documentation

## 2020-04-27 NOTE — PT/OT/SLP PROGRESS
Physical Therapy      Patient Name:  Vern Lr   MRN:  [id number]  Admitting Diagnosis:  COVID-19 virus infection   Recent Surgery: * No surgery found *    Admit Date: 4/9/2020  Length of Stay: 18 days    Patient not seen today due to Other (Comment). Vern Lr's plan of care and PT goals reviewed on this date and remain appropriate. Will follow-up for progressive mobility 4/28/2020.    Grecia Colby, PT, DPT  4/27/2020

## 2020-04-27 NOTE — PLAN OF CARE
Problem: Adult Inpatient Plan of Care  Goal: Plan of Care Review  Outcome: Ongoing, Progressing     POC reviewed this shift.  Quiet uneventful shift.  Remains alert but oriented only to self and place.  Respirations unlabored.  Continues on RA  and maintaining O2 sat>92%.  Skin w/d.  Tolerated meds whole with water without difficulty.  Spoke with sister again this shift.  Pleasant and cooperative.  VSS.  See flowsheet for full assessment.  No s/s of distress noted.

## 2020-04-27 NOTE — PROGRESS NOTES
[facility] Medicine  Telemedicine Progress Note    Patient Name: Vern Lr  MRN: [id number]  Patient Class: IP- Inpatient   Admission Date: 4/9/2020  Length of Stay: 18 days  Attending Physician: Praveen Fine MD  Primary Care Provider: Lucio Byrd MD      VIRTUAL TELENOTE    Start time: 1420  Chief complaint: sob  The patient location is: Inpt Bed  Present with the patient at the time of the telemed/virtual assessment: RN    USING a  and help of RN  This service was provided through telemedicine.    This evaluation, treatment, and documentation was performed by me, and was conducted via telephone, with the assistance of the patient's primary nurse Telephone interview with remote .      End time:  1428    Total time spent with patient: 8 minutes    The attending portion of this evaluation, treatment, and documentation was performed per Xiang Adamson MD via audio only.      Subjective:     Principal Problem:COVID-19 virus infection        HPI:  80/M admitted on 4/9/20 for falls and PNA 2/2 COVID infection. Positive testing in ED (4/9/20). Patient speaks only Georgian and language line used to communicate. Spoke to patient's sister who admits patient is hard of hearing and likely has mild dementia so history is somewhat limited as a result. Patient notes constipation without bm in a week. Sister states he often complains of this but patient actually was brought in for falls. He fell twice in past couple of days. She admits he has looked fatigued recently and she is actually recovering from COVID infection herself. He normally ambulates with assistance of cane. After first fall he was brought to [facility] and went home. Second fall while in bathroom yesterday and was subsequently brought here to [facility]. Seizure hx but sister did not see anything to suggest seizure and patient has been on meds. Patient has no recent issues with  passing out. Hx of NSTEMI with non-obstructive CAD in Sept 2019. Largest occulusion noted on LHC was 20% at that time. Has been on asa, Plavix, and statin since. Normally walks with cane and was actually walking yesterday without issue per sister. Patient admits to some shortness of breath and cough as well as nasal congestion.    Overview/Hospital Course:  The patient was placed on supplemental oxygen, 5 L NC.  Azithromycin, ceftriaxone, and hydroxychloroquine, and tamsulosin were started per protocol.  His oxygenation gradually improved.  On hospital day 5, he was transferred to this hospital due to capacity issues at [facility].  Here, oxygenation continued to improve, but the patient has continued to have constitutional symptoms.   Deescalated to room air. Pending SNF placement Per PT/OT recs.   COVID-19 positive 4/24      Interval History: Feels improved. No new concerns. Wants to shave    Review of Systems   Constitutional: Negative for chills and fever.   Respiratory: Negative for cough and shortness of breath.      Objective:     Vital Signs (Most Recent):  Temp: 97.6 °F (36.4 °C) (04/27/20 1127)  Pulse: 105 (04/27/20 1127)  Resp: 20 (04/27/20 1127)  BP: 112/78 (04/27/20 1127)  SpO2: 95 % (04/27/20 1127) Vital Signs (24h Range):  Temp:  [97.6 °F (36.4 °C)-98.1 °F (36.7 °C)] 97.6 °F (36.4 °C)  Pulse:  [] 105  Resp:  [16-20] 20  SpO2:  [93 %-97 %] 95 %  BP: (112-136)/(73-83) 112/78     Weight: 82 kg (180 lb 12.4 oz)  Body mass index is 30.08 kg/m².    Intake/Output Summary (Last 24 hours) at 4/27/2020 1425  Last data filed at 4/27/2020 1300  Gross per 24 hour   Intake 600 ml   Output 650 ml   Net -50 ml      Physical Exam Per RN documentation        Assessment/Plan:      * COVID-19 virus infection  PNA with COVID + on 4/9. Presenting symptoms of weakness, falls, mild sob.   Saturating well on RA initially, put on 5L NC today after desat to 70's. CXR b/l infiltrates, CRP over 200 and mildly elevated trop on  admission.   COVID protocol with isolation and 02 supplementation. Albuterol inhaler prn. Continuing course of HCQ, Azithro and Rocephin.   Oxygenation improving  Awaiting SNF placement - positive covid test 4/23      Coronary artery disease involving native coronary artery of native heart without angina pectoris  [facility] with non-obstructive dz, Sept 2019. On home asa and statin plus Plavix for NSTEMI at that time.   Mild trop elevation in setting of acute illness stabilized. Doubt ACS. Continuing home meds.    Fall  2 falls in setting of COVID infection and baseline gait instability requiring cane.  CT head and abdomen/pelvis show no new fractures. Nothing on exam to specify areas of fracture concern. Does have hx of osteoporosis and on anti-epileptic so will have very low threshold to add additional imaging.      Constipation  Continue daily laxatives while inpatient.      Elevated troponin  Hx of NSTEMI with [facility] showing non-obstructive CAD in Sept 2019.   Mild, stable. Likely related to acute illness. ACS unlikely. Cards has evaluated and signed off. Appreciate reccs.  On asa, Plavix, statin at home; will continue inpatient.    Seizure disorder  Controlled. Continue home Dilantin and valproic acid.      Dyslipidemia  Continue home statin.    BPH (benign prostatic hyperplasia)  Controlled. Continue home Flomax.      Essential hypertension, benign  Controlled. Continue home meds.      VTE Risk Mitigation (From admission, onward)         Ordered     enoxaparin injection 40 mg  Daily      04/09/20 0909     IP VTE HIGH RISK PATIENT  Once      04/09/20 0914     Place sequential compression device  Until discontinued      04/09/20 0914                      I have assessed these finding virtually using telemed platform and with assistance of bedside nurse       The attending portion of this evaluation, treatment, and documentation was performed per Xiang Adamson MD via audio only.        Xiang Adamson,  MD  Department of Hospital Medicine   [facility]

## 2020-04-28 ENCOUNTER — PATIENT MESSAGE (OUTPATIENT)
Dept: CARDIOLOGY | Facility: CLINIC | Age: 80
End: 2020-04-28

## 2020-04-28 LAB — SARS-COV-2 RNA RESP QL NAA+PROBE: DETECTED

## 2020-04-28 PROCEDURE — 11000001 HC ACUTE MED/SURG PRIVATE ROOM

## 2020-04-28 PROCEDURE — U0002 COVID-19 LAB TEST NON-CDC: HCPCS

## 2020-04-28 PROCEDURE — 25000003 PHARM REV CODE 250: Performed by: INTERNAL MEDICINE

## 2020-04-28 PROCEDURE — 63600175 PHARM REV CODE 636 W HCPCS: Performed by: INTERNAL MEDICINE

## 2020-04-28 PROCEDURE — 97530 THERAPEUTIC ACTIVITIES: CPT

## 2020-04-28 PROCEDURE — 97116 GAIT TRAINING THERAPY: CPT

## 2020-04-28 PROCEDURE — 94640 AIRWAY INHALATION TREATMENT: CPT

## 2020-04-28 PROCEDURE — 99232 SBSQ HOSP IP/OBS MODERATE 35: CPT | Mod: ,,, | Performed by: INTERNAL MEDICINE

## 2020-04-28 PROCEDURE — 25000003 PHARM REV CODE 250: Performed by: EMERGENCY MEDICINE

## 2020-04-28 PROCEDURE — 97535 SELF CARE MNGMENT TRAINING: CPT

## 2020-04-28 PROCEDURE — 99232 PR SUBSEQUENT HOSPITAL CARE,LEVL II: ICD-10-PCS | Mod: ,,, | Performed by: INTERNAL MEDICINE

## 2020-04-28 PROCEDURE — 25000003 PHARM REV CODE 250: Performed by: HOSPITALIST

## 2020-04-28 RX ADMIN — MONTELUKAST 10 MG: 10 TABLET, FILM COATED ORAL at 08:04

## 2020-04-28 RX ADMIN — ACETAMINOPHEN 650 MG: 325 TABLET ORAL at 11:04

## 2020-04-28 RX ADMIN — CLOPIDOGREL BISULFATE 75 MG: 75 TABLET ORAL at 08:04

## 2020-04-28 RX ADMIN — ATORVASTATIN CALCIUM 40 MG: 40 TABLET, FILM COATED ORAL at 08:04

## 2020-04-28 RX ADMIN — LISINOPRIL 10 MG: 5 TABLET ORAL at 08:04

## 2020-04-28 RX ADMIN — TAMSULOSIN HYDROCHLORIDE 0.4 MG: 0.4 CAPSULE ORAL at 08:04

## 2020-04-28 RX ADMIN — ALBUTEROL SULFATE 2 PUFF: 90 AEROSOL, METERED RESPIRATORY (INHALATION) at 08:04

## 2020-04-28 RX ADMIN — PHENYTOIN SODIUM 200 MG: 100 CAPSULE ORAL at 08:04

## 2020-04-28 RX ADMIN — PHENOBARBITAL 90 MG: 30 TABLET ORAL at 08:04

## 2020-04-28 RX ADMIN — STANDARDIZED SENNA CONCENTRATE AND DOCUSATE SODIUM 1 TABLET: 8.6; 5 TABLET ORAL at 08:04

## 2020-04-28 RX ADMIN — ASPIRIN 81 MG: 81 TABLET, COATED ORAL at 08:04

## 2020-04-28 RX ADMIN — METOPROLOL TARTRATE 50 MG: 50 TABLET, FILM COATED ORAL at 08:04

## 2020-04-28 RX ADMIN — ENOXAPARIN SODIUM 40 MG: 100 INJECTION SUBCUTANEOUS at 08:04

## 2020-04-28 RX ADMIN — ALBUTEROL SULFATE 2 PUFF: 90 AEROSOL, METERED RESPIRATORY (INHALATION) at 05:04

## 2020-04-28 RX ADMIN — Medication 6 MG: at 11:04

## 2020-04-28 RX ADMIN — FINASTERIDE 5 MG: 5 TABLET, FILM COATED ORAL at 08:04

## 2020-04-28 NOTE — PLAN OF CARE
"Patient Cuban speaking only; per  via [employer], patient oriented to person place and situation. VSS, afebrile, and without injury. Fall precautions maintained. Call bell within reach, patient instructed on how to contact the nurse and educated not to get up alone with help of the  via [employer]. Patient on room air without distress; patient on regular diet with good appetite. Occasional dry cough. COVID19 specimen collected and walked to lab. No complaints of nausea; no complaints of pain. Complaints of discomfort with urination described by patient via  as pain with urination and "only a little at a time." Reported to MD via secure chat, to collect and send urine specimen. Patient up with x1 assistance to the bedside commode and chair. External catheter in place draining concentrated urine, adequate output. Patient assisted to contact sister on the phone today; phone visit with doctor (and the help of  via [employer] facilitated by nurse). Questions and concerns have been addressed; will continue to monitor.   "

## 2020-04-28 NOTE — SUBJECTIVE & OBJECTIVE
Interval History: see hospital course    Review of Systems   Constitutional: Negative for chills and fever.   Respiratory: Negative for cough and shortness of breath.      Objective:     Vital Signs (Most Recent):  Temp: 98.1 °F (36.7 °C) (04/28/20 1113)  Pulse: 80 (04/28/20 1113)  Resp: 19 (04/28/20 1113)  BP: 104/62 (04/28/20 1120)  SpO2: 95 % (04/28/20 1120) Vital Signs (24h Range):  Temp:  [97.3 °F (36.3 °C)-98.1 °F (36.7 °C)] 98.1 °F (36.7 °C)  Pulse:  [] 80  Resp:  [14-20] 19  SpO2:  [94 %-97 %] 95 %  BP: (104-151)/(62-80) 104/62     Weight: 82 kg (180 lb 12.4 oz)  Body mass index is 30.08 kg/m².    Intake/Output Summary (Last 24 hours) at 4/28/2020 1304  Last data filed at 4/28/2020 0800  Gross per 24 hour   Intake 480 ml   Output 625 ml   Net -145 ml      Physical Exam Per RN documentation

## 2020-04-28 NOTE — PLAN OF CARE
ALLYSON rec'd call from [facility] with [facility] who wanted to assist with getting patient placed.  She states that she spoke with [facility] on Monday and [facility] advised they had beds.  Referral sent to [facility] this am per SW.

## 2020-04-28 NOTE — PT/OT/SLP PROGRESS
"Occupational Therapy   Treatment    Name: Vern Lr  MRN: [id number]  Admitting Diagnosis:  COVID-19 virus infection       Recommendations:     Discharge Recommendations: nursing facility, skilled  Discharge Equipment Recommendations:  walker, rolling, bedside commode  Barriers to discharge:  Inaccessible home environment, Decreased caregiver support    Assessment:     Vern Lr is a 80 y.o. male with a medical diagnosis of COVID-19 virus infection.  He presents with improvement in mentation (and affect) and active participation this session making gains towards OT goals. He is unsafe to return home alone at this time. Cont to rec SNF for post acute level of care. Performance deficits affecting function are weakness, impaired endurance, impaired self care skills, impaired functional mobilty, gait instability, impaired balance, impaired cognition, decreased safety awareness, decreased upper extremity function, decreased lower extremity function, impaired cardiopulmonary response to activity.     Rehab Prognosis:  Good; patient would benefit from acute skilled OT services to address these deficits and reach maximum level of function.       Plan:     Patient to be seen 3 x/week to address the above listed problems via self-care/home management, therapeutic activities, therapeutic exercises, neuromuscular re-education  · Plan of Care Expires: 05/12/20  · Plan of Care Reviewed with: patient    Subjective   Pt reported "no one came yesterday" - in regards to therapy session   Pain/Comfort:  · Pain Rating 1: 0/10  · Pain Rating Post-Intervention 1: 0/10    Objective:     Communicated with: RN prior to session. Completed with PT  Patient found HOB elevated with Condom Catheter, telemetry upon OT entry to room.    General Precautions: Standard, airborne, aspiration, fall, contact, droplet(delirium)   Orthopedic Precautions:N/A   Braces: N/A     Occupational Performance:     Bed Mobility:    · Patient completed " Rolling/Turning to Left with CGA  · Patient completed Supine to Sit with minimum assistance and with side rail     Functional Mobility/Transfers:  · Patient completed Sit <> Stand Transfer with minimum assistance  with  rolling walker   · Patient completed Bed <> Chair Transfer using Step Transfer technique with contact guard assistance with rolling walker  · Functional Mobility: CGA with rolling walker at household distance for ADL task     Activities of Daily Living:  · Grooming: standing at sink x2 min duration for denture care  ·  requiring seated for shaving task with mod(A) with electric razor    · Upper Body Dressing: minimum assistance to don gown as jacket    AMPAC 6 Click ADL: 16    Treatment & Education:  -Pt alert; reported orientation x4; completed session with Rigo BYRNE; pt following 100% of commands  -Communication board updated; questions/concerns addressed within OT scope of practice    Patient left up in chair with all lines intact and call button in reachEducation:      GOALS:   Multidisciplinary Problems     Occupational Therapy Goals        Problem: Occupational Therapy Goal    Goal Priority Disciplines Outcome Interventions   Occupational Therapy Goal     OT, PT/OT Ongoing, Progressing    Description:  Goals to be met by: 5/5    Patient will increase functional independence with ADLs by performing:    Bed mobility and supine to sit with CGA and bed rail as needed.  LB dressing (pants, brief)  with set up and CGA.  Grooming while standing at sink with set up and supervision x5 min duration.   Toileting from toilet with CGA.  Toilet transfer to toilet with CGA and RW.  Functional mobility at household distance for ADL task with supervision.                        Time Tracking:     OT Date of Treatment: 04/28/20  OT Start Time: 1021  OT Stop Time: 1100  OT Total Time (min): 39 min    Billable Minutes:Self Care/Home Management 38    DREAD Arredondo  4/28/2020

## 2020-04-28 NOTE — PLAN OF CARE
"  Problem: Fall Injury Risk  Goal: Absence of Fall and Fall-Related Injury  Outcome: Ongoing, Progressing     Problem: Adult Inpatient Plan of Care  Goal: Plan of Care Review  Outcome: Ongoing, Progressing     Problem: Fall Injury Risk  Goal: Absence of Fall and Fall-Related Injury  Outcome: Ongoing, Progressing       Patient AAOx3; disoriented to situation. Attempted to explain POC with patient via CATY ;  stated that patient is difficult to understand and that patient was c/o a dirty diaper. After ending video call with  and assessing patient; patient was actually clean and dry and had no diaper on. Patient was able to communicate that needs were met with a thumbs up and "okay" hand signal.   V/S stable throughout shift; please see flowsheets for V/S information and full assessment data. NAEO. Siderails up x 2, bed locked in lowest position, call bell in reach, O2 & suction at bedside, WCTM.    "

## 2020-04-28 NOTE — PLAN OF CARE
Goals updated per POC. Pt demo great gains towards goals with improvement in active participation and motivation this session. DREAD Arredondo 4/28/2020   Problem: Occupational Therapy Goal  Goal: Occupational Therapy Goal  Description  Goals to be met by: 5/5    Patient will increase functional independence with ADLs by performing:    Bed mobility and supine to sit with CGA and bed rail as needed.  LB dressing (pants, brief)  with set up and CGA.  Grooming while standing at sink with set up and supervision x5 min duration.   Toileting from toilet with CGA.  Toilet transfer to toilet with CGA and RW.  Functional mobility at household distance for ADL task with supervision.       Outcome: Ongoing, Progressing

## 2020-04-28 NOTE — PROGRESS NOTES
[facility] Medicine  Telemedicine Progress Note    Patient Name: Vern Lr  MRN: [id number]  Patient Class: IP- Inpatient   Admission Date: 4/9/2020  Length of Stay: 19 days  Attending Physician: Praveen Fine MD  Primary Care Provider: Lucio Byrd MD      VIRTUAL TELENOTE    Start time:1258  Chief complaint: none  The patient location is: Inpt bed  Present with the patient at the time of the telemed/virtual assessment:    USING a  and help of RN  This service was provided through telemedicine.    This evaluation, treatment, and documentation was performed by me, and was conducted via telephone, with the assistance of the patient's primary nurse Telephone interview with remote .      End time:  1305    Total time spent with patient: 7 minutes    The attending portion of this evaluation, treatment, and documentation was performed per Xiang Adamson MD via audio only.      Subjective:     Principal Problem:COVID-19 virus infection        HPI:  80/M admitted on 4/9/20 for falls and PNA 2/2 COVID infection. Positive testing in ED (4/9/20). Patient speaks only Citizen of [place] and language line used to communicate. Spoke to patient's sister who admits patient is hard of hearing and likely has mild dementia so history is somewhat limited as a result. Patient notes constipation without bm in a week. Sister states he often complains of this but patient actually was brought in for falls. He fell twice in past couple of days. She admits he has looked fatigued recently and she is actually recovering from COVID infection herself. He normally ambulates with assistance of cane. After first fall he was brought to [facility] and went home. Second fall while in bathroom yesterday and was subsequently brought here to [facility]. Seizure hx but sister did not see anything to suggest seizure and patient has been on meds. Patient has no recent issues with  passing out. Hx of NSTEMI with non-obstructive CAD in Sept 2019. Largest occulusion noted on LHC was 20% at that time. Has been on asa, Plavix, and statin since. Normally walks with cane and was actually walking yesterday without issue per sister. Patient admits to some shortness of breath and cough as well as nasal congestion.    Overview/Hospital Course:  The patient was placed on supplemental oxygen, 5 L NC.  Azithromycin, ceftriaxone, and hydroxychloroquine, and tamsulosin were started per protocol.  His oxygenation gradually improved.  On hospital day 5, he was transferred to this hospital due to capacity issues at [facility].  Here, oxygenation continued to improve, but the patient has continued to have constitutional symptoms.   Deescalated to room air. Pending SNF placement Per PT/OT recs.   COVID-19 positive 4/24 4/28: Feels well. Denies shortness of breath, fever, cough. No new concerns.    Interval History: see hospital course    Review of Systems   Constitutional: Negative for chills and fever.   Respiratory: Negative for cough and shortness of breath.      Objective:     Vital Signs (Most Recent):  Temp: 98.1 °F (36.7 °C) (04/28/20 1113)  Pulse: 80 (04/28/20 1113)  Resp: 19 (04/28/20 1113)  BP: 104/62 (04/28/20 1120)  SpO2: 95 % (04/28/20 1120) Vital Signs (24h Range):  Temp:  [97.3 °F (36.3 °C)-98.1 °F (36.7 °C)] 98.1 °F (36.7 °C)  Pulse:  [] 80  Resp:  [14-20] 19  SpO2:  [94 %-97 %] 95 %  BP: (104-151)/(62-80) 104/62     Weight: 82 kg (180 lb 12.4 oz)  Body mass index is 30.08 kg/m².    Intake/Output Summary (Last 24 hours) at 4/28/2020 1304  Last data filed at 4/28/2020 0800  Gross per 24 hour   Intake 480 ml   Output 625 ml   Net -145 ml      Physical Exam Per RN documentation        Assessment/Plan:      * COVID-19 virus infection  PNA with COVID + on 4/9. Presenting symptoms of weakness, falls, mild sob.   Saturating well on RA initially, put on 5L NC today after desat to 70's. CXR b/l  infiltrates, CRP over 200 and mildly elevated trop on admission.   COVID protocol with isolation and 02 supplementation. Albuterol inhaler prn. Continuing course of HCQ, Azithro and Rocephin.   Oxygenation improving  Awaiting SNF placement - positive covid test 4/23      Coronary artery disease involving native coronary artery of native heart without angina pectoris  [facility] with non-obstructive dz, Sept 2019. On home asa and statin plus Plavix for NSTEMI at that time.   Mild trop elevation in setting of acute illness stabilized. Doubt ACS. Continuing home meds.    Fall  2 falls in setting of COVID infection and baseline gait instability requiring cane.  CT head and abdomen/pelvis show no new fractures. Nothing on exam to specify areas of fracture concern. Does have hx of osteoporosis and on anti-epileptic so will have very low threshold to add additional imaging.      Constipation  Continue daily laxatives while inpatient.      Elevated troponin  Hx of NSTEMI with [facility] showing non-obstructive CAD in Sept 2019.   Mild, stable. Likely related to acute illness. ACS unlikely. Cards has evaluated and signed off. Appreciate reccs.  On asa, Plavix, statin at home; will continue inpatient.    Seizure disorder  Controlled. Continue home Dilantin and valproic acid.      Dyslipidemia  Continue home statin.    BPH (benign prostatic hyperplasia)  Controlled. Continue home Flomax.      Essential hypertension, benign  Controlled. Continue home meds.        VTE Risk Mitigation (From admission, onward)         Ordered     enoxaparin injection 40 mg  Daily      04/09/20 0909     IP VTE HIGH RISK PATIENT  Once      04/09/20 0914     Place sequential compression device  Until discontinued      04/09/20 0914                      I have assessed these finding virtually using telemed platform and with assistance of bedside nurse       The attending portion of this evaluation, treatment, and documentation was performed per Xiang STOCK  MD Snow via audio only.        Xiang Adamson MD  Department of Hospital Medicine   [facility]

## 2020-04-28 NOTE — PT/OT/SLP PROGRESS
Physical Therapy Treatment    Patient Name:  Vern Lr   MRN:  [id number]  Admit Date: 4/9/2020  Admitting Diagnosis:  COVID-19 virus infection   Length of Stay: 19 days  Recent Surgery: * No surgery found *      Recommendations:     Discharge Recommendations:  nursing facility, skilled((will require 24hr supervision/assist, living independently PTA))   Discharge Equipment Recommendations: walker, rolling, bedside commode   Barriers to discharge: Decreased caregiver support    Plan:     During this hospitalization, patient to be seen 4 x/week to address the listed problems via gait training, therapeutic activities, neuromuscular re-education  · Plan of Care Expires:  05/13/20   Plan of Care Reviewed with: patient    Assessment:     Vern Lr is a 80 y.o. male admitted with a medical diagnosis of COVID-19 virus infection.   Patient is making progress towards goals, participating well in this session. Pt with noted improvement with functional mobility and ambulation. Pt appears to be in better spirits today, laughing & interacting appropriately. Pt will continue to require supervision and assist upon discharge, not safe to return home independently. Education was provided to patient regarding importance of continued participation in therapy, patient's progress and discharge disposition. Pt continues to benefit from a collaborative PT/OT/SLP program to improve quality of life and focus on recovery of impairments.     Problem List: weakness, impaired self care skills, impaired endurance, impaired functional mobilty, impaired cardiopulmonary response to activity, decreased safety awareness, gait instability.  Rehab Prognosis: Good     GOALS:   Multidisciplinary Problems     Physical Therapy Goals        Problem: Physical Therapy Goal    Goal Priority Disciplines Outcome Goal Variances Interventions   Physical Therapy Goal     PT, PT/OT Ongoing, Progressing     Description:  Goals to be met by: 5/7/2020    Patient  "will increase functional independence with mobility by performin. Pt will perform bed mobility (rolling L/R, scooting, and bridging) with [place]awaf. - GOAL MET  REVISED: with CGA  2. Pt will perform supine to/from sit with N[place]af. - GOAL MET  REVISED: with CGA  3. Pt will sit EOB x 10 mins with B UE support with Supervision assistance.  4. Pt with perform sit to stand with modA assistance and straight cane.  5. Pt will ambulate 30 feet with mod assistance and straight cane. MET with RW  Revised: pt able to ambulate 75ft with SBA with RW.  6. Pt will perform there-ex from handout x 15 reps to improve strength for functional mobility.                            Subjective   Communicated with RN prior to session.  Patient found supine upon PT entry to room, agreeable to evaluation. Vern Lr's alone present during session.    Patient/Family Comments/goals: Pt agreeable to session. Communicating with pt via  [place].   Pain/Comfort:  · Pain Rating 1: 0/10  · Pain Rating Post-Intervention 1: 0/10    Objective:   Patient found with: telemetry, Condom Catheter   General Precautions: Standard, Cardiac airborne, contact, droplet, fall   Orthopedic Precautions:N/A   Braces: N/A   Oxygen Device: Room Air  Vitals: /62   Pulse 80   Temp 98.1 °F (36.7 °C) (Oral)   Resp 19   Ht 5' 5" (1.651 m)   Wt 82 kg (180 lb 12.4 oz)   SpO2 97%   BMI 30.08 kg/m²     Outcome Measures:  AM-PAC 6 CLICK MOBILITY  Turning over in bed (including adjusting bedclothes, sheets and blankets)?: 3  Sitting down on and standing up from a chair with arms (e.g., wheelchair, bedside commode, etc.): 3  Moving from lying on back to sitting on the side of the bed?: 3  Moving to and from a bed to a chair (including a wheelchair)?: 3  Need to walk in hospital room?: 3  Climbing 3-5 steps with a railing?: 2  Basic Mobility Total Score: 17     Cognition:   · Alert and Cooperative  · AxOx4  · Command following: Follows two-step " commands  · Fluency: clear/fluent    Functional Mobility:  Additional staff present: OT  Bed Mobility:    Supine to Sit: stand by assistance; HOB flat and from left side of bed    Transfers:    Sit <> Stand Transfer: contact guard assistance with rolling walker    Stand <> Sit Transfer: contact guard assistance with rolling walker   o [id number] from EOB and [id number] from bedside commode      Gait:  · Patient ambulated: ~50ft    · Patient required: standy by assistance  · Patient used: rolling walker  · Gait Pattern observed: 3-point gait  · Gait Deviation(s): decreased step length, narrow base of support, flexed posture and decreased sylvia  · Impairments due to: decreased endurance  · Comments: pt with kyphotic posture, relying on RW for balance support. No LOB noted.    Therapeutic Activities & Exercises:   *pt participating in standing grooming tasks at sink with OT.    Education:  Patient provided with daily orientation and goals of this PT session. Patient agreed to participate in session. Patient aware of patient's deficits and therapy progression. They were educated to transfer to bedside chair/bedside commode/bathroom with RN/PCT present. Patient educated on Fall risk, gait training, home safety and Home exercise program by explanation. Patient was receptive to education and verbalizes understanding. Encouraged patient to perform daily exercises & mobility to increase endurance and decrease effects of bedrest.Time provided for therapeutic counseling and discussion of health disposition. All questions answered to patient's satisfaction, within scope of PT practice; voiced no other concerns. White board updated in patient's room, RN notified of session.    Patient left up in chair, with head in midline, neutral pelvis & heels floated for skin protection with all lines intact, call button in reach and RN notified  Time Tracking:     PT Received On: 04/28/20  PT Start Time: 1020     PT Stop Time: 1100  PT  Total Time (min): 40 min   Co-treat with OT    Billable Minutes:   · Gait Training 25 and Therapeutic Activity 15    Treatment Type: Treatment  PT/PTA: PT       Marycruz Antoine PT, DPT  04/28/2020  Pager: [phone]

## 2020-04-29 LAB
ALBUMIN SERPL BCP-MCNC: 2.5 G/DL (ref 3.5–5.2)
ALP SERPL-CCNC: 93 U/L (ref 55–135)
ALT SERPL W/O P-5'-P-CCNC: 50 U/L (ref 10–44)
ANION GAP SERPL CALC-SCNC: 8 MMOL/L (ref 8–16)
AST SERPL-CCNC: 52 U/L (ref 10–40)
BASOPHILS # BLD AUTO: 0.06 K/UL (ref 0–0.2)
BASOPHILS NFR BLD: 1 % (ref 0–1.9)
BILIRUB SERPL-MCNC: 0.3 MG/DL (ref 0.1–1)
BILIRUB UR QL STRIP: NEGATIVE
BUN SERPL-MCNC: 18 MG/DL (ref 8–23)
CALCIUM SERPL-MCNC: 8.9 MG/DL (ref 8.7–10.5)
CHLORIDE SERPL-SCNC: 105 MMOL/L (ref 95–110)
CLARITY UR REFRACT.AUTO: ABNORMAL
CO2 SERPL-SCNC: 25 MMOL/L (ref 23–29)
COLOR UR AUTO: YELLOW
CREAT SERPL-MCNC: 0.7 MG/DL (ref 0.5–1.4)
CRP SERPL-MCNC: 42.5 MG/L (ref 0–8.2)
DIFFERENTIAL METHOD: ABNORMAL
EOSINOPHIL # BLD AUTO: 0.2 K/UL (ref 0–0.5)
EOSINOPHIL NFR BLD: 3.7 % (ref 0–8)
ERYTHROCYTE [DISTWIDTH] IN BLOOD BY AUTOMATED COUNT: 13 % (ref 11.5–14.5)
EST. GFR  (AFRICAN AMERICAN): >60 ML/MIN/1.73 M^2
EST. GFR  (NON AFRICAN AMERICAN): >60 ML/MIN/1.73 M^2
FERRITIN SERPL-MCNC: 639 NG/ML (ref 20–300)
GLUCOSE SERPL-MCNC: 82 MG/DL (ref 70–110)
GLUCOSE UR QL STRIP: NEGATIVE
HCT VFR BLD AUTO: 41.8 % (ref 40–54)
HGB BLD-MCNC: 13.3 G/DL (ref 14–18)
HGB UR QL STRIP: NEGATIVE
IMM GRANULOCYTES # BLD AUTO: 0.02 K/UL (ref 0–0.04)
IMM GRANULOCYTES NFR BLD AUTO: 0.3 % (ref 0–0.5)
KETONES UR QL STRIP: NEGATIVE
LEUKOCYTE ESTERASE UR QL STRIP: NEGATIVE
LYMPHOCYTES # BLD AUTO: 1.6 K/UL (ref 1–4.8)
LYMPHOCYTES NFR BLD: 26.5 % (ref 18–48)
MCH RBC QN AUTO: 30.9 PG (ref 27–31)
MCHC RBC AUTO-ENTMCNC: 31.8 G/DL (ref 32–36)
MCV RBC AUTO: 97 FL (ref 82–98)
MONOCYTES # BLD AUTO: 0.7 K/UL (ref 0.3–1)
MONOCYTES NFR BLD: 10.6 % (ref 4–15)
NEUTROPHILS # BLD AUTO: 3.6 K/UL (ref 1.8–7.7)
NEUTROPHILS NFR BLD: 57.9 % (ref 38–73)
NITRITE UR QL STRIP: NEGATIVE
NRBC BLD-RTO: 0 /100 WBC
PH UR STRIP: 5 [PH] (ref 5–8)
PLATELET # BLD AUTO: 411 K/UL (ref 150–350)
PMV BLD AUTO: 9.4 FL (ref 9.2–12.9)
POTASSIUM SERPL-SCNC: 3.8 MMOL/L (ref 3.5–5.1)
PROT SERPL-MCNC: 7.8 G/DL (ref 6–8.4)
PROT UR QL STRIP: NEGATIVE
RBC # BLD AUTO: 4.31 M/UL (ref 4.6–6.2)
SODIUM SERPL-SCNC: 138 MMOL/L (ref 136–145)
SP GR UR STRIP: 1.02 (ref 1–1.03)
URN SPEC COLLECT METH UR: ABNORMAL
WBC # BLD AUTO: 6.15 K/UL (ref 3.9–12.7)

## 2020-04-29 PROCEDURE — 25000003 PHARM REV CODE 250: Performed by: EMERGENCY MEDICINE

## 2020-04-29 PROCEDURE — 97530 THERAPEUTIC ACTIVITIES: CPT

## 2020-04-29 PROCEDURE — 81003 URINALYSIS AUTO W/O SCOPE: CPT

## 2020-04-29 PROCEDURE — 63600175 PHARM REV CODE 636 W HCPCS: Performed by: INTERNAL MEDICINE

## 2020-04-29 PROCEDURE — 97535 SELF CARE MNGMENT TRAINING: CPT

## 2020-04-29 PROCEDURE — 97803 MED NUTRITION INDIV SUBSEQ: CPT

## 2020-04-29 PROCEDURE — 80053 COMPREHEN METABOLIC PANEL: CPT

## 2020-04-29 PROCEDURE — 99232 PR SUBSEQUENT HOSPITAL CARE,LEVL II: ICD-10-PCS | Mod: ,,, | Performed by: INTERNAL MEDICINE

## 2020-04-29 PROCEDURE — 82728 ASSAY OF FERRITIN: CPT

## 2020-04-29 PROCEDURE — 25000003 PHARM REV CODE 250: Performed by: INTERNAL MEDICINE

## 2020-04-29 PROCEDURE — 94761 N-INVAS EAR/PLS OXIMETRY MLT: CPT

## 2020-04-29 PROCEDURE — 94640 AIRWAY INHALATION TREATMENT: CPT

## 2020-04-29 PROCEDURE — 25000003 PHARM REV CODE 250: Performed by: HOSPITALIST

## 2020-04-29 PROCEDURE — 99232 SBSQ HOSP IP/OBS MODERATE 35: CPT | Mod: ,,, | Performed by: INTERNAL MEDICINE

## 2020-04-29 PROCEDURE — 85025 COMPLETE CBC W/AUTO DIFF WBC: CPT

## 2020-04-29 PROCEDURE — 11000001 HC ACUTE MED/SURG PRIVATE ROOM

## 2020-04-29 PROCEDURE — 86140 C-REACTIVE PROTEIN: CPT

## 2020-04-29 PROCEDURE — 36415 COLL VENOUS BLD VENIPUNCTURE: CPT

## 2020-04-29 RX ADMIN — ALBUTEROL SULFATE 2 PUFF: 90 AEROSOL, METERED RESPIRATORY (INHALATION) at 04:04

## 2020-04-29 RX ADMIN — ALBUTEROL SULFATE 2 PUFF: 90 AEROSOL, METERED RESPIRATORY (INHALATION) at 08:04

## 2020-04-29 RX ADMIN — STANDARDIZED SENNA CONCENTRATE AND DOCUSATE SODIUM 1 TABLET: 8.6; 5 TABLET ORAL at 08:04

## 2020-04-29 RX ADMIN — ASPIRIN 81 MG: 81 TABLET, COATED ORAL at 09:04

## 2020-04-29 RX ADMIN — TAMSULOSIN HYDROCHLORIDE 0.4 MG: 0.4 CAPSULE ORAL at 09:04

## 2020-04-29 RX ADMIN — FINASTERIDE 5 MG: 5 TABLET, FILM COATED ORAL at 09:04

## 2020-04-29 RX ADMIN — ACETAMINOPHEN 650 MG: 325 TABLET ORAL at 10:04

## 2020-04-29 RX ADMIN — ATORVASTATIN CALCIUM 40 MG: 40 TABLET, FILM COATED ORAL at 09:04

## 2020-04-29 RX ADMIN — ENOXAPARIN SODIUM 40 MG: 100 INJECTION SUBCUTANEOUS at 08:04

## 2020-04-29 RX ADMIN — PHENYTOIN SODIUM 200 MG: 100 CAPSULE ORAL at 09:04

## 2020-04-29 RX ADMIN — PHENOBARBITAL 90 MG: 30 TABLET ORAL at 09:04

## 2020-04-29 RX ADMIN — PHENYTOIN SODIUM 200 MG: 100 CAPSULE ORAL at 08:04

## 2020-04-29 RX ADMIN — ACETAMINOPHEN 650 MG: 325 TABLET ORAL at 08:04

## 2020-04-29 RX ADMIN — Medication 6 MG: at 08:04

## 2020-04-29 RX ADMIN — MONTELUKAST 10 MG: 10 TABLET, FILM COATED ORAL at 08:04

## 2020-04-29 RX ADMIN — CLOPIDOGREL BISULFATE 75 MG: 75 TABLET ORAL at 09:04

## 2020-04-29 RX ADMIN — STANDARDIZED SENNA CONCENTRATE AND DOCUSATE SODIUM 1 TABLET: 8.6; 5 TABLET ORAL at 09:04

## 2020-04-29 NOTE — PLAN OF CARE
Problem: Fall Injury Risk  Goal: Absence of Fall and Fall-Related Injury  Outcome: Ongoing, Progressing     Problem: Infection  Goal: Infection Symptom Resolution  Outcome: Ongoing, Progressing     Problem: Oral Intake Inadequate  Goal: Improved Oral Intake  Outcome: Ongoing, Progressing      Patient educated on s/s of infection, fall risk and intake.  Patient verbalized.

## 2020-04-29 NOTE — PROGRESS NOTES
[facility] Medicine  Telemedicine Progress Note    Patient Name: Vern Lr  MRN: [id number]  Patient Class: IP- Inpatient   Admission Date: 4/9/2020  Length of Stay: 20 days  Attending Physician: Praveen Fine MD  Primary Care Provider: Lucio Byrd MD    VIRTUAL TELENOTE    Start time: 1415  Chief complaint: none  The patient location is: Inpt Bed  Present with the patient at the time of the telemed/virtual assessment:  USING a  and help of RN  This service was provided through telemedicine.    This evaluation, treatment, and documentation was performed by me, and was conducted via telephone, with the assistance of the patient's primary nurse Telephone interview with remote .     End time:  1420    Total time spent with patient: 5 minutes    The attending portion of this evaluation, treatment, and documentation was performed per Xiang Adamson MD via audio only.      Subjective:     Principal Problem:COVID-19 virus infection        HPI:  80/M admitted on 4/9/20 for falls and PNA 2/2 COVID infection. Positive testing in ED (4/9/20). Patient speaks only Samoan and language line used to communicate. Spoke to patient's sister who admits patient is hard of hearing and likely has mild dementia so history is somewhat limited as a result. Patient notes constipation without bm in a week. Sister states he often complains of this but patient actually was brought in for falls. He fell twice in past couple of days. She admits he has looked fatigued recently and she is actually recovering from COVID infection herself. He normally ambulates with assistance of cane. After first fall he was brought to [facility] and went home. Second fall while in bathroom yesterday and was subsequently brought here to [facility]. Seizure hx but sister did not see anything to suggest seizure and patient has been on meds. Patient has no recent issues with  passing out. Hx of NSTEMI with non-obstructive CAD in Sept 2019. Largest occulusion noted on LHC was 20% at that time. Has been on asa, Plavix, and statin since. Normally walks with cane and was actually walking yesterday without issue per sister. Patient admits to some shortness of breath and cough as well as nasal congestion.    Overview/Hospital Course:  The patient was placed on supplemental oxygen, 5 L NC.  Azithromycin, ceftriaxone, and hydroxychloroquine, and tamsulosin were started per protocol.  His oxygenation gradually improved.  On hospital day 5, he was transferred to this hospital due to capacity issues at [facility].  Here, oxygenation continued to improve, but the patient has continued to have constitutional symptoms.   Deescalated to room air. Pending SNF placement Per PT/OT recs.   COVID-19 positive 4/24 4/28: Feels well. Denies shortness of breath, fever, cough. No new concerns.    Interval History: No new concerns per patient. Some shortness of breath with ambulation. Denies fevers, cough. Feels well.     Review of Systems per RN documentation  Objective:     Vital Signs (Most Recent):  Temp: 97 °F (36.1 °C) (04/29/20 1143)  Pulse: 93 (04/29/20 1143)  Resp: 18 (04/29/20 1143)  BP: 108/74 (04/29/20 1143)  SpO2: 95 % (04/29/20 1143) Vital Signs (24h Range):  Temp:  [96.7 °F (35.9 °C)-97.8 °F (36.6 °C)] 97 °F (36.1 °C)  Pulse:  [71-97] 93  Resp:  [12-20] 18  SpO2:  [94 %-97 %] 95 %  BP: (108-152)/(56-82) 108/74     Weight: 82 kg (180 lb 12.4 oz)  Body mass index is 30.08 kg/m².    Intake/Output Summary (Last 24 hours) at 4/29/2020 1420  Last data filed at 4/29/2020 0500  Gross per 24 hour   Intake 240 ml   Output 475 ml   Net -235 ml      Physical Exam Per RN documentation        Assessment/Plan:      * COVID-19 virus infection  PNA with COVID + on 4/9. Presenting symptoms of weakness, falls, mild sob.   Saturating well on RA initially, put on 5L NC today after desat to 70's. CXR b/l infiltrates, CRP  over 200 and mildly elevated trop on admission.   COVID protocol with isolation and 02 supplementation. Albuterol inhaler prn. Continuing course of HCQ, Azithro and Rocephin.   Oxygenation improving  Awaiting SNF placement - positive covid test 4/23      Coronary artery disease involving native coronary artery of native heart without angina pectoris  [facility] with non-obstructive dz, Sept 2019. On home asa and statin plus Plavix for NSTEMI at that time.   Mild trop elevation in setting of acute illness stabilized. Doubt ACS. Continuing home meds.    Fall  2 falls in setting of COVID infection and baseline gait instability requiring cane.  CT head and abdomen/pelvis show no new fractures. Nothing on exam to specify areas of fracture concern. Does have hx of osteoporosis and on anti-epileptic so will have very low threshold to add additional imaging.      Constipation  Continue daily laxatives while inpatient.      Elevated troponin  Hx of NSTEMI with [facility] showing non-obstructive CAD in Sept 2019.   Mild, stable. Likely related to acute illness. ACS unlikely. Cards has evaluated and signed off. Appreciate reccs.  On asa, Plavix, statin at home; will continue inpatient.    Seizure disorder  Controlled. Continue home Dilantin and valproic acid.      Dyslipidemia  Continue home statin.    BPH (benign prostatic hyperplasia)  Controlled. Continue home Flomax.      Essential hypertension, benign  Controlled. Continue home meds.        VTE Risk Mitigation (From admission, onward)         Ordered     enoxaparin injection 40 mg  Daily      04/09/20 0909     IP VTE HIGH RISK PATIENT  Once      04/09/20 0914     Place sequential compression device  Until discontinued      04/09/20 0914                      I have assessed these finding virtually using telemed platform and with assistance of bedside nurse       The attending portion of this evaluation, treatment, and documentation was performed per Xiang Adamson MD via audio  only.        Xiang Adamson MD  Department of [facility] Medicine   [facility]

## 2020-04-29 NOTE — PROGRESS NOTES
"[facility]  Adult Nutrition  Progress Note    SUMMARY       Recommendations    1. Continue with Cardiac diet and Boost Plus TID   2. RD to continue to monitor    Goals: Pt will consume at least 50% intake at meals  Nutrition Goal Status: goal met  Communication of RD Recs: other (comment)    Reason for Assessment    Reason For Assessment: RD follow-up  Diagnosis: (COVID)  Relevant Medical History: seizure disorder, BPH, DM, stroke, CAD  Interdisciplinary Rounds: did not attend  General Information Comments: RD workign remotely. Did not speak with the pt but did speak with his nurse. Per nurse, pt is eating 75% of his meals and he is drinking his Boost Plus. Pt has no n/v/d. Pt has a hx of constipation but on a bowel regime.Pt's UBW per charts is 86kg so pt has a 10lbs (5%) wt loss and meets criteria for acute moderate malnutrition. Unable to do NFPE due to covid restrictions.   Nutrition Discharge Planning: Pt to follow low sodium, low saturated fat diet.    Nutrition Risk Screen    Nutrition Risk Screen: no indicators present    Nutrition/Diet History    Food Preferences: unable to assess  Spiritual, Cultural Beliefs, Nondenominational Practices, Values that Affect Care: yes(Latvian)  Factors Affecting Nutritional Intake: decreased appetite    Anthropometrics    Temp: 97 °F (36.1 °C)  Height: 5' 5" (165.1 cm)  Height (inches): 65 in  Weight Method: Bed Scale  Weight: 82 kg (180 lb 12.4 oz)  Weight (lb): 180.78 lb  Ideal Body Weight (IBW), Male: 136 lb  % Ideal Body Weight, Male (lb): 139.74 %  BMI (Calculated): 30.1  BMI Grade: 30 - 34.9- obesity - grade I       Lab/Procedures/Meds    Pertinent Labs Reviewed: reviewed  Pertinent Labs Comments: Alb 2.5  Pertinent Medications Reviewed: reviewed  Pertinent Medications Comments: Senna, Statin    Physical Findings/Assessment         Estimated/Assessed Needs    Weight Used For Calorie Calculations: 86.2 kg (190 lb 0.6 oz)  Energy Calorie Requirements (kcal): " 2155 (25 kcal/kg)  Energy Need Method: Kcal/kg  Protein Requirements: 69g (0.8g/kg)  Weight Used For Protein Calculations: 86.2 kg (190 lb 0.6 oz)     Estimated Fluid Requirement Method: RDA Method  RDA Method (mL): 2155         Nutrition Prescription Ordered    Current Diet Order: Cardiac  Oral Nutrition Supplement: Boost Plus TID    Evaluation of Received Nutrient/Fluid Intake    I/O: -172 mL since admit  Energy Calories Required: not meeting needs  Protein Required: not meeting needs  Fluid Required: not meeting needs  Comments: LBM 4/25  Tolerance: tolerating  % Intake of Estimated Energy Needs: 50-75%  % Meal Intake: 50-75%    Nutrition Risk    Level of Risk/Frequency of Follow-up: low(1xweek)     Assessment and Plan      Contributing Nutrition Diagnosis  Inadequate energy intake    Related to (etiology):   Decreased appetite, COVID    Signs and Symptoms (as evidenced by):   Poor intake at meals  PO of 50%    Interventions:  Collaboration with other providers  Commercial beverage- Boost Plus TID    Nutrition Diagnosis Status:   Continues           Monitor and Evaluation    Food and Nutrient Intake: energy intake, food and beverage intake  Food and Nutrient Adminstration: diet order  Physical Activity and Function: nutrition-related ADLs and IADLs  Anthropometric Measurements: weight  Biochemical Data, Medical Tests and Procedures: electrolyte and renal panel  Nutrition-Focused Physical Findings: overall appearance     Malnutrition Assessment                                       Nutrition Follow-Up    RD Follow-up?: Yes

## 2020-04-29 NOTE — PT/OT/SLP PROGRESS
"Occupational Therapy Treatment    Patient Name:  Vern Lr   MRN:  [id number]  Admit Date: 4/9/2020  Admitting Diagnosis:  COVID-19 virus infection   Length of Stay: 20 days  Recent Surgery: * No surgery found *      Recommendations:     Discharge Recommendations: nursing facility, skilled  Discharge Equipment Recommendations:  walker, rolling, bedside commode  Barriers to discharge:  Inaccessible home environment, Decreased caregiver support    Plan:     Patient to be seen 3 x/week to address the above listed problems via self-care/home management, therapeutic activities, therapeutic exercises  · Plan of Care Expires: 05/12/20  · Plan of Care Reviewed with: patient    Assessment:     Vern Lr is a 80 y.o. male admitted with a medical diagnosis of COVID-19 virus infection.     Patient is making progress towards goals, participating well in this session. Pt continues to require significant assist with functional mobility. Pt with noted improvement with endurance for OOB/EOB activity.  Pt continues to benefit from a collaborative PT/OT/SLP program to improve quality of life and focus on recovery of impairments.     Problem List: weakness, impaired endurance, impaired self care skills, impaired functional mobilty, gait instability, impaired balance, impaired cognition, decreased coordination, decreased upper extremity function, decreased lower extremity function, decreased safety awareness, impaired cardiopulmonary response to activity, impaired skin.    Rehab Prognosis: Good; patient would benefit from acute skilled OT services to address these deficits and reach maximum level of function.        Subjective   Communicated with: RN prior to session.  Patient found HOB elevated with bed alarm, telemetry, peripheral IV upon OT entry to room.    Patient: speaking Pashto, "i'm glad to be in the chair, my low back just hurts after time, I yell and yell but no one answers"  Patient educated again on use of call light. " "    Pain/Comfort:  · Pain Rating 1: other (see comments)(c/o pain in buttocks when seated, alleviated with repositioning, RN notified)  · Location - Orientation 1: generalized  · Location 1: sacral spine  · Pain Addressed 1: Reposition, Distraction, Cessation of Activity, Nurse notified  · Pain Rating Post-Intervention 1: 0/10    Objective:   Patient found with: bed alarm, telemetry, peripheral IV   General Precautions: Standard, Cardiac airborne, aspiration, contact, droplet, fall   Orthopedic Precautions:N/A   Braces: N/A   Oxygen Device: Room Air  Vitals: /74   Pulse 89   Temp 97 °F (36.1 °C)   Resp 18   Ht 5' 5" (1.651 m)   Wt 82 kg (180 lb 12.4 oz)   SpO2 97%   BMI 30.08 kg/m²     Outcome Measures:  AMPAC 6 Click ADL: 16    Cognition:   · Oriented X 3 and Cooperative, tangential  · Command following: easily distracted by tangential speech, low back pain and follows one-step commands  · Communication: clear/fluent    Occupational Performance:  Bed Mobility:    · Patient completed Rolling/Turning to Left with  stand by assistance  · Patient completed Supine to Sit with stand by assistance on L side of bed  · Scooting anteriorly to EOB to have both feet planted on floor: stand by assistance    Functional Mobility/Transfers:  -Static Sitting EOB: SBA   -Dynamic Sitting EOB: CGA   -Patient completed Sit <> Stand Transfer with contact guard assistance  with  rolling walker   -Patient completed Bed <> Chair Transfer using Step Transfer technique with contact guard assistance with rolling walker  NOTE TO STAFF: Patient is safe to transfer to bedside chair or commode with [id number] assist.     Activities of Daily Living:  · Grooming: stand by assistance seated UIC    AMPAC 6 Click ADL:  AMPAC Total Score: 16    Treatment & Education:  -Pt education on OT role and POC   -Importance of E/OOB activity with staff assistance  -Multiple self-care tasks and functional mobility completed -- assistance level noted " above  -Safety during functional transfer and mobility ensured  -White board updated, orientation assessed and provided  -Education provided reviewed, questions answered within OT scope of practice.     Patient left up in chair with all lines intact, call button in reach and RN notified, patient assisted with calling family    GOALS:   Multidisciplinary Problems     Occupational Therapy Goals        Problem: Occupational Therapy Goal    Goal Priority Disciplines Outcome Interventions   Occupational Therapy Goal     OT, PT/OT Ongoing, Progressing    Description:  Goals to be met by: 5/5    Patient will increase functional independence with ADLs by performing:    Bed mobility and supine to sit with CGA and bed rail as needed.  LB dressing (pants, brief)  with set up and CGA.  Grooming while standing at sink with set up and supervision x5 min duration.   Toileting from toilet with CGA.  Toilet transfer to toilet with CGA and RW.  Functional mobility at household distance for ADL task with supervision.                        Time Tracking:     OT Date of Treatment: 04/29/20  OT Start Time: 1529  OT Stop Time: 1603  OT Total Time (min): 34 min  Additional staff present: N/A and Call-In , ID#[id number]      Billable Minutes:Self Care/Home Management 20  Therapeutic Activity 14      DREAD Diaz  4/29/2020

## 2020-04-29 NOTE — PLAN OF CARE
Plan of Care:  Discharge Recommendation: SNF.  Please continue Progressive Mobility Protocol as appropriate.  Appropriate transfer level with nursing staff: Bed <> Chair:  Stand Pivot with stand by assistance with Rolling Walker.    Marycruz Antoine PT, DPT  2020  Pager: [phone]    Physical Therapy Treatment Goals:  Multidisciplinary Problems       Physical Therapy Goals          Problem: Physical Therapy Goal    Goal Priority Disciplines Outcome Goal Variances Interventions   Physical Therapy Goal     PT, PT/OT Ongoing, Progressing     Description:  Goals to be met by: 2020    Patient will increase functional independence with mobility by performin. Pt will perform bed mobility (rolling L/R, scooting, and bridging) with Nawaf. - GOAL MET  REVISED: with CGA. MET  Revised: Pt to perform bed mobility with Supervision  2. Pt will perform supine to/from sit with Nawaf. - GOAL MET  REVISED: with CGA. MET  Revied: Pt to perform supine <> sit with Supervision.  3. Pt will sit EOB x 10 mins with B UE support with Supervision assistance. MET  4. Pt with perform sit to stand with modA assistance and straight cane. Discontinued  Revised: Pt to perform sit <> stand with Supervision with RW.  5. Pt will ambulate 30 feet with mod assistance and straight cane. MET with RW  Revised: pt able to ambulate 75ft with SBA with RW.  6. Pt will perform there-ex from handout x 15 reps to improve strength for functional mobility.

## 2020-04-29 NOTE — PT/OT/SLP PROGRESS
Physical Therapy Treatment    Patient Name:  Vern Lr   MRN:  [id number]  Admit Date: 2020  Admitting Diagnosis:  COVID-19 virus infection   Length of Stay: 20 days  Recent Surgery: * No surgery found *      Recommendations:     Discharge Recommendations:  nursing facility, skilled(will require 24/7 supervision/assist due to safety concerns.)   Discharge Equipment Recommendations: walker, rolling, bedside commode   Barriers to discharge: Decreased caregiver support    Plan:     During this hospitalization, patient to be seen 4 x/week to address the listed problems via gait training, therapeutic activities, therapeutic exercises, neuromuscular re-education  · Plan of Care Expires:  20   Plan of Care Reviewed with: patient    Assessment:     Vern Lr is a 80 y.o. male admitted with a medical diagnosis of COVID-19 virus infection.   Patient is making progress towards goals, participating well in this session. Pt with noted improvement with functional mobility and tolerance to ambulation. Pt reporting pain with urination, RN notified. Education was provided to patient regarding importance of continued participation in therapy, patient's progress and discharge disposition. Pt continues to benefit from a collaborative PT/OT/SLP program to improve quality of life and focus on recovery of impairments.     Problem List: weakness, impaired endurance, impaired functional mobilty, impaired self care skills, decreased safety awareness, impaired cardiopulmonary response to activity, impaired balance.  Rehab Prognosis: Good     GOALS:   Multidisciplinary Problems     Physical Therapy Goals        Problem: Physical Therapy Goal    Goal Priority Disciplines Outcome Goal Variances Interventions   Physical Therapy Goal     PT, PT/OT Ongoing, Progressing     Description:  Goals to be met by: 2020    Patient will increase functional independence with mobility by performin. Pt will perform bed mobility (rolling  "L/R, scooting, and bridging) with Nawaf. - GOAL MET  REVISED: with CGA. MET  Revised: Pt to perform bed mobility with Supervision  2. Pt will perform supine to/from sit with Nawaf. - GOAL MET  REVISED: with CGA. MET  Revied: Pt to perform supine <> sit with Supervision.  3. Pt will sit EOB x 10 mins with B UE support with Supervision assistance. MET  4. Pt with perform sit to stand with modA assistance and straight cane. Discontinued  Revised: Pt to perform sit <> stand with Supervision with RW.  5. Pt will ambulate 30 feet with mod assistance and straight cane. MET with RW  Revised: pt able to ambulate 75ft with SBA with RW.  6. Pt will perform there-ex from handout x 15 reps to improve strength for functional mobility.                             Subjective   Communicated with RN prior to session.  Patient found supine upon PT entry to room, agreeable to evaluation. Vern Lr's alone present during session.    Patient/Family Comments/goals: Communicated via  line; "I have to get better, or after this they'll take me to the cemetary." At end of session, PT called pt's sister for pt to speak to. Pt laughing while speaking with sister.  Pain/Comfort:  · Pain Rating 1: (pt c/o burning when urinating. RN notified.)    Objective:   Patient found with: telemetry, pulse ox (continuous), Condom Catheter   General Precautions: Standard, Cardiac airborne, contact, droplet, fall   Orthopedic Precautions:N/A   Braces: N/A   Oxygen Device: Room Air  Vitals: /74   Pulse 93   Temp 97 °F (36.1 °C)   Resp 18   Ht 5' 5" (1.651 m)   Wt 82 kg (180 lb 12.4 oz)   SpO2 95%   BMI 30.08 kg/m²     Outcome Measures:  AM-PAC 6 CLICK MOBILITY  Turning over in bed (including adjusting bedclothes, sheets and blankets)?: 3  Sitting down on and standing up from a chair with arms (e.g., wheelchair, bedside commode, etc.): 3  Moving from lying on back to sitting on the side of the bed?: 3  Moving to and from a bed to a chair " (including a wheelchair)?: 3  Need to walk in hospital room?: 3  Climbing 3-5 steps with a railing?: 3  Basic Mobility Total Score: 18     Cognition:   · Alert and Cooperative  · AxOx3  · Command following: Follows multistep  commands  · Fluency: clear/fluent    Functional Mobility:  Additional staff present: language line   Bed Mobility:    Supine to Sit: stand by assistance; HOB flat and from left side of bed   Scooting anteriorly to EOB to have both feet planted on floor: contact guard assistance    Transfers:    Sit <> Stand Transfer: stand by assistance with rolling walker    Stand <> Sit Transfer: stand by assistance with rolling walker   o [id number] from EOB      Gait:  · Patient ambulated: 16ft laps x 4trials = 65ft   · Patient required: standy by assistance  · Patient used: rolling walker  · Gait Pattern observed: 3-point gait  · Gait Deviation(s): shuffle gait, flexed posture and decreased sylvia  · Impairments due to: decreased endurance  · Comments: pt ambulating with no LOB or instability noted. Pt requiring ~20 minutes to complete due to stopping to look out of window after each lap &. greatly reduced speed.    Therapeutic Activities & Exercises:   *PT called  Services due to MARTTI not working. Services will address MARTTI.   Education:  Patient provided with daily orientation and goals of this PT session. Patient agreed to participate in session. Patient aware of patient's deficits and therapy progression. They were educated to transfer to bedside chair/bedside commode/bathroom with RN/PCT present. Patient educated on Fall risk, home safety and Home exercise program by explanation. Patient was receptive to education and verbalizes understanding. Encouraged patient to perform daily exercises & mobility to increase endurance and decrease effects of bedrest.Time provided for therapeutic counseling and discussion of health disposition. All questions answered to patient's  satisfaction, within scope of PT practice; voiced no other concerns. White board updated in patient's room, RN notified of session.    Patient left up in chair, with head in midline, neutral pelvis & heels floated for skin protection with all lines intact, call button in reach and RN notified  Time Tracking:     PT Received On: 04/29/20  PT Start Time: 1039     PT Stop Time: 1119  PT Total Time (min): 40 min     Billable Minutes:   · Therapeutic Activity 38    Treatment Type: Treatment  PT/PTA: PT       Marycruz Antoine PT, DPT  04/29/2020  Pager: [phone]

## 2020-04-29 NOTE — PLAN OF CARE
ALLYSON rec'd call from Melina with [facility].  Melina stated she had been in contact with [facility] and advised that they are taking patients if diagnosis.

## 2020-04-29 NOTE — SUBJECTIVE & OBJECTIVE
Interval History: No new concerns per patient. Some shortness of breath with ambulation. Denies fevers, cough. Feels well.     Review of Systems per RN documentation  Objective:     Vital Signs (Most Recent):  Temp: 97 °F (36.1 °C) (04/29/20 1143)  Pulse: 93 (04/29/20 1143)  Resp: 18 (04/29/20 1143)  BP: 108/74 (04/29/20 1143)  SpO2: 95 % (04/29/20 1143) Vital Signs (24h Range):  Temp:  [96.7 °F (35.9 °C)-97.8 °F (36.6 °C)] 97 °F (36.1 °C)  Pulse:  [71-97] 93  Resp:  [12-20] 18  SpO2:  [94 %-97 %] 95 %  BP: (108-152)/(56-82) 108/74     Weight: 82 kg (180 lb 12.4 oz)  Body mass index is 30.08 kg/m².    Intake/Output Summary (Last 24 hours) at 4/29/2020 1420  Last data filed at 4/29/2020 0500  Gross per 24 hour   Intake 240 ml   Output 475 ml   Net -235 ml      Physical Exam Per RN documentation

## 2020-04-29 NOTE — PLAN OF CARE
Problem: Occupational Therapy Goal  Goal: Occupational Therapy Goal  Description  Goals to be met by: 5/5    Patient will increase functional independence with ADLs by performing:    Bed mobility and supine to sit with CGA and bed rail as needed.  LB dressing (pants, brief)  with set up and CGA.  Grooming while standing at sink with set up and supervision x5 min duration.   Toileting from toilet with CGA.  Toilet transfer to toilet with CGA and RW.  Functional mobility at household distance for ADL task with supervision.       Outcome: Ongoing, Progressing     Problem: Occupational Therapy Goal  Goal: Occupational Therapy Goal  Description  Goals to be met by: 5/5    Patient will increase functional independence with ADLs by performing:    Bed mobility and supine to sit with CGA and bed rail as needed.  LB dressing (pants, brief)  with set up and CGA.  Grooming while standing at sink with set up and supervision x5 min duration.   Toileting from toilet with CGA.  Toilet transfer to toilet with CGA and RW.  Functional mobility at household distance for ADL task with supervision.       Outcome: Ongoing, Progressing     Pt progressing towards goals. Cont OT POC  Kalani Roe, DREAD  04/29/2020

## 2020-04-29 NOTE — NURSING
Patient with infiltrated IV.   Call made to physician to report.   IV attempt x1 without success.   Midline consult placed related to poor venous system.   Patient verbalized above.   Will cont POC and treatment.

## 2020-04-29 NOTE — PLAN OF CARE
Uneventful night sat's 98% on RA. C/o of posterior left shoulder pain which subsided with acetaminophen. No apparent distress.

## 2020-04-30 PROCEDURE — 25000003 PHARM REV CODE 250: Performed by: INTERNAL MEDICINE

## 2020-04-30 PROCEDURE — 99231 SBSQ HOSP IP/OBS SF/LOW 25: CPT | Mod: 95,,, | Performed by: INTERNAL MEDICINE

## 2020-04-30 PROCEDURE — 11000001 HC ACUTE MED/SURG PRIVATE ROOM

## 2020-04-30 PROCEDURE — 99231 PR SUBSEQUENT HOSPITAL CARE,LEVL I: ICD-10-PCS | Mod: 95,,, | Performed by: INTERNAL MEDICINE

## 2020-04-30 PROCEDURE — 25000242 PHARM REV CODE 250 ALT 637 W/ HCPCS: Performed by: PHYSICIAN ASSISTANT

## 2020-04-30 PROCEDURE — 94761 N-INVAS EAR/PLS OXIMETRY MLT: CPT

## 2020-04-30 PROCEDURE — 94640 AIRWAY INHALATION TREATMENT: CPT

## 2020-04-30 PROCEDURE — 25000003 PHARM REV CODE 250: Performed by: EMERGENCY MEDICINE

## 2020-04-30 PROCEDURE — 63600175 PHARM REV CODE 636 W HCPCS: Performed by: INTERNAL MEDICINE

## 2020-04-30 RX ADMIN — ALBUTEROL SULFATE 2 PUFF: 90 AEROSOL, METERED RESPIRATORY (INHALATION) at 04:04

## 2020-04-30 RX ADMIN — ASPIRIN 81 MG: 81 TABLET, COATED ORAL at 08:04

## 2020-04-30 RX ADMIN — LISINOPRIL 10 MG: 5 TABLET ORAL at 08:04

## 2020-04-30 RX ADMIN — PHENOBARBITAL 90 MG: 30 TABLET ORAL at 08:04

## 2020-04-30 RX ADMIN — STANDARDIZED SENNA CONCENTRATE AND DOCUSATE SODIUM 1 TABLET: 8.6; 5 TABLET ORAL at 08:04

## 2020-04-30 RX ADMIN — TAMSULOSIN HYDROCHLORIDE 0.4 MG: 0.4 CAPSULE ORAL at 08:04

## 2020-04-30 RX ADMIN — PHENYTOIN SODIUM 200 MG: 100 CAPSULE ORAL at 09:04

## 2020-04-30 RX ADMIN — ENOXAPARIN SODIUM 40 MG: 100 INJECTION SUBCUTANEOUS at 08:04

## 2020-04-30 RX ADMIN — ACETAMINOPHEN 650 MG: 325 TABLET ORAL at 04:04

## 2020-04-30 RX ADMIN — CLOPIDOGREL BISULFATE 75 MG: 75 TABLET ORAL at 08:04

## 2020-04-30 RX ADMIN — FINASTERIDE 5 MG: 5 TABLET, FILM COATED ORAL at 08:04

## 2020-04-30 RX ADMIN — PHENYTOIN SODIUM 200 MG: 100 CAPSULE ORAL at 08:04

## 2020-04-30 RX ADMIN — MONTELUKAST 10 MG: 10 TABLET, FILM COATED ORAL at 08:04

## 2020-04-30 RX ADMIN — METOPROLOL TARTRATE 50 MG: 50 TABLET, FILM COATED ORAL at 08:04

## 2020-04-30 RX ADMIN — ALBUTEROL SULFATE 2 PUFF: 90 AEROSOL, METERED RESPIRATORY (INHALATION) at 07:04

## 2020-04-30 RX ADMIN — ATORVASTATIN CALCIUM 40 MG: 40 TABLET, FILM COATED ORAL at 08:04

## 2020-04-30 NOTE — PROGRESS NOTES
[facility] Hospital Medicine  Telemedicine Progress Note    Patient Name: Vern Lr  MRN: [id number]  Patient Class: IP- Inpatient   Admission Date: 4/9/2020  Length of Stay: 21 days  Attending Physician: Nagi Forrest MD  Primary Care Provider: Lucio Byrd MD    [facility] Medicine Team: [facility] [place]IRTUAL TEAM 7 Xiang Aadmson MD    VIRTUAL TELENOTE    Start time:1325  Chief complaint: none  The patient location is: Inpt Bed  Present with the patient at the time of the telemed/virtual assessment: Cameroonian Speaking RN    End time:  1330    Total time spent with patient: 5 minutes    The attending portion of this evaluation, treatment, and documentation was performed per Xiang Adamson MD via audio only.      Subjective:     Principal Problem:COVID-19 virus infection        HPI:  80/M admitted on 4/9/20 for falls and PNA 2/2 COVID infection. Positive testing in ED (4/9/20). Patient speaks only Mauritian and language line used to communicate. Spoke to patient's sister who admits patient is hard of hearing and likely has mild dementia so history is somewhat limited as a result. Patient notes constipation without bm in a week. Sister states he often complains of this but patient actually was brought in for falls. He fell twice in past couple of days. She admits he has looked fatigued recently and she is actually recovering from COVID infection herself. He normally ambulates with assistance of cane. After first fall he was brought to [facility] and went home. Second fall while in bathroom yesterday and was subsequently brought here to [facility]. Seizure hx but sister did not see anything to suggest seizure and patient has been on meds. Patient has no recent issues with passing out. Hx of NSTEMI with non-obstructive CAD in Sept 2019. Largest occulusion noted on LHC was 20% at that time. Has been on asa, Plavix, and statin since. Normally walks with cane and was actually walking  yesterday without issue per sister. Patient admits to some shortness of breath and cough as well as nasal congestion.    Overview/Hospital Course:  The patient was placed on supplemental oxygen, 5 L NC.  Azithromycin, ceftriaxone, and hydroxychloroquine, and tamsulosin were started per protocol.  His oxygenation gradually improved.  On hospital day 5, he was transferred to this hospital due to capacity issues at [facility].  Here, oxygenation continued to improve, but the patient has continued to have constitutional symptoms.   Deescalated to room air. Pending SNF placement Per PT/OT recs.   COVID-19 positive 4/24    Interval History: No new concerns. Some shortness of breath with ambulation, unchanged from prior.    Review of Systems Per RN documentation  Objective:     Vital Signs (Most Recent):  Temp: 98.8 °F (37.1 °C) (04/30/20 0713)  Pulse: 80 (04/30/20 1120)  Resp: (!) 26 (04/30/20 0747)  BP: (!) 156/87 (04/30/20 0747)  SpO2: 96 % (04/30/20 1120) Vital Signs (24h Range):  Temp:  [97.9 °F (36.6 °C)-98.8 °F (37.1 °C)] 98.8 °F (37.1 °C)  Pulse:  [] 80  Resp:  [16-26] 26  SpO2:  [96 %-98 %] 96 %  BP: ()/(70-87) 156/87     Weight: 82 kg (180 lb 12.4 oz)  Body mass index is 30.08 kg/m².    Intake/Output Summary (Last 24 hours) at 4/30/2020 1329  Last data filed at 4/29/2020 1600  Gross per 24 hour   Intake 240 ml   Output 400 ml   Net -160 ml      Physical Exam Per RN documentation        Assessment/Plan:      * COVID-19 virus infection  PNA with COVID + on 4/9. Presenting symptoms of weakness, falls, mild sob.   Saturating well on RA initially, put on 5L NC today after desat to 70's. CXR b/l infiltrates, CRP over 200 and mildly elevated trop on admission.   COVID protocol with isolation and 02 supplementation. Albuterol inhaler prn. Continuing course of HCQ, Azithro and Rocephin.   Oxygenation improving  Awaiting SNF placement - positive covid test 4/23      Coronary artery disease involving native  coronary artery of native heart without angina pectoris  [facility] with non-obstructive dz, Sept 2019. On home asa and statin plus Plavix for NSTEMI at that time.   Mild trop elevation in setting of acute illness stabilized. Doubt ACS. Continuing home meds.    Fall  2 falls in setting of COVID infection and baseline gait instability requiring cane.  CT head and abdomen/pelvis show no new fractures. Nothing on exam to specify areas of fracture concern. Does have hx of osteoporosis and on anti-epileptic so will have very low threshold to add additional imaging.      Constipation  Continue daily laxatives while inpatient.      Elevated troponin  Hx of NSTEMI with [facility] showing non-obstructive CAD in Sept 2019.   Mild, stable. Likely related to acute illness. ACS unlikely. Cards has evaluated and signed off. Appreciate reccs.  On asa, Plavix, statin at home; will continue inpatient.    Seizure disorder  Controlled. Continue home Dilantin and valproic acid.      Dyslipidemia  Continue home statin.    BPH (benign prostatic hyperplasia)  Controlled. Continue home Flomax.      Essential hypertension, benign  Controlled. Continue home meds.        VTE Risk Mitigation (From admission, onward)         Ordered     enoxaparin injection 40 mg  Daily      04/09/20 0909     IP VTE HIGH RISK PATIENT  Once      04/09/20 0914     Place sequential compression device  Until discontinued      04/09/20 0914                I have assessed these finding virtually using telemed platform and with assistance of bedside nurse     The attending portion of this evaluation, treatment, and documentation was performed per Xiang Adamson MD via audio only.        Xiang Adamson MD  Department of Hospital Medicine   [facility]

## 2020-04-30 NOTE — PLAN OF CARE
Problem: Fall Injury Risk  Goal: Absence of Fall and Fall-Related Injury  Outcome: Ongoing, Progressing     Problem: Adult Inpatient Plan of Care  Goal: Plan of Care Review  Outcome: Ongoing, Progressing     Problem: Adult Inpatient Plan of Care  Goal: Readiness for Transition of Care  Outcome: Ongoing, Progressing   Patient remains free from injury  or fall. No significant changes noted or reported from initial assessment. Tolerating PO. On room air. Out of bed to bedside commode with moderate assistance. Will continue to monitor.

## 2020-04-30 NOTE — SUBJECTIVE & OBJECTIVE
Interval History: No new concerns. Some shortness of breath with ambulation    Review of Systems Per RN documentation  Objective:     Vital Signs (Most Recent):  Temp: 98.8 °F (37.1 °C) (04/30/20 0713)  Pulse: 80 (04/30/20 1120)  Resp: (!) 26 (04/30/20 0747)  BP: (!) 156/87 (04/30/20 0747)  SpO2: 96 % (04/30/20 1120) Vital Signs (24h Range):  Temp:  [97.9 °F (36.6 °C)-98.8 °F (37.1 °C)] 98.8 °F (37.1 °C)  Pulse:  [] 80  Resp:  [16-26] 26  SpO2:  [96 %-98 %] 96 %  BP: ()/(70-87) 156/87     Weight: 82 kg (180 lb 12.4 oz)  Body mass index is 30.08 kg/m².    Intake/Output Summary (Last 24 hours) at 4/30/2020 1329  Last data filed at 4/29/2020 1600  Gross per 24 hour   Intake 240 ml   Output 400 ml   Net -160 ml      Physical Exam Per RN documentation

## 2020-04-30 NOTE — PLAN OF CARE
Problem: Adult Inpatient Plan of Care  Goal: Plan of Care Review  Outcome: Ongoing, Progressing     Plan of care reviewed with pt. Pt voiced understanding. Pt AAOx4. Pt c/o lower back pain during the shift. Heating packs applied and prn tylenol given x2. Positioned adjusted per pt. No apparent distress noted. Fall precautions maintained. Bed in lowest position, and locked. Call light within reach and advised to call for assistance. Side rails x 2 and slip resistant socks on at this time. WCTM.

## 2020-04-30 NOTE — PLAN OF CARE
CM attempted to reach someone in admissions at [facility] to check status of referral, however, no one was in admissions at this time.  Will call again later today.

## 2020-04-30 NOTE — PLAN OF CARE
04/30/20 1104   Discharge Reassessment   Assessment Type Discharge Planning Reassessment   Provided patient/caregiver education on the expected discharge date and the discharge plan No   Do you have any problems affording any of your prescribed medications? TBD   Discharge Plan A Skilled Nursing Facility   Discharge Plan B Rehab   DME Needed Upon Discharge    (tbd)   Patient choice form signed by patient/caregiver N/A

## 2020-05-01 LAB — TB INDURATION 48 - 72 HR READ: 0 MM

## 2020-05-01 PROCEDURE — 11000001 HC ACUTE MED/SURG PRIVATE ROOM

## 2020-05-01 PROCEDURE — 25000003 PHARM REV CODE 250: Performed by: HOSPITALIST

## 2020-05-01 PROCEDURE — 99231 SBSQ HOSP IP/OBS SF/LOW 25: CPT | Mod: 95,,, | Performed by: INTERNAL MEDICINE

## 2020-05-01 PROCEDURE — 25000003 PHARM REV CODE 250: Performed by: EMERGENCY MEDICINE

## 2020-05-01 PROCEDURE — 25000003 PHARM REV CODE 250: Performed by: INTERNAL MEDICINE

## 2020-05-01 PROCEDURE — 63600175 PHARM REV CODE 636 W HCPCS: Performed by: INTERNAL MEDICINE

## 2020-05-01 PROCEDURE — 97535 SELF CARE MNGMENT TRAINING: CPT

## 2020-05-01 PROCEDURE — 94640 AIRWAY INHALATION TREATMENT: CPT

## 2020-05-01 PROCEDURE — 97116 GAIT TRAINING THERAPY: CPT

## 2020-05-01 PROCEDURE — 99231 PR SUBSEQUENT HOSPITAL CARE,LEVL I: ICD-10-PCS | Mod: 95,,, | Performed by: INTERNAL MEDICINE

## 2020-05-01 PROCEDURE — 97530 THERAPEUTIC ACTIVITIES: CPT

## 2020-05-01 PROCEDURE — 94761 N-INVAS EAR/PLS OXIMETRY MLT: CPT

## 2020-05-01 RX ADMIN — Medication 6 MG: at 08:05

## 2020-05-01 RX ADMIN — CLOPIDOGREL BISULFATE 75 MG: 75 TABLET ORAL at 09:05

## 2020-05-01 RX ADMIN — PHENOBARBITAL 90 MG: 30 TABLET ORAL at 02:05

## 2020-05-01 RX ADMIN — MONTELUKAST 10 MG: 10 TABLET, FILM COATED ORAL at 08:05

## 2020-05-01 RX ADMIN — LISINOPRIL 10 MG: 5 TABLET ORAL at 09:05

## 2020-05-01 RX ADMIN — TAMSULOSIN HYDROCHLORIDE 0.4 MG: 0.4 CAPSULE ORAL at 09:05

## 2020-05-01 RX ADMIN — PHENYTOIN SODIUM 200 MG: 100 CAPSULE ORAL at 09:05

## 2020-05-01 RX ADMIN — ASPIRIN 81 MG: 81 TABLET, COATED ORAL at 09:05

## 2020-05-01 RX ADMIN — METOPROLOL TARTRATE 50 MG: 50 TABLET, FILM COATED ORAL at 09:05

## 2020-05-01 RX ADMIN — ALBUTEROL SULFATE 2 PUFF: 90 AEROSOL, METERED RESPIRATORY (INHALATION) at 04:05

## 2020-05-01 RX ADMIN — ENOXAPARIN SODIUM 40 MG: 100 INJECTION SUBCUTANEOUS at 08:05

## 2020-05-01 RX ADMIN — STANDARDIZED SENNA CONCENTRATE AND DOCUSATE SODIUM 1 TABLET: 8.6; 5 TABLET ORAL at 08:05

## 2020-05-01 RX ADMIN — ACETAMINOPHEN 650 MG: 325 TABLET ORAL at 08:05

## 2020-05-01 RX ADMIN — ALBUTEROL SULFATE 2 PUFF: 90 AEROSOL, METERED RESPIRATORY (INHALATION) at 07:05

## 2020-05-01 RX ADMIN — PHENYTOIN SODIUM 200 MG: 100 CAPSULE ORAL at 08:05

## 2020-05-01 RX ADMIN — FINASTERIDE 5 MG: 5 TABLET, FILM COATED ORAL at 09:05

## 2020-05-01 RX ADMIN — ATORVASTATIN CALCIUM 40 MG: 40 TABLET, FILM COATED ORAL at 09:05

## 2020-05-01 RX ADMIN — STANDARDIZED SENNA CONCENTRATE AND DOCUSATE SODIUM 1 TABLET: 8.6; 5 TABLET ORAL at 09:05

## 2020-05-01 NOTE — PLAN OF CARE
LISANDRO sought assistance of LISANDRO Cunningham to send fax for pts 142 request. LISANDRO Montes De Oca was not able to upload file to  for PASARR, LISANDRO Cunningham asked to send PASARR to  [employer] KOREY Rangel to have PASARR uploaded Monday by another member of  team. LISANDRO Landry also asked that  It be noted , contact info for pt correspondence from the state be directed to KOREY Rangel via phone contact and email contact.     Aylin Landry, TOMAS   Licensed Master Social Worker

## 2020-05-01 NOTE — NURSING
According to Rosette Metz RN and per her note in MAR, pt's TB was read negative on 4/29/2020. Placed result in accurate place of the chart as of now.

## 2020-05-01 NOTE — PT/OT/SLP PROGRESS
Physical Therapy Treatment    Patient Name:  Vern Lr   MRN:  [id number]    Recommendations:     Discharge Recommendations:  nursing facility, skilled   Discharge Equipment Recommendations: walker, rolling, bedside commode   Barriers to discharge: Inaccessible home and Decreased caregiver support    Assessment:     Vern Lr is a 80 y.o. male admitted with a medical diagnosis of COVID-19 virus infection.  He presents with the following impairments/functional limitations:  weakness, impaired endurance, impaired self care skills, impaired functional mobilty, gait instability, impaired balance, impaired cognition, decreased coordination, decreased upper extremity function, decreased safety awareness, pain, impaired cardiopulmonary response to activity. Pt tolerated session well. Pt c/o low back pain during today's session. Pt eager to get back to bed after sitting up in the chair.    Rehab Prognosis: Good; patient would benefit from acute skilled PT services to address these deficits and reach maximum level of function.    Recent Surgery: * No surgery found *      Plan:     During this hospitalization, patient to be seen 4 x/week to address the identified rehab impairments via gait training, therapeutic activities, therapeutic exercises, neuromuscular re-education and progress toward the following goals:    · Plan of Care Expires:  05/13/20    Subjective     Chief Complaint: LBP  Patient/Family Comments/goals: Pt requesting assistance with calling family. Pt called family and handed phone to patient at the end of the session.  Pain/Comfort:  · Pain Rating 1: (not rated)  · Location - Side 1: Left  · Location - Orientation 1: generalized  · Location 1: back  · Pain Addressed 1: Reposition, Distraction, Cessation of Activity, Nurse notified  · Pain Rating Post-Intervention 1: (not rated)      Objective:     Communicated with nursing prior to session.  Patient found up in chair with bed alarm, telemetry, pulse ox  (continuous) upon PT entry to room.     General Precautions: Standard, airborne, aspiration, contact, droplet, fall   Orthopedic Precautions:N/A   Braces: N/A   Language: Maori speaking, CATY used, interpretor ID #[id number]    Functional Mobility:  · Bed Mobility:     · Scooting: stand by assistance  · Bridging: stand by assistance  · Sit to Supine: contact guard assistance  · Transfers:     · Sit to Stand:  contact guard assistance with rolling walker  · Gait: 32 ft, CGA, RW  · Balance:    · Static Sitting: Supervision  · Dynamic Sitting: SBA  · Static Standing: CGA with RW  · Dynamic Standing: CGA for ambulation with RW      AM-PAC 6 CLICK MOBILITY  Turning over in bed (including adjusting bedclothes, sheets and blankets)?: 1  Sitting down on and standing up from a chair with arms (e.g., wheelchair, bedside commode, etc.): 1  Moving from lying on back to sitting on the side of the bed?: 1  Moving to and from a bed to a chair (including a wheelchair)?: 1  Need to walk in hospital room?: 1  Climbing 3-5 steps with a railing?: 2  Basic Mobility Total Score: 7       Therapeutic Activities and Exercises:  Pt oriented to Person and place. Pt aware of year, but not month or day.    Patient educated on role of therapy, goals of session, and benefits of mobilizing.   Discussed PT plan of care during hospitalization.  Patient educated that they need to call for assistance to mobilize out of bed.   Patient educated on how their diagnosis impacts their mobility within PT scope of practice.   Communication board updated.  All questions answered within PT scope of practice.  Pt safe for 1 person assist with RW to/from bedside chair.    Patient left HOB elevated with all lines intact, call button in reach, bed alarm on and nursing notified..    GOALS:   Multidisciplinary Problems     Physical Therapy Goals        Problem: Physical Therapy Goal    Goal Priority Disciplines Outcome Goal Variances Interventions   Physical Therapy  Goal     PT, PT/OT Ongoing, Progressing     Description:  Goals to be met by: 2020    Patient will increase functional independence with mobility by performin. Pt will perform bed mobility (rolling L/R, scooting, and bridging) with Nawaf. - GOAL MET  REVISED: with CGA. MET  Revised: Pt to perform bed mobility with Supervision  2. Pt will perform supine to/from sit with Nawaf. - GOAL MET  REVISED: with CGA. MET  Revied: Pt to perform supine <> sit with Supervision.  3. Pt will sit EOB x 10 mins with B UE support with Supervision assistance. MET  4. Pt with perform sit to stand with modA assistance and straight cane. Discontinued  Revised: Pt to perform sit <> stand with Supervision with RW.  5. Pt will ambulate 30 feet with mod assistance and straight cane. MET with RW  Revised: pt able to ambulate 75ft with SBA with RW.  6. Pt will perform there-ex from handout x 15 reps to improve strength for functional mobility.                             Time Tracking:     PT Received On: 20  PT Start Time: 1531     PT Stop Time: 1554  PT Total Time (min): 23 min     Billable Minutes: Gait Training 8 and Therapeutic Activity 15    Treatment Type: Treatment  PT/PTA: PT           Grecia Colby, PT  2020

## 2020-05-01 NOTE — PLAN OF CARE
Problem: Fall Injury Risk  Goal: Absence of Fall and Fall-Related Injury  Outcome: Ongoing, Progressing   No acute events throughout shift.  Vitals and assessment completed,bed in low position and locked.Call light in reach. Complains of pain during repositioning only.Pt free from injury or falls

## 2020-05-01 NOTE — PLAN OF CARE
Goals remain appropriate. DREAD Arredondo 5/1/2020   Problem: Occupational Therapy Goal  Goal: Occupational Therapy Goal  Description  Goals to be met by: 5/5    Patient will increase functional independence with ADLs by performing:    Bed mobility and supine to sit with CGA and bed rail as needed.  LB dressing (pants, brief)  with set up and CGA.  Grooming while standing at sink with set up and supervision x5 min duration.   Toileting from toilet with CGA.  Toilet transfer to toilet with CGA and RW. MET  *revised: with supervision   Functional mobility at household distance for ADL task with supervision.        Outcome: Ongoing, Progressing

## 2020-05-01 NOTE — PT/OT/SLP PROGRESS
Occupational Therapy   Treatment    Name: Vern Lr  MRN: [id number]  Admitting Diagnosis:  COVID-19 virus infection       Recommendations:     Discharge Recommendations: nursing facility, skilled  Discharge Equipment Recommendations:  walker, rolling, bedside commode  Barriers to discharge:  Inaccessible home environment, Decreased caregiver support    Assessment:     Vern Lr is a 80 y.o. male with a medical diagnosis of COVID-19 virus infection.  He presents with cont gains towards OT goals. Cont to rec SNF at this time- would best benefit from 24 hour care for overall safety. Performance deficits affecting function are weakness, impaired endurance, impaired self care skills, impaired functional mobilty, gait instability, impaired balance, impaired cognition, decreased safety awareness, decreased upper extremity function, decreased lower extremity function, impaired cardiopulmonary response to activity.     Rehab Prognosis:  Good; patient would benefit from acute skilled OT services to address these deficits and reach maximum level of function.       Plan:     Patient to be seen 3 x/week to address the above listed problems via self-care/home management, therapeutic activities, therapeutic exercises  · Plan of Care Expires: 05/12/20  · Plan of Care Reviewed with: patient    Subjective     Pain/Comfort:  · Pain Rating 1: 0/10(c/o mild LB pain)  · Pain Rating Post-Intervention 1: 0/10    Objective:     Communicated with: RN (Will) prior to session.  Patient found HOB elevated with bed alarm, telemetry, pulse ox (continuous)(CATY Mauricio) upon OT entry to room.    General Precautions: Standard, airborne, aspiration, contact, droplet, fall   Orthopedic Precautions:N/A   Braces: N/A     Occupational Performance:     Bed Mobility:  HOB 30*  · Patient completed Rolling/Turning to Right with contact guard assistance and with side rail  · Patient completed Supine to Sit with contact guard assistance and with side rail      Functional Mobility/Transfers:  · Patient completed Sit <> Stand Transfer with contact guard assistance  with  rolling walker   · Patient completed Bed <> Chair Transfer using Step Transfer technique with contact guard assistance with rolling walker  · Patient completed Toilet Transfer Step Transfer technique with contact guard assistance with  rolling walker and grab bars  · Functional Mobility: CGA with rolling walker at household distance   · To bathroom<>return to room to chair    Activities of Daily Living:  · Upper Body Dressing: minimum assistance EOB to doff and don gown while seated EOB  · Lower Body Dressing: total assistance to doff soiled brief (urine)  · Toileting: moderate assistance perineal care from toilet    [facility] 6 Click ADL: 16    Treatment & Education:  -Pt alert and oriented x3; agreeable to therapy session; completed with  via CATY  -rolling walker obtained for pt use with nursing staff; adjusted for ergonomics  -discussed mobility status to RN; encouraged pt to be up in chair for meals and up to toilet for bathroom/use of urinal rather than diaper brief  -Communication board updated; questions/concerns addressed within OT scope of practice  -assisted pt in calling his sister at cessation of session      Patient left up in chair with all lines intact, call button in reach, RN and PT notified and [employer] presentEducation:      GOALS:   Multidisciplinary Problems     Occupational Therapy Goals        Problem: Occupational Therapy Goal    Goal Priority Disciplines Outcome Interventions   Occupational Therapy Goal     OT, PT/OT Ongoing, Progressing    Description:  Goals to be met by: 5/5    Patient will increase functional independence with ADLs by performing:    Bed mobility and supine to sit with CGA and bed rail as needed.  LB dressing (pants, brief)  with set up and CGA.  Grooming while standing at sink with set up and supervision x5 min duration.   Toileting from toilet  with CGA.  Toilet transfer to toilet with CGA and RW. MET  *revised: with supervision   Functional mobility at household distance for ADL task with supervision.                         Time Tracking:     OT Date of Treatment: 05/01/20  OT Start Time: 1031  OT Stop Time: 1055  OT Total Time (min): 24 min    Billable Minutes:Self Care/Home Management 24    DREAD Arredondo  5/1/2020

## 2020-05-01 NOTE — PLAN OF CARE
Pt is progressing towards goals as expected. Goals remain appropriate continue with current POC.     Grecia Colby, PT, DPT  2020      Problem: Physical Therapy Goal  Goal: Physical Therapy Goal  Description  Goals to be met by: 2020    Patient will increase functional independence with mobility by performin. Pt will perform bed mobility (rolling L/R, scooting, and bridging) with Nawaf. - GOAL MET  REVISED: with CGA. MET  Revised: Pt to perform bed mobility with Supervision  2. Pt will perform supine to/from sit with Nawaf. - GOAL MET  REVISED: with CGA. MET  Revied: Pt to perform supine <> sit with Supervision.  3. Pt will sit EOB x 10 mins with B UE support with Supervision assistance. MET  4. Pt with perform sit to stand with modA assistance and straight cane. Discontinued  Revised: Pt to perform sit <> stand with Supervision with RW.  5. Pt will ambulate 30 feet with mod assistance and straight cane. MET with RW  Revised: pt able to ambulate 75ft with SBA with RW.  6. Pt will perform there-ex from handout x 15 reps to improve strength for functional mobility.            Outcome: Ongoing, Progressing

## 2020-05-01 NOTE — PROGRESS NOTES
[facility] Hospital Medicine  Telemedicine Progress Note        Patient Name: Vern Lr  MRN: [id number]  Patient Class: IP- Inpatient   Admission Date: 4/9/2020  Length of Stay: 22 days  Attending Physician: Nagi Forrest MD  Primary Care Provider: Lucio Byrd MD    [facility] Medicine Team: [facility] VIRTUAL TEAM 7 Nagi Forrest MD  Start time:15:00  Chief complaint: none  The patient location is: Inpt Bed  Present with the patient at the time of the telemed/virtual assessment: RN with interpretor via audio   End time:  15:10    Total time spent with patient: 10 minutes    The attending portion of this evaluation, treatment, and documentation was performed per Nagi Forrest MD via audio only.      Subjective:     Principal Problem:COVID-19 virus infection        HPI:  80/M admitted on 4/9/20 for falls and PNA 2/2 COVID infection. Positive testing in ED (4/9/20). Patient speaks only Tongan and language line used to communicate. Spoke to patient's sister who admits patient is hard of hearing and likely has mild dementia so history is somewhat limited as a result. Patient notes constipation without bm in a week. Sister states he often complains of this but patient actually was brought in for falls. He fell twice in past couple of days. She admits he has looked fatigued recently and she is actually recovering from COVID infection herself. He normally ambulates with assistance of cane. After first fall he was brought to [facility] and went home. Second fall while in bathroom yesterday and was subsequently brought here to [facility]. Seizure hx but sister did not see anything to suggest seizure and patient has been on meds. Patient has no recent issues with passing out. Hx of NSTEMI with non-obstructive CAD in Sept 2019. Largest occulusion noted on LHC was 20% at that time. Has been on asa, Plavix, and statin since. Normally walks with cane and was actually walking yesterday without issue per  sister. Patient admits to some shortness of breath and cough as well as nasal congestion.    Overview/Hospital Course:  The patient was placed on supplemental oxygen, 5 L NC.  Azithromycin, ceftriaxone, and hydroxychloroquine, and tamsulosin were started per protocol.  His oxygenation gradually improved.  On hospital day 5, he was transferred to this hospital due to capacity issues at [facility].  Here, oxygenation continued to improve, but the patient has continued to have constitutional symptoms.   Deescalated to room air. Pending SNF placement Per PT/OT recs.   COVID-19 positive 4/24. Medically stable pending placement at this time.     Interval History: No new concerns. Remains HDS and afebrile overnight. Stable. No overnight events. No agitation or behavioural issues. Complaining of some buttock pain while sitting. Repositioning advised. Eating well. Urinating well. Good BM. Dyspnea stable of exertion.       Review of Systems Per RN documentation and patient via interpretor   Gen - weakness  CV - no cp or palpitations   Lung - dyspnea on exertion   Abd - no pain, diarrhea, or constipation   Skin - no rashes    Objective:     Vital Signs (Most Recent):  Temp: 98.8 °F (37.1 °C) (04/30/20 0713)  Pulse: 80 (04/30/20 1120)  Resp: (!) 26 (04/30/20 0747)  BP: (!) 156/87 (04/30/20 0747)  SpO2: 96 % (04/30/20 1120) Vital Signs (24h Range):  Temp:  [97.9 °F (36.6 °C)-98.8 °F (37.1 °C)] 98.8 °F (37.1 °C)  Pulse:  [] 80  Resp:  [16-26] 26  SpO2:  [96 %-98 %] 96 %  BP: ()/(70-87) 156/87     Weight: 82 kg (180 lb 12.4 oz)  Body mass index is 30.08 kg/m².    Intake/Output Summary (Last 24 hours) at 4/30/2020 1329  Last data filed at 4/29/2020 1600  Gross per 24 hour   Intake 240 ml   Output 400 ml   Net -160 ml      Physical Exam Per RN documentation  Gen - NAD  CV - RRR no MRG   Lung - CTAB  Abd - soft NT ND  Neuro - Aox2-3 (not aware of specific date)  Skin - no new rashes      Assessment/Plan:      * COVID-19  virus infection  PNA with COVID + on 4/9. Presenting symptoms of weakness, falls, mild sob.   Saturating well on RA initially, put on 5L NC today after desat to 70's. CXR b/l infiltrates, CRP over 200 and mildly elevated trop on admission.   COVID protocol with isolation and 02 supplementation. Albuterol inhaler prn. Continuing course of HCQ, Azithro and Rocephin.   Oxygenation improving  Awaiting SNF placement - positive covid test 4/23      Coronary artery disease involving native coronary artery of native heart without angina pectoris  [facility] with non-obstructive dz, Sept 2019. On home asa and statin plus Plavix for NSTEMI at that time.   Mild trop elevation in setting of acute illness stabilized. Doubt ACS. Continuing home meds.    Fall  2 falls in setting of COVID infection and baseline gait instability requiring cane.  CT head and abdomen/pelvis show no new fractures. Nothing on exam to specify areas of fracture concern. Does have hx of osteoporosis and on anti-epileptic so will have very low threshold to add additional imaging.      Constipation  Continue daily laxatives while inpatient.      Elevated troponin  Hx of NSTEMI with [facility] showing non-obstructive CAD in Sept 2019.   Mild, stable. Likely related to acute illness. ACS unlikely. Cards has evaluated and signed off. Appreciate reccs.  On asa, Plavix, statin at home; will continue inpatient.    Seizure disorder  Controlled. Continue home Dilantin and valproic acid.      Dyslipidemia  Continue home statin.    BPH (benign prostatic hyperplasia)  Controlled. Continue home Flomax.      Essential hypertension, benign  Controlled. Continue home meds.      VTE Risk Mitigation (From admission, onward)         Ordered     enoxaparin injection 40 mg  Daily      04/09/20 0909     IP VTE HIGH RISK PATIENT  Once      04/09/20 0914     Place sequential compression device  Until discontinued      04/09/20 0914                Dispo - med stable pending placement, NH unable  to take patient until several days after positive test  Code - DNR      Nagi Forrest MD  Department of Hospital Medicine   [facility]

## 2020-05-02 LAB
ALBUMIN SERPL BCP-MCNC: 2.8 G/DL (ref 3.5–5.2)
ALP SERPL-CCNC: 93 U/L (ref 55–135)
ALT SERPL W/O P-5'-P-CCNC: 35 U/L (ref 10–44)
ANION GAP SERPL CALC-SCNC: 6 MMOL/L (ref 8–16)
AST SERPL-CCNC: 27 U/L (ref 10–40)
BASOPHILS # BLD AUTO: 0.07 K/UL (ref 0–0.2)
BASOPHILS NFR BLD: 0.9 % (ref 0–1.9)
BILIRUB SERPL-MCNC: 0.3 MG/DL (ref 0.1–1)
BUN SERPL-MCNC: 16 MG/DL (ref 8–23)
CALCIUM SERPL-MCNC: 9.1 MG/DL (ref 8.7–10.5)
CHLORIDE SERPL-SCNC: 102 MMOL/L (ref 95–110)
CO2 SERPL-SCNC: 28 MMOL/L (ref 23–29)
CREAT SERPL-MCNC: 0.6 MG/DL (ref 0.5–1.4)
CRP SERPL-MCNC: 54.6 MG/L (ref 0–8.2)
DIFFERENTIAL METHOD: ABNORMAL
EOSINOPHIL # BLD AUTO: 0.3 K/UL (ref 0–0.5)
EOSINOPHIL NFR BLD: 3.2 % (ref 0–8)
ERYTHROCYTE [DISTWIDTH] IN BLOOD BY AUTOMATED COUNT: 13.5 % (ref 11.5–14.5)
EST. GFR  (AFRICAN AMERICAN): >60 ML/MIN/1.73 M^2
EST. GFR  (NON AFRICAN AMERICAN): >60 ML/MIN/1.73 M^2
FERRITIN SERPL-MCNC: 453 NG/ML (ref 20–300)
GLUCOSE SERPL-MCNC: 90 MG/DL (ref 70–110)
HCT VFR BLD AUTO: 42 % (ref 40–54)
HGB BLD-MCNC: 13.4 G/DL (ref 14–18)
IMM GRANULOCYTES # BLD AUTO: 0.01 K/UL (ref 0–0.04)
IMM GRANULOCYTES NFR BLD AUTO: 0.1 % (ref 0–0.5)
LYMPHOCYTES # BLD AUTO: 1.7 K/UL (ref 1–4.8)
LYMPHOCYTES NFR BLD: 20.7 % (ref 18–48)
MAGNESIUM SERPL-MCNC: 2 MG/DL (ref 1.6–2.6)
MCH RBC QN AUTO: 31.2 PG (ref 27–31)
MCHC RBC AUTO-ENTMCNC: 31.9 G/DL (ref 32–36)
MCV RBC AUTO: 98 FL (ref 82–98)
MONOCYTES # BLD AUTO: 0.7 K/UL (ref 0.3–1)
MONOCYTES NFR BLD: 8.2 % (ref 4–15)
NEUTROPHILS # BLD AUTO: 5.4 K/UL (ref 1.8–7.7)
NEUTROPHILS NFR BLD: 66.9 % (ref 38–73)
NRBC BLD-RTO: 0 /100 WBC
PHOSPHATE SERPL-MCNC: 3.2 MG/DL (ref 2.7–4.5)
PLATELET # BLD AUTO: 362 K/UL (ref 150–350)
PMV BLD AUTO: 9.4 FL (ref 9.2–12.9)
POTASSIUM SERPL-SCNC: 3.9 MMOL/L (ref 3.5–5.1)
PROT SERPL-MCNC: 8.3 G/DL (ref 6–8.4)
RBC # BLD AUTO: 4.29 M/UL (ref 4.6–6.2)
SODIUM SERPL-SCNC: 136 MMOL/L (ref 136–145)
WBC # BLD AUTO: 8.05 K/UL (ref 3.9–12.7)

## 2020-05-02 PROCEDURE — 11000001 HC ACUTE MED/SURG PRIVATE ROOM

## 2020-05-02 PROCEDURE — 86140 C-REACTIVE PROTEIN: CPT

## 2020-05-02 PROCEDURE — 94761 N-INVAS EAR/PLS OXIMETRY MLT: CPT

## 2020-05-02 PROCEDURE — 63600175 PHARM REV CODE 636 W HCPCS: Performed by: INTERNAL MEDICINE

## 2020-05-02 PROCEDURE — 80053 COMPREHEN METABOLIC PANEL: CPT

## 2020-05-02 PROCEDURE — 27000221 HC OXYGEN, UP TO 24 HOURS

## 2020-05-02 PROCEDURE — 36415 COLL VENOUS BLD VENIPUNCTURE: CPT

## 2020-05-02 PROCEDURE — 25000003 PHARM REV CODE 250: Performed by: INTERNAL MEDICINE

## 2020-05-02 PROCEDURE — 25000003 PHARM REV CODE 250: Performed by: EMERGENCY MEDICINE

## 2020-05-02 PROCEDURE — 99231 SBSQ HOSP IP/OBS SF/LOW 25: CPT | Mod: 95,,, | Performed by: INTERNAL MEDICINE

## 2020-05-02 PROCEDURE — 25000003 PHARM REV CODE 250: Performed by: HOSPITALIST

## 2020-05-02 PROCEDURE — 83735 ASSAY OF MAGNESIUM: CPT

## 2020-05-02 PROCEDURE — 82728 ASSAY OF FERRITIN: CPT

## 2020-05-02 PROCEDURE — 85025 COMPLETE CBC W/AUTO DIFF WBC: CPT

## 2020-05-02 PROCEDURE — 84100 ASSAY OF PHOSPHORUS: CPT

## 2020-05-02 PROCEDURE — 99231 PR SUBSEQUENT HOSPITAL CARE,LEVL I: ICD-10-PCS | Mod: 95,,, | Performed by: INTERNAL MEDICINE

## 2020-05-02 PROCEDURE — 94640 AIRWAY INHALATION TREATMENT: CPT

## 2020-05-02 RX ADMIN — MONTELUKAST 10 MG: 10 TABLET, FILM COATED ORAL at 09:05

## 2020-05-02 RX ADMIN — METOPROLOL TARTRATE 50 MG: 50 TABLET, FILM COATED ORAL at 09:05

## 2020-05-02 RX ADMIN — PHENYTOIN SODIUM 200 MG: 100 CAPSULE ORAL at 09:05

## 2020-05-02 RX ADMIN — TAMSULOSIN HYDROCHLORIDE 0.4 MG: 0.4 CAPSULE ORAL at 09:05

## 2020-05-02 RX ADMIN — ALBUTEROL SULFATE 2 PUFF: 90 AEROSOL, METERED RESPIRATORY (INHALATION) at 03:05

## 2020-05-02 RX ADMIN — FINASTERIDE 5 MG: 5 TABLET, FILM COATED ORAL at 09:05

## 2020-05-02 RX ADMIN — ACETAMINOPHEN 650 MG: 325 TABLET ORAL at 09:05

## 2020-05-02 RX ADMIN — ENOXAPARIN SODIUM 40 MG: 100 INJECTION SUBCUTANEOUS at 09:05

## 2020-05-02 RX ADMIN — STANDARDIZED SENNA CONCENTRATE AND DOCUSATE SODIUM 1 TABLET: 8.6; 5 TABLET ORAL at 09:05

## 2020-05-02 RX ADMIN — Medication 6 MG: at 09:05

## 2020-05-02 RX ADMIN — PHENOBARBITAL 90 MG: 30 TABLET ORAL at 09:05

## 2020-05-02 RX ADMIN — ATORVASTATIN CALCIUM 40 MG: 40 TABLET, FILM COATED ORAL at 09:05

## 2020-05-02 RX ADMIN — LISINOPRIL 10 MG: 5 TABLET ORAL at 09:05

## 2020-05-02 RX ADMIN — ALBUTEROL SULFATE 2 PUFF: 90 AEROSOL, METERED RESPIRATORY (INHALATION) at 08:05

## 2020-05-02 RX ADMIN — CLOPIDOGREL BISULFATE 75 MG: 75 TABLET ORAL at 09:05

## 2020-05-02 RX ADMIN — ASPIRIN 81 MG: 81 TABLET, COATED ORAL at 09:05

## 2020-05-02 NOTE — PROGRESS NOTES
[facility] Hospital Medicine  Telemedicine Progress Note        Patient Name: Vern Lr  MRN: [id number]  Patient Class: IP- Inpatient   Admission Date: 4/9/2020  Length of Stay: 23 days  Attending Physician: Nagi Forrest MD  Primary Care Provider: Lucio Byrd MD    [facility] Medicine Team: [facility] [place]UAL [place]AM 7 Nagi Forrest MD  Start time:9:45  Chief complaint: none  The patient location is: Inpt Bed  Present with the patient at the time of the telemed/virtual assessment: RN with interpretor via audio   End time:  9:55    Total time spent with patient: 10 minutes    The attending portion of this evaluation, treatment, and documentation was performed per Nagi Forrest MD via audio only.      Subjective:     Principal Problem:COVID-19 virus infection        HPI:  80/M admitted on 4/9/20 for falls and PNA 2/2 COVID infection. Positive testing in ED (4/9/20). Patient speaks only Indonesian and language line used to communicate. Spoke to patient's sister who admits patient is hard of hearing and likely has mild dementia so history is somewhat limited as a result. Patient notes constipation without bm in a week. Sister states he often complains of this but patient actually was brought in for falls. He fell twice in past couple of days. She admits he has looked fatigued recently and she is actually recovering from COVID infection herself. He normally ambulates with assistance of cane. After first fall he was brought to [facility] and went home. Second fall while in bathroom yesterday and was subsequently brought here to [facility]. Seizure hx but sister did not see anything to suggest seizure and patient has been on meds. Patient has no recent issues with passing out. Hx of NSTEMI with non-obstructive CAD in Sept 2019. Largest occulusion noted on LHC was 20% at that time. Has been on asa, Plavix, and statin since. Normally walks with cane and was actually walking yesterday without issue per  sister. Patient admits to some shortness of breath and cough as well as nasal congestion.    Overview/Hospital Course:  The patient was placed on supplemental oxygen, 5 L NC.  Azithromycin, ceftriaxone, and hydroxychloroquine, and tamsulosin were started per protocol.  His oxygenation gradually improved.  On hospital day 5, he was transferred to this hospital due to capacity issues at [facility].  Here, oxygenation continued to improve, but the patient has continued to have constitutional symptoms.   Deescalated to room air. Pending SNF placement Per PT/OT recs.   COVID-19 positive 4/24. Medically stable pending placement at this time.     Interval History: Remains HDS and afebrile overnight. Stable. No overnight events. No agitation or behavioural issues. No new concerns. Stable. Urinating well. Good BM. Dyspnea stable of exertion. Medically stable for discharge, pending.       Review of Systems Per RN documentation and patient via interpretor   Gen - weakness  CV - no cp or palpitations   Lung - dyspnea on exertion   Abd - no pain, diarrhea, or constipation   Skin - no rashes    Objective:     Vital Signs (Most Recent):  Temp: 98.8 °F (37.1 °C) (04/30/20 0713)  Pulse: 80 (04/30/20 1120)  Resp: (!) 26 (04/30/20 0747)  BP: (!) 156/87 (04/30/20 0747)  SpO2: 96 % (04/30/20 1120) Vital Signs (24h Range):  Temp:  [97.9 °F (36.6 °C)-98.8 °F (37.1 °C)] 98.8 °F (37.1 °C)  Pulse:  [] 80  Resp:  [16-26] 26  SpO2:  [96 %-98 %] 96 %  BP: ()/(70-87) 156/87     Weight: 82 kg (180 lb 12.4 oz)  Body mass index is 30.08 kg/m².    Intake/Output Summary (Last 24 hours) at 4/30/2020 1329  Last data filed at 4/29/2020 1600  Gross per 24 hour   Intake 240 ml   Output 400 ml   Net -160 ml      Physical Exam Per RN documentation  Gen - NAD  CV - RRR no MRG   Lung - CTAB  Abd - soft NT ND  Neuro - Aox2-3 (not aware of specific date)  Skin - no new rashes      Assessment/Plan:      * COVID-19 virus infection  PNA with COVID + on  4/9. Presenting symptoms of weakness, falls, mild sob.   Saturating well on RA initially, put on 5L NC today after desat to 70's. CXR b/l infiltrates, CRP over 200 and mildly elevated trop on admission.   COVID protocol with isolation and 02 supplementation. Albuterol inhaler prn. Continuing course of HCQ, Azithro and Rocephin.   Oxygenation improving  Awaiting SNF placement - positive covid test 4/23      Coronary artery disease involving native coronary artery of native heart without angina pectoris  [facility] with non-obstructive dz, Sept 2019. On home asa and statin plus Plavix for NSTEMI at that time.   Mild trop elevation in setting of acute illness stabilized. Doubt ACS. Continuing home meds.    Fall  2 falls in setting of COVID infection and baseline gait instability requiring cane.  CT head and abdomen/pelvis show no new fractures. Nothing on exam to specify areas of fracture concern. Does have hx of osteoporosis and on anti-epileptic so will have very low threshold to add additional imaging.      Constipation  Continue daily laxatives while inpatient.      Elevated troponin  Hx of NSTEMI with [facility] showing non-obstructive CAD in Sept 2019.   Mild, stable. Likely related to acute illness. ACS unlikely. Cards has evaluated and signed off. Appreciate reccs.  On asa, Plavix, statin at home; will continue inpatient.    Seizure disorder  Controlled. Continue home Dilantin and valproic acid.      Dyslipidemia  Continue home statin.    BPH (benign prostatic hyperplasia)  Controlled. Continue home Flomax.      Essential hypertension, benign  Controlled. Continue home meds.      VTE Risk Mitigation (From admission, onward)         Ordered     enoxaparin injection 40 mg  Daily      04/09/20 0909     IP VTE HIGH RISK PATIENT  Once      04/09/20 0914     Place sequential compression device  Until discontinued      04/09/20 0914                Dispo - med stable pending placement, NH unable to take patient until several days  after positive test  Code - DNR      Nagi Forrest MD  Department of Hospital Medicine   [facility]

## 2020-05-03 PROCEDURE — 27000221 HC OXYGEN, UP TO 24 HOURS

## 2020-05-03 PROCEDURE — 25000003 PHARM REV CODE 250: Performed by: INTERNAL MEDICINE

## 2020-05-03 PROCEDURE — 25000003 PHARM REV CODE 250: Performed by: HOSPITALIST

## 2020-05-03 PROCEDURE — 99233 SBSQ HOSP IP/OBS HIGH 50: CPT | Mod: 95,,, | Performed by: INTERNAL MEDICINE

## 2020-05-03 PROCEDURE — 63600175 PHARM REV CODE 636 W HCPCS: Performed by: INTERNAL MEDICINE

## 2020-05-03 PROCEDURE — 94761 N-INVAS EAR/PLS OXIMETRY MLT: CPT

## 2020-05-03 PROCEDURE — 99233 PR SUBSEQUENT HOSPITAL CARE,LEVL III: ICD-10-PCS | Mod: 95,,, | Performed by: INTERNAL MEDICINE

## 2020-05-03 PROCEDURE — 11000001 HC ACUTE MED/SURG PRIVATE ROOM

## 2020-05-03 PROCEDURE — 25000003 PHARM REV CODE 250: Performed by: EMERGENCY MEDICINE

## 2020-05-03 PROCEDURE — 99900035 HC TECH TIME PER 15 MIN (STAT)

## 2020-05-03 RX ADMIN — ACETAMINOPHEN 650 MG: 325 TABLET ORAL at 12:05

## 2020-05-03 RX ADMIN — ASPIRIN 81 MG: 81 TABLET, COATED ORAL at 09:05

## 2020-05-03 RX ADMIN — ACETAMINOPHEN 650 MG: 325 TABLET ORAL at 03:05

## 2020-05-03 RX ADMIN — ALBUTEROL SULFATE 2 PUFF: 90 AEROSOL, METERED RESPIRATORY (INHALATION) at 09:05

## 2020-05-03 RX ADMIN — FINASTERIDE 5 MG: 5 TABLET, FILM COATED ORAL at 09:05

## 2020-05-03 RX ADMIN — PHENOBARBITAL 90 MG: 30 TABLET ORAL at 09:05

## 2020-05-03 RX ADMIN — PHENYTOIN SODIUM 200 MG: 100 CAPSULE ORAL at 08:05

## 2020-05-03 RX ADMIN — STANDARDIZED SENNA CONCENTRATE AND DOCUSATE SODIUM 1 TABLET: 8.6; 5 TABLET ORAL at 09:05

## 2020-05-03 RX ADMIN — PHENYTOIN SODIUM 200 MG: 100 CAPSULE ORAL at 09:05

## 2020-05-03 RX ADMIN — ALBUTEROL SULFATE 2 PUFF: 90 AEROSOL, METERED RESPIRATORY (INHALATION) at 04:05

## 2020-05-03 RX ADMIN — LISINOPRIL 10 MG: 5 TABLET ORAL at 09:05

## 2020-05-03 RX ADMIN — CLOPIDOGREL BISULFATE 75 MG: 75 TABLET ORAL at 09:05

## 2020-05-03 RX ADMIN — STANDARDIZED SENNA CONCENTRATE AND DOCUSATE SODIUM 1 TABLET: 8.6; 5 TABLET ORAL at 08:05

## 2020-05-03 RX ADMIN — METOPROLOL TARTRATE 50 MG: 50 TABLET, FILM COATED ORAL at 09:05

## 2020-05-03 RX ADMIN — TAMSULOSIN HYDROCHLORIDE 0.4 MG: 0.4 CAPSULE ORAL at 09:05

## 2020-05-03 RX ADMIN — ENOXAPARIN SODIUM 40 MG: 100 INJECTION SUBCUTANEOUS at 08:05

## 2020-05-03 RX ADMIN — Medication 6 MG: at 08:05

## 2020-05-03 RX ADMIN — ATORVASTATIN CALCIUM 40 MG: 40 TABLET, FILM COATED ORAL at 09:05

## 2020-05-03 RX ADMIN — MONTELUKAST 10 MG: 10 TABLET, FILM COATED ORAL at 08:05

## 2020-05-03 NOTE — PLAN OF CARE
Pt remains free from falls and injuries during shift. Awake, alert, and oriented x 4. Vital signs stable on room air. No signs of acute distress noted at this time. Bed in lowest position, wheels locked, and bed alarm on. Call light in reach. Will continue to monitor camera at the bedside, safety maintained.

## 2020-05-03 NOTE — CARE UPDATE
Rapid Response Nurse Chart Check     Chart check completed. Please call [phone] for further concerns or assistance.

## 2020-05-03 NOTE — PROGRESS NOTES
[facility] Hospital Medicine  Telemedicine Progress Note        Patient Name: Vern Lr  MRN: [id number]  Patient Class: IP- Inpatient   Admission Date: 4/9/2020  Length of Stay: 24 days  Attending Physician: Nagi Forrest MD  Primary Care Provider: Lucio Byrd MD    [facility] Medicine Team: [facility] VIRTUAL TEAM 7 Nagi Forrest MD  Start time:10:55  Chief complaint: none  The patient location is: Inpt Bed  Present with the patient at the time of the telemed/virtual assessment: RN with interpretor via audio   End time:  11:05    Total time spent with patient: 10 minutes    The attending portion of this evaluation, treatment, and documentation was performed per Nagi Forrest MD via audio only.      Subjective:     Principal Problem:COVID-19 virus infection        HPI:  80/M admitted on 4/9/20 for falls and PNA 2/2 COVID infection. Positive testing in ED (4/9/20). Patient speaks only Martiniquais and language line used to communicate. Spoke to patient's sister who admits patient is hard of hearing and likely has mild dementia so history is somewhat limited as a result. Patient notes constipation without bm in a week. Sister states he often complains of this but patient actually was brought in for falls. He fell twice in past couple of days. She admits he has looked fatigued recently and she is actually recovering from COVID infection herself. He normally ambulates with assistance of cane. After first fall he was brought to [facility] and went home. Second fall while in bathroom yesterday and was subsequently brought here to [facility]. Seizure hx but sister did not see anything to suggest seizure and patient has been on meds. Patient has no recent issues with passing out. Hx of NSTEMI with non-obstructive CAD in Sept 2019. Largest occulusion noted on LHC was 20% at that time. Has been on asa, Plavix, and statin since. Normally walks with cane and was actually walking yesterday without issue per  sister. Patient admits to some shortness of breath and cough as well as nasal congestion.    Overview/Hospital Course:  The patient was placed on supplemental oxygen, 5 L NC.  Azithromycin, ceftriaxone, and hydroxychloroquine, and tamsulosin were started per protocol.  His oxygenation gradually improved.  On hospital day 5, he was transferred to this hospital due to capacity issues at [facility].  Here, oxygenation continued to improve, but the patient has continued to have constitutional symptoms.   Deescalated to room air. Pending SNF placement Per PT/OT recs.   COVID-19 positive 4/24. Medically stable pending placement at this time.     Interval History: Remains HDS and afebrile overnight. Stable. No overnight events. No agitation or behavioural issues. No new concerns. Stable. Urinating well. Good BM, endorsed some soft stool RN to check if diarrhea. Dyspnea stable of exertion. Medically stable for discharge, pending.       Review of Systems Per RN documentation and patient via interpretor   Gen - weakness  CV - no cp or palpitations   Lung - dyspnea on exertion   Abd - no pain, diarrhea, or constipation   Skin - no rashes    Objective:     Vital Signs (Most Recent):  Temp: 98.8 °F (37.1 °C) (04/30/20 0713)  Pulse: 80 (04/30/20 1120)  Resp: (!) 26 (04/30/20 0747)  BP: (!) 156/87 (04/30/20 0747)  SpO2: 96 % (04/30/20 1120) Vital Signs (24h Range):  Temp:  [97.9 °F (36.6 °C)-98.8 °F (37.1 °C)] 98.8 °F (37.1 °C)  Pulse:  [] 80  Resp:  [16-26] 26  SpO2:  [96 %-98 %] 96 %  BP: ()/(70-87) 156/87     Weight: 82 kg (180 lb 12.4 oz)  Body mass index is 30.08 kg/m².    Intake/Output Summary (Last 24 hours) at 4/30/2020 1329  Last data filed at 4/29/2020 1600  Gross per 24 hour   Intake 240 ml   Output 400 ml   Net -160 ml      Physical Exam Per RN documentation  Gen - NAD  CV - RRR no MRG   Lung - CTAB  Abd - soft NT ND  Neuro - Aox2-3 (not aware of specific date)  Skin - no new rashes      Assessment/Plan:       * COVID-19 virus infection  PNA with COVID + on 4/9. Presenting symptoms of weakness, falls, mild sob.   Saturating well on RA initially, put on 5L NC today after desat to 70's. CXR b/l infiltrates, CRP over 200 and mildly elevated trop on admission.   COVID protocol with isolation and 02 supplementation. Albuterol inhaler prn. Continuing course of HCQ, Azithro and Rocephin.   Oxygenation improving  Awaiting SNF placement - positive covid test 4/23      Coronary artery disease involving native coronary artery of native heart without angina pectoris  [facility] with non-obstructive dz, Sept 2019. On home asa and statin plus Plavix for NSTEMI at that time.   Mild trop elevation in setting of acute illness stabilized. Doubt ACS. Continuing home meds.    Fall  2 falls in setting of COVID infection and baseline gait instability requiring cane.  CT head and abdomen/pelvis show no new fractures. Nothing on exam to specify areas of fracture concern. Does have hx of osteoporosis and on anti-epileptic so will have very low threshold to add additional imaging.      Constipation  Continue daily laxatives while inpatient.      Elevated troponin  Hx of NSTEMI with [facility] showing non-obstructive CAD in Sept 2019.   Mild, stable. Likely related to acute illness. ACS unlikely. Cards has evaluated and signed off. Appreciate reccs.  On asa, Plavix, statin at home; will continue inpatient.    Seizure disorder  Controlled. Continue home Dilantin and valproic acid.      Dyslipidemia  Continue home statin.    BPH (benign prostatic hyperplasia)  Controlled. Continue home Flomax.      Essential hypertension, benign  Controlled. Continue home meds.      VTE Risk Mitigation (From admission, onward)         Ordered     enoxaparin injection 40 mg  Daily      04/09/20 0909     IP VTE HIGH RISK PATIENT  Once      04/09/20 0914     Place sequential compression device  Until discontinued      04/09/20 0914                Dispo - med stable pending  [facility] unable to take patient until several days after positive test  Code - DNR      Nagi Forrest MD  Department of Hospital Medicine   [facility]

## 2020-05-04 PROCEDURE — 25000003 PHARM REV CODE 250: Performed by: INTERNAL MEDICINE

## 2020-05-04 PROCEDURE — 99231 PR SUBSEQUENT HOSPITAL CARE,LEVL I: ICD-10-PCS | Mod: 95,,, | Performed by: INTERNAL MEDICINE

## 2020-05-04 PROCEDURE — 94761 N-INVAS EAR/PLS OXIMETRY MLT: CPT

## 2020-05-04 PROCEDURE — 97535 SELF CARE MNGMENT TRAINING: CPT

## 2020-05-04 PROCEDURE — 63600175 PHARM REV CODE 636 W HCPCS: Performed by: INTERNAL MEDICINE

## 2020-05-04 PROCEDURE — 99231 SBSQ HOSP IP/OBS SF/LOW 25: CPT | Mod: 95,,, | Performed by: INTERNAL MEDICINE

## 2020-05-04 PROCEDURE — 11000001 HC ACUTE MED/SURG PRIVATE ROOM

## 2020-05-04 PROCEDURE — 97116 GAIT TRAINING THERAPY: CPT

## 2020-05-04 PROCEDURE — 25000003 PHARM REV CODE 250: Performed by: EMERGENCY MEDICINE

## 2020-05-04 PROCEDURE — 94640 AIRWAY INHALATION TREATMENT: CPT

## 2020-05-04 RX ADMIN — STANDARDIZED SENNA CONCENTRATE AND DOCUSATE SODIUM 1 TABLET: 8.6; 5 TABLET ORAL at 09:05

## 2020-05-04 RX ADMIN — ATORVASTATIN CALCIUM 40 MG: 40 TABLET, FILM COATED ORAL at 09:05

## 2020-05-04 RX ADMIN — ASPIRIN 81 MG: 81 TABLET, COATED ORAL at 09:05

## 2020-05-04 RX ADMIN — PHENYTOIN SODIUM 200 MG: 100 CAPSULE ORAL at 09:05

## 2020-05-04 RX ADMIN — TAMSULOSIN HYDROCHLORIDE 0.4 MG: 0.4 CAPSULE ORAL at 09:05

## 2020-05-04 RX ADMIN — ALBUTEROL SULFATE 2 PUFF: 90 AEROSOL, METERED RESPIRATORY (INHALATION) at 04:05

## 2020-05-04 RX ADMIN — FINASTERIDE 5 MG: 5 TABLET, FILM COATED ORAL at 09:05

## 2020-05-04 RX ADMIN — ALBUTEROL SULFATE 2 PUFF: 90 AEROSOL, METERED RESPIRATORY (INHALATION) at 08:05

## 2020-05-04 RX ADMIN — CLOPIDOGREL BISULFATE 75 MG: 75 TABLET ORAL at 09:05

## 2020-05-04 RX ADMIN — ACETAMINOPHEN 650 MG: 325 TABLET ORAL at 09:05

## 2020-05-04 RX ADMIN — PHENYTOIN SODIUM 200 MG: 100 CAPSULE ORAL at 08:05

## 2020-05-04 RX ADMIN — ENOXAPARIN SODIUM 40 MG: 100 INJECTION SUBCUTANEOUS at 08:05

## 2020-05-04 RX ADMIN — MONTELUKAST 10 MG: 10 TABLET, FILM COATED ORAL at 08:05

## 2020-05-04 RX ADMIN — LISINOPRIL 10 MG: 5 TABLET ORAL at 09:05

## 2020-05-04 RX ADMIN — PHENOBARBITAL 90 MG: 30 TABLET ORAL at 09:05

## 2020-05-04 RX ADMIN — METOPROLOL TARTRATE 50 MG: 50 TABLET, FILM COATED ORAL at 09:05

## 2020-05-04 RX ADMIN — STANDARDIZED SENNA CONCENTRATE AND DOCUSATE SODIUM 1 TABLET: 8.6; 5 TABLET ORAL at 08:05

## 2020-05-04 NOTE — PLAN OF CARE
Problem: Fall Injury Risk  Goal: Absence of Fall and Fall-Related Injury  Outcome: Ongoing, Progressing     Problem: Adult Inpatient Plan of Care  Goal: Optimal Comfort and Wellbeing  Outcome: Ongoing, Progressing     Problem: Oral Intake Inadequate  Goal: Improved Oral Intake  Outcome: Ongoing, Progressing         Safety demonstrated by keeping bed locked, in lowest position, personal items within reach, toileting offered during rounds. Wellbeing and comfort increased by providing a bedside  via ipad to express his needs and concerns.

## 2020-05-04 NOTE — PT/OT/SLP PROGRESS
Physical Therapy Treatment    Patient Name:  Vern Lr   MRN:  [id number]    Recommendations:     Discharge Recommendations:  nursing facility, skilled   Discharge Equipment Recommendations: bedside commode, walker, rolling   Barriers to discharge: decreased functional mobility requiring increased assist      Assessment:     Vern Lr is a 80 y.o. male admitted with a medical diagnosis of COVID-19 virus infection.  He presents with the following impairments/functional limitations:  gait instability, weakness, decreased upper extremity function, impaired cardiopulmonary response to activity, impaired endurance, impaired balance, decreased lower extremity function, decreased safety awareness, impaired self care skills, impaired functional mobilty. Pt tolerated session well on this date focusing on bed mobility, gait training, and static standing balance/endurance. Pt would benefit from continued skilled acute PT 4x/wk to improve functional mobility.  Recommending pt receive PT services in SNF setting following discharge from hospital once medically cleared.     Rehab Prognosis: Fair; patient would benefit from acute skilled PT services to address these deficits and reach maximum level of function.    Recent Surgery: * No surgery found *      Plan:     During this hospitalization, patient to be seen 4 x/week to address the identified rehab impairments via gait training, therapeutic activities, therapeutic exercises, neuromuscular re-education and progress toward the following goals:    · Plan of Care Expires:  05/13/20    Subjective     Pain/Comfort:  · Pain Rating 1: (did not rate)  · Location - Side 1: Left  · Location - Orientation 1: generalized  · Location 1: back      Objective:     Communicated with NSG prior to session.  Patient found HOB elevated with pulse ox (continuous), telemetry upon PT entry to room.     General Precautions: Standard, airborne, contact, droplet, fall   Orthopedic Precautions:N/A    Braces: N/A     Functional Mobility:  · Bed Mobility:     · Rolling Right: stand by assistance  · Scooting: stand by assistance  · Supine to Sit: stand by assistance  · Transfers:     · Sit to Stand:  contact guard assistance with rolling walker  · Gait: 15ft x2 SBA w/RW  · Balance: SBA      AM-PAC 6 CLICK MOBILITY  Turning over in bed (including adjusting bedclothes, sheets and blankets)?: 3  Sitting down on and standing up from a chair with arms (e.g., wheelchair, bedside commode, etc.): 3  Moving from lying on back to sitting on the side of the bed?: 3  Moving to and from a bed to a chair (including a wheelchair)?: 3  Need to walk in hospital room?: 3  Climbing 3-5 steps with a railing?: 3  Basic Mobility Total Score: 18       Therapeutic Activities and Exercises:  - Static Standing: ~3mins performing ADLs SBA    - Pt educated on:   -PT roles, expectations, and POC    -Safety with mobility   -Benefits of OOB activities to increase strength and functional mobility    -Performing ther ex for increasing LE ROM and strength   -Discharge recommendations     Patient left up in chair with call button in reach..    GOALS:   Multidisciplinary Problems     Physical Therapy Goals        Problem: Physical Therapy Goal    Goal Priority Disciplines Outcome Goal Variances Interventions   Physical Therapy Goal     PT, PT/OT Ongoing, Progressing     Description:  Goals to be met by: 2020    Patient will increase functional independence with mobility by performin. Pt will perform bed mobility (rolling L/R, scooting, and bridging) with Nawaf. - GOAL MET  REVISED: with CGA. MET  Revised: Pt to perform bed mobility with Supervision  2. Pt will perform supine to/from sit with Nawaf. - GOAL MET  REVISED: with CGA. MET  Revied: Pt to perform supine <> sit with Supervision.  3. Pt will sit EOB x 10 mins with B UE support with Supervision assistance. MET  4. Pt with perform sit to stand with modA assistance and straight cane.  Discontinued  Revised: Pt to perform sit <> stand with Supervision with RW.  5. Pt will ambulate 30 feet with mod assistance and straight cane. MET with RW  Revised: pt able to ambulate 75ft with SBA with RW.  6. Pt will perform there-ex from handout x 15 reps to improve strength for functional mobility.                             Time Tracking:     PT Received On: 05/04/20  PT Start Time: 1138     PT Stop Time: 1154  PT Total Time (min): 16 min     Billable Minutes: Gait Training 16    Treatment Type: Treatment  PT/PTA: PT           Tray Retana, PT  05/04/2020

## 2020-05-04 NOTE — PLAN OF CARE
Goals updated. DREAD Arredondo 5/4/2020   Problem: Occupational Therapy Goal  Goal: Occupational Therapy Goal  Description  Goals to be met by: 5/12    Patient will increase functional independence with ADLs by performing:    LB dressing (pants, brief)  with set up and CGA.  Grooming while standing at sink with set up and supervision x5 min duration.   Toileting from toilet with CGA.  Toilet transfer to toilet with supervision.  Functional mobility at household distance for ADL task with supervision. Progressing          Outcome: Ongoing, Progressing

## 2020-05-04 NOTE — PLAN OF CARE
05/04/20 1202   Post-Acute Status   Post-Acute Authorization Placement   Post-Acute Placement Status Additional Clinical Requested       Sent updates to the facilities that will take COVID + patients.  Awaiting acceptance.  SW in contact with CM and Medical staff. Will continue to follow and offer support as needed.     Quang Shelby, TOMAS  [facility]   Ext. [phone]

## 2020-05-04 NOTE — PLAN OF CARE
Problem: Adult Inpatient Plan of Care  Goal: Plan of Care Review  Outcome: Ongoing, Progressing   Patient turned and repositioned self independently. Patient pain and safety monitored q 1-2 hrs this shift. Ambulates with x1 staff assist and walker. Bed locked and in lowest position. Bed side rails elevated x 2. Call light and personal belongings in reach. Slovak speaking only, able to make needs known. Will cont. to monitor.

## 2020-05-04 NOTE — PLAN OF CARE
Pt would benefit from continued skilled acute PT services to improve functional mobility. POC is to continue towards current goals set to progress towards PLOF.       Problem: Physical Therapy Goal  Goal: Physical Therapy Goal  Description  Goals to be met by: 2020    Patient will increase functional independence with mobility by performin. Pt will perform bed mobility (rolling L/R, scooting, and bridging) with Nawaf. - GOAL MET  REVISED: with CGA. MET  Revised: Pt to perform bed mobility with Supervision  2. Pt will perform supine to/from sit with Nawaf. - GOAL MET  REVISED: with CGA. MET  Revied: Pt to perform supine <> sit with Supervision.  3. Pt will sit EOB x 10 mins with B UE support with Supervision assistance. MET  4. Pt with perform sit to stand with modA assistance and straight cane. Discontinued  Revised: Pt to perform sit <> stand with Supervision with RW.  5. Pt will ambulate 30 feet with mod assistance and straight cane. MET with RW  Revised: pt able to ambulate 75ft with SBA with RW.  6. Pt will perform there-ex from handout x 15 reps to improve strength for functional mobility.            Outcome: Ongoing, Progressing

## 2020-05-04 NOTE — PLAN OF CARE
05/04/20 1317   Discharge Reassessment   Assessment Type Discharge Planning Reassessment   Discharge Plan A Rehab   Discharge Plan B Long-term acute care facility (LTAC)   DME Needed Upon Discharge  bedside commode;walker, rolling   Anticipated Discharge Disposition Rehab   Can the patient/caregiver answer the patient profile reliably? Yes, cognitively intact   How does the patient rate their overall health at the present time? Fair   Describe the patient's ability to walk at the present time. Minor restrictions or changes   How often would a person be available to care for the patient? Occasionally   Number of comorbid conditions (as recorded on the chart) Five or more   Post-Acute Status   Post-Acute Placement Status Additional Clinical Requested     Patient medically stable to discharge. Multiple referrals sent. Awaiting acceptance. Will continue to follow.

## 2020-05-04 NOTE — PT/OT/SLP PROGRESS
Occupational Therapy   Treatment    Name: Vern Lr  MRN: [id number]  Admitting Diagnosis:  COVID-19 virus infection       Recommendations:     Discharge Recommendations: nursing facility, skilled  Discharge Equipment Recommendations:  bedside commode, walker, rolling  Barriers to discharge:  Inaccessible home environment, Decreased caregiver support    Assessment:     Vern Lr is a 80 y.o. male with a medical diagnosis of COVID-19 virus infection.  He presents with cont gains towards OT goals. Pt c/o needing a shower; however, no shower/tub within room on locked unit. His goals were updated on this date 2/2 cont gains. Cont to rec SNF at this time. Performance deficits affecting function are weakness, impaired endurance, impaired self care skills, impaired functional mobilty, gait instability, impaired balance, impaired cognition, decreased safety awareness, decreased upper extremity function, decreased lower extremity function, impaired cardiopulmonary response to activity.     Rehab Prognosis:  Good; patient would benefit from acute skilled OT services to address these deficits and reach maximum level of function.       Plan:     Patient to be seen 3 x/week to address the above listed problems via self-care/home management, therapeutic activities, therapeutic exercises  · Plan of Care Expires: 05/12/20  · Plan of Care Reviewed with: patient    Subjective     Pain/Comfort:  · Pain Rating 1: (c/o lower back pain)  · Pain Addressed 1: Reposition  · Pain Rating Post-Intervention 1: (remains)    Objective:     Communicated with: RN prior to session.  Completed with PT for bundled care.  Patient found HOB elevated with bed alarm, telemetry, pulse ox (continuous)([employer], CATY) upon OT entry to room.    General Precautions: Standard, airborne, aspiration, contact, droplet, fall   Orthopedic Precautions:N/A     Occupational Performance:     Bed Mobility:  HOB 30* use of rail  · Patient completed Rolling/Turning to  Right with stand by assistance  · Patient completed Supine to Sit with stand by assistance     Functional Mobility/Transfers:  · Patient completed Sit <> Stand Transfer with stand by assistance  with  rolling walker   · Patient completed Bed <> Chair Transfer using Step Transfer technique with stand by assistance with rolling walker  · Functional Mobility: SBA with RW at household distance for ADL task    Activities of Daily Living:  · Feeding:  independence with setup  · Grooming: stand by assistance standing at sink for hand hygiene   · Upper Body Dressing: contact guard assistance EOB to don gown as jacket    [facility] 6 Click ADL: 17    Treatment & Education:  -Pt alert and agreeable to therapy session; re edu on OT role in care  -utilized ALKA for [place] translation   -re edu on importance of activity and mobility for healing process  -Communication board updated; questions/concerns addressed within OT scope of practice    Patient left up in chair with all lines intact and call button in reachEducation:      GOALS:   Multidisciplinary Problems     Occupational Therapy Goals        Problem: Occupational Therapy Goal    Goal Priority Disciplines Outcome Interventions   Occupational Therapy Goal     OT, PT/OT Ongoing, Progressing    Description:  Goals to be met by: 5/12    Patient will increase functional independence with ADLs by performing:    LB dressing (pants, brief)  with set up and CGA.  Grooming while standing at sink with set up and supervision x5 min duration.   Toileting from toilet with CGA.  Toilet transfer to toilet with supervision.  Functional mobility at household distance for ADL task with supervision. Progressing                           Time Tracking:     OT Date of Treatment: 05/04/20  OT Start Time: 1136  OT Stop Time: 1154  OT Total Time (min): 18 min    Billable Minutes:Self Care/Home Management 15    DREAD Arredondo  5/4/2020

## 2020-05-05 LAB
ALBUMIN SERPL BCP-MCNC: 2.6 G/DL (ref 3.5–5.2)
ALP SERPL-CCNC: 91 U/L (ref 55–135)
ALT SERPL W/O P-5'-P-CCNC: 42 U/L (ref 10–44)
ANION GAP SERPL CALC-SCNC: 10 MMOL/L (ref 8–16)
AST SERPL-CCNC: 52 U/L (ref 10–40)
BASOPHILS # BLD AUTO: 0.07 K/UL (ref 0–0.2)
BASOPHILS NFR BLD: 1.2 % (ref 0–1.9)
BILIRUB SERPL-MCNC: 0.4 MG/DL (ref 0.1–1)
BUN SERPL-MCNC: 17 MG/DL (ref 8–23)
CALCIUM SERPL-MCNC: 8.7 MG/DL (ref 8.7–10.5)
CHLORIDE SERPL-SCNC: 102 MMOL/L (ref 95–110)
CO2 SERPL-SCNC: 23 MMOL/L (ref 23–29)
CREAT SERPL-MCNC: 0.6 MG/DL (ref 0.5–1.4)
DIFFERENTIAL METHOD: ABNORMAL
EOSINOPHIL # BLD AUTO: 0.2 K/UL (ref 0–0.5)
EOSINOPHIL NFR BLD: 2.8 % (ref 0–8)
ERYTHROCYTE [DISTWIDTH] IN BLOOD BY AUTOMATED COUNT: 13.5 % (ref 11.5–14.5)
EST. GFR  (AFRICAN AMERICAN): >60 ML/MIN/1.73 M^2
EST. GFR  (NON AFRICAN AMERICAN): >60 ML/MIN/1.73 M^2
GLUCOSE SERPL-MCNC: 99 MG/DL (ref 70–110)
HCT VFR BLD AUTO: 41.6 % (ref 40–54)
HGB BLD-MCNC: 12.4 G/DL (ref 14–18)
IMM GRANULOCYTES # BLD AUTO: 0.04 K/UL (ref 0–0.04)
IMM GRANULOCYTES NFR BLD AUTO: 0.7 % (ref 0–0.5)
LYMPHOCYTES # BLD AUTO: 1.3 K/UL (ref 1–4.8)
LYMPHOCYTES NFR BLD: 21.7 % (ref 18–48)
MCH RBC QN AUTO: 30.8 PG (ref 27–31)
MCHC RBC AUTO-ENTMCNC: 29.8 G/DL (ref 32–36)
MCV RBC AUTO: 104 FL (ref 82–98)
MONOCYTES # BLD AUTO: 0.5 K/UL (ref 0.3–1)
MONOCYTES NFR BLD: 7.6 % (ref 4–15)
NEUTROPHILS # BLD AUTO: 4 K/UL (ref 1.8–7.7)
NEUTROPHILS NFR BLD: 66 % (ref 38–73)
NRBC BLD-RTO: 0 /100 WBC
PLATELET # BLD AUTO: 285 K/UL (ref 150–350)
PMV BLD AUTO: 9.8 FL (ref 9.2–12.9)
POTASSIUM SERPL-SCNC: 4.1 MMOL/L (ref 3.5–5.1)
PROT SERPL-MCNC: 7.8 G/DL (ref 6–8.4)
RBC # BLD AUTO: 4.02 M/UL (ref 4.6–6.2)
SODIUM SERPL-SCNC: 135 MMOL/L (ref 136–145)
WBC # BLD AUTO: 6.07 K/UL (ref 3.9–12.7)

## 2020-05-05 PROCEDURE — 11000001 HC ACUTE MED/SURG PRIVATE ROOM

## 2020-05-05 PROCEDURE — 80053 COMPREHEN METABOLIC PANEL: CPT

## 2020-05-05 PROCEDURE — 36415 COLL VENOUS BLD VENIPUNCTURE: CPT

## 2020-05-05 PROCEDURE — 97530 THERAPEUTIC ACTIVITIES: CPT

## 2020-05-05 PROCEDURE — 25000003 PHARM REV CODE 250: Performed by: EMERGENCY MEDICINE

## 2020-05-05 PROCEDURE — 25000003 PHARM REV CODE 250: Performed by: HOSPITALIST

## 2020-05-05 PROCEDURE — 25000003 PHARM REV CODE 250: Performed by: INTERNAL MEDICINE

## 2020-05-05 PROCEDURE — 99900035 HC TECH TIME PER 15 MIN (STAT)

## 2020-05-05 PROCEDURE — 85025 COMPLETE CBC W/AUTO DIFF WBC: CPT

## 2020-05-05 PROCEDURE — 99231 PR SUBSEQUENT HOSPITAL CARE,LEVL I: ICD-10-PCS | Mod: 95,,, | Performed by: INTERNAL MEDICINE

## 2020-05-05 PROCEDURE — 63600175 PHARM REV CODE 636 W HCPCS: Performed by: INTERNAL MEDICINE

## 2020-05-05 PROCEDURE — 99231 SBSQ HOSP IP/OBS SF/LOW 25: CPT | Mod: 95,,, | Performed by: INTERNAL MEDICINE

## 2020-05-05 PROCEDURE — 97116 GAIT TRAINING THERAPY: CPT

## 2020-05-05 PROCEDURE — 94640 AIRWAY INHALATION TREATMENT: CPT

## 2020-05-05 RX ADMIN — ALBUTEROL SULFATE 2 PUFF: 90 AEROSOL, METERED RESPIRATORY (INHALATION) at 08:05

## 2020-05-05 RX ADMIN — ATORVASTATIN CALCIUM 40 MG: 40 TABLET, FILM COATED ORAL at 08:05

## 2020-05-05 RX ADMIN — PHENOBARBITAL 90 MG: 30 TABLET ORAL at 08:05

## 2020-05-05 RX ADMIN — METOPROLOL TARTRATE 50 MG: 50 TABLET, FILM COATED ORAL at 08:05

## 2020-05-05 RX ADMIN — PHENYTOIN SODIUM 200 MG: 100 CAPSULE ORAL at 08:05

## 2020-05-05 RX ADMIN — ACETAMINOPHEN 650 MG: 325 TABLET ORAL at 08:05

## 2020-05-05 RX ADMIN — TAMSULOSIN HYDROCHLORIDE 0.4 MG: 0.4 CAPSULE ORAL at 08:05

## 2020-05-05 RX ADMIN — STANDARDIZED SENNA CONCENTRATE AND DOCUSATE SODIUM 1 TABLET: 8.6; 5 TABLET ORAL at 08:05

## 2020-05-05 RX ADMIN — LISINOPRIL 10 MG: 5 TABLET ORAL at 08:05

## 2020-05-05 RX ADMIN — MONTELUKAST 10 MG: 10 TABLET, FILM COATED ORAL at 08:05

## 2020-05-05 RX ADMIN — CLOPIDOGREL BISULFATE 75 MG: 75 TABLET ORAL at 08:05

## 2020-05-05 RX ADMIN — ACETAMINOPHEN 650 MG: 325 TABLET ORAL at 12:05

## 2020-05-05 RX ADMIN — FINASTERIDE 5 MG: 5 TABLET, FILM COATED ORAL at 08:05

## 2020-05-05 RX ADMIN — ASPIRIN 81 MG: 81 TABLET, COATED ORAL at 08:05

## 2020-05-05 RX ADMIN — ENOXAPARIN SODIUM 40 MG: 100 INJECTION SUBCUTANEOUS at 08:05

## 2020-05-05 NOTE — PLAN OF CARE
Problem: Fall Injury Risk  Goal: Absence of Fall and Fall-Related Injury  Outcome: Ongoing, Progressing     Problem: Fall Injury Risk  Goal: Absence of Fall and Fall-Related Injury  Outcome: Ongoing, Progressing     Problem: Adult Inpatient Plan of Care  Goal: Plan of Care Review  Outcome: Ongoing, Progressing  Goal: Patient-Specific Goal (Individualization)  Outcome: Ongoing, Progressing  Goal: Absence of Hospital-Acquired Illness or Injury  Outcome: Ongoing, Progressing  Goal: Optimal Comfort and Wellbeing  Outcome: Ongoing, Progressing  Goal: Readiness for Transition of Care  Outcome: Ongoing, Progressing  Goal: Rounds/Family Conference  Outcome: Ongoing, Progressing     Problem: Skin Injury Risk Increased  Goal: Skin Health and Integrity  Outcome: Ongoing, Progressing     Problem: Social Isolation  Goal: Increased Social Interaction  Outcome: Ongoing, Progressing     Problem: Infection  Goal: Infection Symptom Resolution  Outcome: Ongoing, Progressing     Problem: Oral Intake Inadequate  Goal: Improved Oral Intake  Outcome: Ongoing, Progressing     Problem: Coping Ineffective  Goal: Effective Coping  Outcome: Ongoing, Progressing     Problem: Malnutrition  Goal: Improved Nutritional Intake  Outcome: Ongoing, Progressing    Patient alert and oriented. Patient c/o pain to left shoulder. Patient states its old pain and had discussion with md this shift regarding this pain. Medicated with tylenol and hot packs for pain. Appetite fair. Will continue to monitor

## 2020-05-05 NOTE — PT/OT/SLP PROGRESS
Physical Therapy Treatment    Patient Name:  Vern Lr   MRN:  [id number]    Recommendations:     Discharge Recommendations:  nursing facility, skilled   Discharge Equipment Recommendations: bedside commode, walker, rolling   Barriers to discharge: Inaccessible home and Decreased caregiver support    Assessment:     Vern Lr is a 80 y.o. male admitted with a medical diagnosis of COVID-19 virus infection.  He presents with the following impairments/functional limitations:  weakness, impaired functional mobilty, impaired balance, decreased upper extremity function, decreased safety awareness, impaired endurance, impaired self care skills, gait instability, decreased coordination, decreased lower extremity function. Patient tolerated session well. CATY used this date for translation. Patient reports he wants to shave, RN aware. Patient would continue to benefit from skilled PT services while in the hospital. At this time, upon d/c he is an appropriate candidate for SNF.    Rehab Prognosis: Good; patient would benefit from acute skilled PT services to address these deficits and reach maximum level of function.    Recent Surgery: * No surgery found *      Plan:     During this hospitalization, patient to be seen 4 x/week to address the identified rehab impairments via gait training, therapeutic activities, therapeutic exercises, neuromuscular re-education and progress toward the following goals:    · Plan of Care Expires:  05/13/20    Subjective     Chief Complaint: L shoulder pain   Patient/Family Comments/goals: to get a shave   Pain/Comfort:  · Pain Rating 1: (did not rate)  · Location - Side 1: Left  · Location - Orientation 1: generalized  · Location 1: shoulder  · Pain Addressed 1: Distraction, Reposition  · Pain Rating Post-Intervention 1: 0/10      Objective:     Communicated with RN prior to session.  Patient found supine with pulse ox (continuous), telemetry(VISI monitor ), CATY upon PT entry to room.      General Precautions: Standard, airborne, contact, droplet, fall(COVID+)   Orthopedic Precautions:N/A   Braces: N/A     Functional Mobility:  · Bed Mobility:     · Rolling Right: stand by assistance  · Scooting: stand by assistance  · Supine to Sit: stand by assistance  · Transfers:     · Sit to Stand:  contact guard assistance with rolling walker  · 3 trials   · Bed to Chair: contact guard assistance with  rolling walker  using  Step Transfer  · Gait: ~6ftx3 with CGA and RW  ·  FFP, vc's to improve safety, mildly unsteady, no LOB, wide AICHA, decreased sylvia   · Balance: sitting EOB - Sup; static standing - CGA; dynamic standing - CGA      AM-PAC 6 CLICK MOBILITY  Turning over in bed (including adjusting bedclothes, sheets and blankets)?: 4  Sitting down on and standing up from a chair with arms (e.g., wheelchair, bedside commode, etc.): 3  Moving from lying on back to sitting on the side of the bed?: 3  Moving to and from a bed to a chair (including a wheelchair)?: 3  Need to walk in hospital room?: 3  Climbing 3-5 steps with a railing?: 3  Basic Mobility Total Score: 19       Therapeutic Activities and Exercises:  -Patient educated on the continued role and goal of PT  -Questions and concerns answered within the the PT scope of practice.   -White board updated in patient room to reflect level of assistance needed with nursing.     Patient left up in chair with all lines intact, call button in reach and RN notified..    GOALS:   Multidisciplinary Problems     Physical Therapy Goals        Problem: Physical Therapy Goal    Goal Priority Disciplines Outcome Goal Variances Interventions   Physical Therapy Goal     PT, PT/OT Ongoing, Progressing     Description:  Goals to be met by: 2020    Patient will increase functional independence with mobility by performin. Pt will perform bed mobility (rolling L/R, scooting, and bridging) with Nawaf. - GOAL MET  REVISED: with CGA. MET  Revised: Pt to perform bed  mobility with Supervision  2. Pt will perform supine to/from sit with Nawaf. - GOAL MET  REVISED: with CGA. MET  Revied: Pt to perform supine <> sit with Supervision.  3. Pt will sit EOB x 10 mins with B UE support with Supervision assistance. MET  4. Pt with perform sit to stand with modA assistance and straight cane. Discontinued  Revised: Pt to perform sit <> stand with Supervision with RW.  5. Pt will ambulate 30 feet with mod assistance and straight cane. MET with RW  Revised: pt able to ambulate 75ft with SBA with RW.  6. Pt will perform there-ex from handout x 15 reps to improve strength for functional mobility.                             Time Tracking:     PT Received On: 05/05/20  PT Start Time: 1140     PT Stop Time: 1205  PT Total Time (min): 25 min     Billable Minutes: Gait Training 10 and Therapeutic Activity 15    Treatment Type: Treatment  PT/PTA: PT     PTA Visit Number: 0     Marianne Gtz, PT  05/05/2020

## 2020-05-05 NOTE — PLAN OF CARE
Problem: Physical Therapy Goal  Goal: Physical Therapy Goal  Description  Goals to be met by: 2020    Patient will increase functional independence with mobility by performin. Pt will perform bed mobility (rolling L/R, scooting, and bridging) with Nawaf. - GOAL MET  REVISED: with CGA. MET  Revised: Pt to perform bed mobility with Supervision  2. Pt will perform supine to/from sit with Nawaf. - GOAL MET  REVISED: with CGA. MET  Revied: Pt to perform supine <> sit with Supervision.  3. Pt will sit EOB x 10 mins with B UE support with Supervision assistance. MET  4. Pt with perform sit to stand with modA assistance and straight cane. Discontinued  Revised: Pt to perform sit <> stand with Supervision with RW.  5. Pt will ambulate 30 feet with mod assistance and straight cane. MET with RW  Revised: pt able to ambulate 75ft with SBA with RW.  6. Pt will perform there-ex from handout x 15 reps to improve strength for functional mobility.            Outcome: Ongoing, Progressing   Patient tolerated treatment well. Established POC and goals reviewed and remain appropriate. Plan is to continue to improve patient's functional mobility capabilities.      Marianne Gtz, PT, DPT  2020

## 2020-05-05 NOTE — PLAN OF CARE
CM questioned Clarissa ([phone]) w/[facility] regarding admission status. Clarissa stated that would talk to her DON & call this CM back. Will continue to follow.

## 2020-05-05 NOTE — PROGRESS NOTES
Hospital Medicine / Tele Medicine  Progress Note  [facility]      Patient Name: Vern Lr  MRN:  [id number]  Hospital Medicine Team: [facility] VIRTUAL TEAM 7 Francisco Tristan MD  Date of Admission:  4/9/2020     Length of Stay:  LOS: 26 days     VIRTUAL TELENOTE    Start time:10:02 AM  Chief complaint: Suspect COVID-19  End time:  10:19 AM  Total time spent with patient: 17 minutes    The attending portion of this evaluation, treatment, and documentation was performed per Francisco Tristan MD via audio only.    Principal Problem:  COVID-19 virus infection      HPI:  80/M admitted on 4/9/20 for falls and PNA 2/2 COVID infection. Positive testing in ED (4/9/20). Patient speaks only Luxembourgish and language line used to communicate. Spoke to patient's sister who admits patient is hard of hearing and likely has mild dementia so history is somewhat limited as a result. Patient notes constipation without bm in a week. Sister states he often complains of this but patient actually was brought in for falls. He fell twice in past couple of days. She admits he has looked fatigued recently and she is actually recovering from COVID infection herself. He normally ambulates with assistance of cane. After first fall he was brought to [facility] and went home. Second fall while in bathroom yesterday and was subsequently brought here to [facility]. Seizure hx but sister did not see anything to suggest seizure and patient has been on meds. Patient has no recent issues with passing out. Hx of NSTEMI with non-obstructive CAD in Sept 2019. Largest occulusion noted on LHC was 20% at that time. Has been on asa, Plavix, and statin since. Normally walks with cane and was actually walking yesterday without issue per sister. Patient admits to some shortness of breath and cough as well as nasal congestion.     Hospital Course:  The patient was placed on supplemental oxygen, 5 L NC.  Azithromycin, ceftriaxone, and  "hydroxychloroquine, and tamsulosin were started per protocol.  His oxygenation gradually improved.  On hospital day 5, he was transferred to this hospital due to capacity issues at [facility].  Since transfer to [facility] oxygenation has continued to improve. He still has complaints of constitutional symptoms. He has been deescalated to room air and has since maintained his saturations. Continuing with inpatient PT and OT while pending SNF placement. Medically stable pending placement at this time.     Interval History:     Remains HDS and afebrile overnight. No overnight events. No new concerns. Stable. Urinating well and denies consipation. Complaints of left scapular pain which he notes has been present since admission.  He also complains of "cabin fever". Maintaining saturations of +93% on room air. Medically stable for discharge, pending SNF placement at this time. Discussed plan of care with nitayo Sherin Smith, via telephone. All questions answered.    Review of Systems:  ROS (Positive in Bold, otherwise negative)  Constitutional: fever, chills, night sweats weakness  CV: chest pain, edema, palpitations  Resp: SOB, cough, sputum production  GI: changes in appetite, NVDC, pain, melena, hematochezia, GERD, hematemesis  : Dysuria, hematuria, urinary urgency, frequency  MSK: arthralgia (left scapular pain, present since admission, poorly described)/myalgia, joint swelling  All other systems were reviewed & are negative.     Inpatient Medications:    Current Facility-Administered Medications:     acetaminophen tablet 650 mg, 650 mg, Oral, Q4H PRN, Rojas Daniels MD, 650 mg at 05/05/20 0815    acetaminophen tablet 650 mg, 650 mg, Oral, Q6H PRN, Aga Baron MD, 650 mg at 05/05/20 0002    albuterol inhaler 2 puff, 2 puff, Inhalation, Q8H WA, 2 puff at 05/05/20 0815 **AND** MARGARITA Q8H WAKE, , , Q8H WAKE, WHITNEY Orellana    aluminum & magnesium hydroxide-simethicone 400-400-40 mg/5 mL suspension 30 mL, " 30 mL, Oral, Q6H PRN, Aga Baron MD, 30 mL at 04/19/20 0846    artificial tears 0.5 % ophthalmic solution 2 drop, 2 drop, Both Eyes, PRN, Raj Eid MD    aspirin EC tablet 81 mg, 81 mg, Oral, Daily, Ehsan Mendez MD, 81 mg at 05/05/20 0812    atorvastatin tablet 40 mg, 40 mg, Oral, Daily, Rojas Daniels MD, 40 mg at 05/05/20 0811    bisacodyL suppository 10 mg, 10 mg, Rectal, Daily PRN, Aga Baron MD, 10 mg at 04/17/20 1140    clopidogreL tablet 75 mg, 75 mg, Oral, Daily, Ehsan Mendez MD, 75 mg at 05/05/20 0811    enoxaparin injection 40 mg, 40 mg, Subcutaneous, Daily, Rojas Daniels MD, 40 mg at 05/04/20 2014    finasteride tablet 5 mg, 5 mg, Oral, Daily, Tello Arana MD, 5 mg at 05/05/20 0811    lisinopriL tablet 10 mg, 10 mg, Oral, Daily, Tello Arana MD, 10 mg at 05/05/20 0811    melatonin tablet 6 mg, 6 mg, Oral, Nightly PRN, Aga Baron MD, 6 mg at 05/03/20 2044    metoprolol tartrate (LOPRESSOR) tablet 50 mg, 50 mg, Oral, Daily, Ehsan Mendez MD, 50 mg at 05/05/20 0811    montelukast tablet 10 mg, 10 mg, Oral, QHS, Tello Arana MD, 10 mg at 05/04/20 2015    ondansetron tablet 4 mg, 4 mg, Oral, Q6H PRN, Aga Baron MD    PHENobarbitaL tablet 90 mg, 90 mg, Oral, Daily, Ehsan Mendez MD, 90 mg at 05/05/20 0811    phenytoin (DILANTIN) ER capsule 200 mg, 200 mg, Oral, BID, Ehsan Mendez MD, 200 mg at 05/05/20 0811    senna-docusate 8.6-50 mg per tablet 1 tablet, 1 tablet, Oral, BID, Rojas Daniels MD, 1 tablet at 05/05/20 0811    sodium chloride 0.9% flush 10 mL, 10 mL, Intravenous, PRN, Aga Baron MD    tamsulosin 24 hr capsule 0.4 mg, 0.4 mg, Oral, Daily, Rojas Daniels MD, 0.4 mg at 05/05/20 0812      Physical Exam:      Intake/Output Summary (Last 24 hours) at 5/5/2020 1035  Last data filed at 5/5/2020 0745  Gross per 24 hour   Intake 1188 ml   Output 200 ml   Net 988 ml     Wt Readings from  "Last 3 Encounters:   04/18/20 82 kg (180 lb 12.4 oz)   04/12/20 86.2 kg (190 lb 0.6 oz)   09/27/19 86.2 kg (190 lb 0.6 oz)       /85 (BP Location: Left arm, Patient Position: Lying)   Pulse 85   Temp 97.9 °F (36.6 °C) (Oral)   Resp (!) 21   Ht 5' 5" (1.651 m)   Wt 82 kg (180 lb 12.4 oz)   SpO2 96%   BMI 30.08 kg/m²     Done by RN:  GEN: NAD  HEENT: EOMI  CV: RRR  Resp: CTAB  GI: soft, NTND  Neuro: alert, oriented  Skin: no rash  MSK: point tenderness to left scapula, no edema  Psych: normal affect    Laboratory:  Lab Results   Component Value Date    [id number] Detected (A) 04/28/2020       Recent Labs   Lab 04/29/20  0500 05/02/20  0634 05/05/20  0502   WBC 6.15 8.05 6.07   LYMPH 26.5  1.6 20.7  1.7 21.7  1.3   HGB 13.3* 13.4* 12.4*   HCT 41.8 42.0 41.6   * 362* 285     Recent Labs   Lab 04/29/20  0500 05/02/20  0634 05/05/20  0523    136 135*   K 3.8 3.9 4.1    102 102   CO2 25 28 23   BUN 18 16 17   CREATININE 0.7 0.6 0.6   GLU 82 90 99   CALCIUM 8.9 9.1 8.7   MG  --  2.0  --    PHOS  --  3.2  --      Recent Labs   Lab 04/29/20  0500 05/02/20  0634 05/05/20  0523   ALKPHOS 93 93 91   ALT 50* 35 42   AST 52* 27 52*   ALBUMIN 2.5* 2.8* 2.6*   PROT 7.8 8.3 7.8   BILITOT 0.3 0.3 0.4        No results for input(s): DDIMER, FERRITIN, CRP, LDH, BNP, TROPONINI in the last 72 hours.    Invalid input(s): PROCALCITONIN    All labs within the last 24 hours were reviewed.     Microbiology:  Microbiology Results (last 7 days)     ** No results found for the last 168 hours. **            Imaging  ECG Results          EKG 12-lead (Final result)  Result time 04/09/20 20:43:37    Final result by Interface, [facility] (04/09/20 20:43:37)                 Narrative:    Test Reason : R06.02,    Vent. Rate : 090 BPM     Atrial Rate : 090 BPM     P-R Int : 128 ms          QRS Dur : 108 ms      QT Int : 372 ms       P-R-T Axes : 050 -44 077 degrees     QTc Int : 455 ms    Sinus rhythm with " Premature supraventricular complexes  Left axis deviation  Incomplete right bundle branch block  LVH with repolarization abnormality  Cannot rule out Septal infarct ,age undetermined  Abnormal ECG  When compared with ECG of 24-SEP-2019 04:34,  No significant change was found  Confirmed by Zahra Roca MD ([id number]) on 4/9/2020 8:43:23 PM    Referred By: [email or web address]   SELF           Confirmed By:Zahra Roca MD                             EKG 12-LEAD (Final result)  Result time 04/10/20 10:22:35    Final result by Unknown User (04/10/20 10:22:35)                                  Results for orders placed during the hospital encounter of 04/09/20   Echo Color Flow Doppler? Yes    Narrative · Normal left ventricular systolic function. The estimated ejection   fraction is 60%.  · Moderate concentric left ventricular hypertrophy.  · Normal right ventricular systolic function.  · Moderate left atrial enlargement.  · Grade II (moderate) left ventricular diastolic dysfunction consistent   with pseudonormalization.  · Mild right atrial enlargement.          X-Ray Abdomen AP 1 View  Narrative: EXAMINATION:  XR ABDOMEN AP 1 VIEW    CLINICAL HISTORY:  abdominal pain;    TECHNIQUE:  AP View(s) of the abdomen was performed.    COMPARISON:  Multiple priors, most recent 04/09/2020    FINDINGS:  Patchy airspace opacities at the lung bases, similar to prior exams.  No bowel dilatation.  No radiopaque calculi.  No acute osseous abnormality.  Impression: Nonobstructive bowel gas pattern.    Electronically signed by: Maria Dolores Oliver  Date:    04/14/2020  Time:    15:46      All imaging within the last 24 hours was reviewed.     Assessment and Plan:    Active Hospital Problems    Diagnosis  POA    *COVID-19 virus infection [U07.1]  Yes    Moderate malnutrition [E44.0]  Unknown     Recommendations     1. Continue Cardiac diet with Boost Plus TID.  Goals: Pt will consume at least 50% intake at meals  Nutrition Goal Status: progressing towards  goal  Communication of RD Recs: POC    Assessment and Plan   Nutrition Problem:  Moderate Protein-Calorie Malnutrition  Malnutrition in the context of Acute Illness/Injury     Related to (etiology):  COVID-19 infection     Signs and Symptoms (as evidenced by):  Energy Intake: <50% of estimated energy requirement for 2 weeks     Weight Loss: 5.2% x 1 month      Interventions(treatment strategy):  Collaboration of care with providers.  Commercial beverage: Boost Plus TID     Nutrition Diagnosis Status:  New      Lower respiratory tract infection due to COVID-19 virus [U07.1, J22]  Yes    Acute respiratory failure with hypoxia [J96.01]  Yes    Constipation [K59.00]  Yes    Fall [W19.XXXA]  Yes    Coronary artery disease involving native coronary artery of native heart without angina pectoris [I25.10]  Yes    Elevated troponin [R79.89]  Yes    Seizure disorder [G40.909]  Yes    Essential hypertension, benign [I10]  Yes    BPH (benign prostatic hyperplasia) [N40.0]  Yes    Dyslipidemia [E78.5]  Yes      Resolved Hospital Problems   No resolved problems to display.       Suspected Covid-19 Virus Infection  Person under investigation (PUI) for COVID-19  * COVID-19 virus infection  PNA with COVID + on 4/9. Presenting symptoms of weakness, falls, mild sob.   Saturating well on RA initially, put on 5L NC today after desat to 70's. CXR b/l infiltrates, CRP over 200 and mildly elevated trop on admission.   COVID protocol with isolation and 02 supplementation. Albuterol inhaler prn. Continuing course of HCQ, Azithro and Rocephin.   Oxygenation improving  Awaiting SNF placement - positive covid test 4/23        Coronary artery disease involving native coronary artery of native heart without angina pectoris  [facility] with non-obstructive disease back in September 2019. On home DAPT with aspirin & Plavix as well as statin at this time.   Mild troponin elevation in setting of acute illness stabilized. Doubt ACS. Continuing home  meds.     Fall  2 falls in setting of COVID infection and baseline gait instability requiring cane.  CT head and abdomen/pelvis show no new fractures. Nothing on exam to specify areas of fracture concern. Does have hx of osteoporosis and on anti-epileptic so will have very low threshold to add additional imaging.     Constipation  Continue daily laxatives while inpatient.     Elevated troponin  History of NSTEMI with [facility] showing non-obstructive CAD in Sept 2019.   Mild, stable. Likely related to acute illness. ACS unlikely. Cardiology has evaluated and signed off  Resuming home dose DAPT and statin.     Seizure disorder  Controlled. Continue home Dilantin and valproic acid.     Dyslipidemia  Continue home statin.     BPH (benign prostatic hyperplasia)  Controlled. Continue home Flomax.     Essential hypertension, benign  Controlled. Continue home meds.           VTE Risk Mitigation (From admission, onward)                  Ordered        enoxaparin injection 40 mg  Daily      04/09/20 0909        IP VTE HIGH RISK PATIENT  Once      04/09/20 0914        Place sequential compression device  Until discontinued      04/09/20 0914                     Dispo - med stable pending placement (SNF)  Code - DNR    Patient's chronic/stable medical conditions noted in the problem list above will be managed with the patient's home medications as tolerated.     Francisco Tristan MD  Pager# [phone]  5/5/2020 10:35 AM  Department of Hospital medicine

## 2020-05-06 PROCEDURE — 99231 PR SUBSEQUENT HOSPITAL CARE,LEVL I: ICD-10-PCS | Mod: 95,,, | Performed by: INTERNAL MEDICINE

## 2020-05-06 PROCEDURE — 25000003 PHARM REV CODE 250: Performed by: EMERGENCY MEDICINE

## 2020-05-06 PROCEDURE — 99231 SBSQ HOSP IP/OBS SF/LOW 25: CPT | Mod: 95,,, | Performed by: INTERNAL MEDICINE

## 2020-05-06 PROCEDURE — 63600175 PHARM REV CODE 636 W HCPCS: Performed by: INTERNAL MEDICINE

## 2020-05-06 PROCEDURE — 11000001 HC ACUTE MED/SURG PRIVATE ROOM

## 2020-05-06 PROCEDURE — 97530 THERAPEUTIC ACTIVITIES: CPT

## 2020-05-06 PROCEDURE — 94761 N-INVAS EAR/PLS OXIMETRY MLT: CPT

## 2020-05-06 PROCEDURE — 94640 AIRWAY INHALATION TREATMENT: CPT

## 2020-05-06 PROCEDURE — 25000003 PHARM REV CODE 250: Performed by: INTERNAL MEDICINE

## 2020-05-06 PROCEDURE — 25000003 PHARM REV CODE 250: Performed by: HOSPITALIST

## 2020-05-06 PROCEDURE — 97116 GAIT TRAINING THERAPY: CPT

## 2020-05-06 RX ORDER — LIDOCAINE 50 MG/G
1 PATCH TOPICAL DAILY PRN
Status: DISCONTINUED | OUTPATIENT
Start: 2020-05-06 | End: 2020-05-09

## 2020-05-06 RX ADMIN — ENOXAPARIN SODIUM 40 MG: 100 INJECTION SUBCUTANEOUS at 09:05

## 2020-05-06 RX ADMIN — MONTELUKAST 10 MG: 10 TABLET, FILM COATED ORAL at 09:05

## 2020-05-06 RX ADMIN — ACETAMINOPHEN 650 MG: 325 TABLET ORAL at 01:05

## 2020-05-06 RX ADMIN — FINASTERIDE 5 MG: 5 TABLET, FILM COATED ORAL at 09:05

## 2020-05-06 RX ADMIN — ASPIRIN 81 MG: 81 TABLET, COATED ORAL at 09:05

## 2020-05-06 RX ADMIN — METOPROLOL TARTRATE 50 MG: 50 TABLET, FILM COATED ORAL at 09:05

## 2020-05-06 RX ADMIN — TAMSULOSIN HYDROCHLORIDE 0.4 MG: 0.4 CAPSULE ORAL at 09:05

## 2020-05-06 RX ADMIN — CLOPIDOGREL BISULFATE 75 MG: 75 TABLET ORAL at 09:05

## 2020-05-06 RX ADMIN — STANDARDIZED SENNA CONCENTRATE AND DOCUSATE SODIUM 1 TABLET: 8.6; 5 TABLET ORAL at 09:05

## 2020-05-06 RX ADMIN — PHENYTOIN SODIUM 200 MG: 100 CAPSULE ORAL at 09:05

## 2020-05-06 RX ADMIN — ATORVASTATIN CALCIUM 40 MG: 40 TABLET, FILM COATED ORAL at 09:05

## 2020-05-06 RX ADMIN — ALBUTEROL SULFATE 2 PUFF: 90 AEROSOL, METERED RESPIRATORY (INHALATION) at 08:05

## 2020-05-06 RX ADMIN — LIDOCAINE 1 PATCH: 50 PATCH CUTANEOUS at 05:05

## 2020-05-06 RX ADMIN — PHENOBARBITAL 90 MG: 30 TABLET ORAL at 09:05

## 2020-05-06 RX ADMIN — ALBUTEROL SULFATE 2 PUFF: 90 AEROSOL, METERED RESPIRATORY (INHALATION) at 04:05

## 2020-05-06 RX ADMIN — LISINOPRIL 10 MG: 5 TABLET ORAL at 09:05

## 2020-05-06 RX ADMIN — ACETAMINOPHEN 650 MG: 325 TABLET ORAL at 09:05

## 2020-05-06 NOTE — PLAN OF CARE
Continue OT plan of care.    Problem: Occupational Therapy Goal  Goal: Occupational Therapy Goal  Description  Goals to be met by: 5/12    Patient will increase functional independence with ADLs by performing:    LB dressing (pants, brief)  with set up and CGA.  Grooming while standing at sink with set up and supervision x5 min duration.   Toileting from toilet with CGA.  Toilet transfer to toilet with supervision.  Functional mobility at household distance for ADL task with supervision. Progressing          Outcome: Ongoing, Progressing

## 2020-05-06 NOTE — PLAN OF CARE
Problem: Fall Injury Risk  Goal: Absence of Fall and Fall-Related Injury  Outcome: Ongoing, Progressing     Problem: Fall Injury Risk  Goal: Absence of Fall and Fall-Related Injury  Outcome: Ongoing, Progressing     Problem: Adult Inpatient Plan of Care  Goal: Plan of Care Review  Outcome: Ongoing, Progressing  Goal: Patient-Specific Goal (Individualization)  Outcome: Ongoing, Progressing  Goal: Absence of Hospital-Acquired Illness or Injury  Outcome: Ongoing, Progressing  Goal: Optimal Comfort and Wellbeing  Outcome: Ongoing, Progressing  Goal: Readiness for Transition of Care  Outcome: Ongoing, Progressing  Goal: Rounds/Family Conference  Outcome: Ongoing, Progressing     Problem: Skin Injury Risk Increased  Goal: Skin Health and Integrity  Outcome: Ongoing, Progressing     Problem: Social Isolation  Goal: Increased Social Interaction  Outcome: Ongoing, Progressing     Problem: Infection  Goal: Infection Symptom Resolution  Outcome: Ongoing, Progressing     Problem: Oral Intake Inadequate  Goal: Improved Oral Intake  Outcome: Ongoing, Progressing     Problem: Coping Ineffective  Goal: Effective Coping  Outcome: Ongoing, Progressing     Problem: Malnutrition  Goal: Improved Nutritional Intake  Outcome: Ongoing, Progressing    Patient alert and oriented this shift. Out of bed to chair this shift with help of PT. Lidocaine patch applied to left side due to pain. Positive relief. Patient resting comfortably at this time

## 2020-05-06 NOTE — PT/OT/SLP PROGRESS
"Physical Therapy Treatment    Patient Name:  Vern Lr   MRN:  [id number]    Recommendations:     Discharge Recommendations:  nursing facility, skilled   Discharge Equipment Recommendations: bedside commode, walker, rolling   Barriers to discharge: Inaccessible home and Decreased caregiver support    Assessment:     Vern Lr is a 80 y.o. male admitted with a medical diagnosis of COVID-19 virus infection.  He presents with the following impairments/functional limitations:  weakness, impaired balance, impaired functional mobilty, decreased safety awareness, impaired endurance, impaired self care skills, gait instability, decreased coordination, decreased lower extremity function, impaired fine motor. Patient tolerated session well. CATY utilized during session. He was able to complete increased ambulation distance this date. Patient would continue to benefit from skilled PT services while in the hospital. At this time, upon d/c he is an appropriate candidate for SNF in order to progress towards an improved level of functional mobility independence.     Rehab Prognosis: Good; patient would benefit from acute skilled PT services to address these deficits and reach maximum level of function.    Recent Surgery: * No surgery found *      Plan:     During this hospitalization, patient to be seen 4 x/week to address the identified rehab impairments via gait training, therapeutic activities, therapeutic exercises, neuromuscular re-education and progress toward the following goals:    · Plan of Care Expires:  05/13/20    Subjective     Chief Complaint: L shoulder pain; desire to shave   Patient/Family Comments/goals: per : "I need to shave. The roughness on my face reminds me of [place]."  Pain/Comfort:  · Pain Rating 1: 0/10  · Pain Rating Post-Intervention 1: 0/10      Objective:     Communicated with RN prior to session.  Patient found up in chair with pulse ox (continuous), telemetry(VISI monitor, CATY ) upon " PT entry to room.     General Precautions: Standard, airborne, contact, fall(COVID+)   Orthopedic Precautions:N/A   Braces: N/A     Functional Mobility:  · Transfers:     · Sit to Stand:  contact guard assistance with rolling walker  · Gait: ~50ft with CGA and RW  ·  FFP over RW, slow sylvia, mildly unsteady, no LOB   · Balance: static standing - SBA; dynamic standing - CGA     AM-PAC 6 CLICK MOBILITY  Turning over in bed (including adjusting bedclothes, sheets and blankets)?: 4  Sitting down on and standing up from a chair with arms (e.g., wheelchair, bedside commode, etc.): 3  Moving from lying on back to sitting on the side of the bed?: 3  Moving to and from a bed to a chair (including a wheelchair)?: 3  Need to walk in hospital room?: 3  Climbing 3-5 steps with a railing?: 3  Basic Mobility Total Score: 19       Therapeutic Activities and Exercises:  -Patient educated on the continued role and goal of PT  -Questions and concerns answered within the the PT scope of practice.   -White board updated in patient room to reflect level of assistance needed with nursing.     Patient left up in chair with all lines intact, call button in reach and RN notified..    GOALS:   Multidisciplinary Problems     Physical Therapy Goals        Problem: Physical Therapy Goal    Goal Priority Disciplines Outcome Goal Variances Interventions   Physical Therapy Goal     PT, PT/OT Ongoing, Progressing     Description:  Goals to be met by: 2020    Patient will increase functional independence with mobility by performin. Pt will perform bed mobility (rolling L/R, scooting, and bridging) with Nawaf. - GOAL MET  REVISED: with CGA. MET  Revised: Pt to perform bed mobility with Supervision  2. Pt will perform supine to/from sit with Nawaf. - GOAL MET  REVISED: with CGA. MET  Revied: Pt to perform supine <> sit with Supervision.  3. Pt will sit EOB x 10 mins with B UE support with Supervision assistance. MET  4. Pt with perform sit  to stand with modA assistance and straight cane. Discontinued  Revised: Pt to perform sit <> stand with Supervision with RW.  5. Pt will ambulate 30 feet with mod assistance and straight cane. MET with RW  Revised: pt able to ambulate 75ft with SBA with RW.  6. Pt will perform there-ex from handout x 15 reps to improve strength for functional mobility.                             Time Tracking:     PT Received On: 05/06/20  PT Start Time: 1126     PT Stop Time: 1146  PT Total Time (min): 20 min     Billable Minutes: Gait Training 20min    Treatment Type: Treatment  PT/PTA: PT     PTA Visit Number: 0     Marianne Gtz, PT  05/06/2020

## 2020-05-06 NOTE — PLAN OF CARE
LISANDRO left messages in [facility] for the 8 pending referrals to follow up on the patient's referral. LISANDRO is awaiting a call or a response. LISANDRO is in communication with patient's CM, and patient's Care Team.     LISANDRO spoke with Clarissa at [facility] [phone] and they are reviewing the patient's referral and will follow up with LISANDRO. LISANDRO will continue to follow.     Elly Young LMSW   - [facility]  Ext. [phone]

## 2020-05-06 NOTE — PT/OT/SLP PROGRESS
Occupational Therapy   Treatment    Name: Vern Lr  MRN: [id number]  Admitting Diagnosis:  COVID-19 virus infection       Recommendations:     Discharge Recommendations: nursing facility, skilled  Discharge Equipment Recommendations:  walker, rolling, bedside commode  Barriers to discharge:  Inaccessible home environment, Decreased caregiver support    Assessment:     Vern Lr is a 80 y.o. male with a medical diagnosis of COVID-19 virus infection. He presents with performance deficits including gait instability, weakness, impaired endurance, impaired balance, impaired functional mobilty, decreased safety awareness, impaired cardiopulmonary response to activity. Pt progressing toward goals and would continue to benefit from OT to increase functional independence and safety. Recommend SNF upon D/C.     Rehab Prognosis: Good; patient would benefit from acute skilled OT services to address these deficits and reach maximum level of function.       Plan:     Patient to be seen 3 x/week to address the above listed problems via self-care/home management, therapeutic activities, therapeutic exercises  · Plan of Care Expires: 05/12/20  · Plan of Care Reviewed with: patient    Subjective     Pain/Comfort:  · Pain Rating 1: 0/10  · Pain Rating Post-Intervention 1: 0/10    Objective:     Communicated with: RN prior to session. Patient found up in chair with telemetry, pulse ox (continuous)([employer] camera) upon OT entry to room.  CATY used for .  Pt requesting to shave-- RN notified as no razor on floor.    General Precautions: Standard, airborne, contact, droplet, fall   Orthopedic Precautions:N/A   Braces: N/A     Occupational Performance:     Bed Mobility:    · Not assessed    Functional Mobility/Transfers:  · Patient completed Sit <> Stand Transfer with contact guard assistance with rolling walker from bedside chair with cues and assist for controlled descent into chair  · Functional Mobility: Within room  household distance 2 trials with CGA using RW with cues for safety    Activities of Daily Living:  · Grooming: supervision while seated to wash hands and face      AMPAC 6 Click ADL: 17    Treatment & Education:  Pt completed functional mobility within room as described above; seated UE therex with improved ROM of L UE; pt demo understanding to call for assistance of therex and to call for assistance before getting up    Patient left up in chair with all lines intact, call button in reach and RN notifiedEducation:      GOALS:   Multidisciplinary Problems     Occupational Therapy Goals        Problem: Occupational Therapy Goal    Goal Priority Disciplines Outcome Interventions   Occupational Therapy Goal     OT, PT/OT Ongoing, Progressing    Description:  Goals to be met by: 5/12    Patient will increase functional independence with ADLs by performing:    LB dressing (pants, brief)  with set up and CGA.  Grooming while standing at sink with set up and supervision x5 min duration.   Toileting from toilet with CGA.  Toilet transfer to toilet with supervision.  Functional mobility at household distance for ADL task with supervision. Progressing                           Time Tracking:     OT Date of Treatment: 05/06/20  OT Start Time: 1126  OT Stop Time: 1146  OT Total Time (min): 20 min (co-treat with PT)    Billable Minutes:Therapeutic Activity 10 minutes    DREAD Cruz  5/6/2020

## 2020-05-06 NOTE — PROGRESS NOTES
Hospital Medicine / Tele Medicine  Progress Note  [facility]      Patient Name: Vern Lr  MRN:  [id number]  Hospital Medicine Team: [facility] [place] 7 Francisco Tristan MD  Date of Admission:  4/9/2020     Length of Stay:  LOS: 27 days     VIRTUAL TELENOTE    Start time: 11:39 AM  Chief complaint: Suspect COVID-19  End time:  11:51 AM  Total time spent with patient: 12 minutes    The attending portion of this evaluation, treatment, and documentation was performed per Francisco Tristan MD via audio only.    Principal Problem:  COVID-19 virus infection      HPI:  80/M admitted on 4/9/20 for falls and PNA 2/2 COVID infection. Positive testing in ED (4/9/20). Patient speaks only Setswana and language line used to communicate. Spoke to patient's sister who admits patient is hard of hearing and likely has mild dementia so history is somewhat limited as a result. Patient notes constipation without bm in a week. Sister states he often complains of this but patient actually was brought in for falls. He fell twice in past couple of days. She admits he has looked fatigued recently and she is actually recovering from COVID infection herself. He normally ambulates with assistance of cane. After first fall he was brought to [facility] and went home. Second fall while in bathroom yesterday and was subsequently brought here to [facility]. Seizure hx but sister did not see anything to suggest seizure and patient has been on meds. Patient has no recent issues with passing out. Hx of NSTEMI with non-obstructive CAD in Sept 2019. Largest occulusion noted on LHC was 20% at that time. Has been on asa, Plavix, and statin since. Normally walks with cane and was actually walking yesterday without issue per sister. Patient admits to some shortness of breath and cough as well as nasal congestion.     Overview/Hospital Course:  The patient was placed on supplemental oxygen, 5 L NC.  Azithromycin, ceftriaxone, and  hydroxychloroquine, and tamsulosin were started per protocol.  His oxygenation gradually improved.  On hospital day 5, he was transferred to this hospital due to capacity issues at [facility].  Since transfer to [facility] oxygenation has continued to improve. He still has complaints of constitutional symptoms. He has been deescalated to room air and has since maintained his saturations. Continuing with inpatient PT and OT while pending SNF placement. Medically stable pending placement at this time.     Interval History: Remains HDS and afebrile overnight. Maintaining saturations of +93% on room air. No overnight events. Continued complaints of left scapular pain that has been present since admission.  Suspect MSK in origin as tender to palpation per nurse. Medically stable for discharge, pending SNF placement at this time. Discussed plan of care with Sherin olivares, via telephone. All questions answered.    Review of Systems:  ROS (Positive in Bold, otherwise negative)  Constitutional: fever, chills, night sweats, weakness  CV: chest pain, edema, palpitations  Resp: SOB, cough, sputum production  GI: changes in appetite, NVDC, pain, melena, hematochezia, GERD, hematemesis  : Dysuria, hematuria, urinary urgency, frequency  MSK: arthralgia (left scapular pain, present since admission, poorly described)/myalgia, joint swelling  All other systems were reviewed & are negative.     Inpatient Medications:    Current Facility-Administered Medications:     acetaminophen tablet 650 mg, 650 mg, Oral, Q4H PRN, Rojas Daniels MD, 650 mg at 05/06/20 0930    acetaminophen tablet 650 mg, 650 mg, Oral, Q6H PRN, Aga Baron MD, 650 mg at 05/06/20 0114    albuterol inhaler 2 puff, 2 puff, Inhalation, Q8H WA, 2 puff at 05/06/20 0838 **AND** MDI Q8H WAKE, , , Q8H WAKE, WHITNEY Orellana    aluminum & magnesium hydroxide-simethicone 400-400-40 mg/5 mL suspension 30 mL, 30 mL, Oral, Q6H PRN, Aga Baron MD, 30  mL at 04/19/20 0846    artificial tears 0.5 % ophthalmic solution 2 drop, 2 drop, Both Eyes, PRN, Raj Eid MD    aspirin EC tablet 81 mg, 81 mg, Oral, Daily, Ehsan Mendez MD, 81 mg at 05/06/20 0931    atorvastatin tablet 40 mg, 40 mg, Oral, Daily, Rojas Daniels MD, 40 mg at 05/06/20 0930    bisacodyL suppository 10 mg, 10 mg, Rectal, Daily PRN, Aga Baron MD, 10 mg at 04/17/20 1140    clopidogreL tablet 75 mg, 75 mg, Oral, Daily, Ehsan Mendez MD, 75 mg at 05/06/20 0930    enoxaparin injection 40 mg, 40 mg, Subcutaneous, Daily, Rojas Daniels MD, 40 mg at 05/05/20 2026    finasteride tablet 5 mg, 5 mg, Oral, Daily, Tello Arana MD, 5 mg at 05/06/20 0930    lisinopriL tablet 10 mg, 10 mg, Oral, Daily, Tello Arana MD, 10 mg at 05/06/20 0931    melatonin tablet 6 mg, 6 mg, Oral, Nightly PRN, Aga Baron MD, 6 mg at 05/03/20 2044    metoprolol tartrate (LOPRESSOR) tablet 50 mg, 50 mg, Oral, Daily, Ehsan Mendez MD, 50 mg at 05/06/20 0930    montelukast tablet 10 mg, 10 mg, Oral, QHS, Tello Arana MD, 10 mg at 05/05/20 2027    ondansetron tablet 4 mg, 4 mg, Oral, Q6H PRN, Aga Baron MD    PHENobarbitaL tablet 90 mg, 90 mg, Oral, Daily, Ehsan Mendez MD, 90 mg at 05/06/20 0930    phenytoin (DILANTIN) ER capsule 200 mg, 200 mg, Oral, BID, Ehsan Mendez MD, 200 mg at 05/06/20 0930    senna-docusate 8.6-50 mg per tablet 1 tablet, 1 tablet, Oral, BID, Rojas Daniels MD, 1 tablet at 05/06/20 0930    sodium chloride 0.9% flush 10 mL, 10 mL, Intravenous, PRN, Aga Baron MD    tamsulosin 24 hr capsule 0.4 mg, 0.4 mg, Oral, Daily, Rojas Daniels MD, 0.4 mg at 05/06/20 0930      Physical Exam:      Intake/Output Summary (Last 24 hours) at 5/6/2020 1139  Last data filed at 5/6/2020 0745  Gross per 24 hour   Intake 951 ml   Output 100 ml   Net 851 ml     Wt Readings from Last 3 Encounters:   04/18/20 82 kg (180 lb 12.4  "oz)   04/12/20 86.2 kg (190 lb 0.6 oz)   09/27/19 86.2 kg (190 lb 0.6 oz)       /74 (BP Location: Left arm, Patient Position: Lying)   Pulse 95   Temp 98.7 °F (37.1 °C) (Oral)   Resp 18   Ht 5' 5" (1.651 m)   Wt 82 kg (180 lb 12.4 oz)   SpO2 95%   BMI 30.08 kg/m²     Done by RN:  GEN: NAD  HEENT: EOMI  CV: RRR  Resp: CTAB  GI: soft, NTND, some tenderness to left flank appreciated  Neuro: alert, oriented  Skin: no rash  MSK: point tenderness to left scapula, no edema  Psych: normal affect    Laboratory:  Lab Results   Component Value Date    [id number] Detected (A) 04/28/2020       Recent Labs   Lab 05/02/20  0634 05/05/20  0502   WBC 8.05 6.07   LYMPH 20.7  1.7 21.7  1.3   HGB 13.4* 12.4*   HCT 42.0 41.6   * 285     Recent Labs   Lab 05/02/20  0634 05/05/20  0523    135*   K 3.9 4.1    102   CO2 28 23   BUN 16 17   CREATININE 0.6 0.6   GLU 90 99   CALCIUM 9.1 8.7   MG 2.0  --    PHOS 3.2  --      Recent Labs   Lab 05/02/20  0634 05/05/20  0523   ALKPHOS 93 91   ALT 35 42   AST 27 52*   ALBUMIN 2.8* 2.6*   PROT 8.3 7.8   BILITOT 0.3 0.4        No results for input(s): DDIMER, FERRITIN, CRP, LDH, BNP, TROPONINI in the last 72 hours.    Invalid input(s): PROCALCITONIN    All labs within the last 24 hours were reviewed.     Microbiology:  Microbiology Results (last 7 days)     ** No results found for the last 168 hours. **            Imaging  ECG Results          EKG 12-lead (Final result)  Result time 04/09/20 20:43:37    Final result by Interface, Lab In [facility] (04/09/20 20:43:37)                 Narrative:    Test Reason : R06.02,    Vent. Rate : 090 BPM     Atrial Rate : 090 BPM     P-R Int : 128 ms          QRS Dur : 108 ms      QT Int : 372 ms       P-R-T Axes : 050 -44 077 degrees     QTc Int : 455 ms    Sinus rhythm with Premature supraventricular complexes  Left axis deviation  Incomplete right bundle branch block  LVH with repolarization abnormality  Cannot rule out Septal " infarct ,age undetermined  Abnormal ECG  When compared with ECG of 24-SEP-2019 04:34,  No significant change was found  Confirmed by Abelino CAMEJO, Zahra LAZARO ([id number]) on 4/9/2020 8:43:23 PM    Referred By: [email or web address]   SELF           Confirmed By:Zahra Roca MD                             EKG 12-LEAD (Final result)  Result time 04/10/20 10:22:35    Final result by Unknown User (04/10/20 10:22:35)                                  Results for orders placed during the hospital encounter of 04/09/20   Echo Color Flow Doppler? Yes    Narrative · Normal left ventricular systolic function. The estimated ejection   fraction is 60%.  · Moderate concentric left ventricular hypertrophy.  · Normal right ventricular systolic function.  · Moderate left atrial enlargement.  · Grade II (moderate) left ventricular diastolic dysfunction consistent   with pseudonormalization.  · Mild right atrial enlargement.          X-Ray Abdomen AP 1 View  Narrative: EXAMINATION:  XR ABDOMEN AP 1 VIEW    CLINICAL HISTORY:  abdominal pain;    TECHNIQUE:  AP View(s) of the abdomen was performed.    COMPARISON:  Multiple priors, most recent 04/09/2020    FINDINGS:  Patchy airspace opacities at the lung bases, similar to prior exams.  No bowel dilatation.  No radiopaque calculi.  No acute osseous abnormality.  Impression: Nonobstructive bowel gas pattern.    Electronically signed by: Maria Dolores Oliver  Date:    04/14/2020  Time:    15:46      All imaging within the last 24 hours was reviewed.     Assessment and Plan:    Active Hospital Problems    Diagnosis  POA    *COVID-19 virus infection [U07.1]  Yes    Moderate malnutrition [E44.0]  Unknown     Recommendations     1. Continue Cardiac diet with Boost Plus TID.  Goals: Pt will consume at least 50% intake at meals  Nutrition Goal Status: progressing towards goal  Communication of RD Recs: POC    Assessment and Plan   Nutrition Problem:  Moderate Protein-Calorie Malnutrition  Malnutrition in the context of Acute  Illness/Injury     Related to (etiology):  COVID-19 infection     Signs and Symptoms (as evidenced by):  Energy Intake: <50% of estimated energy requirement for 2 weeks     Weight Loss: 5.2% x 1 month      Interventions(treatment strategy):  Collaboration of care with providers.  Commercial beverage: Boost Plus TID     Nutrition Diagnosis Status:  New      Lower respiratory tract infection due to COVID-19 virus [U07.1, J22]  Yes    Acute respiratory failure with hypoxia [J96.01]  Yes    Constipation [K59.00]  Yes    Fall [W19.XXXA]  Yes    Coronary artery disease involving native coronary artery of native heart without angina pectoris [I25.10]  Yes    Elevated troponin [R79.89]  Yes    Seizure disorder [G40.909]  Yes    Essential hypertension, benign [I10]  Yes    BPH (benign prostatic hyperplasia) [N40.0]  Yes    Dyslipidemia [E78.5]  Yes      Resolved Hospital Problems   No resolved problems to display.       Suspected Covid-19 Virus Infection  Person under investigation (PUI) for COVID-19  * COVID-19 virus infection  PNA with COVID + on 4/9. Presenting symptoms of weakness, falls, mild sob.   Saturating well on RA initially, put on 5L NC today after desat to 70's. CXR b/l infiltrates, CRP over 200 and mildly elevated trop on admission.   COVID protocol with isolation and 02 supplementation. Albuterol inhaler prn. Continuing course of HCQ, Azithro and Rocephin.   Oxygenation improving  Awaiting SNF placement - positive covid test 4/23        Coronary artery disease involving native coronary artery of native heart without angina pectoris  [facility] with non-obstructive disease back in September 2019. On home DAPT with aspirin & Plavix as well as statin at this time.   Mild troponin elevation in setting of acute illness stabilized. Doubt ACS. Continuing home meds.     Fall  2 falls in setting of COVID infection and baseline gait instability requiring cane.  CT head and abdomen/pelvis show no new fractures.  Nothing on exam to specify areas of fracture concern. Does have hx of osteoporosis and on anti-epileptic so will have very low threshold to add additional imaging.     Constipation  Continue daily laxatives while inpatient.     Elevated troponin  History of NSTEMI with [facility] showing non-obstructive CAD in Sept 2019.   Mild, stable. Likely related to acute illness. ACS unlikely. Cardiology has evaluated and signed off  Resuming home dose DAPT and statin.     Seizure disorder  Controlled. Continue home Dilantin and valproic acid.     Dyslipidemia  Continue home dose statin.     BPH (benign prostatic hyperplasia)  Controlled. Continue home Flomax.     Essential hypertension, benign  Controlled. Continue home meds.              VTE Risk Mitigation (From admission, onward)                       Ordered         enoxaparin injection 40 mg  Daily      04/09/20 0909         IP VTE HIGH RISK PATIENT  Once      04/09/20 0914         Place sequential compression device  Until discontinued      04/09/20 0914                          Dispo - med stable pending placement (SNF)  Code - DNR     Patient's chronic/stable medical conditions noted in the problem list above will be managed with the patient's home medications as tolerated.      Francisco Tristan MD  Pager# [phone]  5/6/2020 11:39 AM  Department of Hospital medicine

## 2020-05-06 NOTE — PROGRESS NOTES
"[facility]  Adult Nutrition  Progress Note    SUMMARY       Recommendations  1. Continue cardiac diet + Boost Plus TID as tolerated.   2. RD to monitor.    Goals: Pt will consume at least 50% intake at meals  Nutrition Goal Status: goal met  Communication of RD Recs: other (comment)(POC)    Reason for Assessment    Reason For Assessment: RD follow-up  Diagnosis: (COVID)  Relevant Medical History: seizure disorder, BPH, DM, stroke, CAD  Interdisciplinary Rounds: did not attend  General Information Comments: Remote assessment completed. German-speaking pt, unable to communicate with pt via phone. Per RN, pt has been eating well today and consumed 100% of breakfast. % intake over the past week per chart, improved from 50-75% intake last week. No updated weight since 4/18. NFPE to be completed at a later date 2/2 COVID-19 precautions. Pt previously diagnosed w/ acute severe malnutrition 2/2 poor intake and 5% wt loss this admission. Pt continues with malnutrition, intake is improving at this time.  Nutrition Discharge Planning: Pt to follow low sodium, low saturated fat diet.    Nutrition Risk Screen    Nutrition Risk Screen: no indicators present    Nutrition/Diet History    Food Preferences: unable to assess  Spiritual, Cultural Beliefs, Oriental orthodox Practices, Values that Affect Care: no  Factors Affecting Nutritional Intake: decreased appetite    Anthropometrics    Temp: 98.7 °F (37.1 °C)  Height: 5' 5" (165.1 cm)  Height (inches): 65 in  Weight Method: Bed Scale  Weight: 82 kg (180 lb 12.4 oz)  Weight (lb): 180.78 lb  Ideal Body Weight (IBW), Male: 136 lb  % Ideal Body Weight, Male (lb): 139.74 %  BMI (Calculated): 30.1  BMI Grade: 30 - 34.9- obesity - grade I    Lab/Procedures/Meds    Pertinent Labs Reviewed: reviewed  Pertinent Labs Comments: Na 135, Alb 2.6, CRP 54.6  Pertinent Medications Reviewed: reviewed  Pertinent Medications Comments: statin, lisinopril, ondansetron, " senna-docusate    Estimated/Assessed Needs    Weight Used For Calorie Calculations: 82 kg (180 lb 12.4 oz)  Energy Calorie Requirements (kcal): 1827 kcal/day  Energy Need Method: Davison-St Jeor(x 1.25 PAL)  Protein Requirements: 82-99 g/day(1-1.2 g/kg)  Weight Used For Protein Calculations: 82 kg (180 lb 12.4 oz)  Fluid Requirements (mL): per MD or 1 mL/kcal  Estimated Fluid Requirement Method: RDA Method  RDA Method (mL): 1827    Nutrition Prescription Ordered    Current Diet Order: Cardiac  Oral Nutrition Supplement: Boost Plus TID    Evaluation of Received Nutrient/Fluid Intake    I/O: +3.9L since admit  Energy Calories Required: meeting needs  Protein Required: meeting needs  Fluid Required: (per MD)  Comments: LBM 5/6  Tolerance: tolerating  % Intake of Estimated Energy Needs: 75 - 100 %  % Meal Intake: 75 - 100 %    Nutrition Risk    Level of Risk/Frequency of Follow-up: low(1x/week)     Assessment and Plan    Moderate Protein-Calorie Malnutrition  Malnutrition in the context of Acute Illness/Injury     Related to (etiology):  COVID-19 infection     Signs and Symptoms (as evidenced by):  Energy Intake: <50% of estimated energy requirement for 2 weeks     Weight Loss: 5.2% x 1 month      Interventions(treatment strategy):  Collaboration of care with providers.  Commercial beverage: Boost Plus TID     Nutrition Diagnosis Status:  Continues, improving intake    Monitor and Evaluation    Food and Nutrient Intake: energy intake, food and beverage intake  Food and Nutrient Adminstration: diet order  Physical Activity and Function: nutrition-related ADLs and IADLs  Anthropometric Measurements: weight  Biochemical Data, Medical Tests and Procedures: electrolyte and renal panel  Nutrition-Focused Physical Findings: overall appearance     Malnutrition Assessment  Due to recent hospital wide restrictions to limit the transfer of COVID-19, we are not performing any physical exams at this time. All S/S will be  observational; NFPE to be performed at a future date.    Nutrition Follow-Up    RD Follow-up?: Yes

## 2020-05-07 LAB — SARS-COV-2 RNA RESP QL NAA+PROBE: NOT DETECTED

## 2020-05-07 PROCEDURE — 11000001 HC ACUTE MED/SURG PRIVATE ROOM

## 2020-05-07 PROCEDURE — 94640 AIRWAY INHALATION TREATMENT: CPT

## 2020-05-07 PROCEDURE — 25000003 PHARM REV CODE 250: Performed by: EMERGENCY MEDICINE

## 2020-05-07 PROCEDURE — 25000003 PHARM REV CODE 250: Performed by: INTERNAL MEDICINE

## 2020-05-07 PROCEDURE — 99232 SBSQ HOSP IP/OBS MODERATE 35: CPT | Mod: 95,,, | Performed by: INTERNAL MEDICINE

## 2020-05-07 PROCEDURE — 99232 PR SUBSEQUENT HOSPITAL CARE,LEVL II: ICD-10-PCS | Mod: 95,,, | Performed by: INTERNAL MEDICINE

## 2020-05-07 PROCEDURE — 25000003 PHARM REV CODE 250: Performed by: HOSPITALIST

## 2020-05-07 PROCEDURE — U0002 COVID-19 LAB TEST NON-CDC: HCPCS

## 2020-05-07 PROCEDURE — 63600175 PHARM REV CODE 636 W HCPCS: Performed by: INTERNAL MEDICINE

## 2020-05-07 PROCEDURE — 94761 N-INVAS EAR/PLS OXIMETRY MLT: CPT

## 2020-05-07 RX ADMIN — STANDARDIZED SENNA CONCENTRATE AND DOCUSATE SODIUM 1 TABLET: 8.6; 5 TABLET ORAL at 08:05

## 2020-05-07 RX ADMIN — ALBUTEROL SULFATE 2 PUFF: 90 AEROSOL, METERED RESPIRATORY (INHALATION) at 04:05

## 2020-05-07 RX ADMIN — ATORVASTATIN CALCIUM 40 MG: 40 TABLET, FILM COATED ORAL at 10:05

## 2020-05-07 RX ADMIN — METOPROLOL TARTRATE 50 MG: 50 TABLET, FILM COATED ORAL at 10:05

## 2020-05-07 RX ADMIN — PHENYTOIN SODIUM 200 MG: 100 CAPSULE ORAL at 10:05

## 2020-05-07 RX ADMIN — STANDARDIZED SENNA CONCENTRATE AND DOCUSATE SODIUM 1 TABLET: 8.6; 5 TABLET ORAL at 10:05

## 2020-05-07 RX ADMIN — ENOXAPARIN SODIUM 40 MG: 100 INJECTION SUBCUTANEOUS at 08:05

## 2020-05-07 RX ADMIN — MONTELUKAST 10 MG: 10 TABLET, FILM COATED ORAL at 08:05

## 2020-05-07 RX ADMIN — PHENOBARBITAL 90 MG: 30 TABLET ORAL at 10:05

## 2020-05-07 RX ADMIN — PHENYTOIN SODIUM 200 MG: 100 CAPSULE ORAL at 08:05

## 2020-05-07 RX ADMIN — ACETAMINOPHEN 650 MG: 325 TABLET ORAL at 12:05

## 2020-05-07 RX ADMIN — ALBUTEROL SULFATE 2 PUFF: 90 AEROSOL, METERED RESPIRATORY (INHALATION) at 08:05

## 2020-05-07 RX ADMIN — Medication 6 MG: at 08:05

## 2020-05-07 RX ADMIN — CLOPIDOGREL BISULFATE 75 MG: 75 TABLET ORAL at 10:05

## 2020-05-07 RX ADMIN — TAMSULOSIN HYDROCHLORIDE 0.4 MG: 0.4 CAPSULE ORAL at 10:05

## 2020-05-07 RX ADMIN — ASPIRIN 81 MG: 81 TABLET, COATED ORAL at 09:05

## 2020-05-07 RX ADMIN — FINASTERIDE 5 MG: 5 TABLET, FILM COATED ORAL at 10:05

## 2020-05-07 RX ADMIN — LISINOPRIL 10 MG: 5 TABLET ORAL at 10:05

## 2020-05-07 NOTE — PLAN OF CARE
LISANDRO pg'd Dr. Gayle.  Awaiting a return call.    1:01pm - LISANDRO spoke w/Dr. Gayle to get an update on pt's progress.  Dr. Gayle reports that pt is making progress and she has ordered COVID 19 test to see if pt is now negative.  Pt is ready for a lower level of care.     Nancy Mercer LMSW  [facility] - Dept  [phone]

## 2020-05-07 NOTE — PLAN OF CARE
Myrtle with [facility], can accept pt for tomorrow as 2 patients are ahead and nees insurance approval. Anna updated Sw/CM and staff, Sw to follow in the am. Myrtle's cell is  if needed.

## 2020-05-07 NOTE — PLAN OF CARE
SW spoke w/ Mariama, niece, who states that pt/family would prefer Rehab as appose to residential care.  Prema reports pt lives alone but family visits q daily to provide food and make sure pt has his medication.    Nancy Mercer LMSW  [facility] -  Dept  [phone]

## 2020-05-07 NOTE — PROGRESS NOTES
Hospital Medicine / Tele Medicine  Progress Note  [facility]      Patient Name: Vern Lr  MRN:  [id number]  Hospital Medicine Team: [facility] VIRTUAL TEAM 10 Francisco Tristan MD  Date of Admission:  4/9/2020     Length of Stay:  LOS: 28 days     VIRTUAL TELENOTE    Start time: 1:04 PM  Chief complaint: Suspect COVID-19  End time:  1:22 PM  Total time spent with patient: 18 minutes    The attending portion of this evaluation, treatment, and documentation was performed per Francisco Tristan MD via audio only.    Principal Problem:  COVID-19 virus infection      HPI:  80/M admitted on 4/9/20 for falls and PNA 2/2 COVID infection. Positive testing in ED (4/9/20). Patient speaks only Tajik and language line used to communicate. Spoke to patient's sister who admits patient is hard of hearing and likely has mild dementia so history is somewhat limited as a result. Patient notes constipation without bm in a week. Sister states he often complains of this but patient actually was brought in for falls. He fell twice in past couple of days. She admits he has looked fatigued recently and she is actually recovering from COVID infection herself. He normally ambulates with assistance of cane. After first fall he was brought to [facility] and went home. Second fall while in bathroom yesterday and was subsequently brought here to [facility]. Seizure hx but sister did not see anything to suggest seizure and patient has been on meds. Patient has no recent issues with passing out. Hx of NSTEMI with non-obstructive CAD in Sept 2019. Largest occulusion noted on C was 20% at that time. Has been on asa, Plavix, and statin since. Normally walks with cane and was actually walking yesterday without issue per sister. Patient admits to some shortness of breath and cough as well as nasal congestion.     Overview/Hospital Course:  The patient was placed on supplemental oxygen, 5 L NC.  Azithromycin, ceftriaxone, and  "hydroxychloroquine, and tamsulosin were started per protocol.  His oxygenation gradually improved.  On hospital day 5, he was transferred to this hospital due to capacity issues at [facility].  Since transfer to [facility] oxygenation has continued to improve. He still has complaints of constitutional symptoms. He has been deescalated to room air and has since maintained his saturations. Continuing with inpatient PT and OT while pending SNF placement. Medically stable pending placement at this time.     Interval History: Remains HDS and afebrile overnight. Maintaining saturations of +96% on room air. No overnight events. He reports "not feeling too bad" this morning and denies any pain, fever, chills, SOB or cough. Still urinating and having BMs without issue. Good appetite. Request for facial shave today. Medically stable for discharge, pending SNF placement at this time. Discussed plan of care with niece Sherin Smith, via telephone. All questions answered.     Review of Systems:  ROS (Positive in Bold, otherwise negative)  Constitutional: fever, chills, night sweats, weakness  CV: chest pain, edema, palpitations  Resp: SOB, cough, sputum production  GI: changes in appetite, NVDC, pain, melena, hematochezia, GERD, hematemesis  : Dysuria, hematuria, urinary urgency, frequency  MSK: arthralgia/myalgia, joint swelling  All other systems were reviewed & are negative.     Inpatient Medications:    Current Facility-Administered Medications:     acetaminophen tablet 650 mg, 650 mg, Oral, Q4H PRN, Rojas Daniels MD, 650 mg at 05/07/20 0049    acetaminophen tablet 650 mg, 650 mg, Oral, Q6H PRN, Aga Baron MD, 650 mg at 05/06/20 0114    albuterol inhaler 2 puff, 2 puff, Inhalation, Q8H WA, 2 puff at 05/07/20 0857 **AND** MDI Q8H WAKE, , , Q8H WAKE, WHITNEY Orellana    aluminum & magnesium hydroxide-simethicone 400-400-40 mg/5 mL suspension 30 mL, 30 mL, Oral, Q6H PRN, Aga Baron MD, 30 mL at " 04/19/20 0846    artificial tears 0.5 % ophthalmic solution 2 drop, 2 drop, Both Eyes, PRN, Raj Eid MD    aspirin EC tablet 81 mg, 81 mg, Oral, Daily, Ehsan Mendez MD, 81 mg at 05/07/20 0900    atorvastatin tablet 40 mg, 40 mg, Oral, Daily, Rojas Daniels MD, 40 mg at 05/07/20 1017    bisacodyL suppository 10 mg, 10 mg, Rectal, Daily PRN, Aga Baron MD, 10 mg at 04/17/20 1140    clopidogreL tablet 75 mg, 75 mg, Oral, Daily, Ehsan Mendez MD, 75 mg at 05/07/20 1019    enoxaparin injection 40 mg, 40 mg, Subcutaneous, Daily, Rojas Daniels MD, 40 mg at 05/06/20 2103    finasteride tablet 5 mg, 5 mg, Oral, Daily, Tello Arana MD, 5 mg at 05/07/20 1018    lidocaine 5 % patch 1 patch, 1 patch, Transdermal, Daily PRN, Nagi Forrest MD, 1 patch at 05/06/20 1758    lisinopriL tablet 10 mg, 10 mg, Oral, Daily, Tello Arana MD, 10 mg at 05/07/20 1019    melatonin tablet 6 mg, 6 mg, Oral, Nightly PRN, Aga Baron MD, 6 mg at 05/03/20 2044    metoprolol tartrate (LOPRESSOR) tablet 50 mg, 50 mg, Oral, Daily, Ehsan Mendez MD, 50 mg at 05/07/20 1018    montelukast tablet 10 mg, 10 mg, Oral, QHS, Tello Arana MD, 10 mg at 05/06/20 2101    ondansetron tablet 4 mg, 4 mg, Oral, Q6H PRN, Aga Baron MD    PHENobarbitaL tablet 90 mg, 90 mg, Oral, Daily, Ehsan Mendez MD, 90 mg at 05/07/20 1017    phenytoin (DILANTIN) ER capsule 200 mg, 200 mg, Oral, BID, Ehsan Mendez MD, 200 mg at 05/07/20 1017    senna-docusate 8.6-50 mg per tablet 1 tablet, 1 tablet, Oral, BID, Rojas Daniels MD, 1 tablet at 05/07/20 1018    sodium chloride 0.9% flush 10 mL, 10 mL, Intravenous, PRN, Aga Baron MD    tamsulosin 24 hr capsule 0.4 mg, 0.4 mg, Oral, Daily, Rojas Daniels MD, 0.4 mg at 05/07/20 1018      Physical Exam:      Intake/Output Summary (Last 24 hours) at 5/7/2020 1320  Last data filed at 5/7/2020 1100  Gross per 24 hour   Intake  "717 ml   Output 600 ml   Net 117 ml     Wt Readings from Last 3 Encounters:   04/18/20 82 kg (180 lb 12.4 oz)   04/12/20 86.2 kg (190 lb 0.6 oz)   09/27/19 86.2 kg (190 lb 0.6 oz)       /67   Pulse 65   Temp 97.8 °F (36.6 °C) (Oral)   Resp 18   Ht 5' 5" (1.651 m)   Wt 82 kg (180 lb 12.4 oz)   SpO2 95%   BMI 30.08 kg/m²     Done by RN:  GEN: NAD  HEENT: EOMI  CV: RRR  Resp: Diminished breath sounds bilaterally  GI: soft, NTND  Neuro: alert, oriented  Skin: no rash  MSK: no edema  Psych: normal affect    Laboratory:  Lab Results   Component Value Date    [id number] Detected (A) 04/28/2020       Recent Labs   Lab 05/02/20  0634 05/05/20  0502   WBC 8.05 6.07   LYMPH 20.7  1.7 21.7  1.3   HGB 13.4* 12.4*   HCT 42.0 41.6   * 285     Recent Labs   Lab 05/02/20  0634 05/05/20  0523    135*   K 3.9 4.1    102   CO2 28 23   BUN 16 17   CREATININE 0.6 0.6   GLU 90 99   CALCIUM 9.1 8.7   MG 2.0  --    PHOS 3.2  --      Recent Labs   Lab 05/02/20  0634 05/05/20  0523   ALKPHOS 93 91   ALT 35 42   AST 27 52*   ALBUMIN 2.8* 2.6*   PROT 8.3 7.8   BILITOT 0.3 0.4        No results for input(s): DDIMER, FERRITIN, CRP, LDH, BNP, TROPONINI in the last 72 hours.    Invalid input(s): PROCALCITONIN    All labs within the last 24 hours were reviewed.     Microbiology:  Microbiology Results (last 7 days)     ** No results found for the last 168 hours. **            Imaging  ECG Results          EKG 12-lead (Final result)  Result time 04/09/20 20:43:37    Final result by Interface, Lab In [facility] (04/09/20 20:43:37)                 Narrative:    Test Reason : R06.02,    Vent. Rate : 090 BPM     Atrial Rate : 090 BPM     P-R Int : 128 ms          QRS Dur : 108 ms      QT Int : 372 ms       P-R-T Axes : 050 -44 077 degrees     QTc Int : 455 ms    Sinus rhythm with Premature supraventricular complexes  Left axis deviation  Incomplete right bundle branch block  LVH with repolarization abnormality  Cannot rule " out Septal infarct ,age undetermined  Abnormal ECG  When compared with ECG of 24-SEP-2019 04:34,  No significant change was found  Confirmed by Abelino CAMEJO, Zahra LAZARO ([id number]) on 4/9/2020 8:43:23 PM    Referred By: JESSICAERR   SELF           Confirmed By:Zahra Roca MD                             EKG 12-LEAD (Final result)  Result time 04/10/20 10:22:35    Final result by Unknown User (04/10/20 10:22:35)                                  Results for orders placed during the hospital encounter of 04/09/20   Echo Color Flow Doppler? Yes    Narrative · Normal left ventricular systolic function. The estimated ejection   fraction is 60%.  · Moderate concentric left ventricular hypertrophy.  · Normal right ventricular systolic function.  · Moderate left atrial enlargement.  · Grade II (moderate) left ventricular diastolic dysfunction consistent   with pseudonormalization.  · Mild right atrial enlargement.          X-Ray Abdomen AP 1 View  Narrative: EXAMINATION:  XR ABDOMEN AP 1 VIEW    CLINICAL HISTORY:  abdominal pain;    TECHNIQUE:  AP View(s) of the abdomen was performed.    COMPARISON:  Multiple priors, most recent 04/09/2020    FINDINGS:  Patchy airspace opacities at the lung bases, similar to prior exams.  No bowel dilatation.  No radiopaque calculi.  No acute osseous abnormality.  Impression: Nonobstructive bowel gas pattern.    Electronically signed by: Maria Dolores Oliver  Date:    04/14/2020  Time:    15:46      All imaging within the last 24 hours was reviewed.     Assessment and Plan:    Active Hospital Problems    Diagnosis  POA    *COVID-19 virus infection [U07.1]  Yes    Moderate malnutrition [E44.0]  Unknown     Recommendations     1. Continue Cardiac diet with Boost Plus TID.  Goals: Pt will consume at least 50% intake at meals  Nutrition Goal Status: progressing towards goal  Communication of RD Recs: POC    Assessment and Plan   Nutrition Problem:  Moderate Protein-Calorie Malnutrition  Malnutrition in the context  of Acute Illness/Injury     Related to (etiology):  COVID-19 infection     Signs and Symptoms (as evidenced by):  Energy Intake: <50% of estimated energy requirement for 2 weeks     Weight Loss: 5.2% x 1 month      Interventions(treatment strategy):  Collaboration of care with providers.  Commercial beverage: Boost Plus TID     Nutrition Diagnosis Status:  New      Lower respiratory tract infection due to COVID-19 virus [U07.1, J22]  Yes    Acute respiratory failure with hypoxia [J96.01]  Yes    Constipation [K59.00]  Yes    Fall [W19.XXXA]  Yes    Coronary artery disease involving native coronary artery of native heart without angina pectoris [I25.10]  Yes    Elevated troponin [R79.89]  Yes    Seizure disorder [G40.909]  Yes    Essential hypertension, benign [I10]  Yes    BPH (benign prostatic hyperplasia) [N40.0]  Yes    Dyslipidemia [E78.5]  Yes      Resolved Hospital Problems   No resolved problems to display.       Suspected Covid-19 Virus Infection  Person under investigation (PUI) for COVID-19  * COVID-19 virus infection  PNA with COVID + on 4/9. Presenting symptoms of weakness, falls, mild sob.   Saturating well on RA initially, put on 5L NC today after desat to 70's. CXR b/l infiltrates, CRP over 200 and mildly elevated trop on admission.   COVID protocol with isolation and 02 supplementation. Albuterol inhaler prn. Continuing course of HCQ, Azithro and Rocephin.   Oxygenation improving  Awaiting SNF placement - positive covid test 4/23, repeat ordered on 5/7        Coronary artery disease involving native coronary artery of native heart without angina pectoris  [facility] with non-obstructive disease back in September 2019. On home DAPT with aspirin & Plavix as well as statin at this time.   Mild troponin elevation in setting of acute illness stabilized. Doubt ACS. Continuing home meds.     Fall  2 falls in setting of COVID infection and baseline gait instability requiring cane.  CT head and  abdomen/pelvis show no new fractures. Nothing on exam to specify areas of fracture concern. Does have hx of osteoporosis and on anti-epileptic so will have very low threshold to add additional imaging.     Constipation  Continue daily laxatives while inpatient.     Elevated troponin  History of NSTEMI with [facility] showing non-obstructive CAD in Sept 2019.   Mild, stable. Likely related to acute illness. ACS unlikely. Cardiology has evaluated and signed off  Resuming home dose DAPT and statin.     Seizure disorder  Controlled. Continue home Dilantin and valproic acid.     Dyslipidemia  Continue home dose statin.     BPH (benign prostatic hyperplasia)  Controlled. Continue home Flomax.     Essential hypertension, benign  Controlled. Continue home meds.              VTE Risk Mitigation (From admission, onward)                       Ordered         enoxaparin injection 40 mg  Daily      04/09/20 0909         IP VTE HIGH RISK PATIENT  Once      04/09/20 0914         Place sequential compression device  Until discontinued      04/09/20 0914                              Dispo - med stable pending placement (SNF)  Code - DNR     Patient's chronic/stable medical conditions noted in the problem list above will be managed with the patient's home medications as tolerated.      Francisco Tristan MD  Pager# [phone]  5/7/2020 1:20 PM  Department of Hospital medicine

## 2020-05-07 NOTE — PLAN OF CARE
SW has attempted to contact the following facilities for update on referral:    [facility] - ph# [phone]  Cait, left a vmsg.    [facility] - ph# [phone] no answer; unable to leave a vmsg.    [facility] - ph# [phone] [facility] admission Dept; left a vsmg.    [facility] - ph# [phone] spoke w/Moira who reported they are not accepting any new referrals.    [facility] - ph# [phone] spoke w/Mike, admission, who reports pt would need to be [facility].    SW spoke w/Sherin, niece, ph# [phone], in regards to pt moving into a nursing home as he only has medicaid benefits.  Gianna Smith,sister ph# [phone], who speaks very lil English requested that Sw speak w/the her daughter (Sherin).  SW spoke w/Sherin who reports that they will need to talk w/pt regarding [facility] placement.  SW left contact information for a return call once they talk this matter over w/the pt.      LISANDRO will continue to follow and update accordingly.    Nancy Mercer LMSW  [facility] - Dept.  [phone]

## 2020-05-07 NOTE — PLAN OF CARE
Pt does not use call light pt yells out constantly for assistant . Wallisian speaking  is readily  available at at bedside via  virtual .Covid 19 screening complete today and results are not detected  results reported to dr srinivasan and dr srinivasan stated ok we will recheck in 24 hours. Nurse stated ok and will continue with plan of care.

## 2020-05-07 NOTE — PLAN OF CARE
05/07/20 0855   Post-Acute Status   Post-Acute Authorization Placement   Post-Acute Placement Status Referrals Sent     Sw added 2 more NH choices for SNF, insurance is a barrier, Sw to follow. Sw left message with kristan Iraheta for [facility] to see if Pt's referral was received and how facility feels about Pt referral. Sw also placed referral to [facility].

## 2020-05-08 LAB
ALBUMIN SERPL BCP-MCNC: 2.7 G/DL (ref 3.5–5.2)
ALP SERPL-CCNC: 117 U/L (ref 55–135)
ALT SERPL W/O P-5'-P-CCNC: 57 U/L (ref 10–44)
ANION GAP SERPL CALC-SCNC: 8 MMOL/L (ref 8–16)
AST SERPL-CCNC: 41 U/L (ref 10–40)
BASOPHILS # BLD AUTO: 0.06 K/UL (ref 0–0.2)
BASOPHILS NFR BLD: 1.2 % (ref 0–1.9)
BILIRUB SERPL-MCNC: 0.2 MG/DL (ref 0.1–1)
BUN SERPL-MCNC: 18 MG/DL (ref 8–23)
CALCIUM SERPL-MCNC: 8.9 MG/DL (ref 8.7–10.5)
CHLORIDE SERPL-SCNC: 103 MMOL/L (ref 95–110)
CO2 SERPL-SCNC: 26 MMOL/L (ref 23–29)
CREAT SERPL-MCNC: 0.7 MG/DL (ref 0.5–1.4)
DIFFERENTIAL METHOD: ABNORMAL
EOSINOPHIL # BLD AUTO: 0.5 K/UL (ref 0–0.5)
EOSINOPHIL NFR BLD: 10.4 % (ref 0–8)
ERYTHROCYTE [DISTWIDTH] IN BLOOD BY AUTOMATED COUNT: 13.6 % (ref 11.5–14.5)
EST. GFR  (AFRICAN AMERICAN): >60 ML/MIN/1.73 M^2
EST. GFR  (NON AFRICAN AMERICAN): >60 ML/MIN/1.73 M^2
GLUCOSE SERPL-MCNC: 88 MG/DL (ref 70–110)
HCT VFR BLD AUTO: 36.4 % (ref 40–54)
HGB BLD-MCNC: 11.4 G/DL (ref 14–18)
IMM GRANULOCYTES # BLD AUTO: 0.02 K/UL (ref 0–0.04)
IMM GRANULOCYTES NFR BLD AUTO: 0.4 % (ref 0–0.5)
LYMPHOCYTES # BLD AUTO: 1.7 K/UL (ref 1–4.8)
LYMPHOCYTES NFR BLD: 32.9 % (ref 18–48)
MCH RBC QN AUTO: 30.8 PG (ref 27–31)
MCHC RBC AUTO-ENTMCNC: 31.3 G/DL (ref 32–36)
MCV RBC AUTO: 98 FL (ref 82–98)
MONOCYTES # BLD AUTO: 0.5 K/UL (ref 0.3–1)
MONOCYTES NFR BLD: 9.4 % (ref 4–15)
NEUTROPHILS # BLD AUTO: 2.3 K/UL (ref 1.8–7.7)
NEUTROPHILS NFR BLD: 45.7 % (ref 38–73)
NRBC BLD-RTO: 0 /100 WBC
PLATELET # BLD AUTO: 260 K/UL (ref 150–350)
PMV BLD AUTO: 9.5 FL (ref 9.2–12.9)
POTASSIUM SERPL-SCNC: 3.6 MMOL/L (ref 3.5–5.1)
PROT SERPL-MCNC: 7.5 G/DL (ref 6–8.4)
RBC # BLD AUTO: 3.7 M/UL (ref 4.6–6.2)
SARS-COV-2 RNA RESP QL NAA+PROBE: DETECTED
SODIUM SERPL-SCNC: 137 MMOL/L (ref 136–145)
WBC # BLD AUTO: 5.08 K/UL (ref 3.9–12.7)

## 2020-05-08 PROCEDURE — 36415 COLL VENOUS BLD VENIPUNCTURE: CPT

## 2020-05-08 PROCEDURE — U0002 COVID-19 LAB TEST NON-CDC: HCPCS

## 2020-05-08 PROCEDURE — 85025 COMPLETE CBC W/AUTO DIFF WBC: CPT

## 2020-05-08 PROCEDURE — 11000001 HC ACUTE MED/SURG PRIVATE ROOM

## 2020-05-08 PROCEDURE — 25000003 PHARM REV CODE 250: Performed by: INTERNAL MEDICINE

## 2020-05-08 PROCEDURE — 25000003 PHARM REV CODE 250: Performed by: EMERGENCY MEDICINE

## 2020-05-08 PROCEDURE — 94640 AIRWAY INHALATION TREATMENT: CPT

## 2020-05-08 PROCEDURE — 63600175 PHARM REV CODE 636 W HCPCS: Performed by: INTERNAL MEDICINE

## 2020-05-08 PROCEDURE — 80053 COMPREHEN METABOLIC PANEL: CPT

## 2020-05-08 PROCEDURE — 94761 N-INVAS EAR/PLS OXIMETRY MLT: CPT

## 2020-05-08 RX ORDER — PHENOBARBITAL 30 MG/1
90 TABLET ORAL DAILY
Qty: 90 TABLET | Refills: 5 | OUTPATIENT
Start: 2020-05-09 | End: 2020-11-07

## 2020-05-08 RX ORDER — PHENYTOIN SODIUM 200 MG/1
200 CAPSULE, EXTENDED RELEASE ORAL 2 TIMES DAILY
Qty: 60 CAPSULE | Refills: 11 | OUTPATIENT
Start: 2020-05-08 | End: 2021-05-08

## 2020-05-08 RX ORDER — CLOPIDOGREL BISULFATE 75 MG/1
75 TABLET ORAL DAILY
Qty: 30 TABLET | Refills: 11 | OUTPATIENT
Start: 2020-05-09 | End: 2021-05-09

## 2020-05-08 RX ADMIN — ATORVASTATIN CALCIUM 40 MG: 40 TABLET, FILM COATED ORAL at 08:05

## 2020-05-08 RX ADMIN — MONTELUKAST 10 MG: 10 TABLET, FILM COATED ORAL at 08:05

## 2020-05-08 RX ADMIN — STANDARDIZED SENNA CONCENTRATE AND DOCUSATE SODIUM 1 TABLET: 8.6; 5 TABLET ORAL at 08:05

## 2020-05-08 RX ADMIN — ACETAMINOPHEN 650 MG: 325 TABLET ORAL at 01:05

## 2020-05-08 RX ADMIN — PHENYTOIN SODIUM 200 MG: 100 CAPSULE ORAL at 08:05

## 2020-05-08 RX ADMIN — METOPROLOL TARTRATE 50 MG: 50 TABLET, FILM COATED ORAL at 08:05

## 2020-05-08 RX ADMIN — CLOPIDOGREL BISULFATE 75 MG: 75 TABLET ORAL at 08:05

## 2020-05-08 RX ADMIN — ENOXAPARIN SODIUM 40 MG: 100 INJECTION SUBCUTANEOUS at 08:05

## 2020-05-08 RX ADMIN — PHENOBARBITAL 90 MG: 30 TABLET ORAL at 08:05

## 2020-05-08 RX ADMIN — FINASTERIDE 5 MG: 5 TABLET, FILM COATED ORAL at 08:05

## 2020-05-08 RX ADMIN — TAMSULOSIN HYDROCHLORIDE 0.4 MG: 0.4 CAPSULE ORAL at 08:05

## 2020-05-08 RX ADMIN — ALBUTEROL SULFATE 2 PUFF: 90 AEROSOL, METERED RESPIRATORY (INHALATION) at 08:05

## 2020-05-08 RX ADMIN — ALBUTEROL SULFATE 2 PUFF: 90 AEROSOL, METERED RESPIRATORY (INHALATION) at 04:05

## 2020-05-08 RX ADMIN — LISINOPRIL 10 MG: 5 TABLET ORAL at 08:05

## 2020-05-08 RX ADMIN — ASPIRIN 81 MG: 81 TABLET, COATED ORAL at 08:05

## 2020-05-08 NOTE — PT/OT/SLP PROGRESS
Physical Therapy       [id number]     Pt not treated on this date but remains appropriate for current POC. Therapy will follow up as able.     Geovanni Retana, PT, DPT  5/8/2020

## 2020-05-08 NOTE — PLAN OF CARE
1024  (-) covid result from 5/7/2020 noted. Repeat test to be done today. [facility] unable to accept the patient due to their unit being for (+) covid pts. CM informed the patient's niece, Sherin Smith ([phone]), of patient's status & questioned if the family could provide care following discharge. Sherin stated that she & her mother were recently retested & are both still (+) covid and will not be able to care for the patient.     PM&R consult order entered by MD & referral sent to [facility] by LISANDRO Staples. Repeat covid test ordered today. Awaiting results.     Will continue to follow.

## 2020-05-08 NOTE — PLAN OF CARE
05/08/20 1405   Discharge Reassessment   Assessment Type Discharge Planning Reassessment   Discharge Plan A Rehab   Discharge Plan B Long-term acute care facility (LTAC)   DME Needed Upon Discharge  bedside commode;walker, rolling   Anticipated Discharge Disposition Rehab   Can the patient/caregiver answer the patient profile reliably? Yes, cognitively intact   How does the patient rate their overall health at the present time? Fair   Describe the patient's ability to walk at the present time. Minor restrictions or changes   How often would a person be available to care for the patient? Infrequently   Number of comorbid conditions (as recorded on the chart) Five or more   Post-Acute Status   Post-Acute Authorization Placement   Post-Acute Placement Status Referrals Sent

## 2020-05-08 NOTE — PLAN OF CARE
LISANDRO [facility] to [facility] on referral. LISANDRO informed Myrtle that pt has tested COVID negative and is awaiting results on repeat test. Myrtle states that she can not admit patient to a COVID unit if pt is negative. George states that a referral can be sent to their [place] [facility] for review. Myrtle also mentioned that pt's family wanted to take pt home with HH once he was stable.  will contact family to determine discharge plan at this time.     10:33 AM  Referral sent to [facility] for review.     Bel Baptiste LMSW  [facility]

## 2020-05-08 NOTE — CONSULTS
Inpatient consult to Physical Medicine Rehab  Consult performed by: Shantal Araiza NP  Consult ordered by: Scott Munoz MD  Reason for consult: assess rehab needs        Patient on COVID unit on precautions. Will perform chart review with PM&R staff for PAC recommendation. Patient must be negative x 2 for possible admission to [facility].     PRESTON Roldan, FNP-C  Physical Medicine & Rehabilitation   05/08/2020

## 2020-05-08 NOTE — PLAN OF CARE
Problem: Fall Injury Risk  Goal: Absence of Fall and Fall-Related Injury  Outcome: Ongoing, Progressing     Problem: Adult Inpatient Plan of Care  Goal: Plan of Care Review  Outcome: Ongoing, Progressing  Goal: Optimal Comfort and Wellbeing  Outcome: Ongoing, Progressing     Problem: Skin Injury Risk Increased  Goal: Skin Health and Integrity  Outcome: Ongoing, Progressing       Patient sitting up in chair, tele and visi in place. [place] speaking only,  provided and used virtually. Free from falls during shift. Bath provided. COVID-19 screening performed and sent to lab. No concerns or requests voiced at the moment. Call light within reach. Will continue to monitor.

## 2020-05-08 NOTE — PROGRESS NOTES
Hospital Medicine / Tele Medicine  Progress Note  [facility]      Patient Name: Vern Lr  MRN:  [id number]  Hospital Medicine Team: [facility] VIRTUAL TEAM 10 Scott Munoz MD  Date of Admission:  4/9/2020     Length of Stay:  LOS: 29 days     VIRTUAL TELENOTE    Start time: 10:00 AM  Chief complaint: Suspect COVID-19  End time:  10:30 AM  Total time spent with patient: 30 minutes    The attending portion of this evaluation, treatment, and documentation was performed per Scott Munoz MD via audio only.  help used.    Principal Problem:  COVID-19 virus infection      HPI:  80/M admitted on 4/9/20 for falls and PNA 2/2 COVID infection. Positive testing in ED (4/9/20). Patient speaks only Bulgarian and language line used to communicate. Spoke to patient's sister who admits patient is hard of hearing and likely has mild dementia so history is somewhat limited as a result. Patient notes constipation without bm in a week. Sister states he often complains of this but patient actually was brought in for falls. He fell twice in past couple of days. She admits he has looked fatigued recently and she is actually recovering from COVID infection herself. He normally ambulates with assistance of cane. After first fall he was brought to [facility] and went home. Second fall while in bathroom yesterday and was subsequently brought here to [facility]. Seizure hx but sister did not see anything to suggest seizure and patient has been on meds. Patient has no recent issues with passing out. Hx of NSTEMI with non-obstructive CAD in Sept 2019. Largest occulusion noted on C was 20% at that time. Has been on asa, Plavix, and statin since. Normally walks with cane and was actually walking yesterday without issue per sister. Patient admits to some shortness of breath and cough as well as nasal congestion.     Overview/Hospital Course:  The patient was placed on supplemental oxygen, 5 L NC.  Azithromycin, ceftriaxone,  "and hydroxychloroquine, and tamsulosin were started per protocol.  His oxygenation gradually improved.  On hospital day 5, he was transferred to this hospital due to capacity issues at [facility].  Since transfer to [facility] oxygenation has continued to improve. He still has complaints of constitutional symptoms. He has been deescalated to room air and has since maintained his saturations. Continuing with inpatient PT and OT while pending SNF placement. Medically stable pending placement at this time.     Interval History: Remains HDS and afebrile overnight. Maintaining saturations of +96% on room air. No overnight events. He reports "not feeling too bad" this morning and denies any pain, fever, chills, SOB or cough. Still urinating and having BMs without issue. Good appetite. Request for facial shave today. Medically stable for discharge, pending SNF placement at this time. Discussed plan of care with elise Sherin Smith, via telephone on 5/7 All questions answered.     Review of Systems:  ROS (Positive in Bold, otherwise negative)  Constitutional: fever, chills, night sweats, weakness  CV: chest pain, edema, palpitations  Resp: SOB, cough, sputum production  GI: changes in appetite, NVDC, pain, melena, hematochezia, GERD, hematemesis  : Dysuria, hematuria, urinary urgency, frequency  MSK: arthralgia/myalgia, joint swelling  All other systems were reviewed & are negative.     Inpatient Medications:    Current Facility-Administered Medications:     acetaminophen tablet 650 mg, 650 mg, Oral, Q4H PRN, Rojas Daniels MD, 650 mg at 05/07/20 0049    acetaminophen tablet 650 mg, 650 mg, Oral, Q6H PRN, Aga Baron MD, 650 mg at 05/06/20 0114    albuterol inhaler 2 puff, 2 puff, Inhalation, Q8H WA, 2 puff at 05/08/20 0816 **AND** MDI Q8H WAKE, , , Q8H WAKE, WHITNEY Orellana    aluminum & magnesium hydroxide-simethicone 400-400-40 mg/5 mL suspension 30 mL, 30 mL, Oral, Q6H PRN, Aga Baron MD, 30 mL " at 04/19/20 0846    artificial tears 0.5 % ophthalmic solution 2 drop, 2 drop, Both Eyes, PRN, Raj Eid MD    aspirin EC tablet 81 mg, 81 mg, Oral, Daily, Ehsan Mendez MD, 81 mg at 05/08/20 0824    atorvastatin tablet 40 mg, 40 mg, Oral, Daily, Rojas Daniels MD, 40 mg at 05/08/20 0822    bisacodyL suppository 10 mg, 10 mg, Rectal, Daily PRN, Aga Baron MD, 10 mg at 04/17/20 1140    clopidogreL tablet 75 mg, 75 mg, Oral, Daily, Ehsan Mendez MD, 75 mg at 05/08/20 0822    enoxaparin injection 40 mg, 40 mg, Subcutaneous, Daily, Rojas Daniels MD, 40 mg at 05/07/20 2015    finasteride tablet 5 mg, 5 mg, Oral, Daily, Tello Arana MD, 5 mg at 05/08/20 0822    lidocaine 5 % patch 1 patch, 1 patch, Transdermal, Daily PRN, Nagi Forrest MD, 1 patch at 05/06/20 1758    lisinopriL tablet 10 mg, 10 mg, Oral, Daily, Tello Arana MD, 10 mg at 05/08/20 0824    melatonin tablet 6 mg, 6 mg, Oral, Nightly PRN, Aga Baron MD, 6 mg at 05/07/20 2015    metoprolol tartrate (LOPRESSOR) tablet 50 mg, 50 mg, Oral, Daily, Ehsan Mendez MD, 50 mg at 05/08/20 0822    montelukast tablet 10 mg, 10 mg, Oral, QHS, Tello Arana MD, 10 mg at 05/07/20 2015    ondansetron tablet 4 mg, 4 mg, Oral, Q6H PRN, Aga Baron MD    PHENobarbitaL tablet 90 mg, 90 mg, Oral, Daily, Ehsan Mendez MD, 90 mg at 05/08/20 0822    phenytoin (DILANTIN) ER capsule 200 mg, 200 mg, Oral, BID, Ehsan Mendez MD, 200 mg at 05/08/20 0824    senna-docusate 8.6-50 mg per tablet 1 tablet, 1 tablet, Oral, BID, Rojas Daniels MD, 1 tablet at 05/08/20 0822    sodium chloride 0.9% flush 10 mL, 10 mL, Intravenous, PRN, Aga Baron MD    tamsulosin 24 hr capsule 0.4 mg, 0.4 mg, Oral, Daily, Rojas Daniels MD, 0.4 mg at 05/08/20 0824      Physical Exam:      Intake/Output Summary (Last 24 hours) at 5/8/2020 1209  Last data filed at 5/8/2020 0600  Gross per 24 hour  "  Intake --   Output 300 ml   Net -300 ml     Wt Readings from Last 3 Encounters:   04/18/20 82 kg (180 lb 12.4 oz)   04/12/20 86.2 kg (190 lb 0.6 oz)   09/27/19 86.2 kg (190 lb 0.6 oz)       BP (!) 153/84   Pulse 69   Temp 98.4 °F (36.9 °C) (Oral)   Resp 16   Ht 5' 5" (1.651 m)   Wt 82 kg (180 lb 12.4 oz)   SpO2 97%   BMI 30.08 kg/m²     Done by RN:  GEN: NAD  HEENT: EOMI  CV: RRR  Resp: Diminished breath sounds bilaterally  GI: soft, NTND  Neuro: alert, oriented  Skin: no rash  MSK: no edema  Psych: normal affect    Laboratory:  Lab Results   Component Value Date    [id number] Not Detected 05/07/2020       Recent Labs   Lab 05/02/20  0634 05/05/20  0502 05/08/20  0306   WBC 8.05 6.07 5.08   LYMPH 20.7  1.7 21.7  1.3 32.9  1.7   HGB 13.4* 12.4* 11.4*   HCT 42.0 41.6 36.4*   * 285 260     Recent Labs   Lab 05/02/20  0634 05/05/20  0523 05/08/20  0306    135* 137   K 3.9 4.1 3.6    102 103   CO2 28 23 26   BUN 16 17 18   CREATININE 0.6 0.6 0.7   GLU 90 99 88   CALCIUM 9.1 8.7 8.9   MG 2.0  --   --    PHOS 3.2  --   --      Recent Labs   Lab 05/02/20  0634 05/05/20  0523 05/08/20  0306   ALKPHOS 93 91 117   ALT 35 42 57*   AST 27 52* 41*   ALBUMIN 2.8* 2.6* 2.7*   PROT 8.3 7.8 7.5   BILITOT 0.3 0.4 0.2        No results for input(s): DDIMER, FERRITIN, CRP, LDH, BNP, TROPONINI in the last 72 hours.    Invalid input(s): PROCALCITONIN    All labs within the last 24 hours were reviewed.     Microbiology:  Microbiology Results (last 7 days)     ** No results found for the last 168 hours. **            Imaging  ECG Results          EKG 12-lead (Final result)  Result time 04/09/20 20:43:37    Final result by Interface, Lab In [facility] (04/09/20 20:43:37)                 Narrative:    Test Reason : R06.02,    Vent. Rate : 090 BPM     Atrial Rate : 090 BPM     P-R Int : 128 ms          QRS Dur : 108 ms      QT Int : 372 ms       P-R-T Axes : 050 -44 077 degrees     QTc Int : 455 ms    Sinus rhythm " with Premature supraventricular complexes  Left axis deviation  Incomplete right bundle branch block  LVH with repolarization abnormality  Cannot rule out Septal infarct ,age undetermined  Abnormal ECG  When compared with ECG of 24-SEP-2019 04:34,  No significant change was found  Confirmed by Zahra Roca MD ([id number]) on 4/9/2020 8:43:23 PM    Referred By: [email or web address]   SELF           Confirmed By:Zahra Roca MD                             EKG 12-LEAD (Final result)  Result time 04/10/20 10:22:35    Final result by Unknown User (04/10/20 10:22:35)                                  Results for orders placed during the hospital encounter of 04/09/20   Echo Color Flow Doppler? Yes    Narrative · Normal left ventricular systolic function. The estimated ejection   fraction is 60%.  · Moderate concentric left ventricular hypertrophy.  · Normal right ventricular systolic function.  · Moderate left atrial enlargement.  · Grade II (moderate) left ventricular diastolic dysfunction consistent   with pseudonormalization.  · Mild right atrial enlargement.          X-Ray Abdomen AP 1 View  Narrative: EXAMINATION:  XR ABDOMEN AP 1 VIEW    CLINICAL HISTORY:  abdominal pain;    TECHNIQUE:  AP View(s) of the abdomen was performed.    COMPARISON:  Multiple priors, most recent 04/09/2020    FINDINGS:  Patchy airspace opacities at the lung bases, similar to prior exams.  No bowel dilatation.  No radiopaque calculi.  No acute osseous abnormality.  Impression: Nonobstructive bowel gas pattern.    Electronically signed by: Maria Dolores Oliver  Date:    04/14/2020  Time:    15:46      All imaging within the last 24 hours was reviewed.     Assessment and Plan:    Active Hospital Problems    Diagnosis  POA    *COVID-19 virus infection [U07.1]  Yes    Moderate malnutrition [E44.0]  Unknown     Recommendations     1. Continue Cardiac diet with Boost Plus TID.  Goals: Pt will consume at least 50% intake at meals  Nutrition Goal Status: progressing  towards goal  Communication of RD Recs: POC    Assessment and Plan   Nutrition Problem:  Moderate Protein-Calorie Malnutrition  Malnutrition in the context of Acute Illness/Injury     Related to (etiology):  COVID-19 infection     Signs and Symptoms (as evidenced by):  Energy Intake: <50% of estimated energy requirement for 2 weeks     Weight Loss: 5.2% x 1 month      Interventions(treatment strategy):  Collaboration of care with providers.  Commercial beverage: Boost Plus TID     Nutrition Diagnosis Status:  New      Lower respiratory tract infection due to COVID-19 virus [U07.1, J22]  Yes    Acute respiratory failure with hypoxia [J96.01]  Yes    Constipation [K59.00]  Yes    Fall [W19.XXXA]  Yes    Coronary artery disease involving native coronary artery of native heart without angina pectoris [I25.10]  Yes    Elevated troponin [R79.89]  Yes    Seizure disorder [G40.909]  Yes    Essential hypertension, benign [I10]  Yes    BPH (benign prostatic hyperplasia) [N40.0]  Yes    Dyslipidemia [E78.5]  Yes      Resolved Hospital Problems   No resolved problems to display.         * COVID-19 virus infection  PNA with COVID + on 4/9. Presenting symptoms of weakness, falls, mild sob.   Saturating well on RA initially, put on 5L NC today after desat to 70's. CXR b/l infiltrates, CRP over 200 and mildly elevated trop on admission.   COVID protocol with isolation and 02 supplementation. Albuterol inhaler prn. Continuing course of HCQ, Azithro and Rocephin.   Oxygenation improving  Awaiting SNF placement - positive covid test 4/23, repeat ordered on 5/7 was negative. To be repeat 1330 5/8        Coronary artery disease involving native coronary artery of native heart without angina pectoris  [facility] with non-obstructive disease back in September 2019. On home DAPT with aspirin & Plavix as well as statin at this time.   Mild troponin elevation in setting of acute illness stabilized. Doubt ACS. Continuing home  meds.     Fall  2 falls in setting of COVID infection and baseline gait instability requiring cane.  CT head and abdomen/pelvis show no new fractures. Nothing on exam to specify areas of fracture concern. Does have hx of osteoporosis and on anti-epileptic so will have very low threshold to add additional imaging.     Constipation  Continue daily laxatives while inpatient.     Elevated troponin  History of NSTEMI with [facility] showing non-obstructive CAD in Sept 2019.   Mild, stable. Likely related to acute illness. ACS unlikely. Cardiology has evaluated and signed off  Resuming home dose DAPT and statin.     Seizure disorder  Controlled. Continue home Dilantin and valproic acid.     Dyslipidemia  Continue home dose statin.     BPH (benign prostatic hyperplasia)  Controlled. Continue home Flomax.     Essential hypertension, benign  Controlled. Continue home meds.              VTE Risk Mitigation (From admission, onward)                       Ordered         enoxaparin injection 40 mg  Daily      04/09/20 0909         IP VTE HIGH RISK PATIENT  Once      04/09/20 0914         Place sequential compression device  Until discontinued      04/09/20 0914                              Dispo - med stable pending placement (SNF)  Code - DNR     Patient's chronic/stable medical conditions noted in the problem list above will be managed with the patient's home medications as tolerated.      Scott Munoz MD  Internal Medicine Department, PGY-1  [facility]  [phone]

## 2020-05-09 PROCEDURE — 63600175 PHARM REV CODE 636 W HCPCS: Performed by: INTERNAL MEDICINE

## 2020-05-09 PROCEDURE — 25000003 PHARM REV CODE 250: Performed by: EMERGENCY MEDICINE

## 2020-05-09 PROCEDURE — 25000003 PHARM REV CODE 250: Performed by: INTERNAL MEDICINE

## 2020-05-09 PROCEDURE — 25000003 PHARM REV CODE 250: Performed by: HOSPITALIST

## 2020-05-09 PROCEDURE — 99900035 HC TECH TIME PER 15 MIN (STAT)

## 2020-05-09 PROCEDURE — 11000001 HC ACUTE MED/SURG PRIVATE ROOM

## 2020-05-09 PROCEDURE — 25000003 PHARM REV CODE 250: Performed by: STUDENT IN AN ORGANIZED HEALTH CARE EDUCATION/TRAINING PROGRAM

## 2020-05-09 PROCEDURE — 99232 SBSQ HOSP IP/OBS MODERATE 35: CPT | Mod: 95,,, | Performed by: INTERNAL MEDICINE

## 2020-05-09 PROCEDURE — 94640 AIRWAY INHALATION TREATMENT: CPT

## 2020-05-09 PROCEDURE — 99232 PR SUBSEQUENT HOSPITAL CARE,LEVL II: ICD-10-PCS | Mod: 95,,, | Performed by: INTERNAL MEDICINE

## 2020-05-09 RX ORDER — LIDOCAINE 50 MG/G
2 PATCH TOPICAL DAILY
Status: DISCONTINUED | OUTPATIENT
Start: 2020-05-09 | End: 2020-05-11 | Stop reason: HOSPADM

## 2020-05-09 RX ADMIN — LIDOCAINE 1 PATCH: 50 PATCH CUTANEOUS at 07:05

## 2020-05-09 RX ADMIN — Medication 6 MG: at 08:05

## 2020-05-09 RX ADMIN — PHENYTOIN SODIUM 200 MG: 100 CAPSULE ORAL at 08:05

## 2020-05-09 RX ADMIN — ACETAMINOPHEN 650 MG: 325 TABLET ORAL at 08:05

## 2020-05-09 RX ADMIN — LIDOCAINE 2 PATCH: 50 PATCH TOPICAL at 01:05

## 2020-05-09 RX ADMIN — STANDARDIZED SENNA CONCENTRATE AND DOCUSATE SODIUM 1 TABLET: 8.6; 5 TABLET ORAL at 09:05

## 2020-05-09 RX ADMIN — METOPROLOL TARTRATE 50 MG: 50 TABLET, FILM COATED ORAL at 09:05

## 2020-05-09 RX ADMIN — ATORVASTATIN CALCIUM 40 MG: 40 TABLET, FILM COATED ORAL at 09:05

## 2020-05-09 RX ADMIN — CLOPIDOGREL BISULFATE 75 MG: 75 TABLET ORAL at 09:05

## 2020-05-09 RX ADMIN — ALBUTEROL SULFATE 2 PUFF: 90 AEROSOL, METERED RESPIRATORY (INHALATION) at 08:05

## 2020-05-09 RX ADMIN — ACETAMINOPHEN 650 MG: 325 TABLET ORAL at 07:05

## 2020-05-09 RX ADMIN — PHENYTOIN SODIUM 200 MG: 100 CAPSULE ORAL at 09:05

## 2020-05-09 RX ADMIN — PHENOBARBITAL 90 MG: 30 TABLET ORAL at 09:05

## 2020-05-09 RX ADMIN — FINASTERIDE 5 MG: 5 TABLET, FILM COATED ORAL at 09:05

## 2020-05-09 RX ADMIN — MONTELUKAST 10 MG: 10 TABLET, FILM COATED ORAL at 08:05

## 2020-05-09 RX ADMIN — ASPIRIN 81 MG: 81 TABLET, COATED ORAL at 09:05

## 2020-05-09 RX ADMIN — STANDARDIZED SENNA CONCENTRATE AND DOCUSATE SODIUM 1 TABLET: 8.6; 5 TABLET ORAL at 08:05

## 2020-05-09 RX ADMIN — LISINOPRIL 10 MG: 5 TABLET ORAL at 09:05

## 2020-05-09 RX ADMIN — TAMSULOSIN HYDROCHLORIDE 0.4 MG: 0.4 CAPSULE ORAL at 09:05

## 2020-05-09 RX ADMIN — ENOXAPARIN SODIUM 40 MG: 100 INJECTION SUBCUTANEOUS at 08:05

## 2020-05-09 NOTE — PROGRESS NOTES
Hospital Medicine / Tele Medicine  Progress Note  [facility]      Patient Name: Vern Lr  MRN:  [id number]  Hospital Medicine Team: [facility] VIRTUAL [facility]AM 10 Scott Munoz MD  Date of Admission:  4/9/2020     Length of Stay:  LOS: 30 days     VIRTUAL TELENOTE    Start time: 09:00 AM  Chief complaint: Suspect COVID-19  End time:  09:20 AM  Total time spent with patient: about 20 minutes    The attending portion of this evaluation, treatment, and documentation was performed per Scott Munoz MD via audio only.  help used.    Principal Problem:  COVID-19 virus infection      HPI:  80/M admitted on 4/9/20 for falls and PNA 2/2 COVID infection. Positive testing in ED (4/9/20). Patient speaks only Japanese and language line used to communicate. Spoke to patient's sister who admits patient is hard of hearing and likely has mild dementia so history is somewhat limited as a result. Patient notes constipation without bm in a week. Sister states he often complains of this but patient actually was brought in for falls. He fell twice in past couple of days. She admits he has looked fatigued recently and she is actually recovering from COVID infection herself. He normally ambulates with assistance of cane. After first fall he was brought to [facility] and went home. Second fall while in bathroom yesterday and was subsequently brought here to [facility]. Seizure hx but sister did not see anything to suggest seizure and patient has been on meds. Patient has no recent issues with passing out. Hx of NSTEMI with non-obstructive CAD in Sept 2019. Largest occulusion noted on LHC was 20% at that time. Has been on asa, Plavix, and statin since. Normally walks with cane and was actually walking yesterday without issue per sister. Patient admits to some shortness of breath and cough as well as nasal congestion.     Overview/Hospital Course:  The patient was placed on supplemental oxygen, 5 L NC.  Azithromycin,  "ceftriaxone, and hydroxychloroquine, and tamsulosin were started per protocol.  His oxygenation gradually improved.  On hospital day 5, he was transferred to this hospital due to capacity issues at [facility].  Since transfer to [facility] oxygenation has continued to improve. He still has complaints of constitutional symptoms. He has been deescalated to room air and has since maintained his saturations. Continuing with inpatient PT and OT while pending SNF placement. Medically stable pending placement at this time.     Interval History: Remains HDS and afebrile overnight. Maintaining saturations of +96% on room air. No overnight events. He reports "not feeling too bad" this morning and denies any pain, fever, chills, SOB or cough. Still urinating and having BMs without issue. Good appetite. Request for facial shave today. Medically stable for discharge, pending SNF placement at this time. Discussed plan of care with niece, Sherin Smith, via telephone on 5/7 All questions answered. Patient stated he is calling his sister everyday. Tried to update the family on 5/9 but no answer.      Review of Systems:  ROS (Positive in Bold, otherwise negative)  Constitutional: fever, chills, night sweats, weakness, chronic back pain  CV: chest pain, edema, palpitations  Resp: SOB, cough, sputum production  GI: changes in appetite, NVDC, pain, melena, hematochezia, GERD, hematemesis  : Dysuria, hematuria, urinary urgency, frequency  MSK: arthralgia/myalgia, joint swelling  All other systems were reviewed & are negative.     Inpatient Medications:    Current Facility-Administered Medications:     acetaminophen tablet 650 mg, 650 mg, Oral, Q4H PRN, Rojas Daniels MD, 650 mg at 05/08/20 1347    acetaminophen tablet 650 mg, 650 mg, Oral, Q6H PRN, Aga Baron MD, 650 mg at 05/06/20 0114    albuterol inhaler 2 puff, 2 puff, Inhalation, Q8H WA, 2 puff at 05/09/20 0851 **AND** MARGARITA Q8H WAKE, , , Q8H WAKE, Shantal Real, " PA    aluminum & magnesium hydroxide-simethicone 400-400-40 mg/5 mL suspension 30 mL, 30 mL, Oral, Q6H PRN, Aga Baron MD, 30 mL at 04/19/20 0846    artificial tears 0.5 % ophthalmic solution 2 drop, 2 drop, Both Eyes, PRN, Raj Eid MD    aspirin EC tablet 81 mg, 81 mg, Oral, Daily, Ehsan Mendez MD, 81 mg at 05/09/20 0924    atorvastatin tablet 40 mg, 40 mg, Oral, Daily, Rojas Daniels MD, 40 mg at 05/09/20 0923    bisacodyL suppository 10 mg, 10 mg, Rectal, Daily PRN, Aga Baron MD, 10 mg at 04/17/20 1140    clopidogreL tablet 75 mg, 75 mg, Oral, Daily, Ehsan Mendez MD, 75 mg at 05/09/20 0924    enoxaparin injection 40 mg, 40 mg, Subcutaneous, Daily, Rojas Daniels MD, 40 mg at 05/08/20 2018    finasteride tablet 5 mg, 5 mg, Oral, Daily, Tello Arana MD, 5 mg at 05/09/20 0924    lidocaine 5 % patch 1 patch, 1 patch, Transdermal, Daily PRN, Nagi Forrest MD, 1 patch at 05/06/20 1758    lisinopriL tablet 10 mg, 10 mg, Oral, Daily, Tello Arana MD, 10 mg at 05/09/20 0922    melatonin tablet 6 mg, 6 mg, Oral, Nightly PRN, Aga Baron MD, 6 mg at 05/07/20 2015    metoprolol tartrate (LOPRESSOR) tablet 50 mg, 50 mg, Oral, Daily, Ehsan Mendez MD, 50 mg at 05/09/20 0922    montelukast tablet 10 mg, 10 mg, Oral, QHS, Tello Arana MD, 10 mg at 05/08/20 2018    ondansetron tablet 4 mg, 4 mg, Oral, Q6H PRN, Aga Baron MD    PHENobarbitaL tablet 90 mg, 90 mg, Oral, Daily, Ehsan Mendez MD, 90 mg at 05/09/20 0923    phenytoin (DILANTIN) ER capsule 200 mg, 200 mg, Oral, BID, Ehsan Mendez MD, 200 mg at 05/09/20 0922    senna-docusate 8.6-50 mg per tablet 1 tablet, 1 tablet, Oral, BID, Rojas Daniels MD, 1 tablet at 05/09/20 0924    sodium chloride 0.9% flush 10 mL, 10 mL, Intravenous, PRN, Aga Baron MD    tamsulosin 24 hr capsule 0.4 mg, 0.4 mg, Oral, Daily, Rojas Daniels MD, 0.4 mg at 05/09/20  "0923      Physical Exam:      Intake/Output Summary (Last 24 hours) at 5/9/2020 1058  Last data filed at 5/9/2020 1000  Gross per 24 hour   Intake 240 ml   Output 900 ml   Net -660 ml     Wt Readings from Last 3 Encounters:   04/18/20 82 kg (180 lb 12.4 oz)   04/12/20 86.2 kg (190 lb 0.6 oz)   09/27/19 86.2 kg (190 lb 0.6 oz)       BP (!) 160/83   Pulse 86   Temp 97.6 °F (36.4 °C)   Resp 20   Ht 5' 5" (1.651 m)   Wt 82 kg (180 lb 12.4 oz)   SpO2 96%   BMI 30.08 kg/m²     Done by RN:  GEN: NAD  HEENT: EOMI  CV: RRR  Resp: Clear lung sound bilaterally   GI: soft, NTND  Neuro: alert, oriented to self and place   Skin: no rash  MSK: no edema  Psych: normal affect    Laboratory:  Lab Results   Component Value Date    [id number] Detected (A) 05/08/2020       Recent Labs   Lab 05/05/20  0502 05/08/20  0306   WBC 6.07 5.08   LYMPH 21.7  1.3 32.9  1.7   HGB 12.4* 11.4*   HCT 41.6 36.4*    260     Recent Labs   Lab 05/05/20  0523 05/08/20  0306   * 137   K 4.1 3.6    103   CO2 23 26   BUN 17 18   CREATININE 0.6 0.7   GLU 99 88   CALCIUM 8.7 8.9     Recent Labs   Lab 05/05/20  0523 05/08/20  0306   ALKPHOS 91 117   ALT 42 57*   AST 52* 41*   ALBUMIN 2.6* 2.7*   PROT 7.8 7.5   BILITOT 0.4 0.2        No results for input(s): DDIMER, FERRITIN, CRP, LDH, BNP, TROPONINI in the last 72 hours.    Invalid input(s): PROCALCITONIN    All labs within the last 24 hours were reviewed.     Microbiology:  Microbiology Results (last 7 days)     ** No results found for the last 168 hours. **            Imaging  ECG Results          EKG 12-lead (Final result)  Result time 04/09/20 20:43:37    Final result by Interface, Lab In [facility] (04/09/20 20:43:37)                 Narrative:    Test Reason : R06.02,    Vent. Rate : 090 BPM     Atrial Rate : 090 BPM     P-R Int : 128 ms          QRS Dur : 108 ms      QT Int : 372 ms       P-R-T Axes : 050 -44 077 degrees     QTc Int : 455 ms    Sinus rhythm with Premature " supraventricular complexes  Left axis deviation  Incomplete right bundle branch block  LVH with repolarization abnormality  Cannot rule out Septal infarct ,age undetermined  Abnormal ECG  When compared with ECG of 24-SEP-2019 04:34,  No significant change was found  Confirmed by Zahra Roca MD ([id number]) on 4/9/2020 8:43:23 PM    Referred By: [email or web address]   SELF           Confirmed By:Zahra Roca MD                             EKG 12-LEAD (Final result)  Result time 04/10/20 10:22:35    Final result by Unknown User (04/10/20 10:22:35)                                  Results for orders placed during the hospital encounter of 04/09/20   Echo Color Flow Doppler? Yes    Narrative · Normal left ventricular systolic function. The estimated ejection   fraction is 60%.  · Moderate concentric left ventricular hypertrophy.  · Normal right ventricular systolic function.  · Moderate left atrial enlargement.  · Grade II (moderate) left ventricular diastolic dysfunction consistent   with pseudonormalization.  · Mild right atrial enlargement.          X-Ray Abdomen AP 1 View  Narrative: EXAMINATION:  XR ABDOMEN AP 1 VIEW    CLINICAL HISTORY:  abdominal pain;    TECHNIQUE:  AP View(s) of the abdomen was performed.    COMPARISON:  Multiple priors, most recent 04/09/2020    FINDINGS:  Patchy airspace opacities at the lung bases, similar to prior exams.  No bowel dilatation.  No radiopaque calculi.  No acute osseous abnormality.  Impression: Nonobstructive bowel gas pattern.    Electronically signed by: Maria Dolores Oliver  Date:    04/14/2020  Time:    15:46      All imaging within the last 24 hours was reviewed.     Assessment and Plan:    Active Hospital Problems    Diagnosis  POA    *COVID-19 virus infection [U07.1]  Yes    Moderate malnutrition [E44.0]  Unknown     Recommendations     1. Continue Cardiac diet with Boost Plus TID.  Goals: Pt will consume at least 50% intake at meals  Nutrition Goal Status: progressing towards  goal  Communication of RD Recs: POC    Assessment and Plan   Nutrition Problem:  Moderate Protein-Calorie Malnutrition  Malnutrition in the context of Acute Illness/Injury     Related to (etiology):  COVID-19 infection     Signs and Symptoms (as evidenced by):  Energy Intake: <50% of estimated energy requirement for 2 weeks     Weight Loss: 5.2% x 1 month      Interventions(treatment strategy):  Collaboration of care with providers.  Commercial beverage: Boost Plus TID     Nutrition Diagnosis Status:  New      Lower respiratory tract infection due to COVID-19 virus [U07.1, J22]  Yes    Acute respiratory failure with hypoxia [J96.01]  Yes    Constipation [K59.00]  Yes    Fall [W19.XXXA]  Yes    Coronary artery disease involving native coronary artery of native heart without angina pectoris [I25.10]  Yes    Elevated troponin [R79.89]  Yes    Seizure disorder [G40.909]  Yes    Essential hypertension, benign [I10]  Yes    BPH (benign prostatic hyperplasia) [N40.0]  Yes    Dyslipidemia [E78.5]  Yes      Resolved Hospital Problems   No resolved problems to display.         * COVID-19 virus infection  Multifocal pneumonia  PNA with COVID + on 4/9. Presenting symptoms of weakness, falls, mild sob.   Saturating well on RA initially, put on 5L NC today after desat to 70's. CXR b/l infiltrates, CRP over 200 and mildly elevated trop on admission.   COVID protocol with isolation and 02 supplementation. Albuterol inhaler prn. Continuing course of HCQ, Azithro and Rocephin.   Oxygenation improving  Awaiting SNF placement - positive covid test 4/23, repeat ordered on 5/7 was negative.   Patient tested positive for COVID on 5/8, asymptomatic. Will repeat test in 48 hours        Coronary artery disease involving native coronary artery of native heart without angina pectoris  [facility] with non-obstructive disease back in September 2019. On home DAPT with aspirin & Plavix as well as statin at this time.   Mild troponin elevation  in setting of acute illness stabilized. Doubt ACS. Continuing home meds.     Fall  2 falls in setting of COVID infection and baseline gait instability requiring cane.  CT head and abdomen/pelvis show no new fractures. Nothing on exam to specify areas of fracture concern. Does have hx of osteoporosis and on anti-epileptic so will have very low threshold to add additional imaging.     Constipation  Continue daily laxatives while inpatient.     Elevated troponin  History of NSTEMI with [facility] showing non-obstructive CAD in Sept 2019.   Mild, stable. Likely related to acute illness. ACS unlikely. Cardiology has evaluated and signed off  Resuming home dose DAPT and statin.     Seizure disorder  Controlled. Continue home Dilantin and valproic acid.     Dyslipidemia  Continue home dose statin.     BPH (benign prostatic hyperplasia)  Controlled. Continue home Flomax.     Essential hypertension, benign  Controlled. Continue home meds.              VTE Risk Mitigation (From admission, onward)                       Ordered         enoxaparin injection 40 mg  Daily      04/09/20 0909         IP VTE HIGH RISK PATIENT  Once      04/09/20 0914         Place sequential compression device  Until discontinued      04/09/20 0914                              Dispo - med stable pending placement (SNF)  Code - DNR     Patient's chronic/stable medical conditions noted in the problem list above will be managed with the patient's home medications as tolerated.      Scott Munoz MD  Internal Medicine Department, PGY-1  [facility]  [phone]

## 2020-05-09 NOTE — PLAN OF CARE
Problem: Adult Inpatient Plan of Care  Goal: Plan of Care Review  Outcome: Ongoing, Progressing     Pt in room with no s/s of distress. Libby used to communicate with patient. No c/o pain. Monitoring.

## 2020-05-09 NOTE — PLAN OF CARE
Problem: Adult Inpatient Plan of Care  Goal: Plan of Care Review  Outcome: Ongoing, Progressing     Problem: Adult Inpatient Plan of Care  Goal: Optimal Comfort and Wellbeing  Outcome: Ongoing, Progressing     Problem: Infection  Goal: Infection Symptom Resolution  Outcome: Ongoing, Progressing     Problem: Adult Inpatient Plan of Care  Goal: Optimal Comfort and Wellbeing  Outcome: Ongoing, Progressing      Alert and verbal. C/o lower back pain ; routine medication regimen in place. Disorientation displayed to time and situation. Tolerates RA without difficulty. Alternative communication with use of Martii maintained. No acute issues present.

## 2020-05-10 LAB — SARS-COV-2 RNA RESP QL NAA+PROBE: DETECTED

## 2020-05-10 PROCEDURE — 25000003 PHARM REV CODE 250: Performed by: INTERNAL MEDICINE

## 2020-05-10 PROCEDURE — 25000003 PHARM REV CODE 250: Performed by: STUDENT IN AN ORGANIZED HEALTH CARE EDUCATION/TRAINING PROGRAM

## 2020-05-10 PROCEDURE — 25000003 PHARM REV CODE 250: Performed by: EMERGENCY MEDICINE

## 2020-05-10 PROCEDURE — U0002 COVID-19 LAB TEST NON-CDC: HCPCS

## 2020-05-10 PROCEDURE — 99232 PR SUBSEQUENT HOSPITAL CARE,LEVL II: ICD-10-PCS | Mod: 95,,, | Performed by: INTERNAL MEDICINE

## 2020-05-10 PROCEDURE — 63600175 PHARM REV CODE 636 W HCPCS: Performed by: INTERNAL MEDICINE

## 2020-05-10 PROCEDURE — 94640 AIRWAY INHALATION TREATMENT: CPT

## 2020-05-10 PROCEDURE — 94761 N-INVAS EAR/PLS OXIMETRY MLT: CPT

## 2020-05-10 PROCEDURE — 11000001 HC ACUTE MED/SURG PRIVATE ROOM

## 2020-05-10 PROCEDURE — 99232 SBSQ HOSP IP/OBS MODERATE 35: CPT | Mod: 95,,, | Performed by: INTERNAL MEDICINE

## 2020-05-10 RX ADMIN — STANDARDIZED SENNA CONCENTRATE AND DOCUSATE SODIUM 1 TABLET: 8.6; 5 TABLET ORAL at 08:05

## 2020-05-10 RX ADMIN — ASPIRIN 81 MG: 81 TABLET, COATED ORAL at 08:05

## 2020-05-10 RX ADMIN — LIDOCAINE 2 PATCH: 50 PATCH TOPICAL at 08:05

## 2020-05-10 RX ADMIN — PHENOBARBITAL 90 MG: 30 TABLET ORAL at 08:05

## 2020-05-10 RX ADMIN — LISINOPRIL 10 MG: 5 TABLET ORAL at 08:05

## 2020-05-10 RX ADMIN — PHENYTOIN SODIUM 200 MG: 100 CAPSULE ORAL at 08:05

## 2020-05-10 RX ADMIN — CLOPIDOGREL BISULFATE 75 MG: 75 TABLET ORAL at 08:05

## 2020-05-10 RX ADMIN — TAMSULOSIN HYDROCHLORIDE 0.4 MG: 0.4 CAPSULE ORAL at 08:05

## 2020-05-10 RX ADMIN — ENOXAPARIN SODIUM 40 MG: 100 INJECTION SUBCUTANEOUS at 08:05

## 2020-05-10 RX ADMIN — ALBUTEROL SULFATE 2 PUFF: 90 AEROSOL, METERED RESPIRATORY (INHALATION) at 05:05

## 2020-05-10 RX ADMIN — METOPROLOL TARTRATE 50 MG: 50 TABLET, FILM COATED ORAL at 08:05

## 2020-05-10 RX ADMIN — ATORVASTATIN CALCIUM 40 MG: 40 TABLET, FILM COATED ORAL at 08:05

## 2020-05-10 RX ADMIN — MONTELUKAST 10 MG: 10 TABLET, FILM COATED ORAL at 08:05

## 2020-05-10 RX ADMIN — FINASTERIDE 5 MG: 5 TABLET, FILM COATED ORAL at 08:05

## 2020-05-10 RX ADMIN — ACETAMINOPHEN 650 MG: 325 TABLET ORAL at 07:05

## 2020-05-10 RX ADMIN — ALBUTEROL SULFATE 2 PUFF: 90 AEROSOL, METERED RESPIRATORY (INHALATION) at 08:05

## 2020-05-10 NOTE — PROGRESS NOTES
Hospital Medicine / Tele Medicine  Progress Note  [facility]      Patient Name: Vern Lr  MRN:  [id number]  [place] [facility] [place] 10 Scott Munoz MD  Date of Admission:  4/9/2020     Length of Stay:  LOS: 31 days     VIRTUAL TELENOTE    Start time: 08:30 AM  Chief complaint: Suspect COVID-19  End time:  08:45 AM  Total time spent with patient: about 15 minutes    The attending portion of this evaluation, treatment, and documentation was performed per Scott Munoz MD via audio only.  help used.    Principal Problem:  COVID-19 virus infection      HPI:  80/M admitted on 4/9/20 for falls and PNA 2/2 COVID infection. Positive testing in ED (4/9/20). Patient speaks only Setswana and language line used to communicate. Spoke to patient's sister who admits patient is hard of hearing and likely has mild dementia so history is somewhat limited as a result. Patient notes constipation without bm in a week. Sister states he often complains of this but patient actually was brought in for falls. He fell twice in past couple of days. She admits he has looked fatigued recently and she is actually recovering from COVID infection herself. He normally ambulates with assistance of cane. After first fall he was brought to [facility] and went home. Second fall while in bathroom yesterday and was subsequently brought here to [facility]. Seizure hx but sister did not see anything to suggest seizure and patient has been on meds. Patient has no recent issues with passing out. Hx of NSTEMI with non-obstructive CAD in Sept 2019. Largest occulusion noted on LHC was 20% at that time. Has been on asa, Plavix, and statin since. Normally walks with cane and was actually walking yesterday without issue per sister. Patient admits to some shortness of breath and cough as well as nasal congestion.     Overview/Hospital Course:  The patient was placed on supplemental oxygen, 5 L NC.  Azithromycin,  "ceftriaxone, and hydroxychloroquine, and tamsulosin were started per protocol.  His oxygenation gradually improved.  On hospital day 5, he was transferred to this hospital due to capacity issues at [facility].  Since transfer to [facility] oxygenation has continued to improve. He still has complaints of constitutional symptoms. He has been deescalated to room air and has since maintained his saturations. Continuing with inpatient PT and OT while pending SNF placement. Medically stable pending placement at this time.     Interval History: Remains HDS and afebrile overnight. Maintaining saturations of +96% on room air. No overnight events. He reports "not feeling too bad" this morning and denies any pain, fever, chills, SOB or cough. Still urinating and having BMs without issue. Good appetite. Request for facial shave today. Medically stable for discharge, pending SNF placement at this time. Discussed plan of care with niece, Sherin Smith, via telephone on 5/7 All questions answered. Patient stated he is calling his sister everyday. Tried to update the family on 5/9 but no answer.      Review of Systems:  ROS (Positive in Bold, otherwise negative)  Constitutional: fever, chills, night sweats, weakness, chronic back pain  CV: chest pain  Resp: SOB, cough, sputum production  GI: changes in appetite, constipation  : Dysuria  MSK: arthralgia/myalgia  All other systems were reviewed & are negative.     Inpatient Medications:    Current Facility-Administered Medications:     acetaminophen tablet 650 mg, 650 mg, Oral, Q4H PRN, Rojas Daniels MD, 650 mg at 05/10/20 0720    acetaminophen tablet 650 mg, 650 mg, Oral, Q6H PRN, Aga Baron MD, 650 mg at 05/06/20 0114    albuterol inhaler 2 puff, 2 puff, Inhalation, Q8H WA, 2 puff at 05/10/20 0818 **AND** MARGARITA Q8H WAKE, , , Q8H WAKE, WHITNEY Orellana    aluminum & magnesium hydroxide-simethicone 400-400-40 mg/5 mL suspension 30 mL, 30 mL, Oral, Q6H PRN, Aga ROWLEY" MD Loraine, 30 mL at 04/19/20 0846    artificial tears 0.5 % ophthalmic solution 2 drop, 2 drop, Both Eyes, PRN, Raj Eid MD    aspirin EC tablet 81 mg, 81 mg, Oral, Daily, Ehsan Mendez MD, 81 mg at 05/10/20 0829    atorvastatin tablet 40 mg, 40 mg, Oral, Daily, Rojas Daniels MD, 40 mg at 05/10/20 0831    bisacodyL suppository 10 mg, 10 mg, Rectal, Daily PRN, Aga Baron MD, 10 mg at 04/17/20 1140    clopidogreL tablet 75 mg, 75 mg, Oral, Daily, Ehsan Mendez MD, 75 mg at 05/10/20 0833    enoxaparin injection 40 mg, 40 mg, Subcutaneous, Daily, Rojas Daniels MD, 40 mg at 05/09/20 2036    finasteride tablet 5 mg, 5 mg, Oral, Daily, Tello Arana MD, 5 mg at 05/10/20 0832    lidocaine 5 % patch 2 patch, 2 patch, Transdermal, Daily, Scott Munoz MD, 2 patch at 05/10/20 0838    lisinopriL tablet 10 mg, 10 mg, Oral, Daily, Tello Arana MD, 10 mg at 05/10/20 0832    melatonin tablet 6 mg, 6 mg, Oral, Nightly PRN, Aga Baron MD, 6 mg at 05/09/20 2036    metoprolol tartrate (LOPRESSOR) tablet 50 mg, 50 mg, Oral, Daily, Ehsan Mendez MD, 50 mg at 05/10/20 0831    montelukast tablet 10 mg, 10 mg, Oral, QHS, Tello Arana MD, 10 mg at 05/09/20 2035    ondansetron tablet 4 mg, 4 mg, Oral, Q6H PRN, Aga Baron MD    PHENobarbitaL tablet 90 mg, 90 mg, Oral, Daily, Ehsan Mendez MD, 90 mg at 05/10/20 0832    phenytoin (DILANTIN) ER capsule 200 mg, 200 mg, Oral, BID, Ehsan Mendez MD, 200 mg at 05/10/20 0830    senna-docusate 8.6-50 mg per tablet 1 tablet, 1 tablet, Oral, BID, Rojas Daniels MD, 1 tablet at 05/10/20 0834    sodium chloride 0.9% flush 10 mL, 10 mL, Intravenous, PRN, Aga Baron MD    tamsulosin 24 hr capsule 0.4 mg, 0.4 mg, Oral, Daily, Rojas Daniels MD, 0.4 mg at 05/10/20 0832      Physical Exam:      Intake/Output Summary (Last 24 hours) at 5/10/2020 1034  Last data filed at 5/10/2020 1000  Gross per  "24 hour   Intake 717 ml   Output 500 ml   Net 217 ml     Wt Readings from Last 3 Encounters:   04/18/20 82 kg (180 lb 12.4 oz)   04/12/20 86.2 kg (190 lb 0.6 oz)   09/27/19 86.2 kg (190 lb 0.6 oz)       BP (!) 164/85   Pulse 82   Temp 97.6 °F (36.4 °C)   Resp (!) 21   Ht 5' 5" (1.651 m)   Wt 82 kg (180 lb 12.4 oz)   SpO2 95%   BMI 30.08 kg/m²     Done by RN:  GEN: NAD  HEENT: EOMI  CV: RRR  Resp: Clear lung sound bilaterally   GI: soft, NTND  Neuro: alert, oriented to self and place   Skin: no rash  MSK: no edema  Psych: normal affect    Laboratory:  Lab Results   Component Value Date    [id number] Detected (A) 05/08/2020       Recent Labs   Lab 05/05/20  0502 05/08/20  0306   WBC 6.07 5.08   LYMPH 21.7  1.3 32.9  1.7   HGB 12.4* 11.4*   HCT 41.6 36.4*    260     Recent Labs   Lab 05/05/20  0523 05/08/20  0306   * 137   K 4.1 3.6    103   CO2 23 26   BUN 17 18   CREATININE 0.6 0.7   GLU 99 88   CALCIUM 8.7 8.9     Recent Labs   Lab 05/05/20  0523 05/08/20  0306   ALKPHOS 91 117   ALT 42 57*   AST 52* 41*   ALBUMIN 2.6* 2.7*   PROT 7.8 7.5   BILITOT 0.4 0.2        No results for input(s): DDIMER, FERRITIN, CRP, LDH, BNP, TROPONINI in the last 72 hours.    Invalid input(s): PROCALCITONIN    All labs within the last 24 hours were reviewed.     Microbiology:  Microbiology Results (last 7 days)     ** No results found for the last 168 hours. **            Imaging  ECG Results          EKG 12-lead (Final result)  Result time 04/09/20 20:43:37    Final result by Interface, Lab In [facility] (04/09/20 20:43:37)                 Narrative:    Test Reason : R06.02,    Vent. Rate : 090 BPM     Atrial Rate : 090 BPM     P-R Int : 128 ms          QRS Dur : 108 ms      QT Int : 372 ms       P-R-T Axes : 050 -44 077 degrees     QTc Int : 455 ms    Sinus rhythm with Premature supraventricular complexes  Left axis deviation  Incomplete right bundle branch block  LVH with repolarization abnormality  Cannot " rule out Septal infarct ,age undetermined  Abnormal ECG  When compared with ECG of 24-SEP-2019 04:34,  No significant change was found  Confirmed by Abelino CAMEJO, Zahra LAZARO ([id number]) on 4/9/2020 8:43:23 PM    Referred By: OLIVER   SELF           Confirmed By:Zahra Roca MD                             EKG 12-LEAD (Final result)  Result time 04/10/20 10:22:35    Final result by Unknown User (04/10/20 10:22:35)                                  Results for orders placed during the hospital encounter of 04/09/20   Echo Color Flow Doppler? Yes    Narrative · Normal left ventricular systolic function. The estimated ejection   fraction is 60%.  · Moderate concentric left ventricular hypertrophy.  · Normal right ventricular systolic function.  · Moderate left atrial enlargement.  · Grade II (moderate) left ventricular diastolic dysfunction consistent   with pseudonormalization.  · Mild right atrial enlargement.          X-Ray Abdomen AP 1 View  Narrative: EXAMINATION:  XR ABDOMEN AP 1 VIEW    CLINICAL HISTORY:  abdominal pain;    TECHNIQUE:  AP View(s) of the abdomen was performed.    COMPARISON:  Multiple priors, most recent 04/09/2020    FINDINGS:  Patchy airspace opacities at the lung bases, similar to prior exams.  No bowel dilatation.  No radiopaque calculi.  No acute osseous abnormality.  Impression: Nonobstructive bowel gas pattern.    Electronically signed by: Maria Dolores Oliver  Date:    04/14/2020  Time:    15:46      All imaging within the last 24 hours was reviewed.     Assessment and Plan:    Active Hospital Problems    Diagnosis  POA    *COVID-19 virus infection [U07.1]  Yes    Moderate malnutrition [E44.0]  Unknown     Recommendations     1. Continue Cardiac diet with Boost Plus TID.  Goals: Pt will consume at least 50% intake at meals  Nutrition Goal Status: progressing towards goal  Communication of RD Recs: POC    Assessment and Plan   Nutrition Problem:  Moderate Protein-Calorie Malnutrition  Malnutrition in the  context of Acute Illness/Injury     Related to (etiology):  COVID-19 infection     Signs and Symptoms (as evidenced by):  Energy Intake: <50% of estimated energy requirement for 2 weeks     Weight Loss: 5.2% x 1 month      Interventions(treatment strategy):  Collaboration of care with providers.  Commercial beverage: Boost Plus TID     Nutrition Diagnosis Status:  New      Lower respiratory tract infection due to COVID-19 virus [U07.1, J22]  Yes    Acute respiratory failure with hypoxia [J96.01]  Yes    Constipation [K59.00]  Yes    Fall [W19.XXXA]  Yes    Coronary artery disease involving native coronary artery of native heart without angina pectoris [I25.10]  Yes    Elevated troponin [R79.89]  Yes    Seizure disorder [G40.909]  Yes    Essential hypertension, benign [I10]  Yes    BPH (benign prostatic hyperplasia) [N40.0]  Yes    Dyslipidemia [E78.5]  Yes      Resolved Hospital Problems   No resolved problems to display.         * COVID-19 virus infection  Multifocal pneumonia  PNA with COVID + on 4/9. Presenting symptoms of weakness, falls, mild sob.   Saturating well on RA initially, put on 5L NC today after desat to 70's. CXR b/l infiltrates, CRP over 200 and mildly elevated trop on admission.   COVID protocol with isolation and 02 supplementation. Albuterol inhaler prn. Continuing course of HCQ, Azithro and Rocephin.   Oxygenation improving  Awaiting SNF placement - positive covid test 4/23, repeat ordered on 5/7 was negative.   Patient tested positive for COVID on 5/8, asymptomatic. breathing on RA.  Will repeat test in 48 hours        Coronary artery disease involving native coronary artery of native heart without angina pectoris  [facility] with non-obstructive disease back in September 2019. On home DAPT with aspirin & Plavix as well as statin at this time.   Mild troponin elevation in setting of acute illness stabilized. Doubt ACS. Continuing home meds.     Fall  2 falls in setting of COVID infection  and baseline gait instability requiring cane.  CT head and abdomen/pelvis show no new fractures. Nothing on exam to specify areas of fracture concern. Does have hx of osteoporosis and on anti-epileptic so will have very low threshold to add additional imaging.     Constipation  Continue daily laxatives while inpatient.     Elevated troponin  History of NSTEMI with [facility] showing non-obstructive CAD in Sept 2019.   Mild, stable. Likely related to acute illness. ACS unlikely. Cardiology has evaluated and signed off  Resuming home dose DAPT and statin.     Seizure disorder  Controlled. Continue home Dilantin and valproic acid.     Dyslipidemia  Continue home dose statin.     BPH (benign prostatic hyperplasia)  Controlled. Continue home Flomax.     Essential hypertension, benign  Controlled. Continue home meds.              VTE Risk Mitigation (From admission, onward)                       Ordered         enoxaparin injection 40 mg  Daily      04/09/20 0909         IP VTE HIGH RISK PATIENT  Once      04/09/20 0914         Place sequential compression device  Until discontinued      04/09/20 0914                              Dispo - med stable pending placement (SNF)  Code - DNR     Patient's chronic/stable medical conditions noted in the problem list above will be managed with the patient's home medications as tolerated.      Scott Munoz MD  Internal Medicine Department, PGY-1  [facility]  [phone]

## 2020-05-11 VITALS
BODY MASS INDEX: 30.12 KG/M2 | OXYGEN SATURATION: 94 % | TEMPERATURE: 98 F | SYSTOLIC BLOOD PRESSURE: 130 MMHG | RESPIRATION RATE: 21 BRPM | DIASTOLIC BLOOD PRESSURE: 70 MMHG | WEIGHT: 180.75 LBS | HEART RATE: 78 BPM | HEIGHT: 65 IN

## 2020-05-11 LAB
ALBUMIN SERPL BCP-MCNC: 3 G/DL (ref 3.5–5.2)
ALP SERPL-CCNC: 101 U/L (ref 55–135)
ALT SERPL W/O P-5'-P-CCNC: 43 U/L (ref 10–44)
ANION GAP SERPL CALC-SCNC: 8 MMOL/L (ref 8–16)
AST SERPL-CCNC: 35 U/L (ref 10–40)
BASOPHILS # BLD AUTO: 0.07 K/UL (ref 0–0.2)
BASOPHILS NFR BLD: 1.3 % (ref 0–1.9)
BILIRUB SERPL-MCNC: 0.2 MG/DL (ref 0.1–1)
BUN SERPL-MCNC: 15 MG/DL (ref 8–23)
CALCIUM SERPL-MCNC: 9.1 MG/DL (ref 8.7–10.5)
CHLORIDE SERPL-SCNC: 102 MMOL/L (ref 95–110)
CO2 SERPL-SCNC: 29 MMOL/L (ref 23–29)
CREAT SERPL-MCNC: 0.7 MG/DL (ref 0.5–1.4)
DIFFERENTIAL METHOD: ABNORMAL
EOSINOPHIL # BLD AUTO: 0.5 K/UL (ref 0–0.5)
EOSINOPHIL NFR BLD: 9.3 % (ref 0–8)
ERYTHROCYTE [DISTWIDTH] IN BLOOD BY AUTOMATED COUNT: 13.6 % (ref 11.5–14.5)
EST. GFR  (AFRICAN AMERICAN): >60 ML/MIN/1.73 M^2
EST. GFR  (NON AFRICAN AMERICAN): >60 ML/MIN/1.73 M^2
GLUCOSE SERPL-MCNC: 90 MG/DL (ref 70–110)
HCT VFR BLD AUTO: 41.3 % (ref 40–54)
HGB BLD-MCNC: 12.9 G/DL (ref 14–18)
IMM GRANULOCYTES # BLD AUTO: 0 K/UL (ref 0–0.04)
IMM GRANULOCYTES NFR BLD AUTO: 0 % (ref 0–0.5)
LYMPHOCYTES # BLD AUTO: 1.6 K/UL (ref 1–4.8)
LYMPHOCYTES NFR BLD: 30.6 % (ref 18–48)
MCH RBC QN AUTO: 30.8 PG (ref 27–31)
MCHC RBC AUTO-ENTMCNC: 31.2 G/DL (ref 32–36)
MCV RBC AUTO: 99 FL (ref 82–98)
MONOCYTES # BLD AUTO: 0.5 K/UL (ref 0.3–1)
MONOCYTES NFR BLD: 9.9 % (ref 4–15)
NEUTROPHILS # BLD AUTO: 2.6 K/UL (ref 1.8–7.7)
NEUTROPHILS NFR BLD: 48.9 % (ref 38–73)
NRBC BLD-RTO: 0 /100 WBC
PLATELET # BLD AUTO: 260 K/UL (ref 150–350)
PMV BLD AUTO: 9.4 FL (ref 9.2–12.9)
POTASSIUM SERPL-SCNC: 4 MMOL/L (ref 3.5–5.1)
PROT SERPL-MCNC: 7.8 G/DL (ref 6–8.4)
RBC # BLD AUTO: 4.19 M/UL (ref 4.6–6.2)
SODIUM SERPL-SCNC: 139 MMOL/L (ref 136–145)
WBC # BLD AUTO: 5.27 K/UL (ref 3.9–12.7)

## 2020-05-11 PROCEDURE — 97535 SELF CARE MNGMENT TRAINING: CPT

## 2020-05-11 PROCEDURE — 94640 AIRWAY INHALATION TREATMENT: CPT

## 2020-05-11 PROCEDURE — 25000003 PHARM REV CODE 250: Performed by: INTERNAL MEDICINE

## 2020-05-11 PROCEDURE — 97116 GAIT TRAINING THERAPY: CPT

## 2020-05-11 PROCEDURE — 97530 THERAPEUTIC ACTIVITIES: CPT

## 2020-05-11 PROCEDURE — 25000003 PHARM REV CODE 250: Performed by: STUDENT IN AN ORGANIZED HEALTH CARE EDUCATION/TRAINING PROGRAM

## 2020-05-11 PROCEDURE — 80053 COMPREHEN METABOLIC PANEL: CPT

## 2020-05-11 PROCEDURE — 36415 COLL VENOUS BLD VENIPUNCTURE: CPT

## 2020-05-11 PROCEDURE — 99239 HOSP IP/OBS DSCHRG MGMT >30: CPT | Mod: ,,, | Performed by: INTERNAL MEDICINE

## 2020-05-11 PROCEDURE — 99239 PR HOSPITAL DISCHARGE DAY,>30 MIN: ICD-10-PCS | Mod: ,,, | Performed by: INTERNAL MEDICINE

## 2020-05-11 PROCEDURE — 85025 COMPLETE CBC W/AUTO DIFF WBC: CPT

## 2020-05-11 PROCEDURE — 25000003 PHARM REV CODE 250: Performed by: EMERGENCY MEDICINE

## 2020-05-11 PROCEDURE — 94761 N-INVAS EAR/PLS OXIMETRY MLT: CPT

## 2020-05-11 RX ORDER — AMOXICILLIN 250 MG
1 CAPSULE ORAL 2 TIMES DAILY
Qty: 60 TABLET | Refills: 3 | Status: SHIPPED | OUTPATIENT
Start: 2020-05-11

## 2020-05-11 RX ORDER — BISACODYL 10 MG
10 SUPPOSITORY, RECTAL RECTAL DAILY PRN
Refills: 0
Start: 2020-05-11

## 2020-05-11 RX ORDER — ALBUTEROL SULFATE 90 UG/1
2 AEROSOL, METERED RESPIRATORY (INHALATION)
Qty: 6.7 G | Refills: 1 | Status: SHIPPED | OUTPATIENT
Start: 2020-05-11

## 2020-05-11 RX ORDER — CLOPIDOGREL BISULFATE 75 MG/1
75 TABLET ORAL DAILY
Qty: 30 TABLET | Refills: 3
Start: 2020-05-12

## 2020-05-11 RX ADMIN — METOPROLOL TARTRATE 50 MG: 50 TABLET, FILM COATED ORAL at 08:05

## 2020-05-11 RX ADMIN — TAMSULOSIN HYDROCHLORIDE 0.4 MG: 0.4 CAPSULE ORAL at 08:05

## 2020-05-11 RX ADMIN — CLOPIDOGREL BISULFATE 75 MG: 75 TABLET ORAL at 08:05

## 2020-05-11 RX ADMIN — LISINOPRIL 10 MG: 5 TABLET ORAL at 08:05

## 2020-05-11 RX ADMIN — LIDOCAINE 2 PATCH: 50 PATCH TOPICAL at 08:05

## 2020-05-11 RX ADMIN — ALBUTEROL SULFATE 2 PUFF: 90 AEROSOL, METERED RESPIRATORY (INHALATION) at 09:05

## 2020-05-11 RX ADMIN — CARBAMIDE PEROXIDE 6.5% 5 DROP: 6.5 LIQUID AURICULAR (OTIC) at 03:05

## 2020-05-11 RX ADMIN — ALBUTEROL SULFATE 2 PUFF: 90 AEROSOL, METERED RESPIRATORY (INHALATION) at 04:05

## 2020-05-11 RX ADMIN — FINASTERIDE 5 MG: 5 TABLET, FILM COATED ORAL at 08:05

## 2020-05-11 RX ADMIN — PHENYTOIN SODIUM 200 MG: 100 CAPSULE ORAL at 08:05

## 2020-05-11 RX ADMIN — ATORVASTATIN CALCIUM 40 MG: 40 TABLET, FILM COATED ORAL at 08:05

## 2020-05-11 RX ADMIN — PHENOBARBITAL 90 MG: 30 TABLET ORAL at 08:05

## 2020-05-11 RX ADMIN — ASPIRIN 81 MG: 81 TABLET, COATED ORAL at 08:05

## 2020-05-11 RX ADMIN — STANDARDIZED SENNA CONCENTRATE AND DOCUSATE SODIUM 1 TABLET: 8.6; 5 TABLET ORAL at 08:05

## 2020-05-11 NOTE — PROGRESS NOTES
Hospital Medicine / Tele Medicine  Progress Note  [facility]      Patient Name: Vern Lr  MRN:  [id number]  Hospital Medicine Team: [facility] VIRTUAL TEAM 10 Saul Jerome MD  Date of Admission:  4/9/2020     Length of Stay:  LOS: 32 days     VIRTUAL TELENOTE    Start time: 10:45 AM  Chief complaint: Suspect COVID-19  End time:  11:05 AM  Total time spent with patient: about 15 minutes    The attending portion of this evaluation, treatment, and documentation was performed per Saul Jerome MD via audiovisual.  help used via [employer].    Principal Problem:  COVID-19 virus infection      HPI:  80/M admitted on 4/9/20 for falls and PNA 2/2 COVID infection. Positive testing in ED (4/9/20). Patient speaks only Upper sorbian and language line used to communicate. Spoke to patient's sister who admits patient is hard of hearing and likely has mild dementia so history is somewhat limited as a result. Patient notes constipation without bm in a week. Sister states he often complains of this but patient actually was brought in for falls. He fell twice in past couple of days. She admits he has looked fatigued recently and she is actually recovering from COVID infection herself. He normally ambulates with assistance of cane. After first fall he was brought to [facility] and went home. Second fall while in bathroom yesterday and was subsequently brought here to [facility]. Seizure hx but sister did not see anything to suggest seizure and patient has been on meds. Patient has no recent issues with passing out. Hx of NSTEMI with non-obstructive CAD in Sept 2019. Largest occulusion noted on LHC was 20% at that time. Has been on asa, Plavix, and statin since. Normally walks with cane and was actually walking yesterday without issue per sister. Patient admits to some shortness of breath and cough as well as nasal congestion.     Overview/Hospital Course:  The patient was placed on supplemental oxygen, 5 L  NC.  Azithromycin, ceftriaxone, and hydroxychloroquine, and tamsulosin were started per protocol.  His oxygenation gradually improved.  On hospital day 5, he was transferred to this hospital due to capacity issues at [facility].  [place] [facility] [place] has continued to improve. He still has complaints of constitutional symptoms. He has been deescalated to room air and has since maintained his saturations. Continuing with inpatient PT and OT while pending SNF placement. Medically stable pending placement at this time.     Interval History: No acute events overnight, continues to sta well on room air. He reports feeling well, only complaint is regarding earwax. He denies any pain, fever, chills, SOB or cough. Still urinating and having BMs without issue. Good appetite. Patient is medically stable, accepted to LTAC but pending insurance authorization. Discussed plan of care with niece, Sherin Smith, via telephone today. All questions answered. Patient stated he is calling his sister everyday.      Review of Systems:  ROS (Positive in Bold, otherwise negative)  Constitutional: fever, chills, night sweats, weakness, chronic back pain  CV: chest pain  Resp: SOB, cough, sputum production  GI: changes in appetite, constipation  : Dysuria  MSK: arthralgia/myalgia  All other systems were reviewed & are negative.     Inpatient Medications:    Current Facility-Administered Medications:     acetaminophen tablet 650 mg, 650 mg, Oral, Q4H PRN, Rojas Daniels MD, 650 mg at 05/10/20 0720    acetaminophen tablet 650 mg, 650 mg, Oral, Q6H PRN, Aga Baron MD, 650 mg at 05/06/20 0114    albuterol inhaler 2 puff, 2 puff, Inhalation, Q8H WA, 2 puff at 05/11/20 0919 **AND** MDI Q8H WAKE, , , Q8H WAKE, WHITNEY Orellana    aluminum & magnesium hydroxide-simethicone 400-400-40 mg/5 mL suspension 30 mL, 30 mL, Oral, Q6H PRN, Aga Baron MD, 30 mL at 04/19/20 0846    artificial tears 0.5 % ophthalmic  solution 2 drop, 2 drop, Both Eyes, PRN, Raj Eid MD    aspirin EC tablet 81 mg, 81 mg, Oral, Daily, Ehsan Mendez MD, 81 mg at 05/11/20 0828    atorvastatin tablet 40 mg, 40 mg, Oral, Daily, Rojas Daniels MD, 40 mg at 05/11/20 0828    bisacodyL suppository 10 mg, 10 mg, Rectal, Daily PRN, Aga Baron MD, 10 mg at 04/17/20 1140    carbamide peroxide 6.5 % otic solution 5 drop, 5 drop, Both Ears, BID, Saul Jerome MD    clopidogreL tablet 75 mg, 75 mg, Oral, Daily, Ehsan Mendez MD, 75 mg at 05/11/20 0828    enoxaparin injection 40 mg, 40 mg, Subcutaneous, Daily, Rojas Daniels MD, 40 mg at 05/10/20 2015    finasteride tablet 5 mg, 5 mg, Oral, Daily, Tello Arana MD, 5 mg at 05/11/20 0828    lidocaine 5 % patch 2 patch, 2 patch, Transdermal, Daily, Scott Munoz MD, 2 patch at 05/11/20 0827    lisinopriL tablet 10 mg, 10 mg, Oral, Daily, Tello Arana MD, 10 mg at 05/11/20 0828    melatonin tablet 6 mg, 6 mg, Oral, Nightly PRN, Aga Baron MD, 6 mg at 05/09/20 2036    metoprolol tartrate (LOPRESSOR) tablet 50 mg, 50 mg, Oral, Daily, Ehsan Mendez MD, 50 mg at 05/11/20 0828    montelukast tablet 10 mg, 10 mg, Oral, QHS, Tello Arana MD, 10 mg at 05/10/20 2015    ondansetron tablet 4 mg, 4 mg, Oral, Q6H PRN, Aga Baron MD    PHENobarbitaL tablet 90 mg, 90 mg, Oral, Daily, Ehsan Mendez MD, 90 mg at 05/11/20 0828    phenytoin (DILANTIN) ER capsule 200 mg, 200 mg, Oral, BID, Ehsan Mendez MD, 200 mg at 05/11/20 0828    senna-docusate 8.6-50 mg per tablet 1 tablet, 1 tablet, Oral, BID, Rojas Daniels MD, 1 tablet at 05/11/20 0828    sodium chloride 0.9% flush 10 mL, 10 mL, Intravenous, PRN, Aga Baron MD    tamsulosin 24 hr capsule 0.4 mg, 0.4 mg, Oral, Daily, Rojas Daniels MD, 0.4 mg at 05/11/20 0828      Physical Exam:      Intake/Output Summary (Last 24 hours) at 5/11/2020 1504  Last data filed at  "5/11/2020 1200  Gross per 24 hour   Intake 1074 ml   Output 1100 ml   Net -26 ml     Wt Readings from Last 3 Encounters:   04/18/20 82 kg (180 lb 12.4 oz)   04/12/20 86.2 kg (190 lb 0.6 oz)   09/27/19 86.2 kg (190 lb 0.6 oz)       /74   Pulse 80   Temp 97.8 °F (36.6 °C) (Oral)   Resp 17   Ht 5' 5" (1.651 m)   Wt 82 kg (180 lb 12.4 oz)   SpO2 97%   BMI 30.08 kg/m²     Done by RN:  GEN: NAD  HEENT: EOMI  CV: RRR  Resp: Clear lung sound bilaterally   GI: soft, NTND  Neuro: alert, oriented to self and place   Skin: no rash  MSK: no edema  Psych: normal affect    Laboratory:  Lab Results   Component Value Date    [id number] Detected (A) 05/10/2020       Recent Labs   Lab 05/05/20  0502 05/08/20  0306 05/11/20  0540   WBC 6.07 5.08 5.27   LYMPH 21.7  1.3 32.9  1.7 30.6  1.6   HGB 12.4* 11.4* 12.9*   HCT 41.6 36.4* 41.3    260 260     Recent Labs   Lab 05/05/20  0523 05/08/20  0306 05/11/20  0540   * 137 139   K 4.1 3.6 4.0    103 102   CO2 23 26 29   BUN 17 18 15   CREATININE 0.6 0.7 0.7   GLU 99 88 90   CALCIUM 8.7 8.9 9.1     Recent Labs   Lab 05/05/20  0523 05/08/20  0306 05/11/20  0540   ALKPHOS 91 117 101   ALT 42 57* 43   AST 52* 41* 35   ALBUMIN 2.6* 2.7* 3.0*   PROT 7.8 7.5 7.8   BILITOT 0.4 0.2 0.2        No results for input(s): DDIMER, FERRITIN, CRP, LDH, BNP, TROPONINI in the last 72 hours.    Invalid input(s): PROCALCITONIN    All labs within the last 24 hours were reviewed.     Microbiology:  Microbiology Results (last 7 days)     ** No results found for the last 168 hours. **            Imaging  ECG Results          EKG 12-lead (Final result)  Result time 04/09/20 20:43:37    Final result by Interface, Lab In [facility] (04/09/20 20:43:37)                 Narrative:    Test Reason : R06.02,    Vent. Rate : 090 BPM     Atrial Rate : 090 BPM     P-R Int : 128 ms          QRS Dur : 108 ms      QT Int : 372 ms       P-R-T Axes : 050 -44 077 degrees     QTc Int : 455 ms    Sinus " rhythm with Premature supraventricular complexes  Left axis deviation  Incomplete right bundle branch block  LVH with repolarization abnormality  Cannot rule out Septal infarct ,age undetermined  Abnormal ECG  When compared with ECG of 24-SEP-2019 04:34,  No significant change was found  Confirmed by Zahra Roca MD ([id number]) on 4/9/2020 8:43:23 PM    Referred By: [email or web address]   SELF           Confirmed By:Zahra Roca MD                             EKG 12-LEAD (Final result)  Result time 04/10/20 10:22:35    Final result by Unknown User (04/10/20 10:22:35)                                  Results for orders placed during the hospital encounter of 04/09/20   Echo Color Flow Doppler? Yes    Narrative · Normal left ventricular systolic function. The estimated ejection   fraction is 60%.  · Moderate concentric left ventricular hypertrophy.  · Normal right ventricular systolic function.  · Moderate left atrial enlargement.  · Grade II (moderate) left ventricular diastolic dysfunction consistent   with pseudonormalization.  · Mild right atrial enlargement.          X-Ray Abdomen AP 1 View  Narrative: EXAMINATION:  XR ABDOMEN AP 1 VIEW    CLINICAL HISTORY:  abdominal pain;    TECHNIQUE:  AP View(s) of the abdomen was performed.    COMPARISON:  Multiple priors, most recent 04/09/2020    FINDINGS:  Patchy airspace opacities at the lung bases, similar to prior exams.  No bowel dilatation.  No radiopaque calculi.  No acute osseous abnormality.  Impression: Nonobstructive bowel gas pattern.    Electronically signed by: Maria Dolores Oliver  Date:    04/14/2020  Time:    15:46      All imaging within the last 24 hours was reviewed.     Assessment and Plan:    Active Hospital Problems    Diagnosis  POA    *COVID-19 virus infection [U07.1]  Yes    Moderate malnutrition [E44.0]  Unknown     Recommendations     1. Continue Cardiac diet with Boost Plus TID.  Goals: Pt will consume at least 50% intake at meals  Nutrition Goal Status:  progressing towards goal  Communication of RD Recs: POC    Assessment and Plan   Nutrition Problem:  Moderate Protein-Calorie Malnutrition  Malnutrition in the context of Acute Illness/Injury     Related to (etiology):  COVID-19 infection     Signs and Symptoms (as evidenced by):  Energy Intake: <50% of estimated energy requirement for 2 weeks     Weight Loss: 5.2% x 1 month      Interventions(treatment strategy):  Collaboration of care with providers.  Commercial beverage: Boost Plus TID     Nutrition Diagnosis Status:  New      Lower respiratory tract infection due to COVID-19 virus [U07.1, J22]  Yes    Acute respiratory failure with hypoxia [J96.01]  Yes    Constipation [K59.00]  Yes    Fall [W19.XXXA]  Yes    Coronary artery disease involving native coronary artery of native heart without angina pectoris [I25.10]  Yes    Elevated troponin [R79.89]  Yes    Seizure disorder [G40.909]  Yes    Essential hypertension, benign [I10]  Yes    BPH (benign prostatic hyperplasia) [N40.0]  Yes    Dyslipidemia [E78.5]  Yes      Resolved Hospital Problems   No resolved problems to display.         * COVID-19 virus infection  Multifocal pneumonia  PNA with COVID + on 4/9. Presenting symptoms of weakness, falls, mild sob.   Saturating well on RA initially, put on 5L NC today after desat to 70's. CXR b/l infiltrates, CRP over 200 and mildly elevated trop on admission.   COVID protocol with isolation and 02 supplementation. Albuterol inhaler prn. Continuing course of HCQ, Azithro and Rocephin.   Oxygenation improving  Awaiting SNF placement - positive covid test 4/23, repeat ordered on 5/7 was negative.   Patient tested positive for COVID on 5/8, asymptomatic. breathing on RA.  Patient has been accepted to an LTAC facility, discharge pending authorization.        Coronary artery disease involving native coronary artery of native heart without angina pectoris  [facility] with non-obstructive disease back in September 2019. On  home DAPT with aspirin & Plavix as well as statin at this time.   Mild troponin elevation in setting of acute illness stabilized. Doubt ACS. Continuing home meds.     Fall  2 falls in setting of COVID infection and baseline gait instability requiring cane.  CT head and abdomen/pelvis show no new fractures. Nothing on exam to specify areas of fracture concern. Does have hx of osteoporosis and on anti-epileptic so will have very low threshold to add additional imaging.     Constipation  Continue daily laxatives while inpatient.     Elevated troponin  History of NSTEMI with [facility] showing non-obstructive CAD in Sept 2019.   Mild, stable. Likely related to acute illness. ACS unlikely. Cardiology has evaluated and signed off  Resumed home dose DAPT and statin.     Seizure disorder  Controlled. Continue home Dilantin and valproic acid.     Dyslipidemia  Continue home dose statin.     BPH (benign prostatic hyperplasia)  Controlled. Continue home Flomax.     Essential hypertension, benign  Controlled. Continue home meds.              VTE Risk Mitigation (From admission, onward)                       Ordered         enoxaparin injection 40 mg  Daily      04/09/20 0909         IP VTE HIGH RISK PATIENT  Once      04/09/20 0914         Place sequential compression device  Until discontinued      04/09/20 0914                              Dispo - med stable pending insurance authorization for LTAC  Code - DNR     Patient's chronic/stable medical conditions noted in the problem list above will be managed with the patient's home medications as tolerated.      Saul Jerome MD  Internal Medicine PGY-2  Service: Virtual Hospital Medicine [facility] [place]  Pager: [phone]  Cell: [phone]

## 2020-05-11 NOTE — PT/OT/SLP PROGRESS
"Occupational Therapy   Treatment    Name: Vern Lr  MRN: [id number]  Admitting Diagnosis:  COVID-19 virus infection       Recommendations:     Discharge Recommendations: nursing facility, skilled (vs: home health with 24 hour supervision)  Discharge Equipment Recommendations:  walker, rolling, bedside commode  Barriers to discharge:  Inaccessible home environment, Decreased caregiver support    Assessment:     Vern Lr is a 80 y.o. male with a medical diagnosis of COVID-19 virus infection.  He presents with cont gains towards OT goals. He demo near functional baseline at this time. Performance deficits affecting function are weakness, impaired endurance, impaired self care skills, impaired functional mobilty, gait instability, impaired balance, impaired cognition, decreased safety awareness, decreased upper extremity function, decreased lower extremity function, impaired cardiopulmonary response to activity.     Rehab Prognosis:  Good; patient would benefit from acute skilled OT services to address these deficits and reach maximum level of function.       Plan:     Patient to be seen 3 x/week to address the above listed problems via self-care/home management, therapeutic activities, therapeutic exercises  · Plan of Care Expires: 05/12/20  · Plan of Care Reviewed with: patient    Subjective   Pt reported "I need a shave, I shave every 3 days"  Pain/Comfort:  · Pain Rating 1: 0/10  · Pain Rating Post-Intervention 1: 0/10    Objective:     Patient found up in chair with bed alarm, telemetry, pulse ox (continuous)(CATY Mauricio) upon OT entry to room.    General Precautions: Standard, airborne, contact, droplet, fall   Orthopedic Precautions:N/A   Braces: N/A     Occupational Performance:     Bed Mobility:    · Patient completed Sit to Supine with minimum assistance     Functional Mobility/Transfers:  · Patient completed Sit <> Stand Transfer with contact guard assistance  with  rolling walker   · Patient completed " Bed <> Chair Transfer using Step Transfer technique with contact guard assistance with rolling walker  · Functional Mobility: household distance x35 ft with rolling walker and SBA    Activities of Daily Living:  · Grooming: x6 min duration at sink; denture and oral care and washing face      AMPAC 6 Click ADL: 17    Treatment & Education:  -Pt alert and agreeable to therapy session; reported orientation x4; requiring cues for time; utilized CATY throughout session for Serbian translation   -completed mobility to bathroom for standing grooming<>mobility within room<>return to bed in upright position   -Communication board updated; questions/concerns addressed within OT scope of practice      Patient left HOB elevated with all lines intact, call button in reach and bed alarm onEducation:      GOALS:   Multidisciplinary Problems     Occupational Therapy Goals        Problem: Occupational Therapy Goal    Goal Priority Disciplines Outcome Interventions   Occupational Therapy Goal     OT, PT/OT Ongoing, Progressing    Description:  Goals to be met by: 5/18    Patient will increase functional independence with ADLs by performing:    LB dressing (pants, brief)  with set up and CGA.  Grooming while standing at sink with set up and supervision x5 min duration. Met 5/11  *revised: set up and mod(I)  Toileting from toilet with CGA.  Toilet transfer to toilet with supervision.  Functional mobility at household distance for ADL task with supervision. SBA 5/11                           Time Tracking:     OT Date of Treatment: 05/11/20  OT Start Time: 1405  OT Stop Time: 1430  OT Total Time (min): 25 min    Billable Minutes:Self Care/Home Management 25    DREAD Arredondo  5/11/2020

## 2020-05-11 NOTE — DISCHARGE SUMMARY
Discharge Summary  Hospital Medicine  [facility]      Attending Physician on Discharge: Saul Jerome MD  [facility] Team: [facility] 10  Date of Admission:  4/9/2020     Date of Discharge:      Active Hospital Problems    Diagnosis  POA    *COVID-19 virus infection [U07.1]  Yes    Moderate malnutrition [E44.0]  Unknown     Recommendations     1. Continue Cardiac diet with Boost Plus TID.  Goals: Pt will consume at least 50% intake at meals  Nutrition Goal Status: progressing towards goal  Communication of RD Recs: POC    Assessment and Plan   Nutrition Problem:  Moderate Protein-Calorie Malnutrition  Malnutrition in the context of Acute Illness/Injury     Related to (etiology):  COVID-19 infection     Signs and Symptoms (as evidenced by):  Energy Intake: <50% of estimated energy requirement for 2 weeks     Weight Loss: 5.2% x 1 month      Interventions(treatment strategy):  Collaboration of care with providers.  Commercial beverage: Boost Plus TID     Nutrition Diagnosis Status:  New      Lower respiratory tract infection due to COVID-19 virus [U07.1, J22]  Yes    Acute respiratory failure with hypoxia [J96.01]  Yes    Constipation [K59.00]  Yes    Fall [W19.XXXA]  Yes    Coronary artery disease involving native coronary artery of native heart without angina pectoris [I25.10]  Yes    Elevated troponin [R79.89]  Yes    Seizure disorder [G40.909]  Yes    Essential hypertension, benign [I10]  Yes    BPH (benign prostatic hyperplasia) [N40.0]  Yes    Dyslipidemia [E78.5]  Yes      Resolved Hospital Problems   No resolved problems to display.     VIRTUAL TELENOTE    Start time: 10:45  Chief complaint: Covid-19 Infection  The patient location is: [facility]8  End time:  11:05 AM    Total time spent with patient: 20 minutes    The attending portion of this evaluation, treatment, and documentation was performed via audiovisual.    Consults: Cardiology 4/9,  Palliative medicine 4/13, PMR 5/8     History of Present Illness:  80/M admitted on 4/9/20 for falls and PNA 2/2 COVID infection. Positive testing in ED (4/9/20). Patient speaks only [place] Islander and language line used to communicate. Spoke to patient's sister who admits patient is hard of hearing and likely has mild dementia so history is somewhat limited as a result. Patient notes constipation without bm in a week. Sister states he often complains of this but patient actually was brought in for falls. He fell twice in past couple of days. She admits he has looked fatigued recently and she is actually recovering from COVID infection herself. He normally ambulates with assistance of cane. After first fall he was brought to [facility] and went home. Second fall while in bathroom yesterday and was subsequently brought here to [facility]. Seizure hx but sister did not see anything to suggest seizure and patient has been on meds. Patient has no recent issues with passing out. Hx of NSTEMI with non-obstructive CAD in Sept 2019. Largest occulusion noted on LHC was 20% at that time. Has been on asa, Plavix, and statin since. Normally walks with cane and was actually walking yesterday without issue per sister. Patient admits to some shortness of breath and cough as well as nasal congestion.    Hospital Course:   Vern Lr was admitted to Hospital Medicine for treatment of suspected COVID-19 viral infection and was treated with supportive care following a comprehensive physical, radiographic, and lab evaluation tailored to the current standard of care for COVID19. Please review the admission H&P and the studies listed below for details. He improved with supportive care and was found to be suitable for discharge to LTAC without oxygen.    Additional details of the hospitalization include treatment with Azithromycin, ceftriaxone, and hydroxychloroquine. Patient was transferred to [facility] on hospital day 5 due to [facility] being  full.    On the day of discharge, isolation precautions were reviewed at length verbally. The patient was also provided with written isolation guidelines modified from the [place] Department of [facility] and [facility] as well as the CDC, as part of discharge paperwork. An updated phone number was obtained, which will be used to contact the patient when results are available, and positive patients will be enrolled in both the COVID-19 Home Symptom Monitoring program.    Laboratory Values:  Lab Results   Component Value Date    [id number] Detected (A) 05/10/2020       Recent Labs   Lab 05/05/20  0502 05/08/20  0306 05/11/20  0540   WBC 6.07 5.08 5.27   LYMPH 21.7  1.3 32.9  1.7 30.6  1.6   HGB 12.4* 11.4* 12.9*   HCT 41.6 36.4* 41.3    260 260     Recent Labs   Lab 05/05/20  0523 05/08/20  0306 05/11/20  0540   * 137 139   K 4.1 3.6 4.0    103 102   CO2 23 26 29   BUN 17 18 15   CREATININE 0.6 0.7 0.7   GLU 99 88 90   CALCIUM 8.7 8.9 9.1     Recent Labs   Lab 05/05/20  0523 05/08/20  0306 05/11/20  0540   ALKPHOS 91 117 101   ALT 42 57* 43   AST 52* 41* 35   ALBUMIN 2.6* 2.7* 3.0*   PROT 7.8 7.5 7.8   BILITOT 0.4 0.2 0.2        No results for input(s): DDIMER, FERRITIN, CRP, LDH, BNP, TROPONINI, CPK in the last 72 hours.    Invalid input(s): PROCALCITONIN      Microbiology:  Microbiology Results (last 7 days)     ** No results found for the last 168 hours. **          Imaging:  Imaging Results          CT Chest Abdomen Pelvis With Contrast (Final result)  Result time 04/09/20 06:22:08    Final result by Lashonda Granger MD (04/09/20 06:22:08)                 Impression:      1. Diffuse bilateral ground-glass opacities affecting all lobes concerning for underlying infectious process in the appropriate clinical setting with additional differential considerations including pulmonary edema or non infectious inflammatory process.  2. No acute intra-abdominal abnormality identified.  3. 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  4. Moderate volume of fecal material in the colon which can be seen with constipation.  5. Redemonstration of multiple compression fracture deformities of the lower thoracic and lumbar spine similar to most recent CT examination.  Concavity of the superior endplates of the T6 and T7 vertebral bodies of unknown chronicity.  Correlation with point tenderness advised.  6. Multiple additional stable findings as detailed above.      Electronically signed by: Lashonda Granger MD  Date:    04/09/2020  Time:    06:22             Narrative:    EXAMINATION:  CT CHEST ABDOMEN PELVIS WITH CONTRAST (XPD)    CLINICAL HISTORY:  Sepsis;    TECHNIQUE:  Low dose axial images, sagittal and coronal reformations were obtained from the thoracic inlet to the pubic symphysis following the IV administration of 100 mL of Omnipaque 350 .  No oral contrast was administered.    COMPARISON:  CT abdomen and pelvis 09/24/2019    FINDINGS:  The visualized soft tissue and vascular structures at the base of the neck are within normal limits.  The thoracic aorta is nonaneurysmal with mild atherosclerosis.  The heart is enlarged, and there is no significant pericardial fluid present.  There is calcific atherosclerosis of the coronary vessels.  No axillary adenopathy.  There are normal and upper limit of normal sized mediastinal lymph nodes present.  Hilar contours are within normal limits.  The esophagus maintains normal caliber and course.    The trachea is midline and proximal airways appear patent.  The lungs demonstrate diffuse bilateral ground-glass opacities affecting all lobes concerning for underlying infectious process in the appropriate clinical setting.  Additional differential considerations include pulmonary edema or non infectious inflammatory process.  There is no pleural fluid.  No pneumothorax.    Please note evaluation of  solid organ parenchyma is limited due to artifact from the patient's upper extremities.  The liver is prominent in size.  There is a subcentimeter left hepatic hypodensity too small to definitively characterize.  The portal vein and splenic vein appear patent.  There are probable layering stones or sludge within the gallbladder lumen.  No intra or extrahepatic biliary ductal dilatation.    The kidneys are normal in size and location and enhance symmetrically without evidence of hydronephrosis.  The urinary bladder demonstrates smooth margins.  The prostate demonstrates dystrophic calcification.    The visualized loops of large and small bowel demonstrate no evidence of obstruction or inflammatory change.  There is a moderate volume of fecal material throughout the colon which can be seen with constipation.  The appendix is not definitively visualized, but there is no inflammatory change in the right lower quadrant to suggest acute appendicitis.  There is no ascites, free intraperitoneal air or portal venous gas.    There is mild infrarenal abdominal aortic ectasia.  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  There is aortoiliac atherosclerosis.  No bulky lymphadenopathy.  There is redemonstration of compression fracture deformities involving the T11 through L5 vertebral body similar to most recent CT exam of 09/24/2019.  There is additional can cavity involving the superior endplates of the T6 and T7 vertebral bodies of uncertain chronicity.  Correlation with point tenderness advised.  There is a small fat containing umbilical hernia.                               CT Head Without Contrast (Final result)  Result time 04/09/20 05:59:46    Final result by Lashonda Granger MD (04/09/20 05:59:46)                 Impression:      1. No CT evidence of acute intracranial abnormality. Clinical correlation and further evaluation as warranted.  2. Generalized cerebral  volume loss, findings suggestive of chronic microvascular ischemic change bitemporal encephalomalacia likely relating to sequela of prior trauma.  3. Small air-fluid level in the right maxillary sinus with paranasal sinus mucosal thickening.  Correlation for underlying acute sinusitis advised.      Electronically signed by: Lashonda Granger MD  Date:    04/09/2020  Time:    05:59             Narrative:    EXAMINATION:  CT HEAD WITHOUT CONTRAST    CLINICAL HISTORY:  Confusion/delirium, altered LOC, unexplained;    TECHNIQUE:  Low dose axial images were obtained through the head.  Coronal and sagittal reformations were also performed. Contrast was not administered.    COMPARISON:  Head CT 09/24/2019    FINDINGS:  There is no acute intracranial hemorrhage, hydrocephalus, midline shift or mass effect. Brain parenchyma appears stable with generalized cerebral volume loss and findings suggestive of chronic microvascular ischemic change.  There is encephalomalacia involving the bilateral temporal lobes (left greater than right) possibly relating to sequela of prior trauma.  Gray-white matter differentiation is otherwise maintained.  The basilar cisterns are patent.  There is postoperative change of bilateral antrostomies.  There is mucosal thickening and small air-fluid level in the right maxillary sinus.  There is also mucosal thickening of the frontal and ethmoid air cells.  There is partial opacification of inferior right mastoid air cells, similar to prior studies.  There is heterogeneous opacity in the external auditory canals likely relating to cerumen although correlation with physical exam advised.  The calvarium is intact.                               X-Ray Abdomen Flat And Erect (Final result)  Result time 04/09/20 04:02:59    Final result by Lashonda Granger MD (04/09/20 04:02:59)                 Impression:      Please see above.      Electronically signed by: Lashonda Granger MD  Date:    04/09/2020  Time:    04:02              Narrative:    EXAMINATION:  XR ABDOMEN FLAT AND ERECT    CLINICAL HISTORY:  Abdominal Pain;    TECHNIQUE:  Flat and erect AP views of the abdomen were performed.    COMPARISON:  None    FINDINGS:  Scattered air is seen in nondilated loops of small and large bowel.  There is a moderate volume of fecal material in the right colon.  No definite evidence of free intraperitoneal air.  Visualized lung bases demonstrate patchy interstitial and basilar opacities.  Visualized osseous structures are intact with degenerative change.                                Cardiac:  ECG Results          EKG 12-lead (Final result)  Result time 04/09/20 20:43:37    Final result by Interface, Lab In [facility] (04/09/20 20:43:37)                 Narrative:    Test Reason : R06.02,    Vent. Rate : 090 BPM     Atrial Rate : 090 BPM     P-R Int : 128 ms          QRS Dur : 108 ms      QT Int : 372 ms       P-R-T Axes : 050 -44 077 degrees     QTc Int : 455 ms    Sinus rhythm with Premature supraventricular complexes  Left axis deviation  Incomplete right bundle branch block  LVH with repolarization abnormality  Cannot rule out Septal infarct ,age undetermined  Abnormal ECG  When compared with ECG of 24-SEP-2019 04:34,  No significant change was found  Confirmed by Zahra Roca MD ([id number]) on 4/9/2020 8:43:23 PM    Referred By: [email or web address]   SELF           Confirmed By:Zahra Roca MD                             EKG 12-LEAD (Final result)  Result time 04/10/20 10:22:35    Final result by Unknown User (04/10/20 10:22:35)                                  Results for orders placed during the hospital encounter of 04/09/20   Echo Color Flow Doppler? Yes    Narrative · Normal left ventricular systolic function. The estimated ejection   fraction is 60%.  · Moderate concentric left ventricular hypertrophy.  · Normal right ventricular systolic function.  · Moderate left atrial enlargement.  · Grade II (moderate) left ventricular diastolic  dysfunction consistent   with pseudonormalization.  · Mild right atrial enlargement.            Procedures:   * No surgery found *        Current Discharge Medication List      START taking these medications    Details   albuterol (PROVENTIL HFA) 90 mcg/actuation inhaler Inhale 2 puffs into the lungs every 8 (eight) hours while awake. Rescue  Qty: 6.7 g, Refills: 1      bisacodyL (DULCOLAX) 10 mg Supp Place 1 suppository (10 mg total) rectally daily as needed.  Refills: 0      clopidogreL (PLAVIX) 75 mg tablet Take 1 tablet (75 mg total) by mouth once daily.  Qty: 30 tablet, Refills: 3      senna-docusate 8.6-50 mg (PERICOLACE) 8.6-50 mg per tablet Take 1 tablet by mouth 2 (two) times daily.  Qty: 60 tablet, Refills: 3         CONTINUE these medications which have NOT CHANGED    Details   alendronate (FOSAMAX) 70 MG tablet Take 1 tablet (70 mg total) by mouth every 7 days.  Qty: 12 tablet, Refills: 4    Associated Diagnoses: Osteoporosis      aspirin (ECOTRIN) 81 MG EC tablet Take 81 mg by mouth once daily.      atorvastatin (LIPITOR) 40 MG tablet Take 1 tablet (40 mg total) by mouth once daily.  Qty: 90 tablet, Refills: 4    Associated Diagnoses: Dyslipidemia      calcium citrate-vitamin D3 (CITRACAL + D MAXIMUM) 315-250 mg-unit Tab Take 315 mg by mouth 2 (two) times daily.  Qty: 180 tablet, Refills: 6    Associated Diagnoses: Lumbar compression fracture, closed, initial encounter      finasteride (PROSCAR) 5 mg tablet Take 1 tablet (5 mg total) by mouth once daily.  Qty: 90 tablet, Refills: 4    Associated Diagnoses: Benign non-nodular prostatic hyperplasia with lower urinary tract symptoms      fluticasone (FLONASE) 50 mcg/actuation nasal spray SHAKE WELL AND U 2 SPRAYS IEN BID  Refills: 3      ketorolac 0.5% (ACULAR) 0.5 % Drop Place 1 drop into the right eye 4 (four) times daily.  Refills: 0      lisinopril 10 MG tablet Take 1 tablet (10 mg total) by mouth once daily.  Qty: 90 tablet, Refills: 4    Associated  Diagnoses: Essential hypertension, benign      metoprolol tartrate (LOPRESSOR) 50 MG tablet Take 1 tablet (50 mg total) by mouth once daily.  Qty: 90 tablet, Refills: 4    Associated Diagnoses: Essential hypertension, benign      phenobarbital (LUMINAL) 97.2 MG tablet Take 1 tablet (97.2 mg total) by mouth once daily.  Qty: 90 tablet, Refills: 4    Associated Diagnoses: Seizure disorder      phenytoin (DILANTIN) 100 MG ER capsule 2 capsules po q am, 2 capsules po q hs  Qty: 360 capsule, Refills: 4    Associated Diagnoses: Seizure disorder      tamsulosin (FLOMAX) 0.4 mg Cp24 Take 1 capsule (0.4 mg total) by mouth once daily.  Qty: 90 capsule, Refills: 4    Associated Diagnoses: Benign prostatic hyperplasia, presence of lower urinary tract symptoms unspecified, unspecified morphology      montelukast (SINGULAIR) 10 mg tablet Take 10 mg by mouth every evening.               Discharge Diet:cardiac diet with Normal Fluid intake of 1500 - 2000 mL per day    Activity: activity as tolerated    Discharge Condition: Good    Disposition: Long Term Care    Follow up:    Follow-up Information     Lucio Byrd MD.    Specialty:  General Practice  Why:  Outpatient Services  [facility] information:  [facility] [place] [address]  [phone]                   Tests pending at the time of discharge: none       Time spent  on the discharge of the patient including review of hospital course with the patient. reviewing discharge medications and arranging follow-up care: 35 mins    Discharge examination: Patient was seen and examined on the date of discharge and determined to be suitable for discharge.    Saul Jerome MD  Internal Medicine PGY-2  Service: Virtual Hospital Medicine [facility] [place]  Pager: [phone]  Cell: [phone]

## 2020-05-11 NOTE — PLAN OF CARE
[facility]    FACILITY TRANSFER ORDERS      Patient Name: Vern Lr  YOB: 1940    PCP: Lucio Byrd MD   [facility] [facility] [address]  PCP Phone Number: [phone]  PCP Fax: [phone]    Encounter Date: 05/11/2020    Admit to: [facility]    Vital Signs:  Routine    Diagnoses:   Active Hospital Problems    Diagnosis  POA    *COVID-19 virus infection [U07.1]  Yes    Moderate malnutrition [E44.0]  Unknown     Recommendations     1. Continue Cardiac diet with Boost Plus TID.  Goals: Pt will consume at least 50% intake at meals  Nutrition Goal Status: progressing towards goal  Communication of RD Recs: POC    Assessment and Plan   Nutrition Problem:  Moderate Protein-Calorie Malnutrition  Malnutrition in the context of Acute Illness/Injury     Related to (etiology):  COVID-19 infection     Signs and Symptoms (as evidenced by):  Energy Intake: <50% of estimated energy requirement for 2 weeks     Weight Loss: 5.2% x 1 month      Interventions(treatment strategy):  Collaboration of care with providers.  Commercial beverage: Boost Plus TID     Nutrition Diagnosis Status:  New      Lower respiratory tract infection due to COVID-19 virus [U07.1, J22]  Yes    Acute respiratory failure with hypoxia [J96.01]  Yes    Constipation [K59.00]  Yes    Fall [W19.XXXA]  Yes    Coronary artery disease involving native coronary artery of native heart without angina pectoris [I25.10]  Yes    Elevated troponin [R79.89]  Yes    Seizure disorder [G40.909]  Yes    Essential hypertension, benign [I10]  Yes    BPH (benign prostatic hyperplasia) [N40.0]  Yes    Dyslipidemia [E78.5]  Yes      Resolved Hospital Problems   No resolved problems to display.       Allergies:Review of patient's allergies indicates:  No Known Allergies    Diet: cardiac diet    Activities: Activity as tolerated, Commode at bedside, Dangle at bedside, Up with assistance and Weight bearing as  tolerated    Nursing: Per LTAC routine. Covid-19 Isolation precautions ([place], contact, droplet).  - Vital signs per unit routine  - Fall precatuions    Labs: CBC and CMP Per LTAC routine     CONSULTS:    Physical Therapy to evaluate and treat. , Occupational Therapy to evaluate and treat. and  to evaluate for community resources/long-range planning.    MISCELLANEOUS CARE:  Diabetes Care:   SN to perform and educate Diabetic management with blood glucose monitoring:, Fingerstick blood sugar AC and HS and Report CBG < 60 or > 350 to physician.    WOUND CARE ORDERS  None    Medications: Review discharge medications with patient and family and provide education.      Current Discharge Medication List      START taking these medications    Details   albuterol (PROVENTIL HFA) 90 mcg/actuation inhaler Inhale 2 puffs into the lungs every 8 (eight) hours while awake. Rescue  Qty: 6.7 g, Refills: 1      bisacodyL (DULCOLAX) 10 mg Supp Place 1 suppository (10 mg total) rectally daily as needed.  Refills: 0      clopidogreL (PLAVIX) 75 mg tablet Take 1 tablet (75 mg total) by mouth once daily.  Qty: 30 tablet, Refills: 3      senna-docusate 8.6-50 mg (PERICOLACE) 8.6-50 mg per tablet Take 1 tablet by mouth 2 (two) times daily.  Qty: 60 tablet, Refills: 3         CONTINUE these medications which have NOT CHANGED    Details   alendronate (FOSAMAX) 70 MG tablet Take 1 tablet (70 mg total) by mouth every 7 days.  Qty: 12 tablet, Refills: 4    Associated Diagnoses: Osteoporosis      aspirin (ECOTRIN) 81 MG EC tablet Take 81 mg by mouth once daily.      atorvastatin (LIPITOR) 40 MG tablet Take 1 tablet (40 mg total) by mouth once daily.  Qty: 90 tablet, Refills: 4    Associated Diagnoses: Dyslipidemia      calcium citrate-vitamin D3 (CITRACAL + D MAXIMUM) 315-250 mg-unit Tab Take 315 mg by mouth 2 (two) times daily.  Qty: 180 tablet, Refills: 6    Associated Diagnoses: Lumbar compression fracture, closed, initial  encounter      finasteride (PROSCAR) 5 mg tablet Take 1 tablet (5 mg total) by mouth once daily.  Qty: 90 tablet, Refills: 4    Associated Diagnoses: Benign non-nodular prostatic hyperplasia with lower urinary tract symptoms      fluticasone (FLONASE) 50 mcg/actuation nasal spray SHAKE WELL AND U 2 SPRAYS IEN BID  Refills: 3      ketorolac 0.5% (ACULAR) 0.5 % Drop Place 1 drop into the right eye 4 (four) times daily.  Refills: 0      lisinopril 10 MG tablet Take 1 tablet (10 mg total) by mouth once daily.  Qty: 90 tablet, Refills: 4    Associated Diagnoses: Essential hypertension, benign      metoprolol tartrate (LOPRESSOR) 50 MG tablet Take 1 tablet (50 mg total) by mouth once daily.  Qty: 90 tablet, Refills: 4    Associated Diagnoses: Essential hypertension, benign      phenobarbital (LUMINAL) 97.2 MG tablet Take 1 tablet (97.2 mg total) by mouth once daily.  Qty: 90 tablet, Refills: 4    Associated Diagnoses: Seizure disorder      phenytoin (DILANTIN) 100 MG ER capsule 2 capsules po q am, 2 capsules po q hs  Qty: 360 capsule, Refills: 4    Associated Diagnoses: Seizure disorder      tamsulosin (FLOMAX) 0.4 mg Cp24 Take 1 capsule (0.4 mg total) by mouth once daily.  Qty: 90 capsule, Refills: 4    Associated Diagnoses: Benign prostatic hyperplasia, presence of lower urinary tract symptoms unspecified, unspecified morphology      montelukast (SINGULAIR) 10 mg tablet Take 10 mg by mouth every evening.                  _________________________________  Saul Jerome MD  05/11/2020

## 2020-05-11 NOTE — PROGRESS NOTES
Reviewed patient history and current admission. May be a rehab candidate once medically stable. Will follow progress with therapy.     PRESTON Garcia, FNP-C  Physical Medicine & Rehabilitation   05/11/2020  Spectralink: [id number]

## 2020-05-11 NOTE — PLAN OF CARE
Patient will DC to [facility] today.     SW arranged stretcher transport via [facility]. Requested  time is 5:15PM.  Requested  time does not guarantee arrival time.      Nurse call report to the Charge Nurse at [facility] at [phone].    Nurse has been notified of the above.      05/11/20 161   Post-Acute Status   Post-Acute Authorization Placement   Post-Acute Placement Status Set-up Complete     Lizbet Cristina, LCSW [facility]  [id number]

## 2020-05-11 NOTE — PLAN OF CARE
Problem: Fall Injury Risk  Goal: Absence of Fall and Fall-Related Injury  Outcome: Ongoing, Progressing     Problem: Adult Inpatient Plan of Care  Goal: Plan of Care Review  Outcome: Ongoing, Progressing  Goal: Patient-Specific Goal (Individualization)  Outcome: Ongoing, Progressing  Goal: Absence of Hospital-Acquired Illness or Injury  Outcome: Ongoing, Progressing  Goal: Optimal Comfort and Wellbeing  Outcome: Ongoing, Progressing     Problem: Skin Injury Risk Increased  Goal: Skin Health and Integrity  Outcome: Ongoing, Progressing     Problem: Social Isolation  Goal: Increased Social Interaction  Outcome: Ongoing, Progressing     Problem: Infection  Goal: Infection Symptom Resolution  Outcome: Ongoing, Progressing     Problem: Fall Injury Risk  Goal: Absence of Fall and Fall-Related Injury  Outcome: Ongoing, Progressing     Problem: Coping Ineffective  Goal: Effective Coping  Outcome: Ongoing, Progressing     Pt has had no attempt to get out of bed this shift,no fall. Vital signs WDL. Reported flank pain but seemed to find relief after I massaged him.Will continue to monitor

## 2020-05-11 NOTE — PLAN OF CARE
Goals remain appropriate. DREAD Arredondo 5/11/2020   Problem: Occupational Therapy Goal  Goal: Occupational Therapy Goal  Description  Goals to be met by: 5/18    Patient will increase functional independence with ADLs by performing:    LB dressing (pants, brief)  with set up and CGA.  Grooming while standing at sink with set up and supervision x5 min duration. Met 5/11  *revised: set up and mod(I)  Toileting from toilet with CGA.  Toilet transfer to toilet with supervision.  Functional mobility at household distance for ADL task with supervision. SBA 5/11          Outcome: Ongoing, Progressing

## 2020-05-11 NOTE — CARE UPDATE
Called patient's Niece Sherin Smith (Sister did not answer) to update on patient's condition and acceptance to [facility]. All questions answered. Discharge pending insurance authorization.    Saul Jerome MD  Internal Medicine PGY-2  Service: Tele-medicine  [facility]  Pager: [phone]  Cell: [phone]

## 2020-05-11 NOTE — PLAN OF CARE
05/11/20 1500   Discharge Reassessment   Discharge Plan A Long-term acute care facility (LTAC)   Discharge Plan B Rehab   DME Needed Upon Discharge  other (see comments)  (tbd)   Anticipated Discharge Disposition Long Term   Can the patient/caregiver answer the patient profile reliably? Yes, cognitively intact   How does the patient rate their overall health at the present time? Fair   Describe the patient's ability to walk at the present time. Minor restrictions or changes   How often would a person be available to care for the patient? Infrequently   Number of comorbid conditions (as recorded on the chart) Five or more   Post-Acute Status   Post-Acute Authorization Placement   Post-Acute Placement Status Pending Payor Review     Patient has been accepted to [facility]. Awaiting insurance authorization from [facility] [employer].     1600  CM was informed by Zunilda ([phone]) /[facility] Medicaid that auth #[id number] has been given for the patient's admission to [facility] today. CM was informed by Myrtle ([phone]) [facility] that the patient can be admitted today & requested that report be called to the charge nurse at ([phone]). CM informed Dr. Jerome, LISANDRO Snow, & nurse Robles ([phone]) of above.     Will continue to follow.

## 2020-05-11 NOTE — NURSING
Patient sitting up in chair, no s/s of distress, vizi in place and calibrated, telemetry in place, normal rhythm noted, denies any discomfort or pain. Utilized the [employer]  system in the room twice today without complications. t contacted patients sister this evening per pt request. Obtained covid-19 swab at bedside per md order. Pt tolerated well

## 2020-05-11 NOTE — PLAN OF CARE
LISANDRO spoke to Myrtle ([phone]) at [facility] to notify her that the LTAC orders have been uploaded. Myrtle confirmed that she received the orders and is just waiting on insurance authorization from [email or web address].      05/11/20 1317   Post-Acute Status   Post-Acute Authorization Placement   Post-Acute Placement Status Pending Payor Review     Lizbet Cristina, LCSW [facility]  [id number]

## 2020-05-12 ENCOUNTER — OUTPATIENT CASE MANAGEMENT (OUTPATIENT)
Dept: ADMINISTRATIVE | Facility: OTHER | Age: 80
End: 2020-05-12

## 2020-05-12 NOTE — PLAN OF CARE
Goals partially met by d/c. DREAD Arredondo 5/12/2020   Problem: Occupational Therapy Goal  Goal: Occupational Therapy Goal  Description  Goals to be met by: 5/18    Patient will increase functional independence with ADLs by performing:    LB dressing (pants, brief)  with set up and CGA. Not met  Grooming while standing at sink with set up and supervision x5 min duration. Met 5/11  *revised: set up and mod(I). Not met  Toileting from toilet with CGA. Not met  Toilet transfer to toilet with supervision. Not met  Functional mobility at household distance for ADL task with supervision. SBA 5/11           Outcome: Met

## 2020-05-12 NOTE — PT/OT/SLP DISCHARGE
Occupational Therapy Discharge Summary    Vern Lr  MRN: [id number]   Principal Problem: COVID-19 virus infection      Patient Discharged from acute Occupational Therapy on 5/12/2020.  Please refer to prior OT note dated 5/11/2020 for functional status.    Assessment:      Goals partially met.    Objective:     GOALS:   Multidisciplinary Problems     Occupational Therapy Goals     Not on file          Multidisciplinary Problems (Resolved)        Problem: Occupational Therapy Goal    Goal Priority Disciplines Outcome Interventions   Occupational Therapy Goal   (Resolved)     OT, PT/OT Met    Description:  Goals to be met by: 5/18    Patient will increase functional independence with ADLs by performing:    LB dressing (pants, brief)  with set up and CGA. Not met  Grooming while standing at sink with set up and supervision x5 min duration. Met 5/11  *revised: set up and mod(I). Not met  Toileting from toilet with CGA. Not met  Toilet transfer to toilet with supervision. Not met  Functional mobility at household distance for ADL task with supervision. SBA 5/11                            Reasons for Discontinuation of Therapy Services  Transfer to alternate level of care.      Plan:     Patient Discharged to: Long Term Acute Care    RDEAD Arredondo  5/12/2020

## 2020-05-12 NOTE — PLAN OF CARE
05/12/20 0741   Final Note   Assessment Type Final Discharge Note     Patient discharged to [facility] on 5/11/2020. Discharge summary faxed to [facility] [employer].

## 2020-12-12 NOTE — ASSESSMENT & PLAN NOTE
Central Line    Diagnosis: Covid 19  Doctor requesting consult:    Patient location during procedure: ICU  Procedure start time: 12/12/2020 3:10 PM  Timeout: 12/12/2020 3:10 PM  Procedure end time: 12/12/2020 3:30 PM    Staffing  Authorizing Provider: Beau Knox MD  Performing Provider: Beau Knox MD    Staffing  Anesthesiologist: Beau Knox MD  Performed: anesthesiologist   Anesthesiologist was present at the time of the procedure.  Preanesthetic Checklist  Completed: patient identified, site marked, surgical consent, pre-op evaluation, timeout performed, IV checked, risks and benefits discussed, monitors and equipment checked and anesthesia consent given  Indication   Indication: vascular access, med administration     Anesthesia   local infiltration    Central Line   Skin Prep: skin prepped with ChloraPrep, skin prep agent completely dried prior to procedure  maximum sterile barriers used during central venous catheter insertion  hand hygiene performed prior to central venous catheter insertion  Location: right, internal jugular.   Catheter type: quad lumen  Catheter Size: 8.5 Fr  Inserted Catheter Length: 16 cm  Ultrasound: vascular probe with ultrasound  Vessel Caliber: medium, patent  Vascular Doppler:  not done, compressibility normal  Needle advanced into vessel with real time Ultrasound guidance.  Guidewire confirmed in vessel.  Image recorded and saved.  Manometry: none  Insertion Attempts: 1   Securement:line sutured, chlorhexidine patch, sterile dressing applied and blood return through all ports    Post-Procedure   X-Ray: no pneumothorax on x-ray, placement verified by x-ray, tip termination and successful placement  Adverse Events:none    Guidewire Guidewire removed intact, verified with nurse.             Controlled. Continue home Flomax.

## 2021-03-26 NOTE — PLAN OF CARE
Problem: Physical Therapy Goal  Goal: Physical Therapy Goal  Description  Goals to be met by: 2020    Patient will increase functional independence with mobility by performin. Pt will perform bed mobility (rolling L/R, scooting, and bridging) with Nawaf. - GOAL MET  REVISED: with CGA. MET  Revised: Pt to perform bed mobility with Supervision  2. Pt will perform supine to/from sit with Nawaf. - GOAL MET  REVISED: with CGA. MET  Revied: Pt to perform supine <> sit with Supervision.  3. Pt will sit EOB x 10 mins with B UE support with Supervision assistance. MET  4. Pt with perform sit to stand with modA assistance and straight cane. Discontinued  Revised: Pt to perform sit <> stand with Supervision with RW.  5. Pt will ambulate 30 feet with mod assistance and straight cane. MET with RW  Revised: pt able to ambulate 75ft with SBA with RW.  6. Pt will perform there-ex from handout x 15 reps to improve strength for functional mobility.            Outcome: Ongoing, Progressing   Patient tolerated treatment well. Established POC and goals reviewed and remain appropriate. Plan is to continue to improve patient's functional mobility capabilities.      Marianne Gtz, PT, DPT  2020     Critical care services provided

## 2021-04-15 ENCOUNTER — PATIENT MESSAGE (OUTPATIENT)
Dept: RESEARCH | Facility: HOSPITAL | Age: 81
End: 2021-04-15

## 2021-05-15 ENCOUNTER — HOSPITAL ENCOUNTER (EMERGENCY)
Facility: HOSPITAL | Age: 81
Discharge: HOME | End: 2021-05-15
Attending: EMERGENCY MEDICINE
Payer: MEDICAID

## 2021-05-15 VITALS
OXYGEN SATURATION: 96 % | WEIGHT: 165 LBS | SYSTOLIC BLOOD PRESSURE: 127 MMHG | DIASTOLIC BLOOD PRESSURE: 62 MMHG | BODY MASS INDEX: 27.49 KG/M2 | HEART RATE: 84 BPM | HEIGHT: 65 IN | TEMPERATURE: 99 F | RESPIRATION RATE: 16 BRPM
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
## 2021-05-15 DIAGNOSIS — M54.9 BACK PAIN: ICD-10-CM

## 2021-05-15 DIAGNOSIS — S22.088A OTHER CLOSED FRACTURE OF ELEVENTH THORACIC VERTEBRA, INITIAL ENCOUNTER: Primary | ICD-10-CM

## 2021-05-15 LAB
ALBUMIN SERPL BCP-MCNC: 4 G/DL (ref 3.5–5.2)
ALP SERPL-CCNC: 71 U/L (ref 55–135)
ALT SERPL W/O P-5'-P-CCNC: 14 U/L (ref 10–44)
ANION GAP SERPL CALC-SCNC: 11 MMOL/L (ref 8–16)
AST SERPL-CCNC: 21 U/L (ref 10–40)
BASOPHILS # BLD AUTO: 0.04 K/UL (ref 0–0.2)
BASOPHILS NFR BLD: 0.5 % (ref 0–1.9)
BILIRUB SERPL-MCNC: 0.3 MG/DL (ref 0.1–1)
BUN SERPL-MCNC: 23 MG/DL (ref 8–23)
CALCIUM SERPL-MCNC: 9.3 MG/DL (ref 8.7–10.5)
CHLORIDE SERPL-SCNC: 102 MMOL/L (ref 95–110)
CO2 SERPL-SCNC: 25 MMOL/L (ref 23–29)
CREAT SERPL-MCNC: 1.3 MG/DL (ref 0.5–1.4)
DIFFERENTIAL METHOD: ABNORMAL
EOSINOPHIL # BLD AUTO: 0.3 K/UL (ref 0–0.5)
EOSINOPHIL NFR BLD: 3.3 % (ref 0–8)
ERYTHROCYTE [DISTWIDTH] IN BLOOD BY AUTOMATED COUNT: 12.3 % (ref 11.5–14.5)
EST. GFR  (AFRICAN AMERICAN): 60 ML/MIN/1.73 M^2
EST. GFR  (NON AFRICAN AMERICAN): 52 ML/MIN/1.73 M^2
GLUCOSE SERPL-MCNC: 107 MG/DL (ref 70–110)
HCT VFR BLD AUTO: 41.5 % (ref 40–54)
HGB BLD-MCNC: 14.1 G/DL (ref 14–18)
IMM GRANULOCYTES # BLD AUTO: 0.02 K/UL (ref 0–0.04)
IMM GRANULOCYTES NFR BLD AUTO: 0.3 % (ref 0–0.5)
LYMPHOCYTES # BLD AUTO: 1.9 K/UL (ref 1–4.8)
LYMPHOCYTES NFR BLD: 23.5 % (ref 18–48)
MCH RBC QN AUTO: 31.5 PG (ref 27–31)
MCHC RBC AUTO-ENTMCNC: 34 G/DL (ref 32–36)
MCV RBC AUTO: 93 FL (ref 82–98)
MONOCYTES # BLD AUTO: 0.9 K/UL (ref 0.3–1)
MONOCYTES NFR BLD: 11.1 % (ref 4–15)
NEUTROPHILS # BLD AUTO: 4.9 K/UL (ref 1.8–7.7)
NEUTROPHILS NFR BLD: 61.3 % (ref 38–73)
NRBC BLD-RTO: 0 /100 WBC
PLATELET # BLD AUTO: 262 K/UL (ref 150–450)
PMV BLD AUTO: 9 FL (ref 9.2–12.9)
POTASSIUM SERPL-SCNC: 4.7 MMOL/L (ref 3.5–5.1)
PROT SERPL-MCNC: 8.2 G/DL (ref 6–8.4)
RBC # BLD AUTO: 4.48 M/UL (ref 4.6–6.2)
SODIUM SERPL-SCNC: 138 MMOL/L (ref 136–145)
WBC # BLD AUTO: 7.92 K/UL (ref 3.9–12.7)

## 2021-05-15 PROCEDURE — 93010 EKG 12-LEAD: ICD-10-PCS | Mod: ,,, | Performed by: INTERNAL MEDICINE

## 2021-05-15 PROCEDURE — 99285 EMERGENCY DEPT VISIT HI MDM: CPT | Mod: 25

## 2021-05-15 PROCEDURE — 93010 ELECTROCARDIOGRAM REPORT: CPT | Mod: ,,, | Performed by: INTERNAL MEDICINE

## 2021-05-15 PROCEDURE — 96374 THER/PROPH/DIAG INJ IV PUSH: CPT

## 2021-05-15 PROCEDURE — 63600175 PHARM REV CODE 636 W HCPCS: Performed by: EMERGENCY MEDICINE

## 2021-05-15 PROCEDURE — 96375 TX/PRO/DX INJ NEW DRUG ADDON: CPT

## 2021-05-15 PROCEDURE — 93005 ELECTROCARDIOGRAM TRACING: CPT

## 2021-05-15 PROCEDURE — 85025 COMPLETE CBC W/AUTO DIFF WBC: CPT | Performed by: EMERGENCY MEDICINE

## 2021-05-15 PROCEDURE — 80053 COMPREHEN METABOLIC PANEL: CPT | Performed by: EMERGENCY MEDICINE

## 2021-05-15 RX ORDER — ONDANSETRON 2 MG/ML
4 INJECTION INTRAMUSCULAR; INTRAVENOUS
Status: COMPLETED | OUTPATIENT
Start: 2021-05-15 | End: 2021-05-15

## 2021-05-15 RX ORDER — HYDROCODONE BITARTRATE AND ACETAMINOPHEN 5; 325 MG/1; MG/1
1 TABLET ORAL EVERY 6 HOURS PRN
Qty: 15 TABLET | Refills: 0 | Status: SHIPPED | OUTPATIENT
Start: 2021-05-15

## 2021-05-15 RX ORDER — MORPHINE SULFATE 4 MG/ML
4 INJECTION, SOLUTION INTRAMUSCULAR; INTRAVENOUS
Status: COMPLETED | OUTPATIENT
Start: 2021-05-15 | End: 2021-05-15

## 2021-05-15 RX ORDER — LIDOCAINE 50 MG/G
1 PATCH TOPICAL DAILY
Qty: 15 PATCH | Refills: 0 | Status: SHIPPED | OUTPATIENT
Start: 2021-05-15

## 2021-05-15 RX ADMIN — MORPHINE SULFATE 4 MG: 4 INJECTION, SOLUTION INTRAMUSCULAR; INTRAVENOUS at 03:05

## 2021-05-15 RX ADMIN — ONDANSETRON 4 MG: 2 INJECTION INTRAMUSCULAR; INTRAVENOUS at 03:05

## 2021-05-17 ENCOUNTER — HOSPITAL ENCOUNTER (EMERGENCY)
Facility: HOSPITAL | Age: 81
Discharge: HOME | End: 2021-05-17
Attending: EMERGENCY MEDICINE
Payer: MEDICAID

## 2021-05-17 VITALS
SYSTOLIC BLOOD PRESSURE: 135 MMHG | HEIGHT: 65 IN | WEIGHT: 165 LBS | OXYGEN SATURATION: 99 % | BODY MASS INDEX: 27.49 KG/M2 | HEART RATE: 63 BPM | TEMPERATURE: 98 F | RESPIRATION RATE: 18 BRPM | DIASTOLIC BLOOD PRESSURE: 63 MMHG

## 2021-05-17 DIAGNOSIS — S22.000A COMPRESSION FRACTURE OF BODY OF THORACIC VERTEBRA: Primary | ICD-10-CM

## 2021-05-17 DIAGNOSIS — S32.000A COMPRESSION FRACTURE OF LUMBAR VERTEBRA, INITIAL ENCOUNTER, UNSPECIFIED LUMBAR VERTEBRAL LEVEL: ICD-10-CM

## 2021-05-17 LAB
ALBUMIN SERPL BCP-MCNC: 3.1 G/DL (ref 3.5–5.2)
ALP SERPL-CCNC: 57 U/L (ref 55–135)
ALT SERPL W/O P-5'-P-CCNC: 11 U/L (ref 10–44)
ANION GAP SERPL CALC-SCNC: 8 MMOL/L (ref 8–16)
AST SERPL-CCNC: 19 U/L (ref 10–40)
BASOPHILS # BLD AUTO: 0.04 K/UL (ref 0–0.2)
BASOPHILS NFR BLD: 0.5 % (ref 0–1.9)
BILIRUB SERPL-MCNC: 0.2 MG/DL (ref 0.1–1)
BILIRUB UR QL STRIP: NEGATIVE
BUN SERPL-MCNC: 15 MG/DL (ref 8–23)
CALCIUM SERPL-MCNC: 8.1 MG/DL (ref 8.7–10.5)
CHLORIDE SERPL-SCNC: 104 MMOL/L (ref 95–110)
CLARITY UR: CLEAR
CO2 SERPL-SCNC: 24 MMOL/L (ref 23–29)
COLOR UR: YELLOW
CREAT SERPL-MCNC: 0.7 MG/DL (ref 0.5–1.4)
DIFFERENTIAL METHOD: ABNORMAL
EOSINOPHIL # BLD AUTO: 0.1 K/UL (ref 0–0.5)
EOSINOPHIL NFR BLD: 1.1 % (ref 0–8)
ERYTHROCYTE [DISTWIDTH] IN BLOOD BY AUTOMATED COUNT: 12.2 % (ref 11.5–14.5)
EST. GFR  (AFRICAN AMERICAN): >60 ML/MIN/1.73 M^2
EST. GFR  (NON AFRICAN AMERICAN): >60 ML/MIN/1.73 M^2
GLUCOSE SERPL-MCNC: 122 MG/DL (ref 70–110)
GLUCOSE UR QL STRIP: NEGATIVE
HCT VFR BLD AUTO: 42.6 % (ref 40–54)
HGB BLD-MCNC: 14.1 G/DL (ref 14–18)
HGB UR QL STRIP: ABNORMAL
IMM GRANULOCYTES # BLD AUTO: 0.02 K/UL (ref 0–0.04)
IMM GRANULOCYTES NFR BLD AUTO: 0.2 % (ref 0–0.5)
KETONES UR QL STRIP: NEGATIVE
LEUKOCYTE ESTERASE UR QL STRIP: NEGATIVE
LIPASE SERPL-CCNC: 25 U/L (ref 4–60)
LYMPHOCYTES # BLD AUTO: 1.5 K/UL (ref 1–4.8)
LYMPHOCYTES NFR BLD: 17.8 % (ref 18–48)
MCH RBC QN AUTO: 30.9 PG (ref 27–31)
MCHC RBC AUTO-ENTMCNC: 33.1 G/DL (ref 32–36)
MCV RBC AUTO: 93 FL (ref 82–98)
MONOCYTES # BLD AUTO: 0.6 K/UL (ref 0.3–1)
MONOCYTES NFR BLD: 7.5 % (ref 4–15)
NEUTROPHILS # BLD AUTO: 6 K/UL (ref 1.8–7.7)
NEUTROPHILS NFR BLD: 72.9 % (ref 38–73)
NITRITE UR QL STRIP: NEGATIVE
NRBC BLD-RTO: 0 /100 WBC
PH UR STRIP: 7 [PH] (ref 5–8)
PLATELET # BLD AUTO: 245 K/UL (ref 150–450)
PMV BLD AUTO: 9.2 FL (ref 9.2–12.9)
POTASSIUM SERPL-SCNC: 4.1 MMOL/L (ref 3.5–5.1)
PROT SERPL-MCNC: 6.7 G/DL (ref 6–8.4)
PROT UR QL STRIP: ABNORMAL
RBC # BLD AUTO: 4.57 M/UL (ref 4.6–6.2)
SODIUM SERPL-SCNC: 136 MMOL/L (ref 136–145)
SP GR UR STRIP: 1.02 (ref 1–1.03)
URN SPEC COLLECT METH UR: ABNORMAL
UROBILINOGEN UR STRIP-ACNC: NEGATIVE EU/DL
WBC # BLD AUTO: 8.24 K/UL (ref 3.9–12.7)

## 2021-05-17 PROCEDURE — 63600175 PHARM REV CODE 636 W HCPCS: Performed by: EMERGENCY MEDICINE

## 2021-05-17 PROCEDURE — 96374 THER/PROPH/DIAG INJ IV PUSH: CPT

## 2021-05-17 PROCEDURE — 81003 URINALYSIS AUTO W/O SCOPE: CPT | Performed by: EMERGENCY MEDICINE

## 2021-05-17 PROCEDURE — 83690 ASSAY OF LIPASE: CPT | Performed by: EMERGENCY MEDICINE

## 2021-05-17 PROCEDURE — 96376 TX/PRO/DX INJ SAME DRUG ADON: CPT

## 2021-05-17 PROCEDURE — 96375 TX/PRO/DX INJ NEW DRUG ADDON: CPT

## 2021-05-17 PROCEDURE — 80053 COMPREHEN METABOLIC PANEL: CPT | Performed by: EMERGENCY MEDICINE

## 2021-05-17 PROCEDURE — 99285 EMERGENCY DEPT VISIT HI MDM: CPT | Mod: 25

## 2021-05-17 PROCEDURE — 85025 COMPLETE CBC W/AUTO DIFF WBC: CPT | Performed by: EMERGENCY MEDICINE

## 2021-05-17 RX ORDER — KETOROLAC TROMETHAMINE 30 MG/ML
10 INJECTION, SOLUTION INTRAMUSCULAR; INTRAVENOUS
Status: COMPLETED | OUTPATIENT
Start: 2021-05-17 | End: 2021-05-17

## 2021-05-17 RX ORDER — OXYCODONE AND ACETAMINOPHEN 5; 325 MG/1; MG/1
1 TABLET ORAL EVERY 6 HOURS PRN
Qty: 12 TABLET | Refills: 0 | Status: SHIPPED | OUTPATIENT
Start: 2021-05-17

## 2021-05-17 RX ORDER — HYDROMORPHONE HYDROCHLORIDE 2 MG/ML
0.5 INJECTION, SOLUTION INTRAMUSCULAR; INTRAVENOUS; SUBCUTANEOUS
Status: COMPLETED | OUTPATIENT
Start: 2021-05-17 | End: 2021-05-17

## 2021-05-17 RX ADMIN — HYDROMORPHONE HYDROCHLORIDE 0.5 MG: 2 INJECTION INTRAMUSCULAR; INTRAVENOUS; SUBCUTANEOUS at 03:05

## 2021-05-17 RX ADMIN — KETOROLAC TROMETHAMINE 10 MG: 30 INJECTION, SOLUTION INTRAMUSCULAR; INTRAVENOUS at 04:05

## 2021-05-17 RX ADMIN — HYDROMORPHONE HYDROCHLORIDE 0.5 MG: 2 INJECTION INTRAMUSCULAR; INTRAVENOUS; SUBCUTANEOUS at 04:05

## 2021-10-06 NOTE — PT/OT/SLP PROGRESS
Physical Therapy Treatment    Patient Name:  Vern Lr   MRN:  [id number]    Recommendations:     Discharge Recommendations:  nursing facility, skilled   Discharge Equipment Recommendations: walker, rolling, bedside commode   Barriers to discharge: Inaccessible home and Decreased caregiver support    Assessment:     Vern Lr is a 80 y.o. male admitted with a medical diagnosis of COVID-19 virus infection.  He presents with the following impairments/functional limitations:  weakness, impaired functional mobilty, decreased safety awareness, impaired cardiopulmonary response to activity, impaired endurance, gait instability, decreased coordination, impaired balance, impaired self care skills. Patient tolerated session well. He continues to participate and is progressing well with functional mobility. CATY utilized during session for [place] translation. Patient would continue to benefit from skilled PT services while in the hospital. At this time, upon d/c he remains an appropriate candidate for SNF in order to progress towards an improved level of functional mobility independence.     Rehab Prognosis: Good; patient would benefit from acute skilled PT services to address these deficits and reach maximum level of function.    Recent Surgery: * No surgery found *      Plan:     During this hospitalization, patient to be seen 4 x/week to address the identified rehab impairments via therapeutic activities, therapeutic exercises, gait training, neuromuscular re-education and progress toward the following goals:    · Plan of Care Expires:  05/13/20    Subjective     Chief Complaint: none   Patient/Family Comments/goals: to go home   Pain/Comfort:  · Pain Rating 1: 0/10  · Pain Rating Post-Intervention 1: 0/10      Objective:     Communicated with RN prior to session.  Patient found up in chair with pulse ox (continuous), telemetry(VISI monitor; [employer] ) upon PT entry to room.     General Precautions: Standard, airborne,  "droplet, fall, contact(COVID+)   Orthopedic Precautions:N/A   Braces: N/A     Functional Mobility:  · Transfers:     · Sit to Stand:  stand by assistance with rolling walker  · Gait: ~50ft within room with SBA and RW  ·  safe and good safety awareness with directional changes and RW use  ·  slow sylvia, decreased LLE stance phase   · Balance: good throughout, no LOB     Five Times Sit to Stand Test:  How long the patient takes to completed 5 sit to stand form chair with arms folded across chest: 58 seconds   "Inability to perform test in less than 13.6 seconds indicates increased disability and Morbidity." (Khoi 2000).  Normative values for community dwelling elderly:   · 60-70y/o: 11.4 seconds  · 70-80y/o: 12.6 seconds  · 80-90y/o: 14.8 seconds    AM-PAC 6 CLICK MOBILITY  Sitting down on and standing up from a chair with arms (e.g., wheelchair, bedside commode, etc.): 3  Moving from lying on back to sitting on the side of the bed?: 4  Moving to and from a bed to a chair (including a wheelchair)?: 3  Need to walk in hospital room?: 3  Climbing 3-5 steps with a railing?: 3       Therapeutic Activities and Exercises:  -Patient educated on the continued role and goal of PT  -Questions and concerns answered within the the PT scope of practice.   -White board updated in patient room to reflect level of assistance needed with nursing.     Patient left up in chair with all lines intact, call button in reach and RN present..    GOALS:   Multidisciplinary Problems     Physical Therapy Goals        Problem: Physical Therapy Goal    Goal Priority Disciplines Outcome Goal Variances Interventions   Physical Therapy Goal     PT, PT/OT Ongoing, Progressing     Description:  Goals to be met by: 2020    Patient will increase functional independence with mobility by performin. Pt will perform bed mobility (rolling L/R, scooting, and bridging) with Nawaf. - GOAL MET  REVISED: with CGA. MET  Revised: Pt to perform bed " mobility with Supervision  2. Pt will perform supine to/from sit with Nawaf. - GOAL MET  REVISED: with CGA. MET  Revied: Pt to perform supine <> sit with Supervision.  3. Pt will sit EOB x 10 mins with B UE support with Supervision assistance. MET  4. Pt with perform sit to stand with modA assistance and straight cane. Discontinued  Revised: Pt to perform sit <> stand with Supervision with RW.  5. Pt will ambulate 30 feet with mod assistance and straight cane. MET with RW  Revised: pt able to ambulate 75ft with SBA with RW.  6. Pt will perform there-ex from handout x 15 reps to improve strength for functional mobility.                             Time Tracking:     PT Received On: 05/11/20  PT Start Time: 1034     PT Stop Time: 1057  PT Total Time (min): 23 min     Billable Minutes: Gait Training 13 and Therapeutic Activity 10    Treatment Type: Treatment  PT/PTA: PT     PTA Visit Number: 0     Marianne Gtz, PT  05/11/2020     The patient was oriented to feeding situ, willingly accepted barium altered food materials and demonstrated functional labial grading on utensils. No dribbling was noted.

## 2022-08-02 ENCOUNTER — HOSPITAL ENCOUNTER (EMERGENCY)
Facility: HOSPITAL | Age: 82
Discharge: HOME | End: 2022-08-02
Attending: EMERGENCY MEDICINE
Payer: MEDICAID

## 2022-08-02 VITALS
DIASTOLIC BLOOD PRESSURE: 59 MMHG | HEART RATE: 66 BPM | BODY MASS INDEX: 28.25 KG/M2 | HEIGHT: 67 IN | SYSTOLIC BLOOD PRESSURE: 124 MMHG | OXYGEN SATURATION: 96 % | TEMPERATURE: 98 F | RESPIRATION RATE: 16 BRPM | WEIGHT: 180 LBS

## 2022-08-02 DIAGNOSIS — R40.0 DROWSINESS: ICD-10-CM

## 2022-08-02 DIAGNOSIS — G40.909 SEIZURE DISORDER: ICD-10-CM

## 2022-08-02 DIAGNOSIS — R41.0 CONFUSION: ICD-10-CM

## 2022-08-02 DIAGNOSIS — M79.89 LEG SWELLING: Primary | ICD-10-CM

## 2022-08-02 LAB
ALBUMIN SERPL BCP-MCNC: 3.9 G/DL (ref 3.5–5.2)
ALP SERPL-CCNC: 58 U/L (ref 55–135)
ALT SERPL W/O P-5'-P-CCNC: 16 U/L (ref 10–44)
AMMONIA PLAS-SCNC: 32 UMOL/L (ref 10–50)
ANION GAP SERPL CALC-SCNC: 10 MMOL/L (ref 8–16)
AST SERPL-CCNC: 21 U/L (ref 10–40)
BASOPHILS # BLD AUTO: 0.05 K/UL (ref 0–0.2)
BASOPHILS NFR BLD: 0.9 % (ref 0–1.9)
BILIRUB SERPL-MCNC: 0.2 MG/DL (ref 0.1–1)
BILIRUB UR QL STRIP: NEGATIVE
BNP SERPL-MCNC: 82 PG/ML (ref 0–99)
BUN SERPL-MCNC: 16 MG/DL (ref 8–23)
CALCIUM SERPL-MCNC: 9.4 MG/DL (ref 8.7–10.5)
CHLORIDE SERPL-SCNC: 105 MMOL/L (ref 95–110)
CLARITY UR: CLEAR
CO2 SERPL-SCNC: 25 MMOL/L (ref 23–29)
COLOR UR: YELLOW
CREAT SERPL-MCNC: 0.7 MG/DL (ref 0.5–1.4)
DIFFERENTIAL METHOD: ABNORMAL
EOSINOPHIL # BLD AUTO: 0.5 K/UL (ref 0–0.5)
EOSINOPHIL NFR BLD: 7.9 % (ref 0–8)
ERYTHROCYTE [DISTWIDTH] IN BLOOD BY AUTOMATED COUNT: 12.2 % (ref 11.5–14.5)
EST. GFR  (NO RACE VARIABLE): >60 ML/MIN/1.73 M^2
GLUCOSE SERPL-MCNC: 97 MG/DL (ref 70–110)
GLUCOSE UR QL STRIP: NEGATIVE
HCT VFR BLD AUTO: 42 % (ref 40–54)
HGB BLD-MCNC: 14.4 G/DL (ref 14–18)
HGB UR QL STRIP: NEGATIVE
IMM GRANULOCYTES # BLD AUTO: 0.02 K/UL (ref 0–0.04)
IMM GRANULOCYTES NFR BLD AUTO: 0.3 % (ref 0–0.5)
KETONES UR QL STRIP: NEGATIVE
LACTATE SERPL-SCNC: 1.3 MMOL/L (ref 0.5–2.2)
LEUKOCYTE ESTERASE UR QL STRIP: NEGATIVE
LYMPHOCYTES # BLD AUTO: 1.8 K/UL (ref 1–4.8)
LYMPHOCYTES NFR BLD: 30.8 % (ref 18–48)
MCH RBC QN AUTO: 31.5 PG (ref 27–31)
MCHC RBC AUTO-ENTMCNC: 34.3 G/DL (ref 32–36)
MCV RBC AUTO: 92 FL (ref 82–98)
MONOCYTES # BLD AUTO: 0.5 K/UL (ref 0.3–1)
MONOCYTES NFR BLD: 8.2 % (ref 4–15)
NEUTROPHILS # BLD AUTO: 3 K/UL (ref 1.8–7.7)
NEUTROPHILS NFR BLD: 51.9 % (ref 38–73)
NITRITE UR QL STRIP: NEGATIVE
NRBC BLD-RTO: 0 /100 WBC
PH UR STRIP: 7 [PH] (ref 5–8)
PHENYTOIN SERPL-MCNC: 16.6 UG/ML (ref 10–20)
PLATELET # BLD AUTO: 184 K/UL (ref 150–450)
PMV BLD AUTO: 9.2 FL (ref 9.2–12.9)
POTASSIUM SERPL-SCNC: 4.5 MMOL/L (ref 3.5–5.1)
PROT SERPL-MCNC: 7.5 G/DL (ref 6–8.4)
PROT UR QL STRIP: NEGATIVE
RBC # BLD AUTO: 4.57 M/UL (ref 4.6–6.2)
SODIUM SERPL-SCNC: 140 MMOL/L (ref 136–145)
SP GR UR STRIP: 1.01 (ref 1–1.03)
TROPONIN I SERPL DL<=0.01 NG/ML-MCNC: <0.006 NG/ML (ref 0–0.03)
URN SPEC COLLECT METH UR: NORMAL
UROBILINOGEN UR STRIP-ACNC: NEGATIVE EU/DL
WBC # BLD AUTO: 5.72 K/UL (ref 3.9–12.7)

## 2022-08-02 PROCEDURE — 85025 COMPLETE CBC W/AUTO DIFF WBC: CPT | Performed by: EMERGENCY MEDICINE

## 2022-08-02 PROCEDURE — 82140 ASSAY OF AMMONIA: CPT | Performed by: EMERGENCY MEDICINE

## 2022-08-02 PROCEDURE — 93010 EKG 12-LEAD: ICD-10-PCS | Mod: ,,, | Performed by: INTERNAL MEDICINE

## 2022-08-02 PROCEDURE — 93005 ELECTROCARDIOGRAM TRACING: CPT

## 2022-08-02 PROCEDURE — 99285 EMERGENCY DEPT VISIT HI MDM: CPT | Mod: 25

## 2022-08-02 PROCEDURE — 93010 ELECTROCARDIOGRAM REPORT: CPT | Mod: ,,, | Performed by: INTERNAL MEDICINE

## 2022-08-02 PROCEDURE — 84484 ASSAY OF TROPONIN QUANT: CPT | Performed by: EMERGENCY MEDICINE

## 2022-08-02 PROCEDURE — 83605 ASSAY OF LACTIC ACID: CPT | Performed by: EMERGENCY MEDICINE

## 2022-08-02 PROCEDURE — 80185 ASSAY OF PHENYTOIN TOTAL: CPT | Performed by: EMERGENCY MEDICINE

## 2022-08-02 PROCEDURE — 80177 DRUG SCRN QUAN LEVETIRACETAM: CPT | Performed by: EMERGENCY MEDICINE

## 2022-08-02 PROCEDURE — 36000 PLACE NEEDLE IN VEIN: CPT

## 2022-08-02 PROCEDURE — 80053 COMPREHEN METABOLIC PANEL: CPT | Performed by: EMERGENCY MEDICINE

## 2022-08-02 PROCEDURE — 83880 ASSAY OF NATRIURETIC PEPTIDE: CPT | Performed by: EMERGENCY MEDICINE

## 2022-08-02 PROCEDURE — 81003 URINALYSIS AUTO W/O SCOPE: CPT | Performed by: EMERGENCY MEDICINE

## 2022-08-02 NOTE — ED PROVIDER NOTES
"Encounter Date: 8/2/2022    SCRIBE #1 NOTE: I, Ilana Otoole, am scribing for, and in the presence of,  Ehsan Mendez MD. I have scribed the following portions of the note - Other sections scribed: HPI, ROS.       History     Chief Complaint   Patient presents with    Leg Swelling     Sent by Dr Byrd from [facility] for altered LOC (incr drowsiness), kiah leg swelling.      A 82 y.o. male with a pertinent PMHx of seizures, CAD, borderline DM, hypercholesteremia, HTN, and stroke, presents to the ED for evaluation of bilateral leg swelling that began "a long time ago". He was sent here by Dr. Byrd for further evaluation. Patient's sister reports associated symptoms of confusion, fatigue, and frequency. He recently switched from Phenobarbital to Keppra 3 months ago and his sister believes that this could be causing his symptoms because this is when his symptoms began. He ambulates with a cane. His last seizure was 8 months ago. He is compliant with Dilantin and Proscar. No other exacerbating or alleviating factors. Patient denies shortness of breath, chest pain, appetite changes, or any other associated symptoms.     The history is provided by the patient and a relative. A  was used ([id number]).     Review of patient's allergies indicates:  No Known Allergies  Past Medical History:   Diagnosis Date    Abdominal hernia     BPH (benign prostatic hyperplasia)     Coronary artery disease     Non-obstructive CAD on [facility], Sept 2019. Performed for NSTEMI.    Diabetes mellitus     sister states he is borderline    Dyslipidemia     Hypercholesteremia     Hypertension     Seizure disorder     Seizures     Sinus disorder     Stroke      Past Surgical History:   Procedure Laterality Date    KNEE SURGERY      LEFT HEART CATHETERIZATION Left 9/26/2019    Procedure: Left heart cath, radial. 11 am;  Surgeon: Zahra Roca MD;  Location: [facility] CATH LAB;  Service: Cardiology;  Laterality: " Left;    NASAL SINUS SURGERY      NOSE SURGERY       Family History   Problem Relation Age of Onset    Hypertension Brother     Hypertension Brother     Coronary artery disease Brother     Coronary artery disease Brother     Cancer Mother     Kidney disease Father     Heart disease Sister      Social History     Tobacco Use    Smoking status: Never Smoker    Smokeless tobacco: Never Used   Substance Use Topics    Alcohol use: No    Drug use: No     Review of Systems   Constitutional: Positive for fatigue. Negative for appetite change.   HENT: Negative.    Eyes: Negative.    Respiratory: Negative.  Negative for shortness of breath.    Cardiovascular: Negative.  Negative for chest pain.   Gastrointestinal: Negative.    Genitourinary: Positive for frequency.   Musculoskeletal: Negative.         (+) Bilateral leg swelling   Skin: Negative.    Neurological: Negative.    Psychiatric/Behavioral: Positive for confusion.       Physical Exam     Initial Vitals [08/02/22 1013]   BP Pulse Resp Temp SpO2   133/88 70 18 97.8 °F (36.6 °C) 96 %      MAP       --         Physical Exam    Nursing note and vitals reviewed.  Constitutional: He appears well-developed and well-nourished. He is not diaphoretic. No distress.   HENT:   Head: Normocephalic and atraumatic.   Nose: Nose normal.   Mouth/Throat: No oropharyngeal exudate.   Eyes: EOM are normal. Pupils are equal, round, and reactive to light.   Neck: Neck supple. No tracheal deviation present. No JVD present.   Normal range of motion.  Cardiovascular: Normal rate, regular rhythm, normal heart sounds and intact distal pulses.   Pulmonary/Chest: Breath sounds normal. No respiratory distress. He has no wheezes. He has no rhonchi. He has no rales.   Abdominal: Abdomen is soft. Bowel sounds are normal. He exhibits no distension. There is no abdominal tenderness. There is no rebound and no guarding.   Musculoskeletal:         General: Edema (2+ pitting bilateral lower  extremity.) present. No tenderness. Normal range of motion.      Cervical back: Normal range of motion and neck supple.     Neurological: He has normal strength. No cranial nerve deficit.   Slightly drowsy appearing, able to follow simple commands, oriented to self and place not to date/time.  Able to move all extremities grossly   Skin: Skin is warm and dry. Capillary refill takes less than 2 seconds. No rash noted. No erythema.         ED Course   Procedures  Labs Reviewed   CBC W/ AUTO DIFFERENTIAL - Abnormal; Notable for the following components:       Result Value    RBC 4.57 (*)     MCH 31.5 (*)     All other components within normal limits   COMPREHENSIVE METABOLIC PANEL   PHENYTOIN LEVEL, TOTAL   AMMONIA   LACTIC ACID, PLASMA   URINALYSIS, REFLEX TO URINE CULTURE    Narrative:     Specimen Source->Urine   TROPONIN I   B-TYPE NATRIURETIC PEPTIDE   LEVETIRACETAM  (KEPPRA) LEVEL        ECG Results          EKG 12-lead (Final result)  Result time 08/02/22 16:22:00    Final result by Interface, Lab In [facility] (08/02/22 16:22:00)                 Narrative:    Test Reason : R41.0,    Vent. Rate : 072 BPM     Atrial Rate : 072 BPM     P-R Int : 146 ms          QRS Dur : 110 ms      QT Int : 384 ms       P-R-T Axes : 000 -30 089 degrees     QTc Int : 420 ms    Normal sinus rhythm  Left axis deviation  Incomplete right bundle branch block  LVH with repolarization abnormality  Cannot rule out Septal infarct ,age undetermined  Abnormal ECG  When compared with ECG of 15-MAY-2021 15:04,  No significant change was found  Confirmed by Monty White MD ([id number]) on 8/2/2022 4:21:52 PM    Referred By: [email or web address]   SELF           Confirmed By:Monty White MD                            Imaging Results          X-Ray Chest 1 View (Final result)  Result time 08/02/22 12:52:48    Final result by Woodrow Anaya MD (08/02/22 12:52:48)                 Impression:      No detrimental change when compared with  "05/15/2021.    Persistent enlargement of the cardiomediastinal silhouette.      Electronically signed by: Woodrow Anaya MD  Date:    08/02/2022  Time:    12:52             Narrative:    EXAMINATION:  XR CHEST 1 VIEW    CLINICAL HISTORY:  Provided history is "  Other specified soft tissue disorders".    TECHNIQUE:  One view of the chest.    COMPARISON:  05/15/2021.    FINDINGS:  Cardiac wires overlie the chest.  Patient is slightly rotated.  Cardiomediastinal silhouette is enlarged and similar to the prior study.  No large focal consolidation.  No sizable pleural effusion.  No pneumothorax.                               CT Head Without Contrast (Final result)  Result time 08/02/22 11:12:50    Final result by Johnathan Heath MD (08/02/22 11:12:50)                 Impression:      No acute intracranial abnormalities identified.  No evidence for intracranial hemorrhage.    Atrophy and periventricular white matter ischemic changes commensurate with the patient's chronological age.    Encephalomalacia involving the temporal lobes, left greater than right, stable when compared to prior examinations.  This may be related to prior remote trauma or infarcts.    Paranasal sinus mucosal disease.      Electronically signed by: Johnathan Heath MD  Date:    08/02/2022  Time:    11:12             Narrative:    EXAMINATION:  CT HEAD WITHOUT CONTRAST    CLINICAL HISTORY:  Mental status change, unknown cause;    TECHNIQUE:  Low dose axial images were obtained through the head.  Coronal and sagittal reformations were also performed. Contrast was not administered.    COMPARISON:  05/15/2021.    FINDINGS:  Ventricles are enlarged and the sulci are prominent consistent with generalized atrophy commensurate with the patient's chronological age.  Once again, there is encephalomalacia involving the temporal lobes bilaterally, left greater than right, and this is likely related to prior remote trauma or infarct.  There is no evidence for " abnormal masses, mass effect, or midline shift.  No abnormal intra or extra-axial fluid collections are identified, specifically there is no evidence for intracranial hemorrhage.  There is mild mucosal thickening within the right frontal, ethmoid, maxillary, and anterior sphenoid sinuses.  There is a small effusion within the right mastoid air cells as before.  Bony calvarium is intact.                                 Medications - No data to display  Medical Decision Making:   History:   Old Medical Records: I decided to obtain old medical records.    MDM:    82-year-old male with past medical history as noted above presenting with leg swelling.  Physical exam as noted above.  ED workup notable for EKG- normal sinus rhythm rate of 72 beats per minute, incomplete right bundle-branch block present, LVH present,  MS, similar to prior EKG, no STEMI.  CT head no acute findings, encephalomalacia present temporal lobes, chest x-rays unremarkable, troponin negative, lactate 1.3, ammonia 32, BNP 82, CBC/CMP within normal limits, urinalysis unremarkable.  Patient presentation consistent with decreased mental status, reportedly has been going on for the last 3 months as noted by sister, reports also a change from his phenobarbital to Keppra at that time with ongoing seizure control as well.  Discussed with Neurology, Dr. Tineo, and that conceivably his decreased mental status is secondary to his change in antiepileptic medications which will need follow-up and further titration as he currently has good seizure control and if changed or decreased would likely cause a worsening consequence.  Otherwise completely unremarkable workup today, discussed via  with patient and sister at bedside regarding plan further care, need for follow-up with Neurology as well as likely causes for his decreased alertness.  Patient's lower extremity swelling likely secondary to dependent edema, no evidence of vascular congestion,  heart failure or JVD to suggest volume overload or indication for diuretic at this point.  Advised further symptomatic care compression stockings and elevation over the next couple of days to assist with his swelling.  At this point time based on physical exam evaluation not suspect acute CVA/TIA, acute mi/ACS, arrhythmia, electrolyte abnormality, sepsis, urinary tract infection, or any further surgical or medical emergency. Discussed diagnosis and further treatment with patient and sister at bedside, including f/u.  Return precautions given and all questions answered.  Patient and sister in understanding of plan.  Pt discharged to home improved and stable.          Scribe Attestation:   Scribe #1: I performed the above scribed service and the documentation accurately describes the services I performed. I attest to the accuracy of the note.                 Clinical Impression:   Final diagnoses:  [R41.0] Confusion  [M79.89] Leg swelling (Primary)  [R40.0] Drowsiness  [G40.909] Seizure disorder          ED Disposition Condition    Discharge Stable        I, Ehsan Mendez M.D., personally performed the services described in this documentation. All medical record entries made by the scribe were at my direction and in my presence. I have reviewed the chart and agree that the record reflects my personal performance and is accurate and complete.       Ehsan Mendez MD  08/03/22 0155

## 2022-08-02 NOTE — ED TRIAGE NOTES
Pt comes in from PCP for bilateral leg swelling and drowsiness. Pt has edema in both lower legs. Skin WDI, resp EU, color WDL. VSS at this time. Family at bedside

## 2022-08-05 LAB — LEVETIRACETAM SERPL-MCNC: 14.7 UG/ML (ref 3–60)

## 2023-06-27 NOTE — PROGRESS NOTES
[facility] Hospital Medicine  Telemedicine Progress Note        Patient Name: Vern Lr  MRN: [id number]  Patient Class: IP- Inpatient   Admission Date: 4/9/2020  Length of Stay: 25 days  Attending Physician: Nagi Forrest MD  Primary Care Provider: Lucio Byrd MD    [facility] Team: [facility] VIRTUAL TEAM 7 Nagi Forrest MD  Start time:12:00pm  Chief complaint: none  The patient location is: Inpt Bed  Present with the patient at the time of the telemed/virtual assessment: RN with interpretor via audio   End time:  12:10pm    Total time spent with patient: 10 minutes    The attending portion of this evaluation, treatment, and documentation was performed per Nagi Forrest MD via audio only.      Subjective:     Principal Problem:COVID-19 virus infection        HPI:  80/M admitted on 4/9/20 for falls and PNA 2/2 COVID infection. Positive testing in ED (4/9/20). Patient speaks only [place] and language line used to communicate. Spoke to patient's sister who admits patient is hard of hearing and likely has mild dementia so history is somewhat limited as a result. Patient notes constipation without bm in a week. Sister states he often complains of this but patient actually was brought in for falls. He fell twice in past couple of days. She admits he has looked fatigued recently and she is actually recovering from COVID infection herself. He normally ambulates with assistance of cane. After first fall he was brought to [facility] and went home. Second fall while in bathroom yesterday and was subsequently brought here to [facility]. Seizure hx but sister did not see anything to suggest seizure and patient has been on meds. Patient has no recent issues with passing out. Hx of NSTEMI with non-obstructive CAD in Sept 2019. Largest occulusion noted on LHC was 20% at that time. Has been on asa, Plavix, and statin since. Normally walks with cane and was actually walking yesterday without issue  per sister. Patient admits to some shortness of breath and cough as well as nasal congestion.    Overview/Hospital Course:  The patient was placed on supplemental oxygen, 5 L NC.  Azithromycin, ceftriaxone, and hydroxychloroquine, and tamsulosin were started per protocol.  His oxygenation gradually improved.  On hospital day 5, he was transferred to this hospital due to capacity issues at [facility].  Here, oxygenation continued to improve, but the patient has continued to have constitutional symptoms.   Deescalated to room air. Pending SNF placement Per PT/OT recs.   COVID-19 positive 4/24. Medically stable pending placement at this time.     Interval History: Remains HDS and afebrile overnight. No overnight events. No new concerns. Stable. Urinating well. Good BM. Asked for shower, okay to shower with assistance. Medically stable for discharge, pending.       Review of Systems Per RN documentation and patient via interpretor   Gen - weakness  CV - no cp or palpitations   Lung - dyspnea on exertion   Abd - no pain, diarrhea, or constipation   Skin - no rashes    Objective:     Vital Signs (Most Recent):  Temp: 98.8 °F (37.1 °C) (04/30/20 0713)  Pulse: 80 (04/30/20 1120)  Resp: (!) 26 (04/30/20 0747)  BP: (!) 156/87 (04/30/20 0747)  SpO2: 96 % (04/30/20 1120) Vital Signs (24h Range):  Temp:  [97.9 °F (36.6 °C)-98.8 °F (37.1 °C)] 98.8 °F (37.1 °C)  Pulse:  [] 80  Resp:  [16-26] 26  SpO2:  [96 %-98 %] 96 %  BP: ()/(70-87) 156/87     Weight: 82 kg (180 lb 12.4 oz)  Body mass index is 30.08 kg/m².    Intake/Output Summary (Last 24 hours) at 4/30/2020 1329  Last data filed at 4/29/2020 1600  Gross per 24 hour   Intake 240 ml   Output 400 ml   Net -160 ml      Physical Exam Per RN documentation  Gen - NAD  CV - RRR no MRG   Lung - CTAB  Abd - soft NT ND  Neuro - Aox2-3 (not aware of specific date)  Skin - no new rashes      Assessment/Plan:      * COVID-19 virus infection  PNA with COVID + on 4/9. Presenting  symptoms of weakness, falls, mild sob.   Saturating well on RA initially, put on 5L NC today after desat to 70's. CXR b/l infiltrates, CRP over 200 and mildly elevated trop on admission.   COVID protocol with isolation and 02 supplementation. Albuterol inhaler prn. Continuing course of HCQ, Azithro and Rocephin.   Oxygenation improving  Awaiting SNF placement - positive covid test 4/23      Coronary artery disease involving native coronary artery of native heart without angina pectoris  [facility] with non-obstructive dz, Sept 2019. On home asa and statin plus Plavix for NSTEMI at that time.   Mild trop elevation in setting of acute illness stabilized. Doubt ACS. Continuing home meds.    Fall  2 falls in setting of COVID infection and baseline gait instability requiring cane.  CT head and abdomen/pelvis show no new fractures. Nothing on exam to specify areas of fracture concern. Does have hx of osteoporosis and on anti-epileptic so will have very low threshold to add additional imaging.      Constipation  Continue daily laxatives while inpatient.      Elevated troponin  Hx of NSTEMI with [facility] showing non-obstructive CAD in Sept 2019.   Mild, stable. Likely related to acute illness. ACS unlikely. Cards has evaluated and signed off. Appreciate reccs.  On asa, Plavix, statin at home; will continue inpatient.    Seizure disorder  Controlled. Continue home Dilantin and valproic acid.      Dyslipidemia  Continue home statin.    BPH (benign prostatic hyperplasia)  Controlled. Continue home Flomax.      Essential hypertension, benign  Controlled. Continue home meds.      VTE Risk Mitigation (From admission, onward)         Ordered     enoxaparin injection 40 mg  Daily      04/09/20 0909     IP VTE HIGH RISK PATIENT  Once      04/09/20 0914     Place sequential compression device  Until discontinued      04/09/20 0914                Dispo - med stable pending placement (SNF)  Code - DNR      Nagi Forrest MD  Department of  Hospital Medicine [facility]   Unable to assess

## 2024-05-08 ENCOUNTER — HOSPITAL ENCOUNTER (EMERGENCY)
Facility: HOSPITAL | Age: 84
Discharge: HOME | End: 2024-05-08
Attending: STUDENT IN AN ORGANIZED HEALTH CARE EDUCATION/TRAINING PROGRAM
Payer: MEDICAID

## 2024-05-08 VITALS
SYSTOLIC BLOOD PRESSURE: 107 MMHG | TEMPERATURE: 99 F | DIASTOLIC BLOOD PRESSURE: 53 MMHG | OXYGEN SATURATION: 99 % | RESPIRATION RATE: 17 BRPM | HEART RATE: 67 BPM

## 2024-05-08 DIAGNOSIS — H53.9 VISUAL DISTURBANCE: ICD-10-CM

## 2024-05-08 DIAGNOSIS — I10 HYPERTENSION: ICD-10-CM

## 2024-05-08 DIAGNOSIS — H57.89 IRRITATION OF LEFT EYE: Primary | ICD-10-CM

## 2024-05-08 DIAGNOSIS — I63.89 OTHER CEREBRAL INFARCTION: ICD-10-CM

## 2024-05-08 LAB
ALBUMIN SERPL BCP-MCNC: 3.8 G/DL (ref 3.5–5.2)
ALP SERPL-CCNC: 58 U/L (ref 55–135)
ALT SERPL W/O P-5'-P-CCNC: 28 U/L (ref 10–44)
ANION GAP SERPL CALC-SCNC: 10 MMOL/L (ref 8–16)
AST SERPL-CCNC: 28 U/L (ref 10–40)
BASOPHILS # BLD AUTO: 0.04 K/UL (ref 0–0.2)
BASOPHILS NFR BLD: 0.5 % (ref 0–1.9)
BILIRUB SERPL-MCNC: 0.8 MG/DL (ref 0.1–1)
BUN SERPL-MCNC: 25 MG/DL (ref 8–23)
CALCIUM SERPL-MCNC: 9.3 MG/DL (ref 8.7–10.5)
CHLORIDE SERPL-SCNC: 104 MMOL/L (ref 95–110)
CHOLEST SERPL-MCNC: 138 MG/DL (ref 120–199)
CHOLEST/HDLC SERPL: 3.9 {RATIO} (ref 2–5)
CO2 SERPL-SCNC: 22 MMOL/L (ref 23–29)
CREAT SERPL-MCNC: 0.9 MG/DL (ref 0.5–1.4)
DIFFERENTIAL METHOD BLD: ABNORMAL
EOSINOPHIL # BLD AUTO: 0.2 K/UL (ref 0–0.5)
EOSINOPHIL NFR BLD: 2.1 % (ref 0–8)
ERYTHROCYTE [DISTWIDTH] IN BLOOD BY AUTOMATED COUNT: 13.8 % (ref 11.5–14.5)
EST. GFR  (NO RACE VARIABLE): >60 ML/MIN/1.73 M^2
GLUCOSE SERPL-MCNC: 101 MG/DL (ref 70–110)
HCT VFR BLD AUTO: 40.4 % (ref 40–54)
HDLC SERPL-MCNC: 35 MG/DL (ref 40–75)
HDLC SERPL: 25.4 % (ref 20–50)
HGB BLD-MCNC: 12.7 G/DL (ref 14–18)
IMM GRANULOCYTES # BLD AUTO: 0.02 K/UL (ref 0–0.04)
IMM GRANULOCYTES NFR BLD AUTO: 0.3 % (ref 0–0.5)
INR PPP: 1.3 (ref 0.8–1.2)
LDLC SERPL CALC-MCNC: 75.6 MG/DL (ref 63–159)
LYMPHOCYTES # BLD AUTO: 1 K/UL (ref 1–4.8)
LYMPHOCYTES NFR BLD: 14.1 % (ref 18–48)
MCH RBC QN AUTO: 30.3 PG (ref 27–31)
MCHC RBC AUTO-ENTMCNC: 31.4 G/DL (ref 32–36)
MCV RBC AUTO: 96 FL (ref 82–98)
MONOCYTES # BLD AUTO: 0.8 K/UL (ref 0.3–1)
MONOCYTES NFR BLD: 10.7 % (ref 4–15)
NEUTROPHILS # BLD AUTO: 5.3 K/UL (ref 1.8–7.7)
NEUTROPHILS NFR BLD: 72.3 % (ref 38–73)
NONHDLC SERPL-MCNC: 103 MG/DL
NRBC BLD-RTO: 0 /100 WBC
OHS QRS DURATION: 120 MS
OHS QTC CALCULATION: 410 MS
PLATELET # BLD AUTO: 195 K/UL (ref 150–450)
PMV BLD AUTO: 10 FL (ref 9.2–12.9)
POTASSIUM SERPL-SCNC: 4.3 MMOL/L (ref 3.5–5.1)
PROT SERPL-MCNC: 7.9 G/DL (ref 6–8.4)
PROTHROMBIN TIME: 13.6 SEC (ref 9–12.5)
RBC # BLD AUTO: 4.19 M/UL (ref 4.6–6.2)
SODIUM SERPL-SCNC: 136 MMOL/L (ref 136–145)
TRIGL SERPL-MCNC: 137 MG/DL (ref 30–150)
TROPONIN I SERPL DL<=0.01 NG/ML-MCNC: 0.01 NG/ML (ref 0–0.03)
TSH SERPL DL<=0.005 MIU/L-ACNC: 0.59 UIU/ML (ref 0.4–4)
WBC # BLD AUTO: 7.31 K/UL (ref 3.9–12.7)

## 2024-05-08 PROCEDURE — 25500020 PHARM REV CODE 255: Performed by: EMERGENCY MEDICINE

## 2024-05-08 PROCEDURE — 85610 PROTHROMBIN TIME: CPT | Performed by: PHYSICIAN ASSISTANT

## 2024-05-08 PROCEDURE — 80061 LIPID PANEL: CPT | Performed by: PHYSICIAN ASSISTANT

## 2024-05-08 PROCEDURE — 93005 ELECTROCARDIOGRAM TRACING: CPT

## 2024-05-08 PROCEDURE — 99284 EMERGENCY DEPT VISIT MOD MDM: CPT | Mod: 25

## 2024-05-08 PROCEDURE — 25000003 PHARM REV CODE 250: Performed by: PHYSICIAN ASSISTANT

## 2024-05-08 PROCEDURE — 96361 HYDRATE IV INFUSION ADD-ON: CPT

## 2024-05-08 PROCEDURE — 93010 ELECTROCARDIOGRAM REPORT: CPT | Mod: ,,, | Performed by: INTERNAL MEDICINE

## 2024-05-08 PROCEDURE — 80053 COMPREHEN METABOLIC PANEL: CPT | Performed by: PHYSICIAN ASSISTANT

## 2024-05-08 PROCEDURE — 84443 ASSAY THYROID STIM HORMONE: CPT | Performed by: PHYSICIAN ASSISTANT

## 2024-05-08 PROCEDURE — 84484 ASSAY OF TROPONIN QUANT: CPT | Performed by: PHYSICIAN ASSISTANT

## 2024-05-08 PROCEDURE — 99283 EMERGENCY DEPT VISIT LOW MDM: CPT | Mod: ,,, | Performed by: INTERNAL MEDICINE

## 2024-05-08 PROCEDURE — 85025 COMPLETE CBC W/AUTO DIFF WBC: CPT | Performed by: PHYSICIAN ASSISTANT

## 2024-05-08 PROCEDURE — 96360 HYDRATION IV INFUSION INIT: CPT

## 2024-05-08 RX ORDER — PROPARACAINE HYDROCHLORIDE 5 MG/ML
1 SOLUTION/ DROPS OPHTHALMIC
Status: COMPLETED | OUTPATIENT
Start: 2024-05-08 | End: 2024-05-08

## 2024-05-08 RX ADMIN — SODIUM CHLORIDE 500 ML: 9 INJECTION, SOLUTION INTRAVENOUS at 10:05

## 2024-05-08 RX ADMIN — IOHEXOL 75 ML: 350 INJECTION, SOLUTION INTRAVENOUS at 12:05

## 2024-05-08 RX ADMIN — FLUORESCEIN SODIUM 1 EACH: 1 STRIP OPHTHALMIC at 09:05

## 2024-05-08 RX ADMIN — PROPARACAINE HYDROCHLORIDE 1 DROP: 5 SOLUTION/ DROPS OPHTHALMIC at 09:05

## 2024-05-08 NOTE — CONSULTS
[facility] Emergency Dept  Neurology Consult Note    Patient Name: Vern Lr  Age: 84 y.o.  MRN: [id number]  Admission Date: 5/8/2024  Reason for consult:  eye abnormality    CC: ocular irritation    HPI:   Vern Lr is a 84 y.o. year old male with past medical history of diabetes, hyperlipidemia, hypertension, seizures, history of stroke presenting to the ED for vision abnormality.  Patient is a very difficult historian.   was used for this encounter.  Patient is here with his neither today who does not speak English either.     Patient reports feeling uneasy in the left eye for many days now.  He states that he has a ringing sensation in the corner of the eye and has tearing associated with it.  Repeatedly states that occasionally there appears something is in front of his eye which interferes with the vision.  Otherwise denies any field defect.  Neighbor states that patient had upper respiratory symptoms yesterday and therefore came to get checked out as well as vision evaluated.  Denies abnormality with speech or numbness and weakness in arms or legs.  Denies headaches.  Denies dizziness or lightheadedness    In the ED, BP 99/54.  CTA head and neck was obtained which did not show acute abnormality.  Neurology was consulted for evaluation of the same.         Histories:  Allergies:  Patient has no known allergies.    Current Medications:    No current facility-administered medications for this encounter.     Current Outpatient Medications   Medication Sig Dispense Refill    albuterol (PROVENTIL HFA) 90 mcg/actuation inhaler Inhale 2 puffs into the lungs every 8 (eight) hours while awake. Rescue 6.7 g 1    alendronate (FOSAMAX) 70 MG tablet Take 1 tablet (70 mg total) by mouth every 7 days. 12 tablet 4    aspirin (ECOTRIN) 81 MG EC tablet Take 81 mg by mouth once daily.      atorvastatin (LIPITOR) 40 MG tablet Take 1 tablet (40 mg total) by mouth once daily. 90 tablet 4    bisacodyL  (DULCOLAX) 10 mg Supp Place 1 suppository (10 mg total) rectally daily as needed.  0    calcium citrate-vitamin D3 (CITRACAL + D MAXIMUM) 315-250 mg-unit Tab Take 315 mg by mouth 2 (two) times daily. 180 tablet 6    clopidogreL (PLAVIX) 75 mg tablet Take 1 tablet (75 mg total) by mouth once daily. 30 tablet 3    finasteride (PROSCAR) 5 mg tablet Take 1 tablet (5 mg total) by mouth once daily. 90 tablet 4    fluticasone (FLONASE) 50 mcg/actuation nasal spray SHAKE WELL AND U 2 SPRAYS IEN BID  3    HYDROcodone-acetaminophen (NORCO) 5-325 mg per tablet Take 1 tablet by mouth every 6 (six) hours as needed for Pain. 15 tablet 0    ketorolac 0.5% (ACULAR) 0.5 % Drop Place 1 drop into the right eye 4 (four) times daily.  0    LIDOcaine (LIDODERM) 5 % Place 1 patch onto the skin once daily. Remove & Discard patch within 12 hours or as directed by MD 15 patch 0    lisinopril 10 MG tablet Take 1 tablet (10 mg total) by mouth once daily. 90 tablet 4    metoprolol tartrate (LOPRESSOR) 50 MG tablet Take 1 tablet (50 mg total) by mouth once daily. 90 tablet 4    montelukast (SINGULAIR) 10 mg tablet Take 10 mg by mouth every evening.      oxyCODONE-acetaminophen (PERCOCET) 5-325 mg per tablet Take 1 tablet by mouth every 6 (six) hours as needed for Pain. 12 tablet 0    phenobarbital (LUMINAL) 97.2 MG tablet Take 1 tablet (97.2 mg total) by mouth once daily. 90 tablet 4    phenytoin (DILANTIN) 100 MG ER capsule 2 capsules po q am, 2 capsules po q hs 360 capsule 4    senna-docusate 8.6-50 mg (PERICOLACE) 8.6-50 mg per tablet Take 1 tablet by mouth 2 (two) times daily. 60 tablet 3    tamsulosin (FLOMAX) 0.4 mg Cp24 Take 1 capsule (0.4 mg total) by mouth once daily. 90 capsule 4       Medical:   Past Medical History:   Diagnosis Date    Abdominal hernia     BPH (benign prostatic hyperplasia)     Coronary artery disease     Non-obstructive CAD on [facility], Sept 2019. Performed for NSTEMI.    Diabetes mellitus     sister states he is  borderline    Dyslipidemia     Hypercholesteremia     Hypertension     Seizure disorder     Seizures     Sinus disorder     Stroke         Surgeries:   Past Surgical History:   Procedure Laterality Date    KNEE SURGERY      LEFT HEART CATHETERIZATION Left 9/26/2019    Procedure: Left heart cath, radial. 11 am;  Surgeon: Zahra Roca MD;  Location: [facility] CATH LAB;  Service: Cardiology;  Laterality: Left;    NASAL SINUS SURGERY      NOSE SURGERY          Family:   Family History   Problem Relation Name Age of Onset    Hypertension Brother      Hypertension Brother      Coronary artery disease Brother      Coronary artery disease Brother      Cancer Mother      Kidney disease Father      Heart disease Sister         Tobacco Use    Smoking status: Never    Smokeless tobacco: Never   Substance and Sexual Activity    Alcohol use: No    Drug use: No    Sexual activity: Not Currently         Physical Exam:     Physical Examination  BP (!) 105/50   Pulse (!) 57   Temp 98.9 °F (37.2 °C) (Oral)   Resp 16   SpO2 100%   There is no height or weight on file to calculate BMI.    Neurological Exam  Mental Status:   Alert and oriented to name, date, location    CN:   II, III, IV, VI: PERRL, EOMI, no nystagmus, no visual field deficits, no conjunctival redness or tearing, fundus not visualized due to inadequate dilation.V: intact to fine touch, temperature, pain.VII: symmetrical facial movement, nice smile, no drooping.XII: tongue midline       Motor:   5/5 in all 4 extremities:                Reflexes:   No Clonus, down going toes b/l.    Sensation: on both UEs and LEs    Light Touch: Normal    Coordination:    Tremor: Absent    Gait:    Not tested    Significant Labs:   Recent Lab Results         05/08/24  1007   05/08/24  0959   05/08/24  0944        Albumin     3.8       ALP     58       ALT     28       Anion Gap     10       AST     28       Baso # 0.04           Basophil % 0.5           BILIRUBIN TOTAL     0.8  Comment:  For infants and newborns, interpretation of results should be based  on gestational age, weight and in agreement with clinical  observations.    Premature Infant recommended reference ranges:  Up to 24 hours.............<8.0 mg/dL  Up to 48 hours............<12.0 mg/dL  3-5 days..................<15.0 mg/dL  6-29 days.................<15.0 mg/dL         BUN     25       Calcium     9.3       Chloride     104       Cholesterol Total     138  Comment: The National Cholesterol Education Program (NCEP) has set the  following guidelines (reference ranges) for Cholesterol:  Optimal.....................<200 mg/dL  Borderline High.............200-239 mg/dL  High........................> or = 240 mg/dL         CO2     22       Creatinine     0.9       Differential Method Automated           eGFR     >60       Eos # 0.2           Eos % 2.1           Glucose     101       Gran # (ANC) 5.3           Gran % 72.3           HDL     35  Comment: The National Cholesterol Education Program (NCEP) has set the  following guidelines (reference values) for HDL Cholesterol:  Low...............<40 mg/dL  Optimal...........>60 mg/dL         HDL/Cholesterol Ratio     25.4       Hematocrit 40.4           Hemoglobin 12.7           Immature Grans (Abs) 0.02  Comment: Mild elevation in immature granulocytes is non specific and   can be seen in a variety of conditions including stress response,   acute inflammation, trauma and pregnancy. Correlation with other   laboratory and clinical findings is essential.             Immature Granulocytes 0.3           INR 1.3  Comment: Coumadin Therapy:  2.0 - 3.0 for INR for all indicators except mechanical heart valves  and antiphospholipid syndromes which should use 2.5 - 3.5.             LDL Cholesterol     75.6  Comment: The National Cholesterol Education Program (NCEP) has set the  following guidelines (reference values) for LDL Cholesterol:  Optimal.......................<130 mg/dL  Borderline  High...............130-159 mg/dL  High..........................160-189 mg/dL  Very High.....................>190 mg/dL         Lymph # 1.0           Lymph % 14.1           MCH 30.3           MCHC 31.4           MCV 96           Mono # 0.8           Mono % 10.7           MPV 10.0           Non-HDL Cholesterol     103  Comment: Risk category and Non-HDL cholesterol goals:  Coronary heart disease (CHD)or equivalent (10-year risk of CHD >20%):  Non-HDL cholesterol goal     <130 mg/dL  Two or more CHD risk factors and 10-year risk of CHD <= 20%:  Non-HDL cholesterol goal     <160 mg/dL  0 to 1 CHD risk factor:  Non-HDL cholesterol goal     <190 mg/dL         nRBC 0           QRS Duration   120         [facility] QTC Calculation   410         Platelet Count 195           Potassium     4.3  Comment: Specimen slightly hemolyzed       PROTEIN TOTAL     7.9       PT 13.6           RBC 4.19           RDW 13.8           Sodium     136       Total Cholesterol/HDL Ratio     3.9       Triglycerides     137  Comment: The National Cholesterol Education Program (NCEP) has set the  following guidelines (reference values) for triglycerides:  Normal......................<150 mg/dL  Borderline High.............150-199 mg/dL  High........................200-499 mg/dL         Troponin I     0.013  Comment: The reference interval for Troponin I represents the 99th percentile   cutoff   for our facility and is consistent with 3rd generation assay   performance.         TSH     0.586       WBC 7.31                   Significant Imaging:   MRI brain without contrast:  Motion degraded no acute infarct noted      Assessment and Plan:   84 y.o. year old male with past medical history of diabetes, hyperlipidemia, hypertension, seizures, history of stroke presenting to the ED for irritation in eye.  Prickling sensation with tearing and occasional vision blurriness in the left eye.  No visual field deficits on exam.  MRI negative for new infarct.  I is  not acutely inflamed, no redness in the conjunctiva or swelling.  Patient is stable for non urgent evaluation    Plan:   - referral to Ophthalmology as outpatient    Thank you for your consult. I will sign off. Please contact us if you have any additional questions.    Time spent on this encounter: 30 minutes. This includes face to face time and non-face to face time preparing to see the patient (eg, review of tests), obtaining and/or reviewing separately obtained history, documenting clinical information in the electronic or other health record, independently interpreting results and communicating results to the patient/family/caregiver, or care coordinator.     Kaity Cooper MD  Neurology  [facility]

## 2024-05-08 NOTE — ED PROVIDER NOTES
"Encounter Date: 5/8/2024    SCRIBE #1 NOTE: I, Maya Aviles, am scribing for, and in the presence of,  Susanne Fam MD.       History     Chief Complaint   Patient presents with    Eye Problem     Pt to ER with reports of left eye drainage and irritation x few days. Pt denies visual changes      Mr. Vern Lr presents to the ED from [facility] with left eye irritation. Patient reports he has had "issues for many years," but states he came today because he could not take it anymore. States he is able to see out of both eyes, but that it "feel like something is poking my eye." Patient is very difficult to understand per the , and there are no independent historians to refer to for further history. Patient also is pointing to her left ear as if that is bothering him as well. No other exacerbating or alleviating factors.     The history is provided by the patient. A  was used (Ella, #[id number]).     Review of patient's allergies indicates:  No Known Allergies  Past Medical History:   Diagnosis Date    Abdominal hernia     BPH (benign prostatic hyperplasia)     Coronary artery disease     Non-obstructive CAD on [facility], Sept 2019. Performed for NSTEMI.    Diabetes mellitus     sister states he is borderline    Dyslipidemia     Hypercholesteremia     Hypertension     Seizure disorder     Seizures     Sinus disorder     Stroke      Past Surgical History:   Procedure Laterality Date    KNEE SURGERY      LEFT HEART CATHETERIZATION Left 9/26/2019    Procedure: Left heart cath, radial. 11 am;  Surgeon: Zahra Roca MD;  Location: [facility] CATH LAB;  Service: Cardiology;  Laterality: Left;    NASAL SINUS SURGERY      NOSE SURGERY       Family History   Problem Relation Name Age of Onset    Hypertension Brother      Hypertension Brother      Coronary artery disease Brother      Coronary artery disease Brother      Cancer Mother      Kidney disease Father      Heart disease Sister       Social " History     Tobacco Use    Smoking status: Never    Smokeless tobacco: Never   Substance Use Topics    Alcohol use: No    Drug use: No     Review of Systems   Eyes:  Positive for visual disturbance (left).   All other systems reviewed and are negative.      Physical Exam     Initial Vitals [05/08/24 0810]   BP Pulse Resp Temp SpO2   (!) 99/54 60 18 98.9 °F (37.2 °C) 97 %      MAP       --         Physical Exam    Nursing note and vitals reviewed.  Constitutional:   BURT, agree w prior PE performed by [facility]. Lucid and linear.    Eyes: Conjunctivae and EOM are normal.   Neck:   Normal range of motion.  Pulmonary/Chest: No respiratory distress.   Musculoskeletal:         General: No edema. Normal range of motion.      Cervical back: Normal range of motion.     Neurological: He is alert and oriented to person, place, and time. GCS score is 15. GCS eye subscore is 4. GCS verbal subscore is 5. GCS motor subscore is 6.   Skin: Skin is warm. Capillary refill takes less than 2 seconds. No rash noted.   Psychiatric: He has a normal mood and affect. Thought content normal.         ED Course   Procedures  Labs Reviewed   COMPREHENSIVE METABOLIC PANEL - Abnormal; Notable for the following components:       Result Value    CO2 22 (*)     BUN 25 (*)     All other components within normal limits   LIPID PANEL - Abnormal; Notable for the following components:    HDL 35 (*)     All other components within normal limits   CBC W/ AUTO DIFFERENTIAL - Abnormal; Notable for the following components:    RBC 4.19 (*)     Hemoglobin 12.7 (*)     MCHC 31.4 (*)     Lymph % 14.1 (*)     All other components within normal limits   PROTIME-INR - Abnormal; Notable for the following components:    Prothrombin Time 13.6 (*)     INR 1.3 (*)     All other components within normal limits   TSH   TROPONIN I        ECG Results              ECG 12 lead (Final result)        Collection Time Result Time QRS Duration OHS QTC Calculation    05/08/24  09:59:33 05/08/24 11:55:43 120 410                     Final result by Interface, Lab In [facility] (05/08/24 11:55:51)                   Narrative:    Test Reason : R29.818,    Vent. Rate : 060 BPM     Atrial Rate : 060 BPM     P-R Int : 130 ms          QRS Dur : 120 ms      QT Int : 410 ms       P-R-T Axes : 076 -21 066 degrees     QTc Int : 410 ms    Normal sinus rhythm  LVH with QRS widening and repolarization abnormality  Abnormal ECG  When compared with ECG of 02-AUG-2022 11:14,  No significant change was found  Confirmed by Yasmani Vargas MD ([id number]) on 5/8/2024 11:55:41 AM    Referred By: [email or web address]   SELF           Confirmed By:Yasmani Vargas MD                                  Imaging Results              MRI Brain Without Contrast (Final result)  Result time 05/08/24 19:59:13      Final result by David Delgado MD (05/08/24 19:59:13)                   Impression:      1. Significantly limited examination secondary to extensive motion artifact, no convincing major vascular territory infarct or large hemorrhage.  2. Sequela of chronic microvascular ischemic change and senescent change.  3. Sinus disease.      Electronically signed by: David Delgado MD  Date:    05/08/2024  Time:    19:59               Narrative:    EXAMINATION:  MRI BRAIN WITHOUT CONTRAST    CLINICAL HISTORY:  Stroke, follow up; Other specified disorders of eye and adnexa    TECHNIQUE:  Multiplanar multisequence MR imaging of the brain was performed without contrast.    COMPARISON:  CTA head and neck 05/08/2024    FINDINGS:  There is extensive motion artifact.    There is no intracranial mass, or acute hemorrhage. There is no restricted diffusion to suggest acute ischemia.  There are a few scattered foci of T2/FLAIR signal abnormality within the supratentorial periventricular white matter suggesting sequela of chronic microvascular ischemic change.  There is no hydrocephalus. There are no significant extra-axial or extracranial  abnormalities.    The globes, orbits, pituitary gland, pineal gland and craniocervical junction are normal in configuration.  There is mucous membrane thickening and fluid within the right maxillary sinus.  There is fluid within the bilateral maxillary sinuses.  The major vascular flow voids are patent.                                       X-Ray Chest AP Portable (Final result)  Result time 05/08/24 17:51:10      Final result by Yaron Brunson MD (05/08/24 17:51:10)                   Impression:      Stable cardiomegaly.  No evidence of failure.      Electronically signed by: Yaron Brunson MD  Date:    05/08/2024  Time:    17:51               Narrative:    EXAMINATION:  XR CHEST AP PORTABLE    CLINICAL HISTORY:  Essential (primary) hypertension    TECHNIQUE:  Single frontal view of the chest was performed.    COMPARISON:  08/02/2022.    FINDINGS:  Monitoring EKG leads are present.  The trachea is unremarkable.  There are calcifications of the aortic knob.  The cardiomediastinal silhouette is enlarged.  There is no evidence of free air beneath the hemidiaphragms.  There are no pleural effusions.  There is no evidence of a pneumothorax.  There is no evidence of pneumomediastinum.  No airspace opacity is present.  There are degenerative changes in the osseous structures.                                       X-Ray Abdomen AP 1 View (KUB) (Final result)  Result time 05/08/24 17:50:26      Final result by Emile Robb MD (05/08/24 17:50:26)                   Impression:      Stable and negative abdomen with slight decrease gaseous distension of bowel.      Electronically signed by: Emile Robb  Date:    05/08/2024  Time:    17:50               Narrative:    EXAMINATION:  XR ABDOMEN AP 1 VIEW    CLINICAL HISTORY:  Essential (primary) hypertension    TECHNIQUE:  AP View(s) of the abdomen was performed.    COMPARISON:  None    FINDINGS:  Marked spondylosis throughout the imaged thoracic and lumbar spine.  The  bowel gas pattern is not unusual.  There is no evidence of free air, organomegaly or mass.                                       CTA Head and Neck (xpd) (Final result)  Result time 05/08/24 12:14:00      Final result by Jason Cole MD (05/08/24 12:14:00)                   Impression:      No acute intracranial pathology.    No high-grade stenosis or major vessel occlusion.    Mild calcific atherosclerosis about the left greater than right carotid bulbs without evidence for significant stenosis per NASCET criteria.    Focal encephalomalacia about the bilateral temporal lobes, similar from comparison imaging.  Additional sequela of chronic microvascular ischemic change.      Electronically signed by: Jason Cole  Date:    05/08/2024  Time:    12:14               Narrative:    EXAMINATION:  CTA HEAD AND NECK (XPD)    CLINICAL HISTORY:  Neuro deficit, acute, stroke suspected;Stroke/TIA, determine embolic source;    TECHNIQUE:  Non contrast low dose axial images were obtained through the head. CT angiogram was performed from the level of the flor to the top of the head following the IV administration of 75mL of Omnipaque 350.   Sagittal and coronal reconstructions and maximum intensity projection reconstructions were performed. Arterial stenosis percentages are based on NASCET measurement criteria.    COMPARISON:  CT head without contrast 03/25/2024, MRI brain without contrast 07/03/2023    FINDINGS:  Intracranial Compartment:    Mild prominence of the ventricles with compensatory enlargement of the sulci, in keeping with central volume loss.  No extra-axial blood or fluid collections.    Focal encephalomalacia about the bilateral temporal lobes, unchanged.  Scattered regions of subcortical and periventricular hypoattenuation, overall nonspecific, but can be seen in the setting of microvascular ischemic change.  No parenchymal mass, hemorrhage, edema, or major vascular distribution infarct.    Skull/Extracranial  Contents (limited evaluation): No fracture. Patchy mucosal thickening about the paranasal sinuses.  Mastoid air cells are essentially clear.    Patchy heterogeneous airspace opacities about the right lung.  No focal consolidation, pleural effusion, or pneumothorax.    Visualized mediastinal structures are unremarkable.    Soft tissues of the neck and visualized aero digestive structures are unremarkable.    Calcific atherosclerosis about the aortic arch.    Subclavian arteries are patent.    Common carotid arteries are patent.    Mild calcific atherosclerosis about the left greater than right carotid bulbs without significant stenosis per NASCET criteria.  Remainder of the cervical internal carotid arteries are patent without evidence for significant stenosis or occlusion.    Calcific atherosclerosis at the origins of the vertebral arteries.  Extracranial vertebral arteries are patent.    Petrous portions of the internal carotid arteries are unremarkable.  Calcific atherosclerosis about the cavernous and supraclinoid portions of the internal carotid arteries without significant stenosis.    Anterior cerebral arteries are patent without evidence for significant stenosis or occlusion.    Middle cerebral arteries are patent without evidence for significant stenosis or occlusion.    Intracranial vertebral arteries and basilar artery are patent.    Posterior cerebral arteries are patent without evidence for significant stenosis or occlusion.    No intracranial aneurysm.  Intracranial venous structures are unremarkable.    Degenerative change of the visualized spine.  No evidence for acute fracture or osseous destructive lesion.                                       Medications   fluorescein ophthalmic strip 1 each (1 each Left Eye Given by Other 5/8/24 8615)   proparacaine 0.5 % ophthalmic solution 1 drop (1 drop Left Eye Given by Other 5/8/24 5294)   sodium chloride 0.9% bolus 500 mL 500 mL (0 mLs Intravenous Stopped  5/8/24 1504)   iohexoL (OMNIPAQUE 350) injection 75 mL (75 mLs Intravenous Given 5/8/24 1203)     Medical Decision Making  Pt seen in ER by Dr. Allison breaux neurology. Recommended MRI brain. If negative, pt can follow up with outpt ophthalmology. Pt is poor historian even with  line, cxr and kub performed to rule out metal for MRI per their process.   Pt signed out at change of shift to Dr. Mendez to f/u MRI and final disposition. Susanne Fam MD, 05/08/2024 7:17 PM          Amount and/or Complexity of Data Reviewed  Labs: ordered.  Radiology: ordered.    Risk  Prescription drug management.      Assumed care patient pending MRI.  MRI appears negative.  Patient's vision appears to be at baseline.  Discussed with patient and with neighbor at bedside plan for follow-up with Neurology as well as Ophthalmology.  All questions were answered and patient stable for discharge home at this time.          Scribe Attestation:   Scribe #1: I performed the above scribed service and the documentation accurately describes the services I performed. I attest to the accuracy of the note.                               Clinical Impression:  Final diagnoses:  [H57.89] Irritation of left eye (Primary)  [H53.9] Visual disturbance  [I10] Hypertension          ED Disposition Condition    Discharge Stable          ED Prescriptions    None       Follow-up Information       Follow up With Specialties Details Why Contact Info    Lucio Byrd MD General Practice Schedule an appointment as soon as possible for a visit in 1 day For re-evaluation [address] [facility] [address]  [phone]      [facility] Neurology Schedule an appointment as soon as possible for a visit in 1 day For further evaluation and more definitive management of your symptoms [address] [facility] [id number] [phone]    Damon Allred MD Ophthalmology Schedule an appointment as soon as possible for a visit in 1 day  For further evaluation of your eye problem [address]  [place] [address]  [phone]      [facility] - Emergency Dept Emergency Medicine Go to  If symptoms worsen or new symptoms develop [address] [facility] [place] [address] [phone]          I, Ehsan Mendez M.D., personally performed the services described in this documentation. All medical record entries made by the scribe were at my direction and in my presence.  I have reviewed the chart and agree that the record reflects my personal performance and is accurate and complete.      Ehsan Mendez MD  05/09/24 [id number]

## 2024-05-08 NOTE — ED PROVIDER NOTES
"Encounter Date: 5/8/2024    SCRIBE #1 NOTE: I, Josie Huang, am scribing for, and in the presence of,  Rjei Sierra PA-C. I have scribed the following portions of the note - Other sections scribed: HPI, ROS.       History     Chief Complaint   Patient presents with    Eye Problem     Pt to ER with reports of left eye drainage and irritation x few days. Pt denies visual changes      Vern Lr is a 84 y.o. male, with PMHx of CAD, DM, HTN, and seizures, who presents to the ED complaining of left eye irritation and drainage x 2 days. Patient states he feels "like something is stuck in my eye". Patient reports having an episode of a black spot to his inner left visual field 4 days ago which has been persistent. Patient reports complete vision loss in left eye 2 days ago that lasted only a few seconds before resolving spontaneously. He denies headache, chest pain, weakness, shortness of breath, or abdominal pain. Blood pressure reading today was 97/55. Independent historian, patient's neighbor, is unsure if that is patient's normal baseline. Denies history of stroke.       The history is provided by the patient and a friend. The history is limited by a language barrier. A  was used ([id number]).     Review of patient's allergies indicates:  No Known Allergies  Past Medical History:   Diagnosis Date    Abdominal hernia     BPH (benign prostatic hyperplasia)     Coronary artery disease     Non-obstructive CAD on [facility], Sept 2019. Performed for NSTEMI.    Diabetes mellitus     sister states he is borderline    Dyslipidemia     Hypercholesteremia     Hypertension     Seizure disorder     Seizures     Sinus disorder     Stroke      Past Surgical History:   Procedure Laterality Date    KNEE SURGERY      LEFT HEART CATHETERIZATION Left 9/26/2019    Procedure: Left heart cath, radial. 11 am;  Surgeon: Zahra Roca MD;  Location: [facility] CATH LAB;  Service: Cardiology;  Laterality: Left;    NASAL SINUS " SURGERY      NOSE SURGERY       Family History   Problem Relation Name Age of Onset    Hypertension Brother      Hypertension Brother      Coronary artery disease Brother      Coronary artery disease Brother      Cancer Mother      Kidney disease Father      Heart disease Sister       Social History     Tobacco Use    Smoking status: Never    Smokeless tobacco: Never   Substance Use Topics    Alcohol use: No    Drug use: No     Review of Systems   Constitutional:  Negative for fever.   HENT:  Negative for congestion, sore throat and trouble swallowing.    Eyes:  Positive for pain (irritation , L), discharge (L) and visual disturbance.        (+) foreign body sensation in left eye   Respiratory:  Negative for cough and shortness of breath.    Cardiovascular:  Negative for chest pain.   Gastrointestinal:  Negative for abdominal pain, constipation, diarrhea, nausea and vomiting.   Genitourinary:  Negative for dysuria, flank pain, frequency and urgency.   Musculoskeletal:  Negative for back pain.   Skin:  Negative for rash.   Neurological:  Negative for headaches.   All other systems reviewed and are negative.      Physical Exam     Initial Vitals [05/08/24 0810]   BP Pulse Resp Temp SpO2   (!) 99/54 60 18 98.9 °F (37.2 °C) 97 %      MAP       --         Physical Exam    Nursing note and vitals reviewed.  Constitutional: He appears well-developed and well-nourished. He is not diaphoretic. No distress.   HENT:   Head: Atraumatic.   Right Ear: External ear normal.   Left Ear: External ear normal.   Eyes:   Visual Acuity:  R 20/70; L 20/70; Both 20/70.     L EYE:      IOP = 14.    POCUS = no retinal detachment.  Optic nerve of normal caliber.  No optic disc edema.  No signs of vitreous hemorrhage.  Lens in place.    No fluorescein uptake or foreign body.  No tearing or purulent drainage.  No prominent conjunctival injection. No ciliary flush. Pupils equal and reactive to light with direct and consensual light reflexes.  No  photophobia.  Negative Navneet's sign with fluorescein staining. No dendritic lesion. No hyphema or subconjunctival hemorrhage. No cells or flares in the anterior chamber.    No periorbital ecchymosis, lacerations, abrasions, swelling, warmth, erythema, or tenderness to palpation. Able to fully open eyelids with extraocular movements intact in all directions.       Neck: No tracheal deviation present.   Normal range of motion.  Cardiovascular:  Normal rate and regular rhythm.           Pulmonary/Chest: No accessory muscle usage or stridor. No tachypnea. No respiratory distress.   Musculoskeletal:      Cervical back: Normal range of motion.     Neurological: He has normal strength. He displays no tremor. He displays no seizure activity. Coordination normal.   Ambulates with assistance   Skin: Skin is intact. Capillary refill takes less than 2 seconds. No cyanosis.         ED Course   Procedures  Labs Reviewed   COMPREHENSIVE METABOLIC PANEL - Abnormal; Notable for the following components:       Result Value    CO2 22 (*)     BUN 25 (*)     All other components within normal limits   LIPID PANEL - Abnormal; Notable for the following components:    HDL 35 (*)     All other components within normal limits   CBC W/ AUTO DIFFERENTIAL - Abnormal; Notable for the following components:    RBC 4.19 (*)     Hemoglobin 12.7 (*)     MCHC 31.4 (*)     Lymph % 14.1 (*)     All other components within normal limits   PROTIME-INR - Abnormal; Notable for the following components:    Prothrombin Time 13.6 (*)     INR 1.3 (*)     All other components within normal limits   TSH   TROPONIN I        ECG Results              ECG 12 lead (Final result)        Collection Time Result Time QRS Duration OHS QTC Calculation    05/08/24 09:59:33 05/08/24 11:55:43 120 410                     Final result by Interface, Lab In [facility] (05/08/24 11:55:51)                   Narrative:    Test Reason : R29.818,    Vent. Rate : 060 BPM     Atrial Rate :  060 BPM     P-R Int : 130 ms          QRS Dur : 120 ms      QT Int : 410 ms       P-R-T Axes : 076 -21 066 degrees     QTc Int : 410 ms    Normal sinus rhythm  LVH with QRS widening and repolarization abnormality  Abnormal ECG  When compared with ECG of 02-AUG-2022 11:14,  No significant change was found  Confirmed by Yasmani Vargas MD ([id number]) on 5/8/2024 11:55:41 AM    Referred By: [email or web address]   SELF           Confirmed By:Yasmani Vargas MD                                  Imaging Results              CTA Head and Neck (xpd) (Final result)  Result time 05/08/24 12:14:00      Final result by Jason Cole MD (05/08/24 12:14:00)                   Impression:      No acute intracranial pathology.    No high-grade stenosis or major vessel occlusion.    Mild calcific atherosclerosis about the left greater than right carotid bulbs without evidence for significant stenosis per NASCET criteria.    Focal encephalomalacia about the bilateral temporal lobes, similar from comparison imaging.  Additional sequela of chronic microvascular ischemic change.      Electronically signed by: Jason Cole  Date:    05/08/2024  Time:    12:14               Narrative:    EXAMINATION:  CTA HEAD AND NECK (XPD)    CLINICAL HISTORY:  Neuro deficit, acute, stroke suspected;Stroke/TIA, determine embolic source;    TECHNIQUE:  Non contrast low dose axial images were obtained through the head. CT angiogram was performed from the level of the flor to the top of the head following the IV administration of 75mL of Omnipaque 350.   Sagittal and coronal reconstructions and maximum intensity projection reconstructions were performed. Arterial stenosis percentages are based on NASCET measurement criteria.    COMPARISON:  CT head without contrast 03/25/2024, MRI brain without contrast 07/03/2023    FINDINGS:  Intracranial Compartment:    Mild prominence of the ventricles with compensatory enlargement of the sulci, in keeping with central volume loss.   No extra-axial blood or fluid collections.    Focal encephalomalacia about the bilateral temporal lobes, unchanged.  Scattered regions of subcortical and periventricular hypoattenuation, overall nonspecific, but can be seen in the setting of microvascular ischemic change.  No parenchymal mass, hemorrhage, edema, or major vascular distribution infarct.    Skull/Extracranial Contents (limited evaluation): No fracture. Patchy mucosal thickening about the paranasal sinuses.  Mastoid air cells are essentially clear.    Patchy heterogeneous airspace opacities about the right lung.  No focal consolidation, pleural effusion, or pneumothorax.    Visualized mediastinal structures are unremarkable.    Soft tissues of the neck and visualized aero digestive structures are unremarkable.    Calcific atherosclerosis about the aortic arch.    Subclavian arteries are patent.    Common carotid arteries are patent.    Mild calcific atherosclerosis about the left greater than right carotid bulbs without significant stenosis per NASCET criteria.  Remainder of the cervical internal carotid arteries are patent without evidence for significant stenosis or occlusion.    Calcific atherosclerosis at the origins of the vertebral arteries.  Extracranial vertebral arteries are patent.    Petrous portions of the internal carotid arteries are unremarkable.  Calcific atherosclerosis about the cavernous and supraclinoid portions of the internal carotid arteries without significant stenosis.    Anterior cerebral arteries are patent without evidence for significant stenosis or occlusion.    Middle cerebral arteries are patent without evidence for significant stenosis or occlusion.    Intracranial vertebral arteries and basilar artery are patent.    Posterior cerebral arteries are patent without evidence for significant stenosis or occlusion.    No intracranial aneurysm.  Intracranial venous structures are unremarkable.    Degenerative change of the  visualized spine.  No evidence for acute fracture or osseous destructive lesion.                                       Medications   fluorescein ophthalmic strip 1 each (1 each Left Eye Given by Other 5/8/24 9241)   proparacaine 0.5 % ophthalmic solution 1 drop (1 drop Left Eye Given by Other 5/8/24 3422)   sodium chloride 0.9% bolus 500 mL 500 mL (0 mLs Intravenous Stopped 5/8/24 1504)   iohexoL (OMNIPAQUE 350) injection 75 mL (75 mLs Intravenous Given 5/8/24 1203)     Medical Decision Making  There appear to be multiple barriers to thoroughly evaluating this patient.  There is a language barrier.  He may have a component of dementia.  History has changed multiple times while in the ED and amongst different providers.      Presents with left eye pain and remote vision changes >24 hours with persistent small visual field deficit to the inner left eye.  No gross vision loss.  No obvious deficit. Consideration for vascular pathology verses ophthalmic complication.  CTA of head and neck shows no large infarct, significant stenosis, or mass.  Case reviewed with Neurology who advises MRI; pending. Suitable for outpatient management if MRI normal from a neuro standpoint. Not structural pathology such as retinal detachment/tear on bedside ultrasound in the ED and is no longer complaining of visual field deficit.  Describes more eye irritation that does improve with topical proparacaine.     Amount and/or Complexity of Data Reviewed  Labs: ordered.  Radiology: ordered.    Risk  Prescription drug management.            Scribe Attestation:   Scribe #1: I performed the above scribed service and the documentation accurately describes the services I performed. I attest to the accuracy of the note.                             I, Reji Sierra PA-C, personally performed the services described in this documentation. All medical record entries made by the scribe were at my direction and in my presence. I have reviewed the chart and  agree that the record reflects my personal performance and is accurate and complete.    Clinical Impression:  Final diagnoses:  [H57.89] Irritation of left eye (Primary)  [H53.9] Visual disturbance                 Reji Sierra, ROLANDO  05/08/24 1602       Reji Sierra, ROLANDO  05/08/24 1603

## 2024-05-08 NOTE — DISCHARGE INSTRUCTIONS
Corinne por venir a nuestro Departamento de Emergencias hoy. Es importante recordar que algunos problemas son difíciles de diagnosticar y es posible que no se detecten ander mejia primera visita. Asegúrese de hacer un seguimiento con mejia médico de atención primaria.  Si no tiene get, puede comunicarse con el que figura en mejia documentación de kevin o también puede llamar a la [facility] al [phone] para programar aditya jojo con get.    Regrese a la muriel de emergencias con cualquier pregunta / inquietud, síntomas nuevos / preocupantes, empeoramiento o falta de mejora. No conduzca ni tome decisiones importantes ander 24 horas si ha recibido analgésicos, sedantes o fármacos que alteran el estado de ánimo ander mejia visita a la muriel de emergencias.

## 2024-08-13 ENCOUNTER — TELEPHONE (OUTPATIENT)
Dept: OPTOMETRY | Facility: CLINIC | Age: 84
End: 2024-08-13
Payer: MEDICAID

## 2024-12-10 ENCOUNTER — HOSPITAL ENCOUNTER (INPATIENT)
Facility: HOSPITAL | Age: 84
LOS: 8 days | Discharge: HOME HEALTH | DRG: 605 | End: 2024-12-18
Attending: EMERGENCY MEDICINE
Payer: MEDICAID

## 2024-12-10 DIAGNOSIS — S30.1XXA ABDOMINAL HEMATOMA: ICD-10-CM

## 2024-12-10 DIAGNOSIS — N17.9 AKI (ACUTE KIDNEY INJURY): Primary | ICD-10-CM

## 2024-12-10 DIAGNOSIS — S30.1XXA ABDOMINAL WALL HEMATOMA, INITIAL ENCOUNTER: ICD-10-CM

## 2024-12-10 DIAGNOSIS — F70 MILD INTELLECTUAL DISABILITY: ICD-10-CM

## 2024-12-10 DIAGNOSIS — R54 AGE-RELATED PHYSICAL DEBILITY: ICD-10-CM

## 2024-12-10 DIAGNOSIS — R13.10 DYSPHAGIA, UNSPECIFIED TYPE: ICD-10-CM

## 2024-12-10 DIAGNOSIS — R07.9 CHEST PAIN: ICD-10-CM

## 2024-12-10 LAB
ALBUMIN SERPL BCP-MCNC: 2.7 G/DL (ref 3.5–5.2)
ALP SERPL-CCNC: 76 U/L (ref 40–150)
ALT SERPL W/O P-5'-P-CCNC: 18 U/L (ref 10–44)
ANION GAP SERPL CALC-SCNC: 9 MMOL/L (ref 8–16)
AST SERPL-CCNC: 20 U/L (ref 10–40)
BASOPHILS # BLD AUTO: 0.01 K/UL (ref 0–0.2)
BASOPHILS NFR BLD: 0.1 % (ref 0–1.9)
BILIRUB SERPL-MCNC: 0.6 MG/DL (ref 0.1–1)
BILIRUB UR QL STRIP: NEGATIVE
BUN SERPL-MCNC: 73 MG/DL (ref 8–23)
CALCIUM SERPL-MCNC: 10.1 MG/DL (ref 8.7–10.5)
CHLORIDE SERPL-SCNC: 111 MMOL/L (ref 95–110)
CLARITY UR: CLEAR
CO2 SERPL-SCNC: 19 MMOL/L (ref 23–29)
COLOR UR: YELLOW
CREAT SERPL-MCNC: 1.7 MG/DL (ref 0.5–1.4)
DIFFERENTIAL METHOD BLD: ABNORMAL
EOSINOPHIL # BLD AUTO: 0 K/UL (ref 0–0.5)
EOSINOPHIL NFR BLD: 0 % (ref 0–8)
ERYTHROCYTE [DISTWIDTH] IN BLOOD BY AUTOMATED COUNT: 15.6 % (ref 11.5–14.5)
EST. GFR  (NO RACE VARIABLE): 40 ML/MIN/1.73 M^2
GLUCOSE SERPL-MCNC: 103 MG/DL (ref 70–110)
GLUCOSE UR QL STRIP: NEGATIVE
HCT VFR BLD AUTO: 31.8 % (ref 40–54)
HGB BLD-MCNC: 10.1 G/DL (ref 14–18)
HGB UR QL STRIP: NEGATIVE
IMM GRANULOCYTES # BLD AUTO: 0.07 K/UL (ref 0–0.04)
IMM GRANULOCYTES NFR BLD AUTO: 0.5 % (ref 0–0.5)
KETONES UR QL STRIP: NEGATIVE
LEUKOCYTE ESTERASE UR QL STRIP: NEGATIVE
LIPASE SERPL-CCNC: 54 U/L (ref 4–60)
LYMPHOCYTES # BLD AUTO: 0.8 K/UL (ref 1–4.8)
LYMPHOCYTES NFR BLD: 5.9 % (ref 18–48)
MCH RBC QN AUTO: 28.3 PG (ref 27–31)
MCHC RBC AUTO-ENTMCNC: 31.8 G/DL (ref 32–36)
MCV RBC AUTO: 89 FL (ref 82–98)
MONOCYTES # BLD AUTO: 1 K/UL (ref 0.3–1)
MONOCYTES NFR BLD: 7.6 % (ref 4–15)
NEUTROPHILS # BLD AUTO: 11.4 K/UL (ref 1.8–7.7)
NEUTROPHILS NFR BLD: 85.9 % (ref 38–73)
NITRITE UR QL STRIP: NEGATIVE
NRBC BLD-RTO: 0 /100 WBC
PH UR STRIP: 5 [PH] (ref 5–8)
PLATELET # BLD AUTO: 416 K/UL (ref 150–450)
PMV BLD AUTO: 8.8 FL (ref 9.2–12.9)
POTASSIUM SERPL-SCNC: 5.3 MMOL/L (ref 3.5–5.1)
PROT SERPL-MCNC: 8.5 G/DL (ref 6–8.4)
PROT UR QL STRIP: NEGATIVE
RBC # BLD AUTO: 3.57 M/UL (ref 4.6–6.2)
SODIUM SERPL-SCNC: 139 MMOL/L (ref 136–145)
SP GR UR STRIP: 1.01 (ref 1–1.03)
URN SPEC COLLECT METH UR: NORMAL
UROBILINOGEN UR STRIP-ACNC: NEGATIVE EU/DL
WBC # BLD AUTO: 13.28 K/UL (ref 3.9–12.7)

## 2024-12-10 PROCEDURE — 80053 COMPREHEN METABOLIC PANEL: CPT | Performed by: EMERGENCY MEDICINE

## 2024-12-10 PROCEDURE — 86850 RBC ANTIBODY SCREEN: CPT | Performed by: STUDENT IN AN ORGANIZED HEALTH CARE EDUCATION/TRAINING PROGRAM

## 2024-12-10 PROCEDURE — 83605 ASSAY OF LACTIC ACID: CPT | Performed by: STUDENT IN AN ORGANIZED HEALTH CARE EDUCATION/TRAINING PROGRAM

## 2024-12-10 PROCEDURE — 83690 ASSAY OF LIPASE: CPT | Performed by: EMERGENCY MEDICINE

## 2024-12-10 PROCEDURE — 85730 THROMBOPLASTIN TIME PARTIAL: CPT | Performed by: STUDENT IN AN ORGANIZED HEALTH CARE EDUCATION/TRAINING PROGRAM

## 2024-12-10 PROCEDURE — 85610 PROTHROMBIN TIME: CPT | Performed by: STUDENT IN AN ORGANIZED HEALTH CARE EDUCATION/TRAINING PROGRAM

## 2024-12-10 PROCEDURE — 85025 COMPLETE CBC W/AUTO DIFF WBC: CPT | Performed by: EMERGENCY MEDICINE

## 2024-12-10 PROCEDURE — 93005 ELECTROCARDIOGRAM TRACING: CPT

## 2024-12-10 PROCEDURE — 93010 ELECTROCARDIOGRAM REPORT: CPT | Mod: ,,, | Performed by: INTERNAL MEDICINE

## 2024-12-10 PROCEDURE — 12000002 HC ACUTE/MED SURGE SEMI-PRIVATE ROOM

## 2024-12-10 PROCEDURE — 81003 URINALYSIS AUTO W/O SCOPE: CPT | Performed by: EMERGENCY MEDICINE

## 2024-12-10 PROCEDURE — 85025 COMPLETE CBC W/AUTO DIFF WBC: CPT | Mod: 91 | Performed by: STUDENT IN AN ORGANIZED HEALTH CARE EDUCATION/TRAINING PROGRAM

## 2024-12-10 PROCEDURE — 99285 EMERGENCY DEPT VISIT HI MDM: CPT | Mod: 25

## 2024-12-10 PROCEDURE — 25000003 PHARM REV CODE 250

## 2024-12-10 RX ORDER — TALC
6 POWDER (GRAM) TOPICAL NIGHTLY PRN
Status: DISCONTINUED | OUTPATIENT
Start: 2024-12-10 | End: 2024-12-18 | Stop reason: HOSPADM

## 2024-12-10 RX ORDER — DEXTROMETHORPHAN POLISTIREX 30 MG/5 ML
1 SUSPENSION, EXTENDED RELEASE 12 HR ORAL DAILY PRN
Status: DISCONTINUED | OUTPATIENT
Start: 2024-12-10 | End: 2024-12-18 | Stop reason: HOSPADM

## 2024-12-10 RX ORDER — LISINOPRIL 5 MG/1
5 TABLET ORAL DAILY
COMMUNITY
Start: 2024-12-03

## 2024-12-10 RX ORDER — ZONISAMIDE 100 MG/1
100 CAPSULE ORAL DAILY
COMMUNITY

## 2024-12-10 RX ORDER — ATORVASTATIN CALCIUM 80 MG/1
80 TABLET, FILM COATED ORAL DAILY
COMMUNITY

## 2024-12-10 RX ORDER — OMEPRAZOLE 20 MG/1
20 CAPSULE, DELAYED RELEASE ORAL DAILY
COMMUNITY

## 2024-12-10 RX ORDER — ALUMINUM HYDROXIDE, MAGNESIUM HYDROXIDE, AND SIMETHICONE 1200; 120; 1200 MG/30ML; MG/30ML; MG/30ML
30 SUSPENSION ORAL 4 TIMES DAILY PRN
Status: DISCONTINUED | OUTPATIENT
Start: 2024-12-10 | End: 2024-12-18 | Stop reason: HOSPADM

## 2024-12-10 RX ORDER — FUROSEMIDE 40 MG/1
40 TABLET ORAL DAILY
COMMUNITY

## 2024-12-10 RX ORDER — DUTASTERIDE 0.5 MG/1
0.5 CAPSULE, LIQUID FILLED ORAL DAILY
COMMUNITY

## 2024-12-10 RX ORDER — IBUPROFEN 200 MG
24 TABLET ORAL
Status: DISCONTINUED | OUTPATIENT
Start: 2024-12-10 | End: 2024-12-18 | Stop reason: HOSPADM

## 2024-12-10 RX ORDER — FAMOTIDINE 20 MG/1
20 TABLET, FILM COATED ORAL DAILY
COMMUNITY

## 2024-12-10 RX ORDER — ACETAMINOPHEN 325 MG/1
650 TABLET ORAL EVERY 4 HOURS PRN
Status: DISCONTINUED | OUTPATIENT
Start: 2024-12-10 | End: 2024-12-18 | Stop reason: HOSPADM

## 2024-12-10 RX ORDER — LEVETIRACETAM 500 MG/1
500 TABLET ORAL 2 TIMES DAILY
COMMUNITY

## 2024-12-10 RX ORDER — GLUCAGON 1 MG
1 KIT INJECTION
Status: DISCONTINUED | OUTPATIENT
Start: 2024-12-10 | End: 2024-12-18 | Stop reason: HOSPADM

## 2024-12-10 RX ORDER — IBUPROFEN 200 MG
16 TABLET ORAL
Status: DISCONTINUED | OUTPATIENT
Start: 2024-12-10 | End: 2024-12-18 | Stop reason: HOSPADM

## 2024-12-10 RX ORDER — APIXABAN 5 MG/1
5 TABLET, FILM COATED ORAL 2 TIMES DAILY
COMMUNITY

## 2024-12-10 RX ORDER — NALOXONE HCL 0.4 MG/ML
0.02 VIAL (ML) INJECTION
Status: DISCONTINUED | OUTPATIENT
Start: 2024-12-10 | End: 2024-12-18 | Stop reason: HOSPADM

## 2024-12-10 RX ORDER — ONDANSETRON HYDROCHLORIDE 2 MG/ML
4 INJECTION, SOLUTION INTRAVENOUS EVERY 8 HOURS PRN
Status: DISCONTINUED | OUTPATIENT
Start: 2024-12-10 | End: 2024-12-18 | Stop reason: HOSPADM

## 2024-12-10 RX ORDER — POLYETHYLENE GLYCOL 3350 17 G/17G
17 POWDER, FOR SOLUTION ORAL DAILY
Status: DISCONTINUED | OUTPATIENT
Start: 2024-12-11 | End: 2024-12-18 | Stop reason: HOSPADM

## 2024-12-10 RX ORDER — SODIUM CHLORIDE 0.9 % (FLUSH) 0.9 %
10 SYRINGE (ML) INJECTION EVERY 12 HOURS PRN
Status: DISCONTINUED | OUTPATIENT
Start: 2024-12-10 | End: 2024-12-18 | Stop reason: HOSPADM

## 2024-12-10 RX ORDER — PROCHLORPERAZINE EDISYLATE 5 MG/ML
5 INJECTION INTRAMUSCULAR; INTRAVENOUS EVERY 6 HOURS PRN
Status: DISCONTINUED | OUTPATIENT
Start: 2024-12-10 | End: 2024-12-18 | Stop reason: HOSPADM

## 2024-12-10 RX ORDER — SPIRONOLACTONE 25 MG/1
25 TABLET ORAL DAILY
COMMUNITY

## 2024-12-10 RX ADMIN — ACETAMINOPHEN 650 MG: 325 TABLET ORAL at 10:12

## 2024-12-10 NOTE — ED PROVIDER NOTES
Encounter Date: 12/10/2024    SCRIBE #1 NOTE: IYasmin, am scribing for, and in the presence of,  Yasmani Martinez MD. I have scribed the following portions of the note - Other sections scribed: HPI, ROS, PE.       History     Chief Complaint   Patient presents with    Otalgia    Dental Pain     Pt to ED from home with c/o right ear pain x 1 week accompanied by dental/gum pain to generalized mouth x 3 weeks. Pt denies taking any medications to relieve symptoms.     Vern Lr is a 84 y.o. male, with a past medical history of BPH, CAD, HTN, CVA, and seizures, who presents to the ED with difficulty swallowing that began 3 weeks ago. Patient also reports thoracic back pain that began 3 months ago after having back surgery. Denies attempting treatment PTA. Patient denies chest pain, or other associated symptoms. Patient has no other complaints at the present time.  The patient denies any ear pain.  His primary complaint is that he has difficulty swallowing.  The patient is also complaining of pain in his mid back in the area where they performed surgery a few months ago.    The history is provided by the patient. No  was used (ED tech, Kalia translated).     Review of patient's allergies indicates:  No Known Allergies  Past Medical History:   Diagnosis Date    Abdominal hernia     BPH (benign prostatic hyperplasia)     Coronary artery disease     Non-obstructive CAD on [facility], Sept 2019. Performed for NSTEMI.    Diabetes mellitus     sister states he is borderline    Dyslipidemia     Hypercholesteremia     Hypertension     Seizure disorder     Seizures     Sinus disorder     Stroke      Past Surgical History:   Procedure Laterality Date    KNEE SURGERY      LEFT HEART CATHETERIZATION Left 9/26/2019    Procedure: Left heart cath, radial. 11 am;  Surgeon: Zahra Roca MD;  Location: [facility] CATH LAB;  Service: Cardiology;  Laterality: Left;    NASAL SINUS SURGERY      NOSE SURGERY        Family History   Problem Relation Name Age of Onset    Hypertension Brother      Hypertension Brother      Coronary artery disease Brother      Coronary artery disease Brother      Cancer Mother      Kidney disease Father      Heart disease Sister       Social History     Tobacco Use    Smoking status: Never    Smokeless tobacco: Never   Substance Use Topics    Alcohol use: No    Drug use: No     Review of Systems   Constitutional: Negative.  Negative for fever.   HENT:  Positive for trouble swallowing. Negative for sore throat.    Eyes: Negative.    Respiratory: Negative.  Negative for shortness of breath.    Cardiovascular: Negative.  Negative for chest pain.   Gastrointestinal: Negative.  Negative for nausea and vomiting.   Endocrine: Negative.    Genitourinary: Negative.  Negative for dysuria.   Musculoskeletal:  Positive for back pain. Negative for myalgias.   Skin:  Negative for rash.   Allergic/Immunologic: Negative.    Neurological: Negative.  Negative for headaches.   Hematological: Negative.  Negative for adenopathy.   Psychiatric/Behavioral: Negative.  Negative for behavioral problems.    All other systems reviewed and are negative.      Physical Exam     Initial Vitals [12/10/24 1313]   BP Pulse Resp Temp SpO2   (!) 84/45 77 17 98.3 °F (36.8 °C) 100 %      MAP       --         Physical Exam    Nursing note and vitals reviewed.  Constitutional: He appears well-developed and well-nourished.   HENT:   Head: Normocephalic and atraumatic.   Normal range of motion of jaw, no pain or masses.    Eyes: Conjunctivae and EOM are normal.   Neck: Neck supple.   Normal range of motion.  Cardiovascular:  Normal rate and intact distal pulses.           Pulmonary/Chest: Effort normal. No respiratory distress.   Abdominal: Abdomen is soft. Bowel sounds are normal. He exhibits no distension. There is no hepatomegaly. There is no abdominal tenderness. No hernia.   No right CVA tenderness.  No left CVA tenderness. There  is no rebound, no guarding, no tenderness at McBurney's point and negative Johnson's sign.   Musculoskeletal:         General: Normal range of motion.      Cervical back: Normal range of motion and neck supple. No edema. No muscular tenderness.      Comments: Upper and lower extremities normal.     Neurological: He is alert and oriented to person, place, and time.   Skin: Skin is warm and dry.   Psychiatric: He has a normal mood and affect. His behavior is normal.         ED Course   Procedures  Labs Reviewed   CBC W/ AUTO DIFFERENTIAL - Abnormal       Result Value    WBC 13.28 (*)     RBC 3.57 (*)     Hemoglobin 10.1 (*)     Hematocrit 31.8 (*)     MCV 89      MCH 28.3      MCHC 31.8 (*)     RDW 15.6 (*)     Platelets 416      MPV 8.8 (*)     Immature Granulocytes 0.5      Gran # (ANC) 11.4 (*)     Immature Grans (Abs) 0.07 (*)     Lymph # 0.8 (*)     Mono # 1.0      Eos # 0.0      Baso # 0.01      nRBC 0      Gran % 85.9 (*)     Lymph % 5.9 (*)     Mono % 7.6      Eosinophil % 0.0      Basophil % 0.1      Differential Method Automated     COMPREHENSIVE METABOLIC PANEL - Abnormal    Sodium 139      Potassium 5.3 (*)     Chloride 111 (*)     CO2 19 (*)     Glucose 103      BUN 73 (*)     Creatinine 1.7 (*)     Calcium 10.1      Total Protein 8.5 (*)     Albumin 2.7 (*)     Total Bilirubin 0.6      Alkaline Phosphatase 76      AST 20      ALT 18      eGFR 40 (*)     Anion Gap 9     LIPASE    Lipase 54     URINALYSIS    Specimen UA Urine, Clean Catch      Color, UA Yellow      Appearance, UA Clear      pH, UA 5.0      Specific Gravity, UA 1.015      Protein, UA Negative      Glucose, UA Negative      Ketones, UA Negative      Bilirubin (UA) Negative      Occult Blood UA Negative      Nitrite, UA Negative      Urobilinogen, UA Negative      Leukocytes, UA Negative            Imaging Results              CT Soft Tissue Neck WO Contrast (Final result)  Result time 12/10/24 18:00:35      Final result by Missael Roberts MD  (12/10/24 18:00:35)                   Impression:      1. No acute abnormality  2. Mild bilateral mastoid air cell disease, right greater than left and mild right maxillary sinus disease.      Electronically signed by: Missael Roberts  Date:    12/10/2024  Time:    18:00               Narrative:    EXAMINATION:  CT SOFT TISSUE NECK WITHOUT CONTRAST    CLINICAL HISTORY:  Soft tissue swelling, infection suspected, neck xray done;    TECHNIQUE:  Low dose axial images as well as sagittal and coronal reconstructions were performed from the skull base to the clavicles following the intravenous administration of 75 mL of Omnipaque 350.    COMPARISON:  None.    FINDINGS:  Skull Base and Brain (limited evaluation): No significant abnormality.    Sinuses and Mastoid Air Cells: Mild bilateral mastoid air cell disease right greater than left.  Mild right maxillary sinus disease.    Salivary Glands: Parotid and submandibular glands are bilaterally symmetric and demonstrate no gross abnormalities.    Thyroid: Normal in size and configuration the.    Cervical Lymph Nodes: No pathologic enlargement.    Pharynx/Larynx: Normal, with preservation of the normal anatomical fat planes.    Epiglottis is within normal limits.    No abscess is detected.    Vasculature (limited evaluation): Vascular structures of the neck appear patent    Upper Airways and Lungs: Trachea is midline and the proximal airways are patent.  Lung apices are clear.    Bones: No acute fracture or suspicious lytic or sclerotic lesion.                                        CT Thorax Without Contrast (Final result)  Result time 12/10/24 18:00:25      Final result by Missael Roberts MD (12/10/24 18:00:25)                   Impression:      1. There are 2 suspected adjacent hematomas associated with the right lateral abdominal wall with minimal displacement of the adjacent liver.  Follow-up is recommended.  2. Mild cardiomegaly with minimal pericardial effusion and  coronary atherosclerosis.  3. Mild aneurysmal dilation of the ascending thoracic aorta measuring 4.1 cm.  4. Multiple probable chronic compression fractures.  See above comments.  5. This report was flagged in Epic as abnormal.      Electronically signed by: Missael Roberts  Date:    12/10/2024  Time:    18:00               Narrative:    EXAMINATION:  CT CHEST WITHOUT CONTRAST    CLINICAL HISTORY:  Chest trauma, blunt;    TECHNIQUE:  Low dose axial images, sagittal and coronal reformations were obtained from the thoracic inlet to the lung bases without IV contrast.    COMPARISON:  12/10/2024, 04/09/2020    FINDINGS:  Base of Neck: No significant abnormality.    Thoracic soft tissues: Unremarkable.    Aorta: Mild aneurysm dilation of the ascending thoracic aorta measuring 4.1 cm.    Heart: Heart is mildly enlarged..  Minimal pericardial effusion.  Coronary atherosclerosis.    Radha/Mediastinum: No pathologic jacquelyn enlargement.    Airways: Patent.    Lungs/Pleura: Mild bibasilar atelectasis.  Mild probable right upper lobe interstitial scarring.  No mass or consolidation.  No effusion or pneumothorax.    Esophagus: Unremarkable.    Upper Abdomen: 6.0 x 3.4 cm probable complex collection associated with the right posterolateral upper abdominal wall on axial 116 could represent focal hematoma associated with the abdominal wall.  Mild displacement of the adjacent inferior right hepatic lobe of the liver.  Slightly more inferior is a similar probable adjacent right lateral abdominal hematoma measuring 4.1 x 4.6 cm on axial 124 with minimal mass effect on the adjacent inferior right hepatic lobe of the liver also.  Follow-up recommended.    Bones: Mild-moderate probable chronic compression fractures of T2 and T3.    Stable moderate compression fractures of T11 through L3                                       CT Lumbar Spine Without Contrast (Final result)  Result time 12/10/24 17:37:09      Final result by Missael Roberts,  MD (12/10/24 17:37:09)                   Impression:      1. No acute abnormality.  See above comments.  2. Multilevel chronic degenerative changes.  3. Stable chronic compression fractures throughout the lumbar spine.  See above comments.      Electronically signed by: Missael Jurado  Date:    12/10/2024  Time:    17:37               Narrative:    EXAMINATION:  CT LUMBAR SPINE WITHOUT CONTRAST    CLINICAL HISTORY:  Low back pain, trauma;    TECHNIQUE:  Low-dose axial, sagittal and coronal reformations are obtained through the lumbar spine.  Contrast was not administered.    COMPARISON:  05/15/2021    FINDINGS:  No acute fractures of the lumbar spine.    Moderate compression fractures of T11, T12, L1, L2, L3, similar to the prior study.    Stable severe compression fracture of L4 similar to the prior study.    Stable mild compression fracture L5 similar to prior study.    Multilevel anterior bridging osteophytes and right anterior disc protrusion at L1-2.    L1-2: Mild diffuse posterior disc osteophyte complex.  Severe right foraminal narrowing and mild left foraminal narrowing.  Mild-moderate central canal narrowing.  Degenerative vacuum disc phenomena.    L2-3: Mild diffuse posterior disc osteophyte complex.  Mild central canal narrowing.  Moderate bilateral foraminal narrowing.    L3-4: Mild diffuse posterior disc osteophyte complex.  Moderate central canal narrowing.  Severe right foraminal narrowing and moderate left foraminal narrowing.    L4-5: Mild diffuse posterior disc osteophyte complex.  Moderate bilateral foraminal narrowing.  Mild central canal narrowing.  Degenerative vacuum disc phenomena.    L5-S1: Mild diffuse posterior disc osteophyte complex.  Moderate bilateral foraminal narrowing.  Central canal is adequately maintained.    Tortuosity of the aorta with mild aneurysmal dilation of the common iliac arteries bilaterally measuring approximately 2.2 cm on the left and 2.5 cm on the right.                                        Medications   sodium chloride 0.9% flush 10 mL (has no administration in time range)   melatonin tablet 6 mg (has no administration in time range)   ondansetron injection 4 mg (has no administration in time range)   prochlorperazine injection Soln 5 mg (has no administration in time range)   polyethylene glycol packet 17 g (has no administration in time range)   mineral oil enema 1 enema (has no administration in time range)   aluminum-magnesium hydroxide-simethicone 200-200-20 mg/5 mL suspension 30 mL (has no administration in time range)   acetaminophen tablet 650 mg (has no administration in time range)   naloxone 0.4 mg/mL injection 0.02 mg (has no administration in time range)   glucose chewable tablet 16 g (has no administration in time range)   glucose chewable tablet 24 g (has no administration in time range)   glucagon (human recombinant) injection 1 mg (has no administration in time range)     Medical Decision Making  This patient presents with vague complaints and complained of some questionable swallowing difficulties about 3 weeks ago.  Presents to the emergency department with complaints of pain in his back.  Patient states that he did have prior history of compression fractures in his back.  Patient has evidence of PHYLLIS and on CT evaluation reveals 2 small hematomas of uncertain etiology.  I have spoken with General surgery , general surgery and she feels the patient requires admission to the hospital and trending of the patient's H&H.  I did not perform a CT of the abdomen with IV contrast secondary to the patient's PHYLLIS.    Amount and/or Complexity of Data Reviewed  Labs: ordered.  Radiology: ordered.    Risk  Decision regarding hospitalization.            Scribe Attestation:   Scribe #1: I performed the above scribed service and the documentation accurately describes the services I performed. I attest to the accuracy of the note.                             This  document was produced by a scribe under my direction and in my presence. I agree with the content of the note and have made any necessary edits.     Yasmani Martinez MD    12/10/2024 7:40 PM    Clinical Impression:  Final diagnoses:  [S30.1XXA] Abdominal hematoma  [N17.9] PHYLLIS (acute kidney injury) (Primary)  [R13.10] Dysphagia, unspecified type          ED Disposition Condition    Admit                 Yasmani Martinez MD  12/1940       Yasmani Martinez MD  12/10/24 2111

## 2024-12-11 PROBLEM — I48.91 ATRIAL FIBRILLATION WITH RVR: Status: ACTIVE | Noted: 2024-12-11

## 2024-12-11 PROBLEM — N17.9 AKI (ACUTE KIDNEY INJURY): Status: ACTIVE | Noted: 2024-12-11

## 2024-12-11 PROBLEM — S30.1XXA ABDOMINAL WALL HEMATOMA: Status: ACTIVE | Noted: 2024-12-11

## 2024-12-11 LAB
ABO + RH BLD: NORMAL
ALBUMIN SERPL BCP-MCNC: 2.4 G/DL (ref 3.5–5.2)
ALBUMIN SERPL BCP-MCNC: 2.5 G/DL (ref 3.5–5.2)
ALP SERPL-CCNC: 72 U/L (ref 40–150)
ALP SERPL-CCNC: 74 U/L (ref 40–150)
ALT SERPL W/O P-5'-P-CCNC: 19 U/L (ref 10–44)
ALT SERPL W/O P-5'-P-CCNC: 21 U/L (ref 10–44)
ANION GAP SERPL CALC-SCNC: 11 MMOL/L (ref 8–16)
ANION GAP SERPL CALC-SCNC: 9 MMOL/L (ref 8–16)
APTT PPP: 21.1 SEC (ref 21–32)
AST SERPL-CCNC: 20 U/L (ref 10–40)
AST SERPL-CCNC: 23 U/L (ref 10–40)
BASOPHILS # BLD AUTO: 0.01 K/UL (ref 0–0.2)
BASOPHILS NFR BLD: 0.1 % (ref 0–1.9)
BILIRUB SERPL-MCNC: 0.4 MG/DL (ref 0.1–1)
BILIRUB SERPL-MCNC: 0.5 MG/DL (ref 0.1–1)
BLD GP AB SCN CELLS X3 SERPL QL: NORMAL
BUN SERPL-MCNC: 56 MG/DL (ref 8–23)
BUN SERPL-MCNC: 69 MG/DL (ref 8–23)
CALCIUM SERPL-MCNC: 9.4 MG/DL (ref 8.7–10.5)
CALCIUM SERPL-MCNC: 9.6 MG/DL (ref 8.7–10.5)
CHLORIDE SERPL-SCNC: 110 MMOL/L (ref 95–110)
CHLORIDE SERPL-SCNC: 111 MMOL/L (ref 95–110)
CO2 SERPL-SCNC: 18 MMOL/L (ref 23–29)
CO2 SERPL-SCNC: 19 MMOL/L (ref 23–29)
CREAT SERPL-MCNC: 1 MG/DL (ref 0.5–1.4)
CREAT SERPL-MCNC: 1.6 MG/DL (ref 0.5–1.4)
DIFFERENTIAL METHOD BLD: ABNORMAL
EOSINOPHIL # BLD AUTO: 0 K/UL (ref 0–0.5)
EOSINOPHIL NFR BLD: 0.1 % (ref 0–8)
EOSINOPHIL NFR BLD: 0.2 % (ref 0–8)
ERYTHROCYTE [DISTWIDTH] IN BLOOD BY AUTOMATED COUNT: 15.6 % (ref 11.5–14.5)
ERYTHROCYTE [DISTWIDTH] IN BLOOD BY AUTOMATED COUNT: 15.6 % (ref 11.5–14.5)
ERYTHROCYTE [DISTWIDTH] IN BLOOD BY AUTOMATED COUNT: 15.7 % (ref 11.5–14.5)
ERYTHROCYTE [DISTWIDTH] IN BLOOD BY AUTOMATED COUNT: 15.8 % (ref 11.5–14.5)
EST. GFR  (NO RACE VARIABLE): 42 ML/MIN/1.73 M^2
EST. GFR  (NO RACE VARIABLE): >60 ML/MIN/1.73 M^2
GLUCOSE SERPL-MCNC: 110 MG/DL (ref 70–110)
GLUCOSE SERPL-MCNC: 160 MG/DL (ref 70–110)
HCT VFR BLD AUTO: 29.4 % (ref 40–54)
HCT VFR BLD AUTO: 30.6 % (ref 40–54)
HCT VFR BLD AUTO: 30.6 % (ref 40–54)
HCT VFR BLD AUTO: 34.2 % (ref 40–54)
HGB BLD-MCNC: 10.4 G/DL (ref 14–18)
HGB BLD-MCNC: 9.1 G/DL (ref 14–18)
HGB BLD-MCNC: 9.2 G/DL (ref 14–18)
HGB BLD-MCNC: 9.2 G/DL (ref 14–18)
IMM GRANULOCYTES # BLD AUTO: 0.05 K/UL (ref 0–0.04)
IMM GRANULOCYTES NFR BLD AUTO: 0.4 % (ref 0–0.5)
INR PPP: 1.6 (ref 0.8–1.2)
LACTATE SERPL-SCNC: 2 MMOL/L (ref 0.5–2.2)
LYMPHOCYTES # BLD AUTO: 1 K/UL (ref 1–4.8)
LYMPHOCYTES # BLD AUTO: 1.2 K/UL (ref 1–4.8)
LYMPHOCYTES # BLD AUTO: 1.2 K/UL (ref 1–4.8)
LYMPHOCYTES # BLD AUTO: 1.3 K/UL (ref 1–4.8)
LYMPHOCYTES NFR BLD: 10.2 % (ref 18–48)
LYMPHOCYTES NFR BLD: 10.2 % (ref 18–48)
LYMPHOCYTES NFR BLD: 8 % (ref 18–48)
LYMPHOCYTES NFR BLD: 9.6 % (ref 18–48)
MAGNESIUM SERPL-MCNC: 1.7 MG/DL (ref 1.6–2.6)
MCH RBC QN AUTO: 26.8 PG (ref 27–31)
MCH RBC QN AUTO: 26.8 PG (ref 27–31)
MCH RBC QN AUTO: 26.9 PG (ref 27–31)
MCH RBC QN AUTO: 28.1 PG (ref 27–31)
MCHC RBC AUTO-ENTMCNC: 30.1 G/DL (ref 32–36)
MCHC RBC AUTO-ENTMCNC: 30.1 G/DL (ref 32–36)
MCHC RBC AUTO-ENTMCNC: 30.4 G/DL (ref 32–36)
MCHC RBC AUTO-ENTMCNC: 31 G/DL (ref 32–36)
MCV RBC AUTO: 87 FL (ref 82–98)
MCV RBC AUTO: 89 FL (ref 82–98)
MCV RBC AUTO: 89 FL (ref 82–98)
MCV RBC AUTO: 92 FL (ref 82–98)
MONOCYTES # BLD AUTO: 0.8 K/UL (ref 0.3–1)
MONOCYTES # BLD AUTO: 0.8 K/UL (ref 0.3–1)
MONOCYTES # BLD AUTO: 1 K/UL (ref 0.3–1)
MONOCYTES # BLD AUTO: 1.1 K/UL (ref 0.3–1)
MONOCYTES NFR BLD: 7 % (ref 4–15)
MONOCYTES NFR BLD: 7 % (ref 4–15)
MONOCYTES NFR BLD: 7.5 % (ref 4–15)
MONOCYTES NFR BLD: 8.4 % (ref 4–15)
NEUTROPHILS # BLD AUTO: 10.5 K/UL (ref 1.8–7.7)
NEUTROPHILS # BLD AUTO: 11.1 K/UL (ref 1.8–7.7)
NEUTROPHILS # BLD AUTO: 9.5 K/UL (ref 1.8–7.7)
NEUTROPHILS # BLD AUTO: 9.5 K/UL (ref 1.8–7.7)
NEUTROPHILS NFR BLD: 82.1 % (ref 38–73)
NEUTROPHILS NFR BLD: 82.1 % (ref 38–73)
NEUTROPHILS NFR BLD: 82.3 % (ref 38–73)
NEUTROPHILS NFR BLD: 82.9 % (ref 38–73)
NRBC BLD-RTO: 0 /100 WBC
OHS QRS DURATION: 100 MS
OHS QTC CALCULATION: 507 MS
PHOSPHATE SERPL-MCNC: 2.8 MG/DL (ref 2.7–4.5)
PLATELET # BLD AUTO: 345 K/UL (ref 150–450)
PLATELET # BLD AUTO: 409 K/UL (ref 150–450)
PLATELET # BLD AUTO: 414 K/UL (ref 150–450)
PLATELET # BLD AUTO: 414 K/UL (ref 150–450)
PMV BLD AUTO: 8.7 FL (ref 9.2–12.9)
PMV BLD AUTO: 8.7 FL (ref 9.2–12.9)
PMV BLD AUTO: 9.1 FL (ref 9.2–12.9)
PMV BLD AUTO: 9.1 FL (ref 9.2–12.9)
POTASSIUM SERPL-SCNC: 4.3 MMOL/L (ref 3.5–5.1)
POTASSIUM SERPL-SCNC: 4.4 MMOL/L (ref 3.5–5.1)
PROT SERPL-MCNC: 7.6 G/DL (ref 6–8.4)
PROT SERPL-MCNC: 8 G/DL (ref 6–8.4)
PROTHROMBIN TIME: 16.6 SEC (ref 9–12.5)
RBC # BLD AUTO: 3.38 M/UL (ref 4.6–6.2)
RBC # BLD AUTO: 3.43 M/UL (ref 4.6–6.2)
RBC # BLD AUTO: 3.43 M/UL (ref 4.6–6.2)
RBC # BLD AUTO: 3.7 M/UL (ref 4.6–6.2)
SODIUM SERPL-SCNC: 139 MMOL/L (ref 136–145)
SODIUM SERPL-SCNC: 139 MMOL/L (ref 136–145)
SPECIMEN OUTDATE: NORMAL
WBC # BLD AUTO: 11.52 K/UL (ref 3.9–12.7)
WBC # BLD AUTO: 11.52 K/UL (ref 3.9–12.7)
WBC # BLD AUTO: 12.67 K/UL (ref 3.9–12.7)
WBC # BLD AUTO: 13.47 K/UL (ref 3.9–12.7)

## 2024-12-11 PROCEDURE — 25500020 PHARM REV CODE 255: Performed by: STUDENT IN AN ORGANIZED HEALTH CARE EDUCATION/TRAINING PROGRAM

## 2024-12-11 PROCEDURE — 25000003 PHARM REV CODE 250: Performed by: STUDENT IN AN ORGANIZED HEALTH CARE EDUCATION/TRAINING PROGRAM

## 2024-12-11 PROCEDURE — 94761 N-INVAS EAR/PLS OXIMETRY MLT: CPT

## 2024-12-11 PROCEDURE — 94640 AIRWAY INHALATION TREATMENT: CPT

## 2024-12-11 PROCEDURE — 21400001 HC TELEMETRY ROOM

## 2024-12-11 PROCEDURE — 80053 COMPREHEN METABOLIC PANEL: CPT | Mod: 91

## 2024-12-11 PROCEDURE — 97535 SELF CARE MNGMENT TRAINING: CPT

## 2024-12-11 PROCEDURE — 25000242 PHARM REV CODE 250 ALT 637 W/ HCPCS: Performed by: STUDENT IN AN ORGANIZED HEALTH CARE EDUCATION/TRAINING PROGRAM

## 2024-12-11 PROCEDURE — 85025 COMPLETE CBC W/AUTO DIFF WBC: CPT | Mod: 91 | Performed by: HOSPITALIST

## 2024-12-11 PROCEDURE — 94760 N-INVAS EAR/PLS OXIMETRY 1: CPT

## 2024-12-11 PROCEDURE — 25000003 PHARM REV CODE 250

## 2024-12-11 PROCEDURE — 97530 THERAPEUTIC ACTIVITIES: CPT

## 2024-12-11 PROCEDURE — 84100 ASSAY OF PHOSPHORUS: CPT

## 2024-12-11 PROCEDURE — 80053 COMPREHEN METABOLIC PANEL: CPT | Performed by: STUDENT IN AN ORGANIZED HEALTH CARE EDUCATION/TRAINING PROGRAM

## 2024-12-11 PROCEDURE — 83735 ASSAY OF MAGNESIUM: CPT

## 2024-12-11 PROCEDURE — 36415 COLL VENOUS BLD VENIPUNCTURE: CPT | Performed by: HOSPITALIST

## 2024-12-11 PROCEDURE — 85025 COMPLETE CBC W/AUTO DIFF WBC: CPT

## 2024-12-11 PROCEDURE — 97166 OT EVAL MOD COMPLEX 45 MIN: CPT

## 2024-12-11 RX ORDER — METOPROLOL TARTRATE 25 MG/1
25 TABLET, FILM COATED ORAL 2 TIMES DAILY
Status: DISCONTINUED | OUTPATIENT
Start: 2024-12-11 | End: 2024-12-11

## 2024-12-11 RX ORDER — METOPROLOL TARTRATE 25 MG/1
25 TABLET, FILM COATED ORAL 2 TIMES DAILY
Status: DISCONTINUED | OUTPATIENT
Start: 2024-12-11 | End: 2024-12-18 | Stop reason: HOSPADM

## 2024-12-11 RX ORDER — MELATONIN 10 MG
1 TABLET, SUBLINGUAL SUBLINGUAL DAILY
Status: DISCONTINUED | OUTPATIENT
Start: 2024-12-11 | End: 2024-12-18 | Stop reason: HOSPADM

## 2024-12-11 RX ORDER — ALBUTEROL SULFATE 2.5 MG/.5ML
2.5 SOLUTION RESPIRATORY (INHALATION) EVERY 12 HOURS
Status: DISCONTINUED | OUTPATIENT
Start: 2024-12-11 | End: 2024-12-18 | Stop reason: HOSPADM

## 2024-12-11 RX ORDER — METOPROLOL TARTRATE 1 MG/ML
5 INJECTION, SOLUTION INTRAVENOUS EVERY 5 MIN PRN
Status: DISCONTINUED | OUTPATIENT
Start: 2024-12-11 | End: 2024-12-18 | Stop reason: HOSPADM

## 2024-12-11 RX ORDER — TAMSULOSIN HYDROCHLORIDE 0.4 MG/1
0.4 CAPSULE ORAL DAILY
Status: DISCONTINUED | OUTPATIENT
Start: 2024-12-11 | End: 2024-12-18 | Stop reason: HOSPADM

## 2024-12-11 RX ORDER — SODIUM CHLORIDE 9 MG/ML
INJECTION, SOLUTION INTRAVENOUS CONTINUOUS
Status: ACTIVE | OUTPATIENT
Start: 2024-12-11 | End: 2024-12-11

## 2024-12-11 RX ORDER — ALBUTEROL SULFATE 90 UG/1
2 INHALANT RESPIRATORY (INHALATION)
Status: DISCONTINUED | OUTPATIENT
Start: 2024-12-11 | End: 2024-12-11 | Stop reason: CLARIF

## 2024-12-11 RX ORDER — BISACODYL 10 MG/1
10 SUPPOSITORY RECTAL DAILY PRN
Status: DISCONTINUED | OUTPATIENT
Start: 2024-12-11 | End: 2024-12-18 | Stop reason: HOSPADM

## 2024-12-11 RX ORDER — ZONISAMIDE 100 MG/1
100 CAPSULE ORAL DAILY
Status: DISCONTINUED | OUTPATIENT
Start: 2024-12-11 | End: 2024-12-18 | Stop reason: HOSPADM

## 2024-12-11 RX ORDER — DUTASTERIDE 0.5 MG/1
0.5 CAPSULE, LIQUID FILLED ORAL DAILY
Status: DISCONTINUED | OUTPATIENT
Start: 2024-12-11 | End: 2024-12-18 | Stop reason: HOSPADM

## 2024-12-11 RX ORDER — SPIRONOLACTONE 25 MG/1
25 TABLET ORAL DAILY
Status: DISCONTINUED | OUTPATIENT
Start: 2024-12-11 | End: 2024-12-11

## 2024-12-11 RX ORDER — LEVETIRACETAM 500 MG/1
500 TABLET ORAL 2 TIMES DAILY
Status: DISCONTINUED | OUTPATIENT
Start: 2024-12-11 | End: 2024-12-18 | Stop reason: HOSPADM

## 2024-12-11 RX ORDER — ATORVASTATIN CALCIUM 40 MG/1
80 TABLET, FILM COATED ORAL DAILY
Status: DISCONTINUED | OUTPATIENT
Start: 2024-12-11 | End: 2024-12-18 | Stop reason: HOSPADM

## 2024-12-11 RX ORDER — PANTOPRAZOLE SODIUM 40 MG/1
40 TABLET, DELAYED RELEASE ORAL DAILY
Status: DISCONTINUED | OUTPATIENT
Start: 2024-12-11 | End: 2024-12-18 | Stop reason: HOSPADM

## 2024-12-11 RX ADMIN — DUTASTERIDE 0.5 MG: 0.5 CAPSULE, LIQUID FILLED ORAL at 08:12

## 2024-12-11 RX ADMIN — Medication 1 TABLET: at 08:12

## 2024-12-11 RX ADMIN — IOHEXOL 70 ML: 350 INJECTION, SOLUTION INTRAVENOUS at 01:12

## 2024-12-11 RX ADMIN — PANTOPRAZOLE SODIUM 40 MG: 40 TABLET, DELAYED RELEASE ORAL at 08:12

## 2024-12-11 RX ADMIN — LEVETIRACETAM 500 MG: 500 TABLET, FILM COATED ORAL at 08:12

## 2024-12-11 RX ADMIN — METOPROLOL TARTRATE 25 MG: 25 TABLET, FILM COATED ORAL at 08:12

## 2024-12-11 RX ADMIN — ALBUTEROL SULFATE 2.5 MG: 2.5 SOLUTION RESPIRATORY (INHALATION) at 08:12

## 2024-12-11 RX ADMIN — ACETAMINOPHEN 650 MG: 325 TABLET ORAL at 02:12

## 2024-12-11 RX ADMIN — TAMSULOSIN HYDROCHLORIDE 0.4 MG: 0.4 CAPSULE ORAL at 08:12

## 2024-12-11 RX ADMIN — SODIUM CHLORIDE 500 ML: 9 INJECTION, SOLUTION INTRAVENOUS at 12:12

## 2024-12-11 RX ADMIN — SODIUM CHLORIDE: 9 INJECTION, SOLUTION INTRAVENOUS at 11:12

## 2024-12-11 RX ADMIN — ACETAMINOPHEN 650 MG: 325 TABLET ORAL at 08:12

## 2024-12-11 RX ADMIN — ZONISAMIDE 100 MG: 100 CAPSULE ORAL at 08:12

## 2024-12-11 RX ADMIN — METOPROLOL TARTRATE 25 MG: 25 TABLET, FILM COATED ORAL at 02:12

## 2024-12-11 RX ADMIN — SODIUM CHLORIDE: 9 INJECTION, SOLUTION INTRAVENOUS at 06:12

## 2024-12-11 RX ADMIN — BISACODYL 10 MG: 10 SUPPOSITORY RECTAL at 05:12

## 2024-12-11 RX ADMIN — ATORVASTATIN CALCIUM 80 MG: 40 TABLET, FILM COATED ORAL at 08:12

## 2024-12-11 NOTE — PLAN OF CARE
Problem: Skin Injury Risk Increased  Goal: Skin Health and Integrity  12/11/2024 0558 by Purnima West RN  Outcome: Progressing  12/11/2024 0556 by Purnima West RN  Outcome: Progressing     Problem: Adult Inpatient Plan of Care  Goal: Optimal Comfort and Wellbeing  12/11/2024 0558 by Purnima West, RN  Outcome: Progressing  12/11/2024 0556 by Purnima West RN  Outcome: Progressing

## 2024-12-11 NOTE — PLAN OF CARE
Problem: Skin Injury Risk Increased  Goal: Skin Health and Integrity  Outcome: Progressing     Problem: Adult Inpatient Plan of Care  Goal: Optimal Comfort and Wellbeing  Outcome: Progressing

## 2024-12-11 NOTE — NURSING
Pt having extreme pain when JOSEPH hose applied or when pt moves.  No c/o pain on rest.  Will continue to monitor.  NS 500cc at 100cc/hr to rt. A/c saline lock. Site clean and dry and intact.

## 2024-12-11 NOTE — PLAN OF CARE
Case Management Assessment     PCP: Andree Mccallum FNP    Pharmacy:   [id number] - [place] [address] [facility] [address]  [address] [address]  Phone: [phone] Fax: [phone]      Patient Arrived From: Home  Existing Help at Home: pt's brother, niece, and nephew    Barriers to Discharge: None identified at this time    Discharge Plan:    A. New nursing home placement   B. Home with family    CM spoke to pt's niece Kasie and nephew Edison for discharge planning assessment. Pt lives with his brother. He's able to ambulate using a rolling walker, but he doesn't walk much. Family assists with transportation and setting out his medications for him. Both Kasie and Edison stated pt has been more confused at home and think he may have dementia. Pt's brother is not in as bad of shape functionally as pt is, though he isn't able to assist pt much. I explained to both Kasie and Edison that it isn't safe for pt to live at home since he's been more confused without 24/7 supervision/care. I explained we could assist with new nursing home placement, or they could arrange sitters at home. I also informed them that while we can start nursing home placement process, if pt is medically ready for discharge before he's placed, he'll have to discharge home. They verbalized understanding. CM will continue to follow and assist with discharge planning.       12/11/24 1134   Discharge Assessment   Assessment Type Discharge Planning Assessment   Confirmed/corrected address, phone number and insurance Yes   Confirmed Demographics Correct on Facesheet   Source of Information patient   Communicated PATT with patient/caregiver Date not available/Unable to determine   People in Home sibling(s)   Do you expect to return to your current living situation? Yes   Do you have help at home or someone to help you manage your care at home? Yes   Who are your caregiver(s) and their phone  number(s)? Brother; niece Kasie Smith [phone]; nephew Edison Be [phone]   Walking or Climbing Stairs Difficulty no   Dressing/Bathing Difficulty no   Equipment Currently Used at Home walker, rolling;cane, straight   Readmission within 30 days? No   Patient currently being followed by outpatient case management? No   Do you currently have service(s) that help you manage your care at home? No   Do you take prescription medications? Yes   Do you have prescription coverage? Yes   Coverage [employer]   Do you have any problems affording any of your prescribed medications? No   Is the patient taking medications as prescribed? yes   Who is going to help you get home at discharge? Family   How do you get to doctors appointments? family or friend will provide   Are you on dialysis? No   Do you take coumadin? No   Discharge Plan A New Nursing Home placement - jail [facility]   Discharge Plan B Home with family   DME Needed Upon Discharge  none   Discharge Plan discussed with: Caregiver   Name(s) and Number(s) Kasie Smith [phone]; Edison Be [phone]   Transition of Care Barriers None

## 2024-12-11 NOTE — ED NOTES
Ruben at bedside, gave pt Pardo's. [place] provided to pt by nurse.Pt on low sodium diet per MD order
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
## 2024-12-11 NOTE — ASSESSMENT & PLAN NOTE
Patient's blood pressure range in the last 24 hours was: BP  Min: 84/45  Max: 135/71.The patient's inpatient anti-hypertensive regimen is listed below:  Current Antihypertensives  , Daily, Oral  , Daily, Oral  , Daily, Oral  spironolactone tablet 25 mg, Daily, Oral  metoprolol tartrate (LOPRESSOR) tablet 25 mg, 2 times daily, Oral    Plan  - BP is uncontrolled, will adjust as follows: holding home  lisinopril and Lasix in setting of PHYLLIS and hypotension, will resume ASAP.  Holding home spironolactone in setting of hyperkalemia
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
## 2024-12-11 NOTE — CARE UPDATE
Patient seen and examined.  See H/P, treatment and plan per Dr. Almeida.  83 y/o male with multiple chronic medical issues apparently came in for dental pain and possibly dysphagia.  Patient is a very poor historian.  Currently just complaining of bilateral feet pain.  He had an episode of hypotension in ER.  Probably driven by uncontrolled Afib.  Hypotension resolved and rate better controlled.  Patient apparently complained of back pain.  Work up included a CT that showed 2 suspected adjacent hematomas associated with the right lateral abdominal wall with minimal displacement of the adjacent liver.  CTA again showed hematomas, but no evidence of active bleeding.  Patient is afebrile with normal WBC and no indication of infection.  Patient with hx of PAF on Eliquis.  Will hold Eliquis and closely monitor H/H.  I got further history from patient's nephew.  Patient lives with his elderly brother.  They both have multiple medical issues and nephew has been trying to take care of both of them.  This has become more difficult for him.  Will get PT/OT to evaluate patient.  NH placement may be best disposition option for patient.

## 2024-12-11 NOTE — ASSESSMENT & PLAN NOTE
Patient has paroxysmal (<7 days) atrial fibrillation. Patient is currently in sinus rhythm. [id number] Score: 3. The patients heart rate in the last 24 hours is as follows:  Pulse  Min: 74  Max: 100     Antiarrhythmics  metoprolol tartrate (LOPRESSOR) tablet 25 mg, 2 times daily, Oral    Anticoagulants  , 2 times daily, Oral    Plan  - Replete lytes with a goal of K>4, Mg >2  - Patient is not anticoagulated due to new hematomas  - Patient's afib is currently uncontrolled. Will adjust treatment as follows: if he goes back into AFib w RVR after volume status and hematomas addressed, will use IV metoprolol .  Have also changed his Lopressor 50 daily to 25 BID to provide more consistent coverage throughout the day.  - Telemetry

## 2024-12-11 NOTE — ASSESSMENT & PLAN NOTE
Closed compression fracture    Noted on CT on presentation.  Patient's hemorrhage is suspected due to trauma/laceration (AFib w RVR causing him to have a fall?), patient does not have a propensity for bleeding.. Patients most recent Hgb, Hct, platelets, and INR are listed below.  Recent Labs     12/10/24  1521 12/10/24  2357   HGB 10.1* 9.2*  9.2*   HCT 31.8* 30.6*  30.6*    414  414     Plan  - Will trend hemoglobin/hematocrit Every 8 hours  - Will monitor and correct any coagulation defects  - Will transfuse if Hgb is <7g/dl (<8g/dl in cases of active ACS) or if patient has rapid bleeding leading to hemodynamic instability  - CTA A/P pending, will consult surgery/IR if results warrant

## 2024-12-11 NOTE — SUBJECTIVE & OBJECTIVE
Past Medical History:   Diagnosis Date    Abdominal hernia     BPH (benign prostatic hyperplasia)     Coronary artery disease     Non-obstructive CAD on [facility], Sept 2019. Performed for NSTEMI.    Diabetes mellitus     sister states he is borderline    Dyslipidemia     Hypercholesteremia     Hypertension     Seizure disorder     Seizures     Sinus disorder     Stroke        Past Surgical History:   Procedure Laterality Date    KNEE SURGERY      LEFT HEART CATHETERIZATION Left 9/26/2019    Procedure: Left heart cath, radial. 11 am;  Surgeon: Zahra Roca MD;  Location: [facility] CATH LAB;  Service: Cardiology;  Laterality: Left;    NASAL SINUS SURGERY      NOSE SURGERY         Review of patient's allergies indicates:  No Known Allergies    No current facility-administered medications on file prior to encounter.     Current Outpatient Medications on File Prior to Encounter   Medication Sig    albuterol (PROVENTIL HFA) 90 mcg/actuation inhaler Inhale 2 puffs into the lungs every 8 (eight) hours while awake. Rescue    alendronate (FOSAMAX) 70 MG tablet Take 1 tablet (70 mg total) by mouth every 7 days.    aspirin (ECOTRIN) 81 MG EC tablet Take 81 mg by mouth once daily.    atorvastatin (LIPITOR) 80 MG tablet Take 80 mg by mouth once daily.    bisacodyL (DULCOLAX) 10 mg Supp Place 1 suppository (10 mg total) rectally daily as needed.    calcium citrate-vitamin D3 (CITRACAL + D MAXIMUM) 315-250 mg-unit Tab Take 315 mg by mouth 2 (two) times daily.    dutasteride (AVODART) 0.5 mg capsule Take 0.5 mg by mouth once daily.    ELIQUIS 5 mg Tab Take 5 mg by mouth 2 (two) times daily.    famotidine (PEPCID) 20 MG tablet Take 20 mg by mouth Daily.    furosemide (LASIX) 40 MG tablet Take 40 mg by mouth once daily.    ketorolac 0.5% (ACULAR) 0.5 % Drop Place 1 drop into the right eye 4 (four) times daily.    levETIRAcetam (KEPPRA) 500 MG Tab Take 500 mg by mouth 2 (two) times daily.    lisinopriL (PRINIVIL,ZESTRIL) 5 MG tablet Take 5 mg  by mouth once daily.    metoprolol tartrate (LOPRESSOR) 50 MG tablet Take 1 tablet (50 mg total) by mouth once daily.    omeprazole (PRILOSEC) 20 MG capsule Take 20 mg by mouth once daily.    spironolactone (ALDACTONE) 25 MG tablet Take 25 mg by mouth once daily.    tamsulosin (FLOMAX) 0.4 mg Cp24 Take 1 capsule (0.4 mg total) by mouth once daily.    zonisamide (ZONEGRAN) 100 MG Cap Take 100 mg by mouth once daily.     Family History       Problem Relation (Age of Onset)    Cancer Mother    Coronary artery disease Brother, Brother    Heart disease Sister    Hypertension Brother, Brother    Kidney disease Father          Tobacco Use    Smoking status: Never    Smokeless tobacco: Never   Substance and Sexual Activity    Alcohol use: No    Drug use: No    Sexual activity: Not Currently     Review of Systems   Reason unable to perform ROS: ROS was performed and pertinent +s and -s are listed in HPI.     Objective:     Vital Signs (Most Recent):  Temp: 98 °F (36.7 °C) (12/10/24 1600)  Pulse: 100 (12/10/24 2210)  Resp: 20 (12/10/24 2210)  BP: 135/71 (12/10/24 2210)  SpO2: 98 % (12/10/24 2210) Vital Signs (24h Range):  Temp:  [98 °F (36.7 °C)-98.3 °F (36.8 °C)] 98 °F (36.7 °C)  Pulse:  [] 100  Resp:  [16-20] 20  SpO2:  [97 %-100 %] 98 %  BP: ()/(45-71) 135/71     Weight: 79.4 kg (175 lb)  Body mass index is 27.41 kg/m².     Physical Exam  Constitutional:       General: He is in acute distress.      Appearance: He is underweight. He is ill-appearing. He is not toxic-appearing or diaphoretic.   HENT:      Head: Atraumatic.   Cardiovascular:      Rate and Rhythm: Normal rate and regular rhythm.      Pulses: Normal pulses.      Heart sounds: Normal heart sounds.   Pulmonary:      Breath sounds: No rales.   Abdominal:      Tenderness: There is no abdominal tenderness. There is no guarding.   Musculoskeletal:      Right lower leg: No edema.      Left lower leg: No edema.   Neurological:      Mental Status: He is  alert and easily aroused. He is confused.   Psychiatric:         Speech: Speech is tangential.      Comments: Speech was well-phonated and annunciated, but per several translators made little sense and largely consisted of telling stories about his youth.  Required frequent re-direction to elicit any meaningful information.        Significant Labs: All pertinent labs within the past 24 hours have been reviewed.  CBC:   Recent Labs   Lab 12/10/24  1521 12/10/24  2357   WBC 13.28* 11.52  11.52   HGB 10.1* 9.2*  9.2*   HCT 31.8* 30.6*  30.6*    414  414     CMP:   Recent Labs   Lab 12/10/24  1521      K 5.3*   *   CO2 19*      BUN 73*   CREATININE 1.7*   CALCIUM 10.1   PROT 8.5*   ALBUMIN 2.7*   BILITOT 0.6   ALKPHOS 76   AST 20   ALT 18   ANIONGAP 9       Significant Imaging: I have reviewed all pertinent imaging results/findings within the past 24 hours.    Results for orders placed or performed during the hospital encounter of 12/10/24 (from the past 2160 hours)   CT Lumbar Spine Without Contrast    Narrative    EXAMINATION:  CT LUMBAR SPINE WITHOUT CONTRAST    CLINICAL HISTORY:  Low back pain, trauma;    TECHNIQUE:  Low-dose axial, sagittal and coronal reformations are obtained through the lumbar spine.  Contrast was not administered.    COMPARISON:  05/15/2021    FINDINGS:  No acute fractures of the lumbar spine.    Moderate compression fractures of T11, T12, L1, L2, L3, similar to the prior study.    Stable severe compression fracture of L4 similar to the prior study.    Stable mild compression fracture L5 similar to prior study.    Multilevel anterior bridging osteophytes and right anterior disc protrusion at L1-2.    L1-2: Mild diffuse posterior disc osteophyte complex.  Severe right foraminal narrowing and mild left foraminal narrowing.  Mild-moderate central canal narrowing.  Degenerative vacuum disc phenomena.    L2-3: Mild diffuse posterior disc osteophyte complex.  Mild  central canal narrowing.  Moderate bilateral foraminal narrowing.    L3-4: Mild diffuse posterior disc osteophyte complex.  Moderate central canal narrowing.  Severe right foraminal narrowing and moderate left foraminal narrowing.    L4-5: Mild diffuse posterior disc osteophyte complex.  Moderate bilateral foraminal narrowing.  Mild central canal narrowing.  Degenerative vacuum disc phenomena.    L5-S1: Mild diffuse posterior disc osteophyte complex.  Moderate bilateral foraminal narrowing.  Central canal is adequately maintained.    Tortuosity of the aorta with mild aneurysmal dilation of the common iliac arteries bilaterally measuring approximately 2.2 cm on the left and 2.5 cm on the right.      Impression    1. No acute abnormality.  See above comments.  2. Multilevel chronic degenerative changes.  3. Stable chronic compression fractures throughout the lumbar spine.  See above comments.      Electronically signed by: Missael Jurado  Date:    12/10/2024  Time:    17:37   CT Thorax Without Contrast    Narrative    EXAMINATION:  CT CHEST WITHOUT CONTRAST    CLINICAL HISTORY:  Chest trauma, blunt;    TECHNIQUE:  Low dose axial images, sagittal and coronal reformations were obtained from the thoracic inlet to the lung bases without IV contrast.    COMPARISON:  12/10/2024, 04/09/2020    FINDINGS:  Base of Neck: No significant abnormality.    Thoracic soft tissues: Unremarkable.    Aorta: Mild aneurysm dilation of the ascending thoracic aorta measuring 4.1 cm.    Heart: Heart is mildly enlarged..  Minimal pericardial effusion.  Coronary atherosclerosis.    Radha/Mediastinum: No pathologic jacquelyn enlargement.    Airways: Patent.    Lungs/Pleura: Mild bibasilar atelectasis.  Mild probable right upper lobe interstitial scarring.  No mass or consolidation.  No effusion or pneumothorax.    Esophagus: Unremarkable.    Upper Abdomen: 6.0 x 3.4 cm probable complex collection associated with the right posterolateral upper  abdominal wall on axial 116 could represent focal hematoma associated with the abdominal wall.  Mild displacement of the adjacent inferior right hepatic lobe of the liver.  Slightly more inferior is a similar probable adjacent right lateral abdominal hematoma measuring 4.1 x 4.6 cm on axial 124 with minimal mass effect on the adjacent inferior right hepatic lobe of the liver also.  Follow-up recommended.    Bones: Mild-moderate probable chronic compression fractures of T2 and T3.    Stable moderate compression fractures of T11 through L3      Impression    1. There are 2 suspected adjacent hematomas associated with the right lateral abdominal wall with minimal displacement of the adjacent liver.  Follow-up is recommended.  2. Mild cardiomegaly with minimal pericardial effusion and coronary atherosclerosis.  3. Mild aneurysmal dilation of the ascending thoracic aorta measuring 4.1 cm.  4. Multiple probable chronic compression fractures.  See above comments.  5. This report was flagged in Epic as abnormal.      Electronically signed by: Missael Jurado  Date:    12/10/2024  Time:    18:00   CT Soft Tissue Neck WO Contrast    Narrative    EXAMINATION:  CT SOFT TISSUE NECK WITHOUT CONTRAST    CLINICAL HISTORY:  Soft tissue swelling, infection suspected, neck xray done;    TECHNIQUE:  Low dose axial images as well as sagittal and coronal reconstructions were performed from the skull base to the clavicles following the intravenous administration of 75 mL of Omnipaque 350.    COMPARISON:  None.    FINDINGS:  Skull Base and Brain (limited evaluation): No significant abnormality.    Sinuses and Mastoid Air Cells: Mild bilateral mastoid air cell disease right greater than left.  Mild right maxillary sinus disease.    Salivary Glands: Parotid and submandibular glands are bilaterally symmetric and demonstrate no gross abnormalities.    Thyroid: Normal in size and configuration the.    Cervical Lymph Nodes: No pathologic  enlargement.    Pharynx/Larynx: Normal, with preservation of the normal anatomical fat planes.    Epiglottis is within normal limits.    No abscess is detected.    Vasculature (limited evaluation): Vascular structures of the neck appear patent    Upper Airways and Lungs: Trachea is midline and the proximal airways are patent.  Lung apices are clear.    Bones: No acute fracture or suspicious lytic or sclerotic lesion.      Impression    1. No acute abnormality  2. Mild bilateral mastoid air cell disease, right greater than left and mild right maxillary sinus disease.      Electronically signed by: Missael Jurado  Date:    12/10/2024  Time:    18:00

## 2024-12-11 NOTE — ASSESSMENT & PLAN NOTE
PHYLLIS is likely due to pre-renal azotemia due to intravascular volume depletion. Baseline creatinine is  1.0 . Most recent creatinine and eGFR are listed below.  Recent Labs     12/10/24  1521   CREATININE 1.7*   EGFRNORACEVR 40*      Plan  - PHYLLIS is worsening. Will give IV fluids  - Avoid nephrotoxins and renally dose meds for GFR listed above  - Monitor urine output, serial BMP, and adjust therapy as needed

## 2024-12-12 LAB
ALBUMIN SERPL BCP-MCNC: 2.3 G/DL (ref 3.5–5.2)
ALP SERPL-CCNC: 76 U/L (ref 40–150)
ALT SERPL W/O P-5'-P-CCNC: 16 U/L (ref 10–44)
ANION GAP SERPL CALC-SCNC: 9 MMOL/L (ref 8–16)
AST SERPL-CCNC: 20 U/L (ref 10–40)
BILIRUB SERPL-MCNC: 0.4 MG/DL (ref 0.1–1)
BUN SERPL-MCNC: 34 MG/DL (ref 8–23)
CALCIUM SERPL-MCNC: 9.3 MG/DL (ref 8.7–10.5)
CHLORIDE SERPL-SCNC: 113 MMOL/L (ref 95–110)
CO2 SERPL-SCNC: 21 MMOL/L (ref 23–29)
CREAT SERPL-MCNC: 0.8 MG/DL (ref 0.5–1.4)
EST. GFR  (NO RACE VARIABLE): >60 ML/MIN/1.73 M^2
GLUCOSE SERPL-MCNC: 108 MG/DL (ref 70–110)
MAGNESIUM SERPL-MCNC: 1.6 MG/DL (ref 1.6–2.6)
PHOSPHATE SERPL-MCNC: 2.9 MG/DL (ref 2.7–4.5)
POTASSIUM SERPL-SCNC: 4.1 MMOL/L (ref 3.5–5.1)
PROT SERPL-MCNC: 7.6 G/DL (ref 6–8.4)
SODIUM SERPL-SCNC: 143 MMOL/L (ref 136–145)

## 2024-12-12 PROCEDURE — 25000242 PHARM REV CODE 250 ALT 637 W/ HCPCS: Performed by: STUDENT IN AN ORGANIZED HEALTH CARE EDUCATION/TRAINING PROGRAM

## 2024-12-12 PROCEDURE — 36415 COLL VENOUS BLD VENIPUNCTURE: CPT

## 2024-12-12 PROCEDURE — 97530 THERAPEUTIC ACTIVITIES: CPT

## 2024-12-12 PROCEDURE — 94760 N-INVAS EAR/PLS OXIMETRY 1: CPT

## 2024-12-12 PROCEDURE — 94761 N-INVAS EAR/PLS OXIMETRY MLT: CPT

## 2024-12-12 PROCEDURE — 25000003 PHARM REV CODE 250

## 2024-12-12 PROCEDURE — 21400001 HC TELEMETRY ROOM

## 2024-12-12 PROCEDURE — 80053 COMPREHEN METABOLIC PANEL: CPT

## 2024-12-12 PROCEDURE — 83735 ASSAY OF MAGNESIUM: CPT

## 2024-12-12 PROCEDURE — 25000003 PHARM REV CODE 250: Performed by: STUDENT IN AN ORGANIZED HEALTH CARE EDUCATION/TRAINING PROGRAM

## 2024-12-12 PROCEDURE — 94640 AIRWAY INHALATION TREATMENT: CPT

## 2024-12-12 PROCEDURE — 97162 PT EVAL MOD COMPLEX 30 MIN: CPT

## 2024-12-12 PROCEDURE — 84100 ASSAY OF PHOSPHORUS: CPT

## 2024-12-12 PROCEDURE — 99900031 HC PATIENT EDUCATION (STAT)

## 2024-12-12 PROCEDURE — 63600175 PHARM REV CODE 636 W HCPCS

## 2024-12-12 PROCEDURE — 97535 SELF CARE MNGMENT TRAINING: CPT

## 2024-12-12 RX ADMIN — DUTASTERIDE 0.5 MG: 0.5 CAPSULE, LIQUID FILLED ORAL at 09:12

## 2024-12-12 RX ADMIN — Medication 6 MG: at 11:12

## 2024-12-12 RX ADMIN — ZONISAMIDE 100 MG: 100 CAPSULE ORAL at 08:12

## 2024-12-12 RX ADMIN — METOPROLOL TARTRATE 25 MG: 25 TABLET, FILM COATED ORAL at 09:12

## 2024-12-12 RX ADMIN — LEVETIRACETAM 500 MG: 500 TABLET, FILM COATED ORAL at 08:12

## 2024-12-12 RX ADMIN — LEVETIRACETAM 500 MG: 500 TABLET, FILM COATED ORAL at 11:12

## 2024-12-12 RX ADMIN — ACETAMINOPHEN 650 MG: 325 TABLET ORAL at 05:12

## 2024-12-12 RX ADMIN — ALBUTEROL SULFATE 2.5 MG: 2.5 SOLUTION RESPIRATORY (INHALATION) at 07:12

## 2024-12-12 RX ADMIN — ACETAMINOPHEN 650 MG: 325 TABLET ORAL at 04:12

## 2024-12-12 RX ADMIN — ONDANSETRON 4 MG: 2 INJECTION INTRAMUSCULAR; INTRAVENOUS at 11:12

## 2024-12-12 RX ADMIN — PANTOPRAZOLE SODIUM 40 MG: 40 TABLET, DELAYED RELEASE ORAL at 09:12

## 2024-12-12 RX ADMIN — METOPROLOL TARTRATE 25 MG: 25 TABLET, FILM COATED ORAL at 11:12

## 2024-12-12 RX ADMIN — Medication 1 TABLET: at 08:12

## 2024-12-12 RX ADMIN — ATORVASTATIN CALCIUM 80 MG: 40 TABLET, FILM COATED ORAL at 09:12

## 2024-12-12 RX ADMIN — TAMSULOSIN HYDROCHLORIDE 0.4 MG: 0.4 CAPSULE ORAL at 08:12

## 2024-12-12 NOTE — NURSING
[facility]  Nurses Note -- 4 Eyes      12/11/2024       Skin assessed on: Q Shift      [x] No Pressure Injuries Present    []Prevention Measures Documented    [] Yes LDA  for Pressure Injury Previously documented     [] Yes New Pressure Injury Discovered   [] LDA for New Pressure Injury Added      Attending RN:  Purnima West RN     Second RN:  Bonita

## 2024-12-12 NOTE — ASSESSMENT & PLAN NOTE
Patient's blood pressure range in the last 24 hours was: BP  Min: 94/56  Max: 145/76.The patient's inpatient anti-hypertensive regimen is listed below:  Current Antihypertensives  , Daily, Oral  , Daily, Oral  , Daily, Oral  metoprolol tartrate (LOPRESSOR) tablet 25 mg, 2 times daily, Oral  metoprolol injection 5 mg, Every 5 min PRN, Intravenous    Plan  - BP is uncontrolled, will adjust as follows: holding home  lisinopril and Lasix in setting of PHYLLIS and hypotension, will resume ASAP.  Holding home spironolactone in setting of hyperkalemia.  BP better controlled today.

## 2024-12-12 NOTE — HOSPITAL COURSE
85 y/o male with multiple chronic medical issues apparently came in for dental pain and possibly dysphagia.  Patient is a very poor historian.  Currently just complaining of bilateral feet pain. He had an episode of hypotension in ER.  Probably driven by uncontrolled Afib.  Hypotension resolved and rate better controlled. Patient apparently complained of back pain.  Work up included a CT that showed 2 suspected adjacent hematomas associated with the right lateral abdominal wall with minimal displacement of the adjacent liver.  CTA again showed hematomas, but no evidence of active bleeding.  Patient is afebrile with normal WBC and no indication of infection. Patient with hx of PAF on Eliquis.  Will hold Eliquis and closely monitor H/H. I got further history from patient's nephew.  Patient lives with his elderly brother.  They both have multiple medical issues and nephew has been trying to take care of both of them.  This has become more difficult for him.  Will get PT/OT to evaluate patient.  NH placement may be best disposition option for patient. PT/OT recommending SNF.  SW/CM consulted.     Medically ready for dispo, can resume AC upon discharge.    Patient was denied by insurance, unclear why... he had an abdominal wall hematoma while on a blood thinner and his hb was dropping daily... 10.4--> 9.3--> 8.8--> 8.1 which needed to be monitored before restarting AC, and went into afib rvr a couple times. Will need to re-evaluate in this 83 yo gentleman.

## 2024-12-12 NOTE — PT/OT/SLP PROGRESS
Occupational Therapy   Treatment    Name: Vern Lr  MRN: [id number]  Admitting Diagnosis:  Abdominal wall hematoma       Recommendations:     Discharge Recommendations: Moderate Intensity Therapy  Discharge Equipment Recommendations:  to be determined by next level of care  Barriers to discharge:  Other (Comment), Decreased caregiver support    Assessment:     Vern Lr is a 84 y.o. male with a medical diagnosis of Abdominal wall hematoma.  He presents with Hob elevated and c/o neck, back and hip pain. Performance deficits affecting function are weakness, impaired endurance, impaired functional mobility, impaired self care skills, gait instability, impaired balance, impaired cognition, decreased upper extremity function, decreased lower extremity function, decreased safety awareness, pain. Occupational therapy provided limited translation for therapy terminology and he verbalized understanding.      Rehab Prognosis:  Good; patient would benefit from acute skilled OT services to address these deficits and reach maximum level of function.       Plan:     Patient to be seen 4 x/week to address the above listed problems via self-care/home management, therapeutic activities, therapeutic exercises  Plan of Care Expires: 01/11/25  Plan of Care Reviewed with: patient    Subjective     Chief Complaint: neck, hip, and back pain   Patient/Family Comments/goals: agreeable to therapy   Pain/Comfort:  Pain Rating 1: other (see comments) (not rated, but c/o back, neck, and hip pain)    Objective:     Communicated with: RN prior to session.  Patient found HOB elevated with bed alarm, peripheral IV, telemetry upon OT entry to room.    General Precautions: Standard, fall, diabetic, seizure, vision impaired    Orthopedic Precautions:N/A  Braces: N/A  Respiratory Status: Room air     Occupational Performance:     Bed Mobility:    Patient completed Rolling/Turning to Right with minimum assistance and moderate assistance  Patient  completed Scooting/Bridging with minimum assistance and moderate assistance  Patient completed Supine to Sit with minimum assistance and moderate assistance  Patient completed Sit to Supine with minimum assistance and moderate assistance     Functional Mobility/Transfers:  Patient completed Sit <> Stand Transfer with minimum assistance x 2 initially from bed to chair with  rolling walker and then with mod assist x 2 from chair to bed   Patient completed Bed <> Chair Transfer using Step Transfer technique with minimum assistance and moderate assistance with RW needing more assistance after leaving chair   Functional Mobility: He walked a few feet with PT with min assist with RW x 6' twice to and from bed to chair.   Activities of Daily Living:  Toileting: total assistance and of 2  persons for brief change and hygiene       [facility] 6 Click ADL: 13    Treatment & Education:  Patient wanted to remain seated in the chair, but he had difficulty with arthritic pain requiring more assistance with transfers after he sat in chair.     Patient left HOB elevated after sitting in the chair for a few minutes with all lines intact, call button in reach, bed alarm on, and RN notified    GOALS:   Multidisciplinary Problems       Occupational Therapy Goals          Problem: Occupational Therapy    Goal Priority Disciplines Outcome Interventions   Occupational Therapy Goal     OT, PT/OT Progressing    Description: Goals to be met by: 1/11/2025      Patient will increase functional independence with ADLs by performing:    UE Dressing with Modified [place].  LE Dressing with Minimal Assistance.  Grooming while seated with Supervision.  Toileting from bedside commode with Contact Guard Assistance for hygiene and clothing management.   Toilet transfer to bedside commode with Contact Guard Assistance.  Increased functional strength to WFL for self care.  Upper extremity exercise program x10 reps per handout, with assistance as  needed.                          Time Tracking:     OT Date of Treatment: 12/12/24  OT Start Time: 1453  OT Stop Time: 1517  OT Total Time (min): 24 min    Billable Minutes:Self Care/Home Management 15  Therapeutic Activity 9 co-treat with PT     OT/CHRISTY: OT     Number of CHRISTY visits since last OT visit: 0    12/12/2024

## 2024-12-12 NOTE — PLAN OF CARE
Working to ensure pt is pain free; communicate as by  by translation board.  Encourage adequate hydration and diet .  Assist mobility by allowing pt to ambulate by and forth to the bathroom via walker and two person assistance to promote complete B.m. emptying.  Medicate for pain with Tylenol upon the moans or cry for pain med.  Continue JOSEPH hose and SCD if ordered to prevent blood clot.  Problem: Skin Injury Risk Increased  Goal: Skin Health and Integrity  Outcome: Progressing     Problem: Adult Inpatient Plan of Care  Goal: Plan of Care Review  Outcome: Progressing  Goal: Patient-Specific Goal (Individualized)  Outcome: Progressing  Goal: Absence of Hospital-Acquired Illness or Injury  Outcome: Progressing  Goal: Optimal Comfort and Wellbeing  Outcome: Progressing

## 2024-12-12 NOTE — PLAN OF CARE
Problem: Occupational Therapy  Goal: Occupational Therapy Goal  Description: Goals to be met by: 1/11/2025      Patient will increase functional independence with ADLs by performing:    UE Dressing with Modified [place].  LE Dressing with Minimal Assistance.  Grooming while seated with Supervision.  Toileting from bedside commode with Contact Guard Assistance for hygiene and clothing management.   Toilet transfer to bedside commode with Contact Guard Assistance.  Increased functional strength to WFL for self care.  Upper extremity exercise program x10 reps per handout, with assistance as needed.     Outcome: Progressing    Pt would benefit from continued OT to address deficits in self care and functional mobility. Recommending moderate intensity therapy; DME needs defer to next level of care

## 2024-12-12 NOTE — PLAN OF CARE
Problem: Physical Therapy  Goal: Physical Therapy Goal  Description: Goals to be met by: 24     Patient will increase functional independence with mobility by performin. Supine to sit with Stand-by Assistance  2. Rolling to Left and Right with Stand-by Assistance  3. Sit to stand transfer with Stand-by Assistance using RW  4. Bed to chair transfer with Stand-by Assistance using Rolling Walker  5. Gait x50 feet with Stand-by Assistance using Rolling Walker   6. Lower extremity exercise program 2 sets x15 reps per handout, with independence    Outcome: Progressing     Pt will benefit from moderate intensity therapy.

## 2024-12-12 NOTE — PT/OT/SLP EVAL
"Occupational Therapy   Evaluation    Name: Vern Lr  MRN: [id number]  Admitting Diagnosis: Abdominal wall hematoma  Recent Surgery: * No surgery found *      Recommendations:     Discharge Recommendations: Moderate Intensity Therapy  Discharge Equipment Recommendations:  to be determined by next level of care  Barriers to discharge:  Decreased caregiver support, Inaccessible home environment (Pt requires increased assistance)    Assessment:     Vern Lr is a 84 y.o. male with a medical diagnosis of Abdominal wall hematoma.  He presents with intermittent confusion/rambling but at times conversing fairly well in conversational Marshallese (this OT);  Saman utilized in session and reports that pt has some bouts of nonsensical speech but was initially conversing fairly well. Pt able to complete stand pivot transfer to bedside chair with RW. Performance deficits affecting function: weakness, impaired endurance, impaired self care skills, impaired sensation, impaired functional mobility, gait instability, impaired balance, decreased ROM, pain, decreased safety awareness, decreased lower extremity function, decreased upper extremity function, decreased coordination, impaired cognition, impaired coordination, impaired cardiopulmonary response to activity.      Rehab Prognosis: Good; patient would benefit from acute skilled OT services to address these deficits and reach maximum level of function.       Plan:     Patient to be seen 3 x/week to address the above listed problems via self-care/home management, therapeutic activities, therapeutic exercises  Plan of Care Expires: 01/11/25  Plan of Care Reviewed with: patient    Subjective     Chief Complaint: Pt reports he's been having trouble caring for himself, states that he wants a shower but that he's been here 3 or 4 days and "well, you can see."  Patient/Family Comments/goals: To return to [facility]    Occupational Profile: All information reported by pt " who may be poor historian.   Living Environment: Pt lives with brother in H, 0STE, tub/sh  Previous level of function: Mod I ADLs and functional mobility   Roles and Routines: Pt reports he can sometimes bathe and dress himself but at other times requires assistance from family  Equipment Used at Home: walker, rolling, cane, straight  Assistance upon Discharge: Limited    Pain/Comfort:  Pain Rating 1: 7/10  Location - Orientation 1: generalized  Location 1:  (body)  Pain Addressed 1: Reposition, Distraction, Cessation of Activity  Pain Rating Post-Intervention 1: 7/10    Patients cultural, spiritual, Buddhist conflicts given the current situation:      Objective:     Communicated with: nsg prior to session.  Patient found HOB elevated with bed alarm, peripheral IV, telemetry upon OT entry to room.    General Precautions: Standard, fall  Orthopedic Precautions: N/A  Braces: N/A  Respiratory Status: Room air    Occupational Performance:    Bed Mobility:    Patient completed Rolling/Turning to Right with minimum assistance and moderate assistance  Patient completed Scooting/Bridging with minimum assistance and moderate assistance  Patient completed Supine to Sit with minimum assistance and moderate assistance    Functional Mobility/Transfers:  Patient completed Sit <> Stand Transfer with minimum assistance and moderate assistance  with  rolling walker   Patient completed Bed <> Chair Transfer using Stand Pivot technique with minimum assistance with rolling walker  Functional Mobility: Pt with fair to fair- dynamic seated and standing balance.     Activities of Daily Living:  Upper Body Dressing: moderate assistance to don gown as robe seated EOB  Lower Body Dressing: maximal assistance to don/doff B socks seated EOB    Cognitive/Visual Perceptual:  Cognitive/Psychosocial Skills:     -       Oriented to: Person, Place, loosely to time/situation  -       Follows Commands/attention:Follows one and two step   commands  -       Communication: clear/fluent  -       Memory: No Deficits noted  -       Safety awareness/insight to disability: intact   -       Mood/Affect/Coping skills/emotional control: Appropriate to situation    Physical Exam:  Postural examination/scapula alignment:    -      Rounded shoulders, forward head  Skin integrity: Visible skin intact  Motor Planning:    -       WFL  Dominant hand:    -       R handed  Upper Extremity Range of Motion: BUE WFL  except B shoulder flexion limited ~0-150*   Upper Extremity Strength:  BUE WFL   Strength:  B hands WFL  Fine Motor Coordination:    -       Intact  Gross motor coordination:   WFL     AMPAC 6 Click ADL:  AMPAC Total Score: 14    Treatment & Education:  Pt educated on role of OT and POC.   Pt performing skills as listed above.     Patient left up in chair with all lines intact, call button in reach, and nsg notified    GOALS:   Multidisciplinary Problems       Occupational Therapy Goals          Problem: Occupational Therapy    Goal Priority Disciplines Outcome Interventions   Occupational Therapy Goal     OT, PT/OT Progressing    Description: Goals to be met by: 1/11/2025      Patient will increase functional independence with ADLs by performing:    UE Dressing with Modified [place].  LE Dressing with Minimal Assistance.  Grooming while seated with Supervision.  Toileting from bedside commode with Contact Guard Assistance for hygiene and clothing management.   Toilet transfer to bedside commode with Contact Guard Assistance.  Increased functional strength to WFL for self care.  Upper extremity exercise program x10 reps per handout, with assistance as needed.                          History:     Past Medical History:   Diagnosis Date    Abdominal hernia     BPH (benign prostatic hyperplasia)     Coronary artery disease     Non-obstructive CAD on [facility], Sept 2019. Performed for NSTEMI.    Diabetes mellitus     sister states he is borderline     Dyslipidemia     Hypercholesteremia     Hypertension     Seizure disorder     Seizures     Sinus disorder     Stroke          Past Surgical History:   Procedure Laterality Date    KNEE SURGERY      LEFT HEART CATHETERIZATION Left 9/26/2019    Procedure: Left heart cath, radial. 11 am;  Surgeon: Zahra Roca MD;  Location: [facility] CATH LAB;  Service: Cardiology;  Laterality: Left;    NASAL SINUS SURGERY      NOSE SURGERY         Time Tracking:     OT Date of Treatment: 12/11/24  OT Start Time: 1431  OT Stop Time: 1509  OT Total Time (min): 38 min    Billable Minutes:Evaluation 13  Self Care/Home Management 15  Therapeutic Activity 10    12/11/2024

## 2024-12-12 NOTE — ASSESSMENT & PLAN NOTE
Multiple chronic compression fractures seen on CT.  Probably contributing to functional decline.  PT/OT consulted.  Will possibly need SNF.

## 2024-12-12 NOTE — NURSING
I used  [id number] to communication  instructions but some of the message is missed.   Oriented to person and situation but not time and date

## 2024-12-12 NOTE — NURSING
Report received and care assumed. Discussed plan of care and safety with patient . Reviewed call system. No acute distress noted  [facility]  Nurses Note -- 4 Eyes      12/12/2024       Skin assessed on: Q Shift      [x] No Pressure Injuries Present    [x]Prevention Measures Documented    [] Yes LDA  for Pressure Injury Previously documented     [] Yes New Pressure Injury Discovered   [] LDA for New Pressure Injury Added      Attending RN:  Patti Roman RN     Second RN Purnima West RN

## 2024-12-12 NOTE — PLAN OF CARE
Problem: Skin Injury Risk Increased  Goal: Skin Health and Integrity  12/12/2024 0411 by Purnima West RN  Outcome: Progressing  12/12/2024 0354 by Purnima West RN  Outcome: Progressing     Problem: Adult Inpatient Plan of Care  Goal: Plan of Care Review  12/12/2024 0411 by Purnima West RN  Outcome: Progressing  12/12/2024 0354 by Purnima West RN  Outcome: Progressing Encouraging pt to be more compliance with care especially rt foot dressing change; compliance with diet and cbg testings; toileting and hygiene. Continue telemetry monitoring and IV antibiotic therapy for d/c planning  Goal: Patient-Specific Goal (Individualized)  12/12/2024 0411 by Purnima West RN  Outcome: Progressing  12/12/2024 0354 by Purnima West RN  Outcome: Progressing  Goal: Absence of Hospital-Acquired Illness or Injury  12/12/2024 0411 by Purnima West RN  Outcome: Progressing  12/12/2024 0354 by Purnima West RN  Outcome: Progressing  Goal: Optimal Comfort and Wellbeing  12/12/2024 0411 by Purnima West RN  Outcome: Progressing  12/12/2024 0354 by Purnima West RN  Outcome: Progressing

## 2024-12-12 NOTE — PLAN OF CARE
ALLYSON placed nursing home documentation brochure in pt's room per pt nephew's request. I called Edison to inform him of this and informed him of SNF recommendation & explained SNF level of care to him. Edison stated ultimately his father (pt's brother) and pt would have to agree to placement, that he's unable to make the decision for the pt.    I asked if he was able to talk to his cousin Kasie regarding pt's financials, and he stated he hasn't. Edison works 2 jobs and takes care of multiple elderly family members, so he's very busy. I informed him there might be facilities who would be willing to take him for the PT/OT and then to home, but if he needs placement, pt would need to be receiving an income in order to pay for the nursing home and to qualify for long term medicaid. He verbalized understanding.    ALLYSON called pt's niece Kasie, but had to leave a voicemail requesting a call back.

## 2024-12-12 NOTE — ASSESSMENT & PLAN NOTE
PHYLLIS is likely due to pre-renal azotemia due to intravascular volume depletion. Baseline creatinine is  1.0 . Most recent creatinine and eGFR are listed below.  Recent Labs     12/11/24  0004 12/11/24  0538 12/12/24  0503   CREATININE 1.6* 1.0 0.8   EGFRNORACEVR 42* >60 >60        Plan  - PHYLLIS is now resolved.  - Avoid nephrotoxins and renally dose meds for GFR listed above  - Monitor urine output, serial BMP, and adjust therapy as needed

## 2024-12-12 NOTE — SUBJECTIVE & OBJECTIVE
Interval History: no new complaints today.  Wants to get out of bed.    Review of Systems   HENT:  Negative for ear discharge and ear pain.    Eyes:  Negative for discharge and itching.   Endocrine: Negative for cold intolerance and heat intolerance.   Neurological:  Negative for seizures and syncope.     Objective:     Vital Signs (Most Recent):  Temp: 97.5 °F (36.4 °C) (12/12/24 1137)  Pulse: 82 (12/12/24 1137)  Resp: (!) 21 (12/12/24 1137)  BP: (!) 145/76 (12/12/24 1137)  SpO2: 97 % (12/12/24 1137) Vital Signs (24h Range):  Temp:  [97.4 °F (36.3 °C)-98.4 °F (36.9 °C)] 97.5 °F (36.4 °C)  Pulse:  [] 82  Resp:  [16-21] 21  SpO2:  [95 %-99 %] 97 %  BP: ()/(56-76) 145/76     Weight: 79.4 kg (175 lb)  Body mass index is 27.41 kg/m².    Intake/Output Summary (Last 24 hours) at 12/12/2024 1325  Last data filed at 12/12/2024 0814  Gross per 24 hour   Intake 560 ml   Output 2 ml   Net 558 ml         Physical Exam  Constitutional:       Appearance: He is not toxic-appearing or diaphoretic.   HENT:      Head: Normocephalic and atraumatic.      Mouth/Throat:      Pharynx: Oropharynx is clear. No oropharyngeal exudate or posterior oropharyngeal erythema.   Cardiovascular:      Rate and Rhythm: Normal rate and regular rhythm.   Pulmonary:      Effort: No respiratory distress.      Breath sounds: Normal breath sounds.   Abdominal:      General: Bowel sounds are normal.      Palpations: Abdomen is soft.   Musculoskeletal:         General: No deformity or signs of injury.   Skin:     General: Skin is warm and dry.   Neurological:      Comments: Awake and alert.  Oriented to person and place.  Answers simple questions and follows commands, but with episodes of confusion.             Significant Labs: All pertinent labs within the past 24 hours have been reviewed.  BMP:   Recent Labs   Lab 12/12/24  0503         K 4.1   *   CO2 21*   BUN 34*   CREATININE 0.8   CALCIUM 9.3   MG 1.6     CBC:   Recent  Labs   Lab 12/10/24  2357 12/11/24  0538 12/11/24  1847   WBC 11.52  11.52 12.67 13.47*   HGB 9.2*  9.2* 9.1* 10.4*   HCT 30.6*  30.6* 29.4* 34.2*     [phone]       Significant Imaging: I have reviewed all pertinent imaging results/findings within the past 24 hours.

## 2024-12-12 NOTE — NURSING
Pt noted sitting up in bed with the HOB elevated tof 45 angle. Noted pt continues to make sounds as if he is in pain or discomfort.  Yi speaking RN says that at times she finds it hard to follow what pt is saying and that pain medications had been given approximately 2 hours ago.  Pt c/o of pain in feet but when pt's bed was reposition pt noted immediately going off to sleep.  Noted saline lock intact with no redness or infiltration noted.

## 2024-12-12 NOTE — PT/OT/SLP EVAL
Physical Therapy Evaluation    Patient Name:  Vern Lr   MRN:  [id number]    Recommendations:     Discharge Recommendations: Moderate Intensity Therapy   Discharge Equipment Recommendations: to be determined by next level of care   Barriers to discharge home:  Pt with decreased safety awareness, increased fall risks, and decreased functional mobility/ambulation at this time.    Assessment:     Vern Lr is a 84 y.o. male admitted with a medical diagnosis of Abdominal wall hematoma.  He presents with the following impairments/functional limitations: weakness, impaired endurance, impaired self care skills, impaired functional mobility, gait instability, impaired balance, visual deficits, impaired cognition, decreased coordination, decreased upper extremity function, decreased lower extremity function, decreased safety awareness, pain, decreased ROM, edema.    Rehab Prognosis: Fair; patient would benefit from acute skilled PT services to address these deficits and reach maximum level of function.    Recent Surgery: * No surgery found *      Plan:     During this hospitalization, patient to be seen 6 x/week to address the identified rehab impairments via gait training, therapeutic activities, therapeutic exercises and progress toward the following goals:    Plan of Care Expires:  12/26/24    Subjective     Chief Complaint: pain  Patient/Family Comments/goals: Pt agreeable to therapy.   Pain/Comfort:  Pain Rating 1:  (Pt c/o back, L hip, and LLE pain.)  Pain Addressed 1: Reposition, Distraction, Cessation of Activity, Nurse notified      Living Environment:  Pt lives with elderly brother in a [facility] with 1 LION at entry.   Prior to admission, patients level of function was ambulatory with RW.  Equipment at home: cane, straight, walker, rolling.  Upon discharge, patient will have assistance from niece/nephew?.    Objective:     Communicated with nurse Zuluaga prior to session.  Patient found HOB elevated with bed alarm,  peripheral IV, telemetry  upon PT entry to room.    General Precautions: Standard, fall, diabetic, seizure, vision impaired  Orthopedic Precautions:N/A   Braces: N/A  Respiratory Status: Room air    Exams:  Cognitive Exam:  Patient was able to follow simple commands.   Gross Motor Coordination:  WFL  Postural Exam:  Patient presented with the following abnormalities:    -       Rounded shoulders  -       Forward head  -       Kyphosis  Skin Integrity/Edema:      -       Skin integrity: Visible skin intact  -       Edema: Mild LLE  BLE ROM: WFL  BLE Strength: WFL    Functional Mobility:  Pt able to follow simple commands and participate with therapy.  Functional mobility limited by pain.  Nurse notified concerning pain meds.  No family present.   Bed Mobility:     Scooting: contact guard assistance and minimum assistance  Supine to Sit: minimum assistance  Sit to Supine: moderate assistance  Transfers:     Sit to Stand: minimum assistance, moderate assistance, and of 2 persons with rolling walker x3 trials; Pt had some difficulty tolerating standing for diaper change.   Bed to Chair: minimum assistance with  rolling walker  using  Step Transfer  Chair to bed: minimum assistance with  rolling walker  using  Step Transfer  Gait: Pt ambulated 6 ft x2 trials with min A using RW.  Pt with forward flexed trunk, decreased weight shifting, decreased foot clearance, decreased step length, and decreased sylvia.   Balance: Pt with fair/fair- static sitting/standing balance.       AM-PAC 6 CLICK MOBILITY  Total Score:13       Treatment & Education:  BLE seated therex as tolerated: AP, LAQ, and hip flex.  Pt able to perform a few LE therex sitting EOB, now attempting to get back in bed.    Pt educated on acute skilled PT services, demonstrated understanding.  No family present at this time.     Patient left HOB elevated and BLE elevated on pillow with all lines intact, call button in reach, bed alarm on, and nurse Margareth  notified.  Tray table in front.      GOALS:   Multidisciplinary Problems       Physical Therapy Goals          Problem: Physical Therapy    Goal Priority Disciplines Outcome Interventions   Physical Therapy Goal     PT, PT/OT Progressing    Description: Goals to be met by: 24     Patient will increase functional independence with mobility by performin. Supine to sit with Stand-by Assistance  2. Rolling to Left and Right with Stand-by Assistance  3. Sit to stand transfer with Stand-by Assistance using RW  4. Bed to chair transfer with Stand-by Assistance using Rolling Walker  5. Gait x50 feet with Stand-by Assistance using Rolling Walker   6. Lower extremity exercise program 2 sets x15 reps per handout, with independence                         History:     Past Medical History:   Diagnosis Date    Abdominal hernia     BPH (benign prostatic hyperplasia)     Coronary artery disease     Non-obstructive CAD on [facility], 2019. Performed for NSTEMI.    Diabetes mellitus     sister states he is borderline    Dyslipidemia     Hypercholesteremia     Hypertension     Seizure disorder     Seizures     Sinus disorder     Stroke        Past Surgical History:   Procedure Laterality Date    KNEE SURGERY      LEFT HEART CATHETERIZATION Left 2019    Procedure: Left heart cath, radial. 11 am;  Surgeon: Zahra Roca MD;  Location: [facility] CATH LAB;  Service: Cardiology;  Laterality: Left;    NASAL SINUS SURGERY      NOSE SURGERY         Time Tracking:     PT Received On: 24  PT Start Time: 1453     PT Stop Time: 1517  PT Total Time (min): 24 min     Billable Minutes: Evaluation 16 min and Therapeutic Activity 8 min co-tx OT      2024
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
## 2024-12-12 NOTE — ASSESSMENT & PLAN NOTE
Patient has paroxysmal (<7 days) atrial fibrillation. Patient is currently in sinus rhythm. [id number] Score: 3. The patients heart rate in the last 24 hours is as follows:  Pulse  Min: 82  Max: 116     Antiarrhythmics  metoprolol tartrate (LOPRESSOR) tablet 25 mg, 2 times daily, Oral  metoprolol injection 5 mg, Every 5 min PRN, Intravenous    Anticoagulants  , 2 times daily, Oral    Plan  - Replete lytes with a goal of K>4, Mg >2  - Patient is not anticoagulated due to new hematomas  - better controlled since admission.

## 2024-12-12 NOTE — ASSESSMENT & PLAN NOTE
Closed compression fracture    Noted on CT on presentation.  Patient's hemorrhage is suspected due to trauma/laceration (AFib w RVR causing him to have a fall?), patient does not have a propensity for bleeding.. Patients most recent Hgb, Hct, platelets, and INR are listed below.  Recent Labs     12/10/24  2357 12/11/24  0538 12/11/24  1847   HGB 9.2*  9.2* 9.1* 10.4*   HCT 30.6*  30.6* 29.4* 34.2*     414 409 345   INR 1.6*  --   --        Plan  - Will monitor and correct any coagulation defects  - Will transfuse if Hgb is <7g/dl (<8g/dl in cases of active ACS) or if patient has rapid bleeding leading to hemodynamic instability  No evidence of active bleeding on CTA.  H/H stable.  Continue to monitor.

## 2024-12-13 LAB
ALBUMIN SERPL BCP-MCNC: 2.1 G/DL (ref 3.5–5.2)
ALP SERPL-CCNC: 72 U/L (ref 40–150)
ALT SERPL W/O P-5'-P-CCNC: 18 U/L (ref 10–44)
ANION GAP SERPL CALC-SCNC: 7 MMOL/L (ref 8–16)
AST SERPL-CCNC: 19 U/L (ref 10–40)
BASOPHILS # BLD AUTO: 0.01 K/UL (ref 0–0.2)
BASOPHILS NFR BLD: 0.1 % (ref 0–1.9)
BILIRUB SERPL-MCNC: 0.5 MG/DL (ref 0.1–1)
BUN SERPL-MCNC: 25 MG/DL (ref 8–23)
CALCIUM SERPL-MCNC: 8.7 MG/DL (ref 8.7–10.5)
CHLORIDE SERPL-SCNC: 109 MMOL/L (ref 95–110)
CO2 SERPL-SCNC: 21 MMOL/L (ref 23–29)
CREAT SERPL-MCNC: 0.8 MG/DL (ref 0.5–1.4)
DIFFERENTIAL METHOD BLD: ABNORMAL
EOSINOPHIL # BLD AUTO: 0 K/UL (ref 0–0.5)
EOSINOPHIL NFR BLD: 0.3 % (ref 0–8)
ERYTHROCYTE [DISTWIDTH] IN BLOOD BY AUTOMATED COUNT: 15.7 % (ref 11.5–14.5)
EST. GFR  (NO RACE VARIABLE): >60 ML/MIN/1.73 M^2
GLUCOSE SERPL-MCNC: 104 MG/DL (ref 70–110)
HCT VFR BLD AUTO: 27 % (ref 40–54)
HGB BLD-MCNC: 8.3 G/DL (ref 14–18)
HGB BLD-MCNC: 9.3 G/DL (ref 14–18)
IMM GRANULOCYTES # BLD AUTO: 0.06 K/UL (ref 0–0.04)
IMM GRANULOCYTES NFR BLD AUTO: 0.5 % (ref 0–0.5)
LYMPHOCYTES # BLD AUTO: 1.1 K/UL (ref 1–4.8)
LYMPHOCYTES NFR BLD: 9.5 % (ref 18–48)
MAGNESIUM SERPL-MCNC: 1.5 MG/DL (ref 1.6–2.6)
MCH RBC QN AUTO: 27.2 PG (ref 27–31)
MCHC RBC AUTO-ENTMCNC: 30.7 G/DL (ref 32–36)
MCV RBC AUTO: 89 FL (ref 82–98)
MONOCYTES # BLD AUTO: 0.9 K/UL (ref 0.3–1)
MONOCYTES NFR BLD: 7.6 % (ref 4–15)
NEUTROPHILS # BLD AUTO: 9.7 K/UL (ref 1.8–7.7)
NEUTROPHILS NFR BLD: 82 % (ref 38–73)
NRBC BLD-RTO: 0 /100 WBC
PHOSPHATE SERPL-MCNC: 2.6 MG/DL (ref 2.7–4.5)
PLATELET # BLD AUTO: 340 K/UL (ref 150–450)
PMV BLD AUTO: 8.8 FL (ref 9.2–12.9)
POCT GLUCOSE: 152 MG/DL (ref 70–110)
POTASSIUM SERPL-SCNC: 4.3 MMOL/L (ref 3.5–5.1)
PROT SERPL-MCNC: 6.9 G/DL (ref 6–8.4)
RBC # BLD AUTO: 3.05 M/UL (ref 4.6–6.2)
SODIUM SERPL-SCNC: 137 MMOL/L (ref 136–145)
WBC # BLD AUTO: 11.87 K/UL (ref 3.9–12.7)

## 2024-12-13 PROCEDURE — 94640 AIRWAY INHALATION TREATMENT: CPT

## 2024-12-13 PROCEDURE — 92610 EVALUATE SWALLOWING FUNCTION: CPT

## 2024-12-13 PROCEDURE — 85025 COMPLETE CBC W/AUTO DIFF WBC: CPT | Performed by: HOSPITALIST

## 2024-12-13 PROCEDURE — 25000242 PHARM REV CODE 250 ALT 637 W/ HCPCS: Performed by: STUDENT IN AN ORGANIZED HEALTH CARE EDUCATION/TRAINING PROGRAM

## 2024-12-13 PROCEDURE — 25000003 PHARM REV CODE 250

## 2024-12-13 PROCEDURE — 84100 ASSAY OF PHOSPHORUS: CPT

## 2024-12-13 PROCEDURE — 97116 GAIT TRAINING THERAPY: CPT | Mod: CQ

## 2024-12-13 PROCEDURE — 80053 COMPREHEN METABOLIC PANEL: CPT

## 2024-12-13 PROCEDURE — 97110 THERAPEUTIC EXERCISES: CPT | Mod: CQ

## 2024-12-13 PROCEDURE — 21400001 HC TELEMETRY ROOM

## 2024-12-13 PROCEDURE — 83735 ASSAY OF MAGNESIUM: CPT

## 2024-12-13 PROCEDURE — 25000003 PHARM REV CODE 250: Performed by: HOSPITALIST

## 2024-12-13 PROCEDURE — 99900031 HC PATIENT EDUCATION (STAT)

## 2024-12-13 PROCEDURE — 97530 THERAPEUTIC ACTIVITIES: CPT

## 2024-12-13 PROCEDURE — 85018 HEMOGLOBIN: CPT | Performed by: HOSPITALIST

## 2024-12-13 PROCEDURE — 94761 N-INVAS EAR/PLS OXIMETRY MLT: CPT

## 2024-12-13 PROCEDURE — 97535 SELF CARE MNGMENT TRAINING: CPT

## 2024-12-13 PROCEDURE — 36415 COLL VENOUS BLD VENIPUNCTURE: CPT | Performed by: HOSPITALIST

## 2024-12-13 RX ADMIN — PANTOPRAZOLE SODIUM 40 MG: 40 TABLET, DELAYED RELEASE ORAL at 08:12

## 2024-12-13 RX ADMIN — ACETAMINOPHEN 650 MG: 325 TABLET ORAL at 04:12

## 2024-12-13 RX ADMIN — ZONISAMIDE 100 MG: 100 CAPSULE ORAL at 08:12

## 2024-12-13 RX ADMIN — ATORVASTATIN CALCIUM 80 MG: 40 TABLET, FILM COATED ORAL at 08:12

## 2024-12-13 RX ADMIN — TAMSULOSIN HYDROCHLORIDE 0.4 MG: 0.4 CAPSULE ORAL at 08:12

## 2024-12-13 RX ADMIN — Medication 1 TABLET: at 08:12

## 2024-12-13 RX ADMIN — LEVETIRACETAM 500 MG: 500 TABLET, FILM COATED ORAL at 08:12

## 2024-12-13 RX ADMIN — DUTASTERIDE 0.5 MG: 0.5 CAPSULE, LIQUID FILLED ORAL at 08:12

## 2024-12-13 RX ADMIN — ALBUTEROL SULFATE 2.5 MG: 2.5 SOLUTION RESPIRATORY (INHALATION) at 07:12

## 2024-12-13 RX ADMIN — METOPROLOL TARTRATE 25 MG: 25 TABLET, FILM COATED ORAL at 09:12

## 2024-12-13 RX ADMIN — ALBUTEROL SULFATE 2.5 MG: 2.5 SOLUTION RESPIRATORY (INHALATION) at 08:12

## 2024-12-13 NOTE — PLAN OF CARE
Changes in medical condition or discharge plan:    Therapy recommending SNF. CM blasted referrals to local area facilities. Discharge plan A will be SNF to home or [facility] if pt receives an income to assist with paying for [facility] nursing home & applying for long term medicaid. Plan B would be home with home health.    CM spoke to pt's niece Kasie and she stated pt does receive an income, but she doesn't have access to his account and isn't sure how she would obtain his bank statements. She then stated her cousin Edison would be the one to talk to about that. This CM previously talked to Edison regarding pt's financials and he stated he isn't sure if pt receives any income or who he howe with. He stated pt's late sister used to manage pt's finances, but since she passed away he isn't sure who's managing his money.     Does patient need new DME? TBD    Follow up appts needed: TBD    Medically stable: no    Estimated Discharge Date: TBD     12/13/24 0834   Discharge Reassessment   Assessment Type Discharge Planning Reassessment   Did the patient's condition or plan change since previous assessment? Yes   Discharge Plan discussed with: Caregiver   Name(s) and Number(s) Edison Lr [phone]   Communicated PATT with patient/caregiver Date not available/Unable to determine   Discharge Plan A Skilled Nursing Facility   Discharge Plan B Home with family   DME Needed Upon Discharge  other (see comments)  (TBD)   Transition of Care Barriers None   Why the patient remains in the hospital Requires continued medical care   Post-Acute Status   Coverage Healthy [employer] Medicaid   Discharge Delays (!) Patient and Family Barriers

## 2024-12-13 NOTE — PLAN OF CARE
Problem: Skin Injury Risk Increased  Goal: Skin Health and Integrity  Outcome: Progressing     Problem: Adult Inpatient Plan of Care  Goal: Plan of Care Review  Outcome: Progressing

## 2024-12-13 NOTE — PLAN OF CARE
Problem: Physical Therapy  Goal: Physical Therapy Goal  Description: Goals to be met by: 24     Patient will increase functional independence with mobility by performin. Supine to sit with Stand-by Assistance  2. Rolling to Left and Right with Stand-by Assistance  3. Sit to stand transfer with Stand-by Assistance using RW  4. Bed to chair transfer with Stand-by Assistance using Rolling Walker  5. Gait x50 feet with Stand-by Assistance using Rolling Walker   6. Lower extremity exercise program 2 sets x15 reps per handout, with independence    Outcome: Progressing       Pt ambulated ~16ft x2 trials using RW and CGA

## 2024-12-13 NOTE — ASSESSMENT & PLAN NOTE
Closed compression fracture    Noted on CT on presentation.  Patient's hemorrhage is suspected due to trauma/laceration (AFib w RVR causing him to have a fall?), patient does not have a propensity for bleeding.. Patients most recent Hgb, Hct, platelets, and INR are listed below.  Recent Labs     12/10/24  2357 12/11/24  0538 12/11/24  1847 12/13/24  0416   HGB 9.2*  9.2* 9.1* 10.4* 8.3*   HCT 30.6*  30.6* 29.4* 34.2* 27.0*     414 409 345 340   INR 1.6*  --   --   --        Plan  - Will monitor and correct any coagulation defects  - Will transfuse if Hgb is <7g/dl (<8g/dl in cases of active ACS) or if patient has rapid bleeding leading to hemodynamic instability  No evidence of active bleeding on CTA.  H/H stable.  Continue to monitor.  Noted drop in H/H.  Repeat CBC.

## 2024-12-13 NOTE — ASSESSMENT & PLAN NOTE
Patient's blood pressure range in the last 24 hours was: BP  Min: 96/56  Max: 127/92.The patient's inpatient anti-hypertensive regimen is listed below:  Current Antihypertensives  , Daily, Oral  , Daily, Oral  , Daily, Oral  metoprolol tartrate (LOPRESSOR) tablet 25 mg, 2 times daily, Oral  metoprolol injection 5 mg, Every 5 min PRN, Intravenous    Plan  - BP is uncontrolled, will adjust as follows: holding home  lisinopril and Lasix in setting of PHYLLIS and hypotension, will resume ASAP.  Holding home spironolactone in setting of hyperkalemia.  BP better controlled today.

## 2024-12-13 NOTE — NURSING
[facility]  Nurses Note -- 4 Eyes      12/13/2024       Skin assessed on: Q Shift      [x] No Pressure Injuries Present    [x]Prevention Measures Documented    [] Yes LDA  for Pressure Injury Previously documented     [] Yes New Pressure Injury Discovered   [] LDA for New Pressure Injury Added      Attending RN:  Katia Lozano LPN     Second RN:  NUBIA Granger

## 2024-12-13 NOTE — PT/OT/SLP PROGRESS
"Physical Therapy Treatment    Patient Name:  Vern Lr   MRN:  [id number]    Recommendations:     Discharge Recommendations: Moderate Intensity Therapy  Discharge Equipment Recommendations: to be determined by next level of care  Barriers to discharge: None    Assessment:     Vern Lr is a 84 y.o. male admitted with a medical diagnosis of Abdominal wall hematoma.  He presents with the following impairments/functional limitations: weakness, impaired endurance, impaired self care skills, impaired functional mobility, gait instability, impaired balance, visual deficits, impaired cognition, decreased coordination, decreased upper extremity function, decreased lower extremity function, decreased safety awareness, pain, decreased ROM, edema.    Pt ambulated ~16ft x2 trials using RW and CGA    Rehab Prognosis: Good; patient would benefit from acute skilled PT services to address these deficits and reach maximum level of function.    Recent Surgery: * No surgery found *      Plan:     During this hospitalization, patient to be seen 6 x/week to address the identified rehab impairments via gait training, therapeutic activities, therapeutic exercises and progress toward the following goals:    Plan of Care Expires:  12/26/24    Subjective     Chief Complaint: "pain all over"  Patient/Family Comments/goals: Pt agreed to therapy  Pain/Comfort:  Pain Rating 1:  (pt did not rate, pain is all over)  Pain Addressed 1: Reposition, Distraction, Cessation of Activity, Nurse notified      Objective:     Communicated with nursing prior to session.  Patient found up in chair with PureWick, peripheral IV, telemetry upon PT entry to room.     General Precautions: Standard, fall, diabetic, seizure, vision impaired  Orthopedic Precautions: N/A  Braces: N/A  Respiratory Status: Room air     Functional Mobility:  Transfers: Gait belt donned, x3 trials from BSchair     Sit to Stand:  minimum assistance with rolling walker  Gait: Pt ambulated " ~16ft x2 trials using RW and CGA. Pt with decreased sylvia, step length, mild flex posture, increased time in double stance. Pt required v/c for proper sequencing, RW management, and upright posture. Pt required one prolonged seated rest break d/t fatigue and pain.  Balance: Pt with Fair- standing posture      AM-PAC 6 CLICK MOBILITY  Turning over in bed (including adjusting bedclothes, sheets and blankets)?: 3  Sitting down on and standing up from a chair with arms (e.g., wheelchair, bedside commode, etc.): 3  Moving from lying on back to sitting on the side of the bed?: 3  Moving to and from a bed to a chair (including a wheelchair)?: 3  Need to walk in hospital room?: 3  Climbing 3-5 steps with a railing?: 2  Basic Mobility Total Score: 17       Treatment & Education:  Pt instructed to perform seated LAQ, HR, hip flexion, hip abd/add with leg extended 2x10 each    Patient left up in chair with all lines intact, call button in reach, and nursing notified..    GOALS:   Multidisciplinary Problems       Physical Therapy Goals          Problem: Physical Therapy    Goal Priority Disciplines Outcome Interventions   Physical Therapy Goal     PT, PT/OT Progressing    Description: Goals to be met by: 24     Patient will increase functional independence with mobility by performin. Supine to sit with Stand-by Assistance  2. Rolling to Left and Right with Stand-by Assistance  3. Sit to stand transfer with Stand-by Assistance using RW  4. Bed to chair transfer with Stand-by Assistance using Rolling Walker  5. Gait x50 feet with Stand-by Assistance using Rolling Walker   6. Lower extremity exercise program 2 sets x15 reps per handout, with independence                         Time Tracking:     PT Received On: 24  PT Start Time: 1039     PT Stop Time: 1104  PT Total Time (min): 25 min     Billable Minutes: Gait Training 15 and Therapeutic Exercise 10    Treatment Type: Treatment  PT/PTA: PTA     Number of  PTA visits since last PT visit: 1 12/13/2024
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
## 2024-12-13 NOTE — PLAN OF CARE
Problem: Skin Injury Risk Increased  Goal: Skin Health and Integrity  12/13/2024 0751 by Bárbara Lynn, RN  Outcome: Progressing  12/13/2024 0617 by Bárbara Lynn RN  Outcome: Progressing     Problem: Adult Inpatient Plan of Care  Goal: Plan of Care Review  12/13/2024 0751 by Bárbara Lynn RN  Outcome: Progressing  12/13/2024 0617 by Bárbara Lynn RN  Outcome: Progressing  Goal: Patient-Specific Goal (Individualized)  Outcome: Progressing  Goal: Absence of Hospital-Acquired Illness or Injury  Outcome: Progressing     Problem: Fall Injury Risk  Goal: Absence of Fall and Fall-Related Injury  Outcome: Progressing

## 2024-12-13 NOTE — PLAN OF CARE
B/s swallow eval completed. Pt w/ mild oral dysphagia, however, grossly functional swallowing abilities. ST recs cont current Soft & Bite-sized diet w/ thin liquids. Meds whole, 1 at a time. No further ST is required at this time, as the pt is consuming the safest and least restrictive diet.

## 2024-12-13 NOTE — PT/OT/SLP PROGRESS
"Occupational Therapy   Treatment    Name: Vern Lr  MRN: [id number]  Admitting Diagnosis:  Abdominal wall hematoma       Recommendations:     Discharge Recommendations: Moderate Intensity Therapy  Discharge Equipment Recommendations:  to be determined by next level of care  Barriers to discharge:  Decreased caregiver support, Inaccessible home environment    Assessment:     Vern Lr is a 84 y.o. male with a medical diagnosis of Abdominal wall hematoma.  He presents with deconditioning, reporting difficulty eating soft foods. Nsg notified and to consult ST. Pt intermittently with coherent speech but at times rambling and with difficulty for OT to understand with conversational Samoan skills;  had difficulty as well and was utilized throughout session. Performance deficits affecting function are weakness, impaired endurance, impaired self care skills, impaired functional mobility, gait instability, impaired cognition, visual deficits, impaired balance, decreased upper extremity function, decreased lower extremity function, pain, decreased safety awareness, decreased ROM.     Rehab Prognosis:  Good and Fair; patient would benefit from acute skilled OT services to address these deficits and reach maximum level of function.       Plan:     Patient to be seen 4 x/week to address the above listed problems via self-care/home management, therapeutic activities, therapeutic exercises  Plan of Care Expires: 01/11/25  Plan of Care Reviewed with: patient    Subjective     Chief Complaint: Pt stated he wanted "omba" juice (unclear what omba may be;  stated that this is not a typical word in Samoan (though "fruita bomba" is a papaya per Google search)  Patient/Family Comments/goals: To feel better, pain relief in feet  Pain/Comfort:  Pain Rating 1: 5/10  Location - Side 1: Bilateral  Location - Orientation 1: generalized  Location 1: foot  Pain Addressed 1: Reposition, Distraction, Cessation of Activity, " Nurse notified  Pain Rating Post-Intervention 1: 0/10    Objective:     Communicated with: nsg prior to session.  Patient found HOB elevated with bed alarm, peripheral IV, telemetry, PureWick upon OT entry to room.    General Precautions: Standard, fall, diabetic, seizure, hearing impaired, vision impaired    Orthopedic Precautions:N/A  Braces: N/A  Respiratory Status: Room air     Occupational Performance:     Bed Mobility:    Patient completed Rolling/Turning to Right with minimum assistance  Patient completed Scooting/Bridging with minimum assistance  Patient completed Supine to Sit with minimum assistance     Functional Mobility/Transfers:  Patient completed Sit <> Stand Transfer with minimum assistance  with  rolling walker   Patient completed Bed <> Chair Transfer using Step Transfer technique with minimum assistance with rolling walker  Functional Mobility: Pt with fair to fair- dynamic seated and standing balance.     Activities of Daily Living:  Upper Body Dressing: maximal assistance to don gown as robe seated EOB  Lower Body Dressing: maximal assistance to don B socks supine in bed      [facility] 6 Click ADL: 12    Treatment & Education:  Pt educated on role of OT and POC.   Pt performing skills as listed above.     Patient left up in chair with all lines intact, call button in reach, bed alarm on, nsg notified, and PCTs and ANETTE camera present    GOALS:   Multidisciplinary Problems       Occupational Therapy Goals          Problem: Occupational Therapy    Goal Priority Disciplines Outcome Interventions   Occupational Therapy Goal     OT, PT/OT Progressing    Description: Goals to be met by: 1/11/2025      Patient will increase functional independence with ADLs by performing:    UE Dressing with Modified [place].  LE Dressing with Minimal Assistance.  Grooming while seated with Supervision.  Toileting from bedside commode with Contact Guard Assistance for hygiene and clothing management.   Toilet transfer  to bedside commode with Contact Guard Assistance.  Increased functional strength to WFL for self care.  Upper extremity exercise program x10 reps per handout, with assistance as needed.                          Time Tracking:     OT Date of Treatment: 12/13/24  OT Start Time: 1005  OT Stop Time: 1039  OT Total Time (min): 34 min    Billable Minutes:Self Care/Home Management 11  Therapeutic Activity 23    OT/CHRISTY: OT     Number of CHRISTY visits since last OT visit: 0    12/13/2024

## 2024-12-13 NOTE — NURSING
Report received, care assumed. Pt resting comfortably with HOB elevated, no c/o pain or discomfort at this time. Q shift skin assessment done. No new skin breakdown at this time. Board updated. Bed in lowest position, wheels locked, call light within reach.

## 2024-12-13 NOTE — PT/OT/SLP EVAL
Speech Language Pathology Evaluation  Bedside Swallow    Patient Name:  Vern Lr   MRN:  [id number]  Admitting Diagnosis: Abdominal wall hematoma    Recommendations:                 General Recommendations:  Follow-up not indicated  Diet recommendations:  Soft & Bite Sized Diet - IDDSI Level 6, Thin liquids - IDDSI Level 0   Aspiration Precautions: 1 bite/sip at a time, Alternating bites/sips, HOB to 90 degrees, Meds whole 1 at a time, and Small bites/sips   General Precautions: Standard,    Communication strategies:   Please attempt to provide translation in Turkish, however, pt Koi w/ poor comprehension     Assessment:     Vern Lr is a 84 y.o. male with a dx of Abdominal wall hematoma, who presents w/ mild oral dysphagia, however, grossly functional swallow w/ soft solids. ST recs cont current Soft & Bite-sized diet w/ thin liquids. Meds whole, 1 at a time. No further ST is required at this time, as the pt is consuming the safest and least restrictive diet.     History:     Past Medical History:   Diagnosis Date    Abdominal hernia     BPH (benign prostatic hyperplasia)     Coronary artery disease     Non-obstructive CAD on [facility], Sept 2019. Performed for NSTEMI.    Diabetes mellitus     sister states he is borderline    Dyslipidemia     Hypercholesteremia     Hypertension     Seizure disorder     Seizures     Sinus disorder     Stroke        Past Surgical History:   Procedure Laterality Date    KNEE SURGERY      LEFT HEART CATHETERIZATION Left 9/26/2019    Procedure: Left heart cath, radial. 11 am;  Surgeon: Zahra Roca MD;  Location: [facility] CATH LAB;  Service: Cardiology;  Laterality: Left;    NASAL SINUS SURGERY      NOSE SURGERY         Social History: Patient lives at home w/ family.    Prior Intubation HX:  None     Modified Barium Swallow: None per EMR    Chest X-Rays: None this admit     Prior diet: Unknown 2/2 pt poor historian.    Subjective     ST obtained clearance from pt's nurse for b/s swallow  evaluation prior to entrance. Pt's nurse reporting pt stating he is having dysphagia w/ soft solids. Upon ST's entrance, the pt was seated in b/s chair, w/ lunch tray on table. Pt greeted ST and was agreeable to consume lunch tray for b/s swallow eval. ST utilizing google , however, pt w/ fluctating comprehension and ability to have a conversation.     Patient goals: Pt did not state     Pain/Comfort:  Pain Rating 1: 0/10    Respiratory Status: Room air    Objective:     Oral Musculature Evaluation  Oral Musculature: WFL  Dentition: present and adequate  Secretion Management: adequate  Mucosal Quality: good  Oral Labial Strength and Mobility: WFL  Lingual Strength and Mobility: WFL  Volitional Cough: Weak  Volitional Swallow: Delayed  Voice Prior to PO Intake: Clear and audible    Bedside Swallow Eval:   Consistencies Assessed:  Thin liquids -Straw sips of lemonade  Soft solids -Fish, rice, and spinach      Oral Phase:   Prolonged mastication    Pharyngeal Phase:   no overt clinical signs/symptoms of aspiration  no overt clinical signs/symptoms of pharyngeal dysphagia    Compensatory Strategies  None    Treatment: It should be noted that silent aspiration cannot be r/o via b/s assessment. ST provided education to the pt which included diet recs, as well as no further ST required at this time, as the pt is consuming the safest and least restrictive diet. Pt w/ cognitive deficits and will need reinforcement fo ST education and recs.       Goals:   Multidisciplinary Problems       SLP Goals       Not on file                    Plan:     Patient to be seen:      Plan of Care expires:     Plan of Care reviewed with:  patient   SLP Follow-Up:  No       Discharge recommendations:  No Therapy Indicated   Barriers to Discharge:  None    Time Tracking:     SLP Treatment Date:   12/13/24  Speech Start Time:  1203  Speech Stop Time:  1220     Speech Total Time (min):  17 min    Billable Minutes: Eval Swallow and Oral  Function 17    12/13/2024

## 2024-12-13 NOTE — SUBJECTIVE & OBJECTIVE
Interval History: feeling better.  Still having trouble swallowing per nurse.    Review of Systems   HENT:  Negative for ear discharge and ear pain.    Eyes:  Negative for discharge and itching.   Endocrine: Negative for cold intolerance and heat intolerance.   Neurological:  Negative for seizures and syncope.     Objective:     Vital Signs (Most Recent):  Temp: 99.7 °F (37.6 °C) (12/13/24 1146)  Pulse: 94 (12/13/24 1146)  Resp: 20 (12/13/24 1146)  BP: (!) 96/56 (12/13/24 1146)  SpO2: 96 % (12/13/24 1146) Vital Signs (24h Range):  Temp:  [98 °F (36.7 °C)-99.7 °F (37.6 °C)] 99.7 °F (37.6 °C)  Pulse:  [] 94  Resp:  [18-21] 20  SpO2:  [94 %-96 %] 96 %  BP: ()/(55-92) 96/56     Weight: 79.4 kg (175 lb)  Body mass index is 27.41 kg/m².    Intake/Output Summary (Last 24 hours) at 12/13/2024 1521  Last data filed at 12/12/2024 1714  Gross per 24 hour   Intake 240 ml   Output --   Net 240 ml         Physical Exam  Constitutional:       Appearance: He is not toxic-appearing or diaphoretic.   HENT:      Head: Normocephalic and atraumatic.      Mouth/Throat:      Pharynx: Oropharynx is clear. No oropharyngeal exudate or posterior oropharyngeal erythema.   Cardiovascular:      Rate and Rhythm: Normal rate and regular rhythm.   Pulmonary:      Effort: No respiratory distress.      Breath sounds: Normal breath sounds.   Abdominal:      General: Bowel sounds are normal.      Palpations: Abdomen is soft.   Musculoskeletal:         General: No deformity or signs of injury.   Skin:     General: Skin is warm and dry.   Neurological:      Comments: Awake and alert.  Oriented to person and place.  Answers simple questions and follows commands, but with episodes of confusion.             Significant Labs: All pertinent labs within the past 24 hours have been reviewed.  BMP:   Recent Labs   Lab 12/13/24  0416         K 4.3      CO2 21*   BUN 25*   CREATININE 0.8   CALCIUM 8.7   MG 1.5*     CBC:   Recent Labs    Lab 12/11/24  1847 12/13/24  0416   WBC 13.47* 11.87   HGB 10.4* 8.3*   HCT 34.2* 27.0*    340       Significant Imaging: I have reviewed all pertinent imaging results/findings within the past 24 hours.

## 2024-12-14 LAB
BASOPHILS # BLD AUTO: 0.01 K/UL (ref 0–0.2)
BASOPHILS NFR BLD: 0.1 % (ref 0–1.9)
DIFFERENTIAL METHOD BLD: ABNORMAL
EOSINOPHIL # BLD AUTO: 0 K/UL (ref 0–0.5)
EOSINOPHIL NFR BLD: 0.4 % (ref 0–8)
ERYTHROCYTE [DISTWIDTH] IN BLOOD BY AUTOMATED COUNT: 15.3 % (ref 11.5–14.5)
HCT VFR BLD AUTO: 27.8 % (ref 40–54)
HGB BLD-MCNC: 8.8 G/DL (ref 14–18)
IMM GRANULOCYTES # BLD AUTO: 0.04 K/UL (ref 0–0.04)
IMM GRANULOCYTES NFR BLD AUTO: 0.4 % (ref 0–0.5)
LYMPHOCYTES # BLD AUTO: 1.2 K/UL (ref 1–4.8)
LYMPHOCYTES NFR BLD: 10.3 % (ref 18–48)
MCH RBC QN AUTO: 27.8 PG (ref 27–31)
MCHC RBC AUTO-ENTMCNC: 31.7 G/DL (ref 32–36)
MCV RBC AUTO: 88 FL (ref 82–98)
MONOCYTES # BLD AUTO: 0.8 K/UL (ref 0.3–1)
MONOCYTES NFR BLD: 7.2 % (ref 4–15)
NEUTROPHILS # BLD AUTO: 9.3 K/UL (ref 1.8–7.7)
NEUTROPHILS NFR BLD: 81.6 % (ref 38–73)
NRBC BLD-RTO: 0 /100 WBC
PLATELET # BLD AUTO: 327 K/UL (ref 150–450)
PMV BLD AUTO: 8.6 FL (ref 9.2–12.9)
RBC # BLD AUTO: 3.17 M/UL (ref 4.6–6.2)
WBC # BLD AUTO: 11.31 K/UL (ref 3.9–12.7)

## 2024-12-14 PROCEDURE — 25000003 PHARM REV CODE 250: Performed by: HOSPITALIST

## 2024-12-14 PROCEDURE — 25000003 PHARM REV CODE 250: Performed by: STUDENT IN AN ORGANIZED HEALTH CARE EDUCATION/TRAINING PROGRAM

## 2024-12-14 PROCEDURE — 36415 COLL VENOUS BLD VENIPUNCTURE: CPT | Performed by: HOSPITALIST

## 2024-12-14 PROCEDURE — 97116 GAIT TRAINING THERAPY: CPT | Mod: CQ

## 2024-12-14 PROCEDURE — 94640 AIRWAY INHALATION TREATMENT: CPT

## 2024-12-14 PROCEDURE — 21400001 HC TELEMETRY ROOM

## 2024-12-14 PROCEDURE — 94761 N-INVAS EAR/PLS OXIMETRY MLT: CPT

## 2024-12-14 PROCEDURE — 25000242 PHARM REV CODE 250 ALT 637 W/ HCPCS: Performed by: STUDENT IN AN ORGANIZED HEALTH CARE EDUCATION/TRAINING PROGRAM

## 2024-12-14 PROCEDURE — 85025 COMPLETE CBC W/AUTO DIFF WBC: CPT | Performed by: HOSPITALIST

## 2024-12-14 PROCEDURE — 25000003 PHARM REV CODE 250

## 2024-12-14 PROCEDURE — 97110 THERAPEUTIC EXERCISES: CPT | Mod: CQ

## 2024-12-14 RX ORDER — CYCLOBENZAPRINE HCL 5 MG
5 TABLET ORAL 3 TIMES DAILY PRN
Status: DISCONTINUED | OUTPATIENT
Start: 2024-12-14 | End: 2024-12-18 | Stop reason: HOSPADM

## 2024-12-14 RX ADMIN — ZONISAMIDE 100 MG: 100 CAPSULE ORAL at 09:12

## 2024-12-14 RX ADMIN — LEVETIRACETAM 500 MG: 500 TABLET, FILM COATED ORAL at 08:12

## 2024-12-14 RX ADMIN — METOROPROLOL TARTRATE 5 MG: 5 INJECTION, SOLUTION INTRAVENOUS at 11:12

## 2024-12-14 RX ADMIN — LEVETIRACETAM 500 MG: 500 TABLET, FILM COATED ORAL at 09:12

## 2024-12-14 RX ADMIN — ACETAMINOPHEN 650 MG: 325 TABLET ORAL at 11:12

## 2024-12-14 RX ADMIN — METOPROLOL TARTRATE 25 MG: 25 TABLET, FILM COATED ORAL at 09:12

## 2024-12-14 RX ADMIN — ALBUTEROL SULFATE 2.5 MG: 2.5 SOLUTION RESPIRATORY (INHALATION) at 08:12

## 2024-12-14 RX ADMIN — ATORVASTATIN CALCIUM 80 MG: 40 TABLET, FILM COATED ORAL at 09:12

## 2024-12-14 RX ADMIN — DUTASTERIDE 0.5 MG: 0.5 CAPSULE, LIQUID FILLED ORAL at 09:12

## 2024-12-14 RX ADMIN — Medication 1 TABLET: at 09:12

## 2024-12-14 RX ADMIN — METOPROLOL TARTRATE 25 MG: 25 TABLET, FILM COATED ORAL at 08:12

## 2024-12-14 RX ADMIN — PANTOPRAZOLE SODIUM 40 MG: 40 TABLET, DELAYED RELEASE ORAL at 09:12

## 2024-12-14 RX ADMIN — CYCLOBENZAPRINE HYDROCHLORIDE 5 MG: 5 TABLET, FILM COATED ORAL at 11:12

## 2024-12-14 RX ADMIN — ALBUTEROL SULFATE 2.5 MG: 2.5 SOLUTION RESPIRATORY (INHALATION) at 07:12

## 2024-12-14 RX ADMIN — TAMSULOSIN HYDROCHLORIDE 0.4 MG: 0.4 CAPSULE ORAL at 09:12

## 2024-12-14 NOTE — SUBJECTIVE & OBJECTIVE
Interval History: pain whenever he is moved.    Review of Systems   HENT:  Negative for ear discharge and ear pain.    Eyes:  Negative for discharge and itching.   Endocrine: Negative for cold intolerance and heat intolerance.   Neurological:  Negative for seizures and syncope.     Objective:     Vital Signs (Most Recent):  Temp: 99.9 °F (37.7 °C) (12/14/24 0734)  Pulse: 95 (12/14/24 0812)  Resp: 19 (12/14/24 0812)  BP: (!) 111/57 (12/14/24 0734)  SpO2: 99 % (12/14/24 0812) Vital Signs (24h Range):  Temp:  [97.7 °F (36.5 °C)-99.9 °F (37.7 °C)] 99.9 °F (37.7 °C)  Pulse:  [] 95  Resp:  [16-22] 19  SpO2:  [95 %-99 %] 99 %  BP: ()/(56-67) 111/57     Weight: 79.4 kg (175 lb)  Body mass index is 27.41 kg/m².  No intake or output data in the 24 hours ending 12/14/24 1108        Physical Exam  Constitutional:       Appearance: He is not toxic-appearing or diaphoretic.   HENT:      Head: Normocephalic and atraumatic.      Mouth/Throat:      Pharynx: Oropharynx is clear. No oropharyngeal exudate or posterior oropharyngeal erythema.   Cardiovascular:      Rate and Rhythm: Normal rate and regular rhythm.   Pulmonary:      Effort: No respiratory distress.      Breath sounds: Normal breath sounds.   Abdominal:      General: Bowel sounds are normal.      Palpations: Abdomen is soft.   Musculoskeletal:         General: No deformity or signs of injury.   Skin:     General: Skin is warm and dry.   Neurological:      Comments: Awake and alert.  Oriented to person and place.  Answers simple questions and follows commands, but with episodes of confusion.             Significant Labs: All pertinent labs within the past 24 hours have been reviewed.  BMP:   Recent Labs   Lab 12/13/24 0416         K 4.3      CO2 21*   BUN 25*   CREATININE 0.8   CALCIUM 8.7   MG 1.5*     CBC:   Recent Labs   Lab 12/13/24  0416 12/13/24  1759 12/14/24  0641   WBC 11.87  --  11.31   HGB 8.3* 9.3* 8.8*   HCT 27.0*  --  27.8*      --  327       Significant Imaging: I have reviewed all pertinent imaging results/findings within the past 24 hours.

## 2024-12-14 NOTE — NURSING
[facility]  Nurses Note -- 4 Eyes      12/14/2024       Skin assessed on: Q Shift      [x] No Pressure Injuries Present    []Prevention Measures Documented    [] Yes LDA  for Pressure Injury Previously documented     [] Yes New Pressure Injury Discovered   [] LDA for New Pressure Injury Added      Attending RN:  Katia Lozano LPN     Second RN:  NUBIA Nieves
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
## 2024-12-14 NOTE — PLAN OF CARE
Problem: Skin Injury Risk Increased  Goal: Skin Health and Integrity  Outcome: Progressing  Intervention: Optimize Skin Protection  Flowsheets (Taken 12/13/2024 2017)  Pressure Reduction Techniques:   frequent weight shift encouraged   pressure points protected   weight shift assistance provided   heels elevated off bed  Pressure Reduction Devices: pressure-redistributing mattress utilized  Activity Management:   Arm raise - L1   Ankle pumps - L1   Rolling - L1  Head of Bed (HOB) Positioning: HOB at 30-45 degrees     Problem: Adult Inpatient Plan of Care  Goal: Plan of Care Review  Outcome: Progressing  Flowsheets (Taken 12/13/2024 2017)  Plan of Care Reviewed With: patient  Goal: Patient-Specific Goal (Individualized)  Outcome: Progressing  Goal: Absence of Hospital-Acquired Illness or Injury  Outcome: Progressing  Intervention: Identify and Manage Fall Risk  Flowsheets (Taken 12/13/2024 2017)  Safety Promotion/Fall Prevention:   assistive device/personal item within reach   bed alarm set   high risk medications identified   Fall Risk reviewed with patient/family   nonskid shoes/socks when out of bed   medications reviewed   instructed to call staff for mobility   side rails raised x 2   room near unit station  Intervention: Prevent Skin Injury  Flowsheets (Taken 12/13/2024 2017)  Body Position:   turned   weight shifting  Device Skin Pressure Protection:   absorbent pad utilized/changed   pressure points protected   adhesive use limited   positioning supports utilized   tubing/devices free from skin contact  Intervention: Prevent and Manage VTE (Venous Thromboembolism) Risk  Flowsheets (Taken 12/13/2024 2017)  VTE Prevention/Management:   ambulation promoted   bleeding risk assessed   bleeding precautions maintained  Intervention: Prevent Infection  Flowsheets (Taken 12/13/2024 2017)  Infection Prevention: hand hygiene promoted  Goal: Optimal Comfort and Wellbeing  Outcome: Progressing  Goal: Readiness for  Transition of Care  Outcome: Progressing     Problem: Fall Injury Risk  Goal: Absence of Fall and Fall-Related Injury  Outcome: Progressing  Intervention: Identify and Manage Contributors  Flowsheets (Taken 12/13/2024 2017)  Self-Care Promotion:   independence encouraged   BADL personal objects within reach   meal set-up provided   adaptive equipment use encouraged   BADL personal routines maintained  Medication Review/Management:   medications reviewed   high-risk medications identified  Intervention: Promote Injury-Free Environment  Flowsheets (Taken 12/13/2024 2017)  Safety Promotion/Fall Prevention:   assistive device/personal item within reach   bed alarm set   high risk medications identified   Fall Risk reviewed with patient/family   nonskid shoes/socks when out of bed   medications reviewed   instructed to call staff for mobility   side rails raised x 2   room near unit station   Pt alert able to make needs known,franklin meds well,no s/s adverse reaction noted,reposition q 2hrs,pain controlled by prn pain medication,POC explained,remains free from falls and pressure injuries,safety maintained,continue monitoring.

## 2024-12-14 NOTE — ASSESSMENT & PLAN NOTE
Closed compression fracture    Noted on CT on presentation.  Patient's hemorrhage is suspected due to trauma/laceration (AFib w RVR causing him to have a fall?), patient does not have a propensity for bleeding.. Patients most recent Hgb, Hct, platelets, and INR are listed below.  Recent Labs     12/11/24  1847 12/13/24  0416 12/13/24  1759 12/14/24  0641   HGB 10.4* 8.3* 9.3* 8.8*   HCT 34.2* 27.0*  --  27.8*    340  --  327       Plan  - Will monitor and correct any coagulation defects  - Will transfuse if Hgb is <7g/dl (<8g/dl in cases of active ACS) or if patient has rapid bleeding leading to hemodynamic instability  No evidence of active bleeding on CTA.  H/H stable.  Continue to monitor.  Noted drop in H/H.  Repeat CBC.  H/H stable.

## 2024-12-14 NOTE — NURSING
Pt resting in bed watching tv, no complaints of pain/discomfort. Scheduled medications given without difficulty. IV saline lock. Tele #[id number]. Teds in place. Pt remains free from fall/injury. Vitals assessed. Pt on room air; no distress noted. Telesitter #8 remains at bedside. , Emily #[id number]. Safety measures maintained. Will cont to monitor

## 2024-12-14 NOTE — PT/OT/SLP PROGRESS
"Physical Therapy Treatment    Patient Name:  Vern Lr   MRN:  [id number]    Recommendations:     Discharge Recommendations: Moderate Intensity Therapy  Discharge Equipment Recommendations: to be determined by next level of care  Barriers to discharge: None    Assessment:     Vern Lr is a 84 y.o. male admitted with a medical diagnosis of Abdominal wall hematoma.  He presents with the following impairments/functional limitations: weakness, impaired endurance, impaired self care skills, impaired functional mobility, gait instability, impaired balance, decreased coordination, decreased upper extremity function, decreased lower extremity function, pain, impaired coordination, impaired skin, edema. Pt c/ L ankle pain at rest, swelling noted. Pt able to walk a short distance with rw with step to gt pattern, decreased sylvia, decreased step length, decreased toe to floor, decreased wt shifting, increased time in double stance. Pt needed demo/vcs for proper technique of therex. Pt with good tolerance without adverse reaction.    Rehab Prognosis: Good; patient would benefit from acute skilled PT services to address these deficits and reach maximum level of function.    Recent Surgery: * No surgery found *      Plan:     During this hospitalization, patient to be seen 6 x/week to address the identified rehab impairments via gait training, therapeutic activities, therapeutic exercises and progress toward the following goals:    Plan of Care Expires:  12/26/24    Subjective     Chief Complaint: pain in L ankle  Patient/Family Comments/goals: Pt's neighbor translated, pt wanted his sock off. Pt c/o L ankle pain. "I've been having this pain for 15 years," in [place].  Pain/Comfort:  Pain Rating 1:  (pt didn't rate due to language barrier)  Location - Side 1: Left  Location - Orientation 1: lateral  Location 1: ankle  Pain Addressed 1: Pre-medicate for activity, Reposition, Distraction, Cessation of Activity, Nurse " notified      Objective:     Communicated with nurse Katia prior to session.  Patient found up in chair with peripheral IV, telemetry upon PT entry to room.     General Precautions: Standard, fall, diabetic, seizure, vision impaired  Orthopedic Precautions: N/A  Braces: N/A  Respiratory Status: Room air     Functional Mobility:  Gait: 30 ft with rw with CGA/SBA  Balance: standing F+      AM-PAC 6 CLICK MOBILITY  Turning over in bed (including adjusting bedclothes, sheets and blankets)?: 2  Sitting down on and standing up from a chair with arms (e.g., wheelchair, bedside commode, etc.): 3  Moving from lying on back to sitting on the side of the bed?: 3  Moving to and from a bed to a chair (including a wheelchair)?: 3  Need to walk in hospital room?: 3  Climbing 3-5 steps with a railing?: 2  Basic Mobility Total Score: 16       Treatment & Education:  Lower Extremity Exercises.    Patient educated on: Purpose and Benefits of Therapeutic Exercise(s).    Patient verbalized acceptance/understanding of instruction(s), expectation(s), and limitation(s) (for safety).  Patient performed: 2 sets of 10 reps (each) of B LE Ther Ex: AP, LAQ, Hip abd/add, Hip flexion while sitting up in chair    Patient required still requires verbal cues/tactile cues to ensure correct sequence, to maintain proper form, and to allow for self-correction.  Pt reported no complaints or problems with exercise activity.     Patient required increase Reaction Time to initiate movements and a Sitting Rest Break between set(s) to recover.      Patient left up in chair with all lines intact, call button in reach, nurse notified, and brother and neighbor present..    GOALS:   Multidisciplinary Problems       Physical Therapy Goals          Problem: Physical Therapy    Goal Priority Disciplines Outcome Interventions   Physical Therapy Goal     PT, PT/OT Progressing    Description: Goals to be met by: 12/26/24     Patient will increase functional  independence with mobility by performin. Supine to sit with Stand-by Assistance  2. Rolling to Left and Right with Stand-by Assistance  3. Sit to stand transfer with Stand-by Assistance using RW  4. Bed to chair transfer with Stand-by Assistance using Rolling Walker  5. Gait x50 feet with Stand-by Assistance using Rolling Walker   6. Lower extremity exercise program 2 sets x15 reps per handout, with independence                         Time Tracking:     PT Received On: 24  PT Start Time: 1520     PT Stop Time: 1543  PT Total Time (min): 23 min     Billable Minutes: Gait Training 8 and Therapeutic Exercise 15    Treatment Type: Treatment  PT/PTA: PTA     Number of PTA visits since last PT visit: 2     2024

## 2024-12-14 NOTE — NURSING
[facility] [place]  Nurses Note -- 4 Eyes      12/13/2024       Skin assessed on: Q Shift      [x] No Pressure Injuries Present    [x]Prevention Measures Documented    [] Yes LDA  for Pressure Injury Previously documented     [] Yes New Pressure Injury Discovered   [] LDA for New Pressure Injury Added      Attending RN:  NUBIA Nieves     Second RN:  RG Montalvo

## 2024-12-14 NOTE — PLAN OF CARE
Problem: Physical Therapy  Goal: Physical Therapy Goal  Description: Goals to be met by: 24     Patient will increase functional independence with mobility by performin. Supine to sit with Stand-by Assistance  2. Rolling to Left and Right with Stand-by Assistance  3. Sit to stand transfer with Stand-by Assistance using RW  4. Bed to chair transfer with Stand-by Assistance using Rolling Walker  5. Gait x50 feet with Stand-by Assistance using Rolling Walker   6. Lower extremity exercise program 2 sets x15 reps per handout, with independence    Outcome: Progressing

## 2024-12-15 LAB
BASOPHILS # BLD AUTO: 0.02 K/UL (ref 0–0.2)
BASOPHILS NFR BLD: 0.2 % (ref 0–1.9)
DIFFERENTIAL METHOD BLD: ABNORMAL
EOSINOPHIL # BLD AUTO: 0.1 K/UL (ref 0–0.5)
EOSINOPHIL NFR BLD: 0.5 % (ref 0–8)
ERYTHROCYTE [DISTWIDTH] IN BLOOD BY AUTOMATED COUNT: 15.2 % (ref 11.5–14.5)
HCT VFR BLD AUTO: 26.7 % (ref 40–54)
HGB BLD-MCNC: 8.1 G/DL (ref 14–18)
IMM GRANULOCYTES # BLD AUTO: 0.07 K/UL (ref 0–0.04)
IMM GRANULOCYTES NFR BLD AUTO: 0.6 % (ref 0–0.5)
LACTATE SERPL-SCNC: 1 MMOL/L (ref 0.5–2.2)
LYMPHOCYTES # BLD AUTO: 1 K/UL (ref 1–4.8)
LYMPHOCYTES NFR BLD: 9.5 % (ref 18–48)
MCH RBC QN AUTO: 26.9 PG (ref 27–31)
MCHC RBC AUTO-ENTMCNC: 30.3 G/DL (ref 32–36)
MCV RBC AUTO: 89 FL (ref 82–98)
MONOCYTES # BLD AUTO: 0.8 K/UL (ref 0.3–1)
MONOCYTES NFR BLD: 7.5 % (ref 4–15)
NEUTROPHILS # BLD AUTO: 8.8 K/UL (ref 1.8–7.7)
NEUTROPHILS NFR BLD: 81.7 % (ref 38–73)
NRBC BLD-RTO: 0 /100 WBC
PLATELET # BLD AUTO: 330 K/UL (ref 150–450)
PMV BLD AUTO: 8.6 FL (ref 9.2–12.9)
RBC # BLD AUTO: 3.01 M/UL (ref 4.6–6.2)
WBC # BLD AUTO: 10.82 K/UL (ref 3.9–12.7)

## 2024-12-15 PROCEDURE — 85025 COMPLETE CBC W/AUTO DIFF WBC: CPT | Performed by: HOSPITALIST

## 2024-12-15 PROCEDURE — 97530 THERAPEUTIC ACTIVITIES: CPT | Mod: CQ

## 2024-12-15 PROCEDURE — 36415 COLL VENOUS BLD VENIPUNCTURE: CPT | Performed by: HOSPITALIST

## 2024-12-15 PROCEDURE — 25000003 PHARM REV CODE 250: Performed by: HOSPITALIST

## 2024-12-15 PROCEDURE — 25000242 PHARM REV CODE 250 ALT 637 W/ HCPCS: Performed by: STUDENT IN AN ORGANIZED HEALTH CARE EDUCATION/TRAINING PROGRAM

## 2024-12-15 PROCEDURE — 25000003 PHARM REV CODE 250: Performed by: STUDENT IN AN ORGANIZED HEALTH CARE EDUCATION/TRAINING PROGRAM

## 2024-12-15 PROCEDURE — 94640 AIRWAY INHALATION TREATMENT: CPT

## 2024-12-15 PROCEDURE — 83605 ASSAY OF LACTIC ACID: CPT | Performed by: HOSPITALIST

## 2024-12-15 PROCEDURE — 25000003 PHARM REV CODE 250

## 2024-12-15 PROCEDURE — 94761 N-INVAS EAR/PLS OXIMETRY MLT: CPT

## 2024-12-15 PROCEDURE — 21400001 HC TELEMETRY ROOM

## 2024-12-15 RX ORDER — METOPROLOL TARTRATE 25 MG/1
25 TABLET, FILM COATED ORAL ONCE
Status: COMPLETED | OUTPATIENT
Start: 2024-12-15 | End: 2024-12-15

## 2024-12-15 RX ADMIN — ZONISAMIDE 100 MG: 100 CAPSULE ORAL at 08:12

## 2024-12-15 RX ADMIN — Medication 1 TABLET: at 08:12

## 2024-12-15 RX ADMIN — METOPROLOL TARTRATE 25 MG: 25 TABLET, FILM COATED ORAL at 08:12

## 2024-12-15 RX ADMIN — LEVETIRACETAM 500 MG: 500 TABLET, FILM COATED ORAL at 08:12

## 2024-12-15 RX ADMIN — DUTASTERIDE 0.5 MG: 0.5 CAPSULE, LIQUID FILLED ORAL at 08:12

## 2024-12-15 RX ADMIN — ALBUTEROL SULFATE 2.5 MG: 2.5 SOLUTION RESPIRATORY (INHALATION) at 08:12

## 2024-12-15 RX ADMIN — ATORVASTATIN CALCIUM 80 MG: 40 TABLET, FILM COATED ORAL at 08:12

## 2024-12-15 RX ADMIN — TAMSULOSIN HYDROCHLORIDE 0.4 MG: 0.4 CAPSULE ORAL at 08:12

## 2024-12-15 RX ADMIN — METOPROLOL TARTRATE 25 MG: 25 TABLET, FILM COATED ORAL at 02:12

## 2024-12-15 RX ADMIN — Medication 6 MG: at 08:12

## 2024-12-15 RX ADMIN — PANTOPRAZOLE SODIUM 40 MG: 40 TABLET, DELAYED RELEASE ORAL at 08:12

## 2024-12-15 NOTE — PLAN OF CARE
Problem: Skin Injury Risk Increased  Goal: Skin Health and Integrity  Outcome: Progressing     Problem: Adult Inpatient Plan of Care  Goal: Optimal Comfort and Wellbeing  Outcome: Progressing     Problem: Fall Injury Risk  Goal: Absence of Fall and Fall-Related Injury  Outcome: Progressing

## 2024-12-15 NOTE — NURSING
[facility] [place], [place]  Nurses Note -- 4 Eyes      12/15/2024       Skin assessed on: Q Shift      [x] No Pressure Injuries Present    [x]Prevention Measures Documented    [] Yes LDA  for Pressure Injury Previously documented     [] Yes New Pressure Injury Discovered   [] LDA for New Pressure Injury Added      Attending RN:  NUBIA Nieves     Second RN:  LAURI Whiting

## 2024-12-15 NOTE — NURSING
[facility] [place]  Nurses Note -- 4 Eyes      12/15/2024       Skin assessed on: Q Shift      [x] No Pressure Injuries Present    []Prevention Measures Documented    [] Yes LDA  for Pressure Injury Previously documented     [] Yes New Pressure Injury Discovered   [] LDA for New Pressure Injury Added      Attending RN:  Nhung So LPN     Second RN:  Lizbeth Reyes LPN

## 2024-12-15 NOTE — NURSING
Pt resting in bed with eyes closed, no complaints of pain/discomfort. Scheduled medications given without difficulty. IV saline lock. Tele #[phone]. SCDs in place. Heel protector boots on. Pt remains free from fall/injury. Vitals assessed. Pt remains on room air; no distress noted. Telesitter #8 remains at bedside.  used. Safety measures maintained. Will cont to monitor
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
## 2024-12-15 NOTE — PT/OT/SLP PROGRESS
Physical Therapy Treatment    Patient Name:  Vern Lr   MRN:  [id number]    Recommendations:     Discharge Recommendations: Moderate Intensity Therapy  Discharge Equipment Recommendations: to be determined by next level of care  Barriers to discharge: None    Assessment:     Vern Lr is a 84 y.o. male admitted with a medical diagnosis of Abdominal wall hematoma.  He presents with the following impairments/functional limitations: weakness, impaired endurance, impaired self care skills, impaired functional mobility, gait instability, decreased lower extremity function, decreased upper extremity function, impaired coordination, decreased coordination, decreased ROM, decreased safety awareness, impaired balance, pain, impaired cardiopulmonary response to activity, edema .    Rehab Prognosis: Good; patient would benefit from acute skilled PT services to address these deficits and reach maximum level of function.    Recent Surgery: * No surgery found *      Plan:     During this hospitalization, patient to be seen 6 x/week to address the identified rehab impairments via gait training, therapeutic activities, therapeutic exercises and progress toward the following goals:    Plan of Care Expires:  12/26/24    Subjective     Chief Complaint: tired   Patient/Family Comments/goals: to get back in bed  Pain/Comfort:  Location 1: abdomen  Pain Addressed 1: Reposition, Cessation of Activity, Nurse notified      Objective:     Communicated with nurse/PCT  prior to session.  Patient found up in chair with telemetry, peripheral IV upon PT entry to room.     General Precautions: Standard, fall, diabetic, vision impaired  Orthopedic Precautions: N/A  Braces: N/A  Respiratory Status: Room air     Functional Mobility:  Bed Mobility:     Scooting: contact guard assistance  Sit to Supine: min assistance   Transfers:     Sit to Stand:  minimum assistance and moderate assistance with rolling walker  Chair to bed : minimum assistance  with  rolling walker  using  Step Transfer  Gait: pt ambulated 4-5 steps from chair to bed with RW, MIN A.    Balance: fair - in standing         AM-PAC 6 CLICK MOBILITY  Turning over in bed (including adjusting bedclothes, sheets and blankets)?: 4  Sitting down on and standing up from a chair with arms (e.g., wheelchair, bedside commode, etc.): 3  Moving from lying on back to sitting on the side of the bed?: 3  Moving to and from a bed to a chair (including a wheelchair)?: 3  Need to walk in hospital room?: 3  Climbing 3-5 steps with a railing?: 1  Basic Mobility Total Score: 17       Treatment & Education:  Pt performed bed mobility and transfer as above. Limited with further therapy activities 2* to pt's fatigue ( nurse reported that he has been sitting in chair since this AM )      Patient left HOB elevated with pressure relief boots to BLE  all lines intact, call button in reach, chair alarm on, and nurse notified..    GOALS:   Multidisciplinary Problems       Physical Therapy Goals          Problem: Physical Therapy    Goal Priority Disciplines Outcome Interventions   Physical Therapy Goal     PT, PT/OT Progressing    Description: Goals to be met by: 24     Patient will increase functional independence with mobility by performin. Supine to sit with Stand-by Assistance  2. Rolling to Left and Right with Stand-by Assistance  3. Sit to stand transfer with Stand-by Assistance using RW  4. Bed to chair transfer with Stand-by Assistance using Rolling Walker  5. Gait x50 feet with Stand-by Assistance using Rolling Walker   6. Lower extremity exercise program 2 sets x15 reps per handout, with independence                         Time Tracking:     PT Received On: 12/15/24  PT Start Time: 1400     PT Stop Time: 1408  PT Total Time (min): 8 min     Billable Minutes: Therapeutic Activity 8    Treatment Type: Treatment  PT/PTA: PTA     Number of PTA visits since last PT visit: 2     12/15/2024

## 2024-12-15 NOTE — SUBJECTIVE & OBJECTIVE
"Interval History: complaining of generalized pain.    Review of Systems   HENT:  Negative for ear discharge and ear pain.    Eyes:  Negative for discharge and itching.   Endocrine: Negative for cold intolerance and heat intolerance.   Neurological:  Negative for seizures and syncope.     Objective:     Vital Signs (Most Recent):  Temp: 98.9 °F (37.2 °C) (12/15/24 1101)  Pulse: 102 (12/15/24 1101)  Resp: 20 (12/15/24 1101)  BP: (!) 91/55 (12/15/24 1101)  SpO2: 95 % (12/15/24 1101) Vital Signs (24h Range):  Temp:  [97.6 °F (36.4 °C)-100.8 °F (38.2 °C)] 98.9 °F (37.2 °C)  Pulse:  [] 102  Resp:  [16-20] 20  SpO2:  [92 %-97 %] 95 %  BP: ()/(52-72) 91/55     Weight: 79.4 kg (175 lb)  Body mass index is 27.41 kg/m².    Intake/Output Summary (Last 24 hours) at 12/15/2024 1333  Last data filed at 12/15/2024 1246  Gross per 24 hour   Intake 1020 ml   Output --   Net 1020 ml           Physical Exam  Constitutional:       Appearance: He is not toxic-appearing or diaphoretic.   HENT:      Head: Normocephalic and atraumatic.      Mouth/Throat:      Pharynx: Oropharynx is clear. No oropharyngeal exudate or posterior oropharyngeal erythema.   Cardiovascular:      Rate and Rhythm: Normal rate and regular rhythm.   Pulmonary:      Effort: No respiratory distress.      Breath sounds: Normal breath sounds.   Abdominal:      General: Bowel sounds are normal.      Palpations: Abdomen is soft.   Musculoskeletal:         General: No deformity or signs of injury.   Skin:     General: Skin is warm and dry.   Neurological:      Comments: Awake and alert.  Oriented to person and place.  Answers simple questions and follows commands, but with episodes of confusion.             Significant Labs: All pertinent labs within the past 24 hours have been reviewed.  BMP:   No results for input(s): "GLU", "NA", "K", "CL", "CO2", "BUN", "CREATININE", "CALCIUM", "MG" in the last 48 hours.    CBC:   Recent Labs   Lab 12/13/24  4833 " 12/14/24  0641 12/15/24  0710   WBC  --  11.31 10.82   HGB 9.3* 8.8* 8.1*   HCT  --  27.8* 26.7*   PLT  --  327 330       Significant Imaging: I have reviewed all pertinent imaging results/findings within the past 24 hours.

## 2024-12-16 PROBLEM — N17.9 AKI (ACUTE KIDNEY INJURY): Status: RESOLVED | Noted: 2024-12-11 | Resolved: 2024-12-16

## 2024-12-16 LAB
ANION GAP SERPL CALC-SCNC: 8 MMOL/L (ref 8–16)
BASOPHILS # BLD AUTO: 0.01 K/UL (ref 0–0.2)
BASOPHILS NFR BLD: 0.1 % (ref 0–1.9)
BUN SERPL-MCNC: 17 MG/DL (ref 8–23)
CALCIUM SERPL-MCNC: 8.4 MG/DL (ref 8.7–10.5)
CHLORIDE SERPL-SCNC: 105 MMOL/L (ref 95–110)
CO2 SERPL-SCNC: 19 MMOL/L (ref 23–29)
CREAT SERPL-MCNC: 0.7 MG/DL (ref 0.5–1.4)
DIFFERENTIAL METHOD BLD: ABNORMAL
EOSINOPHIL # BLD AUTO: 0.1 K/UL (ref 0–0.5)
EOSINOPHIL NFR BLD: 0.5 % (ref 0–8)
ERYTHROCYTE [DISTWIDTH] IN BLOOD BY AUTOMATED COUNT: 15.2 % (ref 11.5–14.5)
EST. GFR  (NO RACE VARIABLE): >60 ML/MIN/1.73 M^2
GLUCOSE SERPL-MCNC: 96 MG/DL (ref 70–110)
HCT VFR BLD AUTO: 26.5 % (ref 40–54)
HGB BLD-MCNC: 8.4 G/DL (ref 14–18)
IMM GRANULOCYTES # BLD AUTO: 0.05 K/UL (ref 0–0.04)
IMM GRANULOCYTES NFR BLD AUTO: 0.5 % (ref 0–0.5)
LYMPHOCYTES # BLD AUTO: 1 K/UL (ref 1–4.8)
LYMPHOCYTES NFR BLD: 10.2 % (ref 18–48)
MCH RBC QN AUTO: 27.5 PG (ref 27–31)
MCHC RBC AUTO-ENTMCNC: 31.7 G/DL (ref 32–36)
MCV RBC AUTO: 87 FL (ref 82–98)
MONOCYTES # BLD AUTO: 0.8 K/UL (ref 0.3–1)
MONOCYTES NFR BLD: 8.2 % (ref 4–15)
NEUTROPHILS # BLD AUTO: 8.2 K/UL (ref 1.8–7.7)
NEUTROPHILS NFR BLD: 80.5 % (ref 38–73)
NRBC BLD-RTO: 0 /100 WBC
PLATELET # BLD AUTO: 315 K/UL (ref 150–450)
PMV BLD AUTO: 8.6 FL (ref 9.2–12.9)
POTASSIUM SERPL-SCNC: 3.7 MMOL/L (ref 3.5–5.1)
RBC # BLD AUTO: 3.06 M/UL (ref 4.6–6.2)
SODIUM SERPL-SCNC: 132 MMOL/L (ref 136–145)
WBC # BLD AUTO: 10.12 K/UL (ref 3.9–12.7)

## 2024-12-16 PROCEDURE — 25000242 PHARM REV CODE 250 ALT 637 W/ HCPCS: Performed by: STUDENT IN AN ORGANIZED HEALTH CARE EDUCATION/TRAINING PROGRAM

## 2024-12-16 PROCEDURE — 94640 AIRWAY INHALATION TREATMENT: CPT

## 2024-12-16 PROCEDURE — 36415 COLL VENOUS BLD VENIPUNCTURE: CPT | Performed by: HOSPITALIST

## 2024-12-16 PROCEDURE — 99900031 HC PATIENT EDUCATION (STAT)

## 2024-12-16 PROCEDURE — 97116 GAIT TRAINING THERAPY: CPT | Mod: CQ

## 2024-12-16 PROCEDURE — 97535 SELF CARE MNGMENT TRAINING: CPT

## 2024-12-16 PROCEDURE — 97530 THERAPEUTIC ACTIVITIES: CPT | Mod: CQ

## 2024-12-16 PROCEDURE — 97530 THERAPEUTIC ACTIVITIES: CPT

## 2024-12-16 PROCEDURE — 94761 N-INVAS EAR/PLS OXIMETRY MLT: CPT

## 2024-12-16 PROCEDURE — 21400001 HC TELEMETRY ROOM

## 2024-12-16 PROCEDURE — 25000003 PHARM REV CODE 250

## 2024-12-16 PROCEDURE — 80048 BASIC METABOLIC PNL TOTAL CA: CPT | Performed by: HOSPITALIST

## 2024-12-16 PROCEDURE — 25000003 PHARM REV CODE 250: Performed by: HOSPITALIST

## 2024-12-16 PROCEDURE — 85025 COMPLETE CBC W/AUTO DIFF WBC: CPT | Performed by: HOSPITALIST

## 2024-12-16 RX ADMIN — ALBUTEROL SULFATE 2.5 MG: 2.5 SOLUTION RESPIRATORY (INHALATION) at 07:12

## 2024-12-16 RX ADMIN — PANTOPRAZOLE SODIUM 40 MG: 40 TABLET, DELAYED RELEASE ORAL at 09:12

## 2024-12-16 RX ADMIN — LEVETIRACETAM 500 MG: 500 TABLET, FILM COATED ORAL at 09:12

## 2024-12-16 RX ADMIN — ZONISAMIDE 100 MG: 100 CAPSULE ORAL at 09:12

## 2024-12-16 RX ADMIN — TAMSULOSIN HYDROCHLORIDE 0.4 MG: 0.4 CAPSULE ORAL at 09:12

## 2024-12-16 RX ADMIN — METOPROLOL TARTRATE 25 MG: 25 TABLET, FILM COATED ORAL at 09:12

## 2024-12-16 RX ADMIN — SODIUM CHLORIDE 250 ML: 9 INJECTION, SOLUTION INTRAVENOUS at 05:12

## 2024-12-16 RX ADMIN — METOPROLOL TARTRATE 25 MG: 25 TABLET, FILM COATED ORAL at 05:12

## 2024-12-16 RX ADMIN — POLYETHYLENE GLYCOL 3350 17 G: 17 POWDER, FOR SOLUTION ORAL at 09:12

## 2024-12-16 RX ADMIN — ATORVASTATIN CALCIUM 80 MG: 40 TABLET, FILM COATED ORAL at 09:12

## 2024-12-16 RX ADMIN — DUTASTERIDE 0.5 MG: 0.5 CAPSULE, LIQUID FILLED ORAL at 09:12

## 2024-12-16 RX ADMIN — Medication 1 TABLET: at 09:12

## 2024-12-16 RX ADMIN — ALBUTEROL SULFATE 2.5 MG: 2.5 SOLUTION RESPIRATORY (INHALATION) at 09:12

## 2024-12-16 NOTE — NURSING
Pt noted sitting up in bed . Pt is alert and oriented to self.   says pt is confused at time. Pt noted with HoB elevated.  Pt saline lock intact, clean and dry.  Pt noted RR even and regular with no SOB .  Pt abdomen large distended but soft to touch. No evidence of pain or discomforts noted. Pt noted with diapered.  Skin integritiy intact.  Report given from off going RN

## 2024-12-16 NOTE — ASSESSMENT & PLAN NOTE
Patient's blood pressure range in the last 24 hours was: BP  Min: 104/60  Max: 123/60.The patient's inpatient anti-hypertensive regimen is listed below:  Current Antihypertensives  , Daily, Oral  , Daily, Oral  , Daily, Oral  metoprolol tartrate (LOPRESSOR) tablet 25 mg, 2 times daily, Oral  metoprolol injection 5 mg, Every 5 min PRN, Intravenous    Plan  - BP is uncontrolled, will adjust as follows: holding home  lisinopril and Lasix in setting of PHYLLIS and hypotension, will resume ASAP.  Holding home spironolactone in setting of hyperkalemia.  Has remained normotensive off medications.  Continue to monitor.

## 2024-12-16 NOTE — PT/OT/SLP PROGRESS
"Occupational Therapy   Treatment    Name: Vern Lr  MRN: [id number]  Admitting Diagnosis:  Abdominal wall hematoma       Recommendations:     Discharge Recommendations: Moderate Intensity Therapy  Discharge Equipment Recommendations:  to be determined by next level of care  Barriers to discharge:  Decreased caregiver support, Inaccessible home environment    Assessment:     Vern Lr is a 84 y.o. male with a medical diagnosis of Abdominal wall hematoma.  He presents with deconditioning, reporting no foot or knee pain at rest, though anticipated pain with movement. Pt reports that with ambulation he had a little knee pain but not much. . Performance deficits affecting function are weakness, impaired endurance, impaired self care skills, impaired functional mobility, gait instability, impaired balance, decreased ROM, pain, decreased safety awareness, decreased lower extremity function, decreased upper extremity function, impaired cardiopulmonary response to activity.     Rehab Prognosis:  Good; patient would benefit from acute skilled OT services to address these deficits and reach maximum level of function.       Plan:     Patient to be seen 4 x/week to address the above listed problems via self-care/home management, therapeutic activities, therapeutic exercises  Plan of Care Expires: 01/11/25  Plan of Care Reviewed with: patient    Subjective     Chief Complaint: Pt reports knee pain with ambulation   Patient/Family Comments/goals: To return to PLOF  Pain/Comfort:  Pain Rating 1: 0/10  Location - Side 1: Bilateral  Location - Orientation 1: generalized  Location 1: knee ("a little" with walking but unable to quantify numerically)  Pain Addressed 1: Reposition, Distraction, Cessation of Activity  Pain Rating Post-Intervention 1: 0/10    Objective:     Communicated with: nsg prior to session.  Patient found HOB elevated with telemetry, peripheral IV upon OT entry to room.    General Precautions: Standard, " "diabetic, fall, hearing impaired, vision impaired    Orthopedic Precautions:N/A  Braces: N/A  Respiratory Status: Room air     Occupational Performance:     Bed Mobility:    Patient completed Rolling/Turning to Right with minimum assistance  Patient completed Scooting/Bridging with minimum assistance  Patient completed Supine to Sit with minimum assistance     Functional Mobility/Transfers:  Patient completed Sit <> Stand Transfer with minimum assistance  with  rolling walker   Patient completed Bed <> Chair Transfer using Step Transfer technique with minimum assistance with rolling walker with increased v/c for proximity to RW and step sylvia. Some limited carryover of learning  Functional Mobility: Pt with fair to fair- dynamic seated and standing balance.     Activities of Daily Living:  Upper Body Dressing: moderate assistance to don gown as robe seated EOB  Lower Body Dressing: maximal assistance to doff B pressure relief boots while supine in bed      [facility] 6 Click ADL: 13    Treatment & Education:  Pt educated on role of OT and POC.   Pt performing skills as listed above. Pt ambulated ~12 ft in room, fair balance in turn to ambulate back to bedside chair.    utilized in session as able; conversational Vietnamese utilized in session when translation services not accessible.     Patient left up in chair with all lines intact, call button in reach, nsg notified, and ANETTE system present Pt education to reinforce call button use including verbal directives to call nurse to assist with return to bed. Return demo attempted asking pt, "Who do you call to get back to bed?" And pt stated, "I don't know, I don't have a phone." OT re-educated pt to press red button for nsg staff. Pt verbalized understanding but increased measures taken (such as direct line of sight to nsg staff to reduce falls risk.     GOALS:   Multidisciplinary Problems       Occupational Therapy Goals          Problem: Occupational Therapy    " Goal Priority Disciplines Outcome Interventions   Occupational Therapy Goal     OT, PT/OT Progressing    Description: Goals to be met by: 1/11/2025      Patient will increase functional independence with ADLs by performing:    UE Dressing with Modified [place].  LE Dressing with Minimal Assistance.  Grooming while seated with Supervision.  Toileting from bedside commode with Contact Guard Assistance for hygiene and clothing management.   Toilet transfer to bedside commode with Contact Guard Assistance.  Increased functional strength to WFL for self care.  Upper extremity exercise program x10 reps per handout, with assistance as needed.                          Time Tracking:     OT Date of Treatment: 12/16/24  OT Start Time: 1459  OT Stop Time: 1530  OT Total Time (min): 31 min    Billable Minutes:Therapeutic Activity 31    OT/CHRISTY: OT     Number of CHRISTY visits since last OT visit: 0    12/16/2024

## 2024-12-16 NOTE — ASSESSMENT & PLAN NOTE
Closed compression fracture    Noted on CT on presentation.  Patient's hemorrhage is suspected due to trauma/laceration (AFib w RVR causing him to have a fall?), patient does not have a propensity for bleeding.. Patients most recent Hgb, Hct, platelets, and INR are listed below.  Recent Labs     12/14/24  0641 12/15/24  0710 12/16/24  0434   HGB 8.8* 8.1* 8.4*   HCT 27.8* 26.7* 26.5*    330 315       Plan  - Will monitor and correct any coagulation defects  - Will transfuse if Hgb is <7g/dl (<8g/dl in cases of active ACS) or if patient has rapid bleeding leading to hemodynamic instability  No evidence of active bleeding on CTA.  H/H stable.  Continue to monitor.  Noted drop in H/H.  Repeat CBC.  H/H stable.  Continue holding Eliquis.

## 2024-12-16 NOTE — ASSESSMENT & PLAN NOTE
Patient has paroxysmal (<7 days) atrial fibrillation. Patient is currently in sinus rhythm. [id number] Score: 3. The patients heart rate in the last 24 hours is as follows:  Pulse  Min: 66  Max: 128     Antiarrhythmics  metoprolol tartrate (LOPRESSOR) tablet 25 mg, 2 times daily, Oral  metoprolol injection 5 mg, Every 5 min PRN, Intravenous    Anticoagulants  , 2 times daily, Oral    Plan  - Replete lytes with a goal of K>4, Mg >2  - Patient is not anticoagulated due to new hematomas  - better controlled since admission.

## 2024-12-16 NOTE — PLAN OF CARE
Continue managing pt for comfort and safety.  Continue d/c planning for longterm care if necessary.  Problem: Skin Injury Risk Increased  Goal: Skin Health and Integrity  Outcome: Progressing     Problem: Adult Inpatient Plan of Care  Goal: Plan of Care Review  Outcome: Progressing  Goal: Patient-Specific Goal (Individualized)  Outcome: Progressing  Goal: Absence of Hospital-Acquired Illness or Injury  Outcome: Progressing  Goal: Optimal Comfort and Wellbeing  Outcome: Progressing

## 2024-12-16 NOTE — PLAN OF CARE
Changes in medical condition or discharge plan:    Pt has no accepting SNFs as of yet. CM awaiting responses from 5 facilities. If pt medically ready for discharge before being accepted to a facility, family is in agreement to bring him home with home health PT.    Does patient need new DME? TBD    Follow up appts needed: TBD    Medically stable: not at this time    Estimated Discharge Date: unknown       12/16/24 1519   Discharge Reassessment   Assessment Type Discharge Planning Reassessment   Did the patient's condition or plan change since previous assessment? Yes   Discharge Plan discussed with: Caregiver   Name(s) and Number(s) Edison Be [phone]   Communicated PATT with patient/caregiver Yes   Discharge Plan A Skilled Nursing Facility   Discharge Plan B Home Health   DME Needed Upon Discharge  other (see comments)  (TBD)   Transition of Care Barriers None   Why the patient remains in the hospital Requires continued medical care   Post-Acute Status   Coverage [employer]   Discharge Delays None known at this time

## 2024-12-16 NOTE — ASSESSMENT & PLAN NOTE
Multiple chronic compression fractures seen on CT.  Probably contributing to functional decline.  PT/OT consulted.  SNF consult.
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
## 2024-12-16 NOTE — PT/OT/SLP PROGRESS
Physical Therapy Treatment    Patient Name:  Vern Lr   MRN:  [id number]    Recommendations:     Discharge Recommendations: Moderate Intensity Therapy  Discharge Equipment Recommendations: to be determined by next level of care  Barriers to discharge: None    Assessment:     Vern Lr is a 84 y.o. male admitted with a medical diagnosis of Abdominal wall hematoma.  He presents with the following impairments/functional limitations: weakness, impaired endurance, impaired self care skills, impaired functional mobility, gait instability, decreased lower extremity function, decreased upper extremity function, decreased ROM, impaired balance, decreased safety awareness, pain, impaired cognition, decreased coordination, impaired cardiopulmonary response to activity .    Rehab Prognosis: Good and Fair; patient would benefit from acute skilled PT services to address these deficits and reach maximum level of function.    Recent Surgery: * No surgery found *      Plan:     During this hospitalization, patient to be seen 6 x/week to address the identified rehab impairments via gait training, therapeutic activities, therapeutic exercises and progress toward the following goals:    Plan of Care Expires:  12/26/24    Subjective     Chief Complaint: none  Patient/Family Comments/goals: pt is pleasant and agreeable to therapy   Pain/Comfort:  Pain Rating 1: 0/10  Pain Rating Post-Intervention 1: 0/10      Objective:     Communicated with nurse prior to session.  Patient found HOB elevated with telemetry, bed alarm, peripheral IV upon PT entry to room.    : Haim # [id number]  General Precautions: Standard, fall, diabetic, vision impaired  Orthopedic Precautions: N/A  Braces: N/A  Respiratory Status: Room air     Functional Mobility:  Bed Mobility:     Rolling Right: minimum assistance  Scooting: minimum assistance  Supine to Sit: minimum assistance  Transfers:     Sit to Stand:  minimum assistance with rolling  walker  Bed to Chair: minimum assistance with  rolling walker  using  Step Transfer  Gait: Patient ambulated  7-8 steps and 10 ft x 2  on level tile with Rolling Walker with Minimal Assistance .  Pt with demonstarting a  3-point gait with decreased sylvia, increased time in double stance, decreased velocity of limb motion, decreased step length, decreased swing-to-stance ratio, decreased toe-to-floor clearance and decreased weight-shifting ability.Impairments contributing to gait deviations include impaired balance, decreased flexibility, pain, impaired postural control, decreased ROM and decreased strength. V/T cues for safety technique and walker management.    Balance:  good in sitting, fair in standing       AM-PAC 6 CLICK MOBILITY  Turning over in bed (including adjusting bedclothes, sheets and blankets)?: 3  Sitting down on and standing up from a chair with arms (e.g., wheelchair, bedside commode, etc.): 3  Moving from lying on back to sitting on the side of the bed?: 3  Moving to and from a bed to a chair (including a wheelchair)?: 3  Need to walk in hospital room?: 3  Climbing 3-5 steps with a railing?: 2  Basic Mobility Total Score: 17       Treatment & Education:  Pt performed bed mobility , transfer and gait training as above.   Educated pt to call staff for all functional mobility, pt verbalized understanding.   Patient left up in chair with all lines intact, call button in reach, and PCT  notified..    GOALS:   Multidisciplinary Problems       Physical Therapy Goals          Problem: Physical Therapy    Goal Priority Disciplines Outcome Interventions   Physical Therapy Goal     PT, PT/OT Progressing    Description: Goals to be met by: 24     Patient will increase functional independence with mobility by performin. Supine to sit with Stand-by Assistance  2. Rolling to Left and Right with Stand-by Assistance  3. Sit to stand transfer with Stand-by Assistance using RW  4. Bed to chair  transfer with Stand-by Assistance using Rolling Walker  5. Gait x50 feet with Stand-by Assistance using Rolling Walker   6. Lower extremity exercise program 2 sets x15 reps per handout, with independence                         Time Tracking:     PT Received On: 12/16/24  PT Start Time: 1459     PT Stop Time: 1526  PT Total Time (min): 27 min     Billable Minutes: Gait Training 12, Therapeutic Activity 15, and Total Time 27 min with OT     Treatment Type: Treatment  PT/PTA: PTA     Number of PTA visits since last PT visit: 3     12/16/2024

## 2024-12-16 NOTE — SUBJECTIVE & OBJECTIVE
Interval History: feeling better.    Review of Systems   HENT:  Negative for ear discharge and ear pain.    Eyes:  Negative for discharge and itching.   Endocrine: Negative for cold intolerance and heat intolerance.   Neurological:  Negative for seizures and syncope.     Objective:     Vital Signs (Most Recent):  Temp: 98.3 °F (36.8 °C) (12/16/24 1151)  Pulse: 90 (12/16/24 1151)  Resp: 18 (12/16/24 1151)  BP: 104/60 (12/16/24 1151)  SpO2: 95 % (12/16/24 1151) Vital Signs (24h Range):  Temp:  [98 °F (36.7 °C)-99.1 °F (37.3 °C)] 98.3 °F (36.8 °C)  Pulse:  [] 90  Resp:  [18-20] 18  SpO2:  [95 %-97 %] 95 %  BP: (104-123)/(58-69) 104/60     Weight: 79.4 kg (175 lb)  Body mass index is 27.41 kg/m².    Intake/Output Summary (Last 24 hours) at 12/16/2024 1358  Last data filed at 12/16/2024 0524  Gross per 24 hour   Intake 240 ml   Output 200 ml   Net 40 ml           Physical Exam  Constitutional:       Appearance: He is not toxic-appearing or diaphoretic.   HENT:      Head: Normocephalic and atraumatic.      Mouth/Throat:      Pharynx: Oropharynx is clear. No oropharyngeal exudate or posterior oropharyngeal erythema.   Cardiovascular:      Rate and Rhythm: Normal rate and regular rhythm.   Pulmonary:      Effort: No respiratory distress.      Breath sounds: Normal breath sounds.   Abdominal:      General: Bowel sounds are normal.      Palpations: Abdomen is soft.   Musculoskeletal:         General: No deformity or signs of injury.   Skin:     General: Skin is warm and dry.   Neurological:      Mental Status: Mental status is at baseline.      Comments: Awake and alert.               Significant Labs: All pertinent labs within the past 24 hours have been reviewed.  BMP:   Recent Labs   Lab 12/16/24  0434   GLU 96   *   K 3.7      CO2 19*   BUN 17   CREATININE 0.7   CALCIUM 8.4*       CBC:   Recent Labs   Lab 12/15/24  0710 12/16/24  0434   WBC 10.82 10.12   HGB 8.1* 8.4*   HCT 26.7* 26.5*    315        Significant Imaging: I have reviewed all pertinent imaging results/findings within the past 24 hours.

## 2024-12-16 NOTE — CARE UPDATE
12/15/24 2024   Patient Assessment/Suction   Level of Consciousness (AVPU) alert   Respiratory Effort Normal;Unlabored   Expansion/Accessory Muscles/Retractions no use of accessory muscles;no retractions   All Lung Fields Breath Sounds Anterior:;equal bilaterally   Rhythm/Pattern, Respiratory unlabored;pattern regular;depth regular;no shortness of breath reported   Cough Frequency infrequent   Cough Type no productive sputum   PRE-TX-O2   Device (Oxygen Therapy) room air   SpO2 96 %   $ Pulse Oximetry - Multiple Charge Pulse Oximetry - Multiple   Pulse 101   Resp 20   Aerosol Therapy   $ Aerosol Therapy Charges Aerosol Treatment   Daily Review of Necessity (SVN) completed   Respiratory Treatment Status (SVN) given   Treatment Route (SVN) air;mask   Patient Position HOB elevated   Post Treatment Assessment (SVN) breath sounds unchanged   Signs of Intolerance (SVN) none

## 2024-12-16 NOTE — PLAN OF CARE
CM making follow up calls to facilities for SNF placement    Denials:  [facility]  [facility]  [facility]  [facility]    Potential acceptance:  [facility] - only if pt receives an income and family can provide financials.    [facility]  [place] [facility]    CM updated pt's nephew Edison of placement efforts and of pt nearing medical readiness for discharge. He stated ultimately, he'll be okay with bringing pt home with home PT. With pt's Medicaid, he would be able to do home PT only as Medicaid only covers 1 discipline. Informed attending physician of the above.

## 2024-12-16 NOTE — NURSING
[facility]  Nurses Note -- 4 Eyes      12/16/2024       Skin assessed on: Q Shift      [x] No Pressure Injuries Present    []Prevention Measures Documented    [] Yes LDA  for Pressure Injury Previously documented     [] Yes New Pressure Injury Discovered   [] LDA for New Pressure Injury Added      Attending RN:  Purnima West RN     Second RN:  malia polanco

## 2024-12-17 PROCEDURE — 94640 AIRWAY INHALATION TREATMENT: CPT

## 2024-12-17 PROCEDURE — 25000003 PHARM REV CODE 250

## 2024-12-17 PROCEDURE — 25000003 PHARM REV CODE 250: Performed by: HOSPITALIST

## 2024-12-17 PROCEDURE — 99900031 HC PATIENT EDUCATION (STAT)

## 2024-12-17 PROCEDURE — 94761 N-INVAS EAR/PLS OXIMETRY MLT: CPT

## 2024-12-17 PROCEDURE — 21400001 HC TELEMETRY ROOM

## 2024-12-17 PROCEDURE — 25000242 PHARM REV CODE 250 ALT 637 W/ HCPCS: Performed by: STUDENT IN AN ORGANIZED HEALTH CARE EDUCATION/TRAINING PROGRAM

## 2024-12-17 RX ADMIN — ACETAMINOPHEN 650 MG: 325 TABLET ORAL at 08:12

## 2024-12-17 RX ADMIN — Medication 1 TABLET: at 08:12

## 2024-12-17 RX ADMIN — ZONISAMIDE 100 MG: 100 CAPSULE ORAL at 08:12

## 2024-12-17 RX ADMIN — METOPROLOL TARTRATE 25 MG: 25 TABLET, FILM COATED ORAL at 08:12

## 2024-12-17 RX ADMIN — LEVETIRACETAM 500 MG: 500 TABLET, FILM COATED ORAL at 08:12

## 2024-12-17 RX ADMIN — ALBUTEROL SULFATE 2.5 MG: 2.5 SOLUTION RESPIRATORY (INHALATION) at 08:12

## 2024-12-17 RX ADMIN — POLYETHYLENE GLYCOL 3350 17 G: 17 POWDER, FOR SOLUTION ORAL at 08:12

## 2024-12-17 RX ADMIN — TAMSULOSIN HYDROCHLORIDE 0.4 MG: 0.4 CAPSULE ORAL at 08:12

## 2024-12-17 RX ADMIN — ATORVASTATIN CALCIUM 80 MG: 40 TABLET, FILM COATED ORAL at 08:12

## 2024-12-17 RX ADMIN — LEVETIRACETAM 500 MG: 500 TABLET, FILM COATED ORAL at 09:12

## 2024-12-17 RX ADMIN — ALBUTEROL SULFATE 2.5 MG: 2.5 SOLUTION RESPIRATORY (INHALATION) at 07:12

## 2024-12-17 RX ADMIN — DUTASTERIDE 0.5 MG: 0.5 CAPSULE, LIQUID FILLED ORAL at 08:12

## 2024-12-17 RX ADMIN — PANTOPRAZOLE SODIUM 40 MG: 40 TABLET, DELAYED RELEASE ORAL at 08:12

## 2024-12-17 RX ADMIN — METOPROLOL TARTRATE 25 MG: 25 TABLET, FILM COATED ORAL at 09:12

## 2024-12-17 NOTE — PLAN OF CARE
Problem: Adult Inpatient Plan of Care  Goal: Plan of Care Review  Outcome: Progressing  Flowsheets (Taken 12/17/2024 0815)  Plan of Care Reviewed With:   patient   sibling  Goal: Patient-Specific Goal (Individualized)  Outcome: Progressing     Problem: Fall Injury Risk  Goal: Absence of Fall and Fall-Related Injury  Outcome: Progressing  Intervention: Identify and Manage Contributors  Flowsheets (Taken 12/17/2024 0815)  Self-Care Promotion:   independence encouraged   meal set-up provided   BADL personal routines maintained  Medication Review/Management:   medications reviewed   high-risk medications identified     Problem: Adult Inpatient Plan of Care  Goal: Plan of Care Review  Outcome: Progressing  Flowsheets (Taken 12/17/2024 0815)  Plan of Care Reviewed With:   patient   sibling     Problem: Adult Inpatient Plan of Care  Goal: Plan of Care Review  Outcome: Progressing  Flowsheets (Taken 12/17/2024 0815)  Plan of Care Reviewed With:   patient   sibling     Problem: Adult Inpatient Plan of Care  Goal: Patient-Specific Goal (Individualized)  Outcome: Progressing     Problem: Adult Inpatient Plan of Care  Goal: Patient-Specific Goal (Individualized)  Outcome: Progressing     Problem: Fall Injury Risk  Goal: Absence of Fall and Fall-Related Injury  Outcome: Progressing  Intervention: Identify and Manage Contributors  Flowsheets (Taken 12/17/2024 0815)  Self-Care Promotion:   independence encouraged   meal set-up provided   BADL personal routines maintained  Medication Review/Management:   medications reviewed   high-risk medications identified     Problem: Fall Injury Risk  Goal: Absence of Fall and Fall-Related Injury  Outcome: Progressing     Problem: Fall Injury Risk  Goal: Absence of Fall and Fall-Related Injury  Intervention: Identify and Manage Contributors  Flowsheets (Taken 12/17/2024 0815)  Self-Care Promotion:   independence encouraged   meal set-up provided   BADL personal routines  maintained  Medication Review/Management:   medications reviewed   high-risk medications identified     Problem: Fall Injury Risk  Goal: Absence of Fall and Fall-Related Injury  Intervention: Identify and Manage Contributors  Flowsheets (Taken 12/17/2024 0815)  Self-Care Promotion:   independence encouraged   meal set-up provided   BADL personal routines maintained  Medication Review/Management:   medications reviewed   high-risk medications identified

## 2024-12-17 NOTE — PLAN OF CARE
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    Allergies:Review of patient's allergies indicates:  No Known Allergies    Medications: Review discharge medications with patient and family and provide education.    Current Facility-Administered Medications   Medication Dose Route Frequency Provider Last Rate Last Admin    acetaminophen tablet 650 mg  650 mg Oral Q4H PRN Keenan Almeida MD   650 mg at 12/17/24 0859    albuterol sulfate nebulizer solution 2.5 mg  2.5 mg Nebulization Q12H Mumtaz Lundy, DO   2.5 mg at 12/17/24 0801    aluminum-magnesium hydroxide-simethicone 200-200-20 mg/5 mL suspension 30 mL  30 mL Oral QID PRN Keenan Almeida MD        atorvastatin tablet 80 mg  80 mg Oral Daily Keenan Almeida MD   80 mg at 12/17/24 0859    bisacodyL suppository 10 mg  10 mg Rectal Daily PRN Keenan Almeida MD   10 mg at 12/11/24 1710    calcium-vitamin D3 500 mg-5 mcg (200 unit) per tablet 1 tablet  1 tablet Oral Daily Keenan Almeida MD   1 tablet at 12/17/24 0859    cyclobenzaprine tablet 5 mg  5 mg Oral TID PRN Atilio Walker MD   5 mg at 12/14/24 1109     dutasteride capsule 0.5 mg  0.5 mg Oral Daily Keenan Almeida MD   0.5 mg at 12/17/24 0859    glucagon (human recombinant) injection 1 mg  1 mg Intramuscular PRN Keenan Almeida MD        glucose chewable tablet 16 g  16 g Oral PRN Keenan Almeida MD        glucose chewable tablet 24 g  24 g Oral PRN Keenan Almeida MD        levETIRAcetam tablet 500 mg  500 mg Oral BID Keenan Almeida MD   500 mg at 12/17/24 0859    melatonin tablet 6 mg  6 mg Oral Nightly PRN Keenan Almeida MD   6 mg at 12/15/24 2036    metoprolol injection 5 mg  5 mg Intravenous Q5 Min PRN Drew Vázquez MD   5 mg at 12/14/24 2321    metoprolol tartrate (LOPRESSOR) tablet 25 mg  25 mg Oral BID Atilio Walker MD   25 mg at 12/17/24 0859    mineral oil enema 1 enema  1 enema Rectal Daily PRN Keenan Almeida MD        naloxone 0.4 mg/mL injection 0.02 mg  0.02 mg Intravenous PRN Keenan Almeida MD        ondansetron injection 4 mg  4 mg Intravenous Q8H PRN Keenan Almeida MD   4 mg at 12/12/24 2317    pantoprazole EC tablet 40 mg  40 mg Oral Daily Keenan Almeida MD   40 mg at 12/17/24 0859    polyethylene glycol packet 17 g  17 g Oral Daily Keenan Almeida MD   17 g at 12/17/24 0859    prochlorperazine injection Soln 5 mg  5 mg Intravenous Q6H PRN Keenan Almeida MD        sodium chloride 0.9% flush 10 mL  10 mL Intravenous Q12H PRN Keenan Almeida MD        tamsulosin 24 hr capsule 0.4 mg  0.4 mg Oral Daily Keenan Almeida MD   0.4 mg at 12/17/24 0859    zonisamide capsule 100 mg  100 mg Oral Daily Keenan Almeida MD   100 mg at 12/17/24 0859        Medication List        CONTINUE taking these medications      albuterol 90 mcg/actuation inhaler  Commonly known as: PROVENTIL HFA  Inhale 2 puffs into the lungs every 8 (eight) hours while awake. Rescue     alendronate 70 MG tablet  Commonly known as: FOSAMAX  Take 1 tablet (70 mg total) by mouth every 7 days.     atorvastatin 80 MG tablet  Commonly known as:  LIPITOR  Take 80 mg by mouth once daily.     bisacodyL 10 mg Supp  Commonly known as: DULCOLAX  Place 1 suppository (10 mg total) rectally daily as needed.     calcium citrate-vitamin D3 315 mg-6.25 mcg (250 unit) Tab  Commonly known as: CITRACAL + D MAXIMUM  Take 315 mg by mouth 2 (two) times daily.     dutasteride 0.5 mg capsule  Commonly known as: AVODART  Take 0.5 mg by mouth once daily.     ELIQUIS 5 mg Tab  Generic drug: apixaban  Take 5 mg by mouth 2 (two) times daily.     famotidine 20 MG tablet  Commonly known as: PEPCID  Take 20 mg by mouth Daily.     furosemide 40 MG tablet  Commonly known as: LASIX  Take 40 mg by mouth once daily.     ketorolac 0.5% 0.5 % Drop  Commonly known as: ACULAR  Place 1 drop into the right eye 4 (four) times daily.     levETIRAcetam 500 MG Tab  Commonly known as: KEPPRA  Take 500 mg by mouth 2 (two) times daily.     lisinopriL 5 MG tablet  Commonly known as: PRINIVIL,ZESTRIL  Take 5 mg by mouth once daily.     metoprolol tartrate 50 MG tablet  Commonly known as: LOPRESSOR  Take 1 tablet (50 mg total) by mouth once daily.     omeprazole 20 MG capsule  Commonly known as: PRILOSEC  Take 20 mg by mouth once daily.     spironolactone 25 MG tablet  Commonly known as: ALDACTONE  Take 25 mg by mouth once daily.     tamsulosin 0.4 mg Cap  Commonly known as: FLOMAX  Take 1 capsule (0.4 mg total) by mouth once daily.     zonisamide 100 MG Cap  Commonly known as: ZONEGRAN  Take 100 mg by mouth once daily.            STOP taking these medications      aspirin 81 MG EC tablet  Commonly known as: ECOTRIN                I have seen and examined this patient within the last 30 days. My clinical findings that support the need for the home health skilled services and home bound status are the following:no   Weakness/numbness causing balance and gait disturbance due to Weakness/Debility and Anemia making it taxing to leave home.  Requiring assistive device to leave home due to unsteady gait caused  by  Weakness/Debility and Anemia.     Diet:   cardiac diet    Labs:  na    Referrals/ Consults  Physical Therapy to evaluate and treat. Evaluate for home safety and equipment needs; Establish/upgrade home exercise program. Perform / instruct on therapeutic exercises, gait training, transfer training, and Range of Motion.  Occupational Therapy to evaluate and treat. Evaluate home environment for safety and equipment needs. Perform/Instruct on transfers, ADL training, ROM, and therapeutic exercises.   to evaluate for community resources/long-range planning.  Aide to provide assistance with personal care, ADLs, and vital signs.    Activities:   activity as tolerated    Nursing:   Agency to admit patient within 24 hours of hospital discharge unless specified on physician order or at patient request    SN to complete comprehensive assessment including routine vital signs. Instruct on disease process and s/s of complications to report to MD. Review/verify medication list sent home with the patient at time of discharge  and instruct patient/caregiver as needed. Frequency may be adjusted depending on start of care date.     Skilled nurse to perform up to 3 visits PRN for symptoms related to diagnosis    Notify MD if SBP > 160 or < 90; DBP > 90 or < 50; HR > 120 or < 50; Temp > 101; O2 < 88%; Other:       Ok to schedule additional visits based on staff availability and patient request on consecutive days within the home health episode.    When multiple disciplines ordered:    Start of Care occurs on Sunday - Wednesday schedule remaining discipline evaluations as ordered on separate consecutive days following the start of care.    Thursday SOC -schedule subsequent evaluations Friday and Monday the following week.     Friday - Saturday SOC - schedule subsequent discipline evaluations on consecutive days starting Monday of the following week.    For all post-discharge communication, lab results, vitals, and  subsequent orders- please contact patient's PCP.  If unable to get ahold of PCP, and question is about blood pressure, diuresis, or arrhythmia attempt to contact cardiologist.  If unable to get ahold of PCP, and question is about dialysis, please attempt to contact nephrologist.  If unable to get ahold of PCP, and question is about antibiotics or wound care, please contact infectious disease doctor.  If unable to get ahold of PCP, and question is about oxygen titration, CPAP or BIPAP, please contact pulmonologist.   If unable to get ahold of PCP or the above physicians in a reasonable time, please contact  on-call for clarification.    Home Health Aide:  Nursing Twice weekly  Physical Therapy Three times weekly  Occupational Therapy Three times weekly  Medical Social Work Weekly  Home Health Aide Twice weekly    Wound Care Orders  No      I certify that this patient is confined to her home and needs intermittent skilled nursing care, physical therapy, and occupational therapy.              Vinayak Thompson MD        Medical Director        Section Head of Hospital Medicine        Board-Certified Internal Medicine Attending
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
## 2024-12-17 NOTE — DISCHARGE INSTRUCTIONS
Deje de america aspirina   Continúe tomando Apixabán (Eliquis) dos veces al día, a partir de mañana   Le pediré al administrador de casos que le asigne un médico de atención primaria que hable español   Le asignaré atención médica domiciliaria    Stop taking Aspirin  Continue Apixaban (Eliquis) twice daily, starting tomorrow  Will ask  to set up primary care doctor that speaks Yoruba  Will set up home health for you      Thank you for trusting [facility] and me with your care.  We are honored that you entrusted us with your healthcare needs. Your satisfaction is very important to us and we hope you have been very pleased with your experience at [facility]. After your discharge you may receive a survey asking you to rate your hospital experience- Please help us by completing this survey. We hope that you have received the very best care possible during your hospitalization at [facility], as your satisfaction is our top priority.    Let me know if there is anything more I can do!!              Vinayak Thompson MD        Medical Director        Section Head of         Board-Certified IM Attending      PATIENT GENERAL DISCHARGE INFORMATION   Things that YOU are responsible for to Manage Your Care At Home:  1. Getting your prescriptions filled.  2. Taking you medications as directed. (DO NOT MISS ANY DOSES!)  3. Going to your follow-up doctor appointments.                 *This is important because it allows the doctor to monitor your progress and make changes.      If no one calls you for your appointments, please call ([phone]) and reschedule this appointment.   After discharge, if you need assistance, you can call [facility] On Call Nurse Care Line for 24/7 assistance at [phone]  If you are experience any signs or symptoms, Call your doctor or Call [phone] and come to your nearest Emergency Room.    You should receive a call from [facility]charge [facility] within 48-72  hours to help manage your care after discharge.   Please try to make sure that you answer your phone for this important phone call.       Medicaid Long Term Waiver Program [phone]  [facility] on [facility] [phone]    Paid Sitter Services  [facility] [phone]  [facility] [phone]  [facility] [phone]  [facility] [phone]      Our goal at [facility] is to always give you outstanding care and exceptional service. You may receive a survey by mail, text or e-mail in the next 7-10 days from Deana Gallagher and our leadership team asking about the care you received with us. The survey should only take 5-10 minutes to complete and is very important to us.     Your feedback provides us with a way to recognize our staff who work tirelessly to provide the best care! Also, your responses help us learn how to improve when your experience was below our aspiration of excellence. We WILL use your feedback to continue making improvements to help us provide the highest quality care. We keep your personal information and feedback confidential. We appreciate your time completing this survey and can't wait to hear from you!!!     We want you to leave us today feeling like you can DEFINITELY recommend us to others! We look forward to your continued care with us! Thanks so much for choosing [facility] for your healthcare needs!

## 2024-12-17 NOTE — NURSING
[facility]  Nurses Note -- 4 Eyes      12/17/2024       Skin assessed on: Q Shift      [x] No Pressure Injuries Present    []Prevention Measures Documented    [] Yes LDA  for Pressure Injury Previously documented     [] Yes New Pressure Injury Discovered   [] LDA for New Pressure Injury Added      Attending RN:  Bonita Chavez RN     Second RN:  Ronald

## 2024-12-17 NOTE — PLAN OF CARE
Case Management Final Discharge Note      Discharge Disposition: Home Health - referral Epic faxed & hard faxed to [facility]. Also e-mailed referral to [email or web address] as it appears faxes aren't going through.    New DME ordered / company name: None    Relevant SDOH / Transition of Care Barriers:  pt has limited family support    Primary Caretaker and contact information: Edison Lr [phone]    Scheduled followup appointment: PCP - referral faxed to [facility]. Information placed on AVS. The clinic should call pt to schedule, but instructions provided for pt/family to call and schedule if they don't hear from the clinic in a few days after discharge.    Referrals placed: internal medicine    Transportation: private vehicle      Patient and family educated on discharge services and updated on DC plan. Bedside RN notified, patient clear to discharge from Case Management Perspective.      12/17/24 1311   Final Note   Assessment Type Final Discharge Note   Anticipated Discharge Disposition Home-Health   Hospital Resources/Appts/Education Provided Provided patient/caregiver with written discharge plan information   Post-Acute Status   Coverage [employer] Medicaid   Discharge Delays None known at this time

## 2024-12-17 NOTE — DISCHARGE SUMMARY
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 daily.     ketorolac 0.5% 0.5 % Drop  Commonly known as: ACULAR  Place 1 drop into the right eye 4 (four) times daily.     levETIRAcetam 500 MG Tab  Commonly known as: KEPPRA  Take 500 mg by mouth 2 (two) times daily.     lisinopriL 5 MG tablet  Commonly known as: PRINIVIL,ZESTRIL  Take 5 mg by mouth once daily.     metoprolol tartrate 50 MG tablet  Commonly known as: LOPRESSOR  Take 1 tablet (50 mg total) by mouth once daily.     omeprazole 20 MG capsule  Commonly known as: PRILOSEC  Take 20 mg by mouth once daily.     spironolactone 25 MG tablet  Commonly known as: ALDACTONE  Take 25 mg by mouth once daily.     tamsulosin 0.4 mg Cap  Commonly known as: FLOMAX  Take 1 capsule (0.4 mg total) by mouth once daily.     zonisamide 100 MG Cap  Commonly known as: ZONEGRAN  Take 100 mg by mouth once daily.            STOP taking these medications      aspirin 81 MG EC tablet  Commonly known as: ECOTRIN              Indwelling Lines/Drains at time of discharge:   Lines/Drains/Airways       None                   Time spent on the discharge of patient: 35 minutes         Vinayak Thompson MD  [facility]  [facility]

## 2024-12-18 VITALS
HEART RATE: 106 BPM | OXYGEN SATURATION: 96 % | HEIGHT: 67 IN | WEIGHT: 175 LBS | BODY MASS INDEX: 27.47 KG/M2 | SYSTOLIC BLOOD PRESSURE: 117 MMHG | TEMPERATURE: 98 F | RESPIRATION RATE: 16 BRPM | DIASTOLIC BLOOD PRESSURE: 60 MMHG

## 2024-12-18 PROCEDURE — 25000003 PHARM REV CODE 250: Performed by: HOSPITALIST

## 2024-12-18 PROCEDURE — 97530 THERAPEUTIC ACTIVITIES: CPT

## 2024-12-18 PROCEDURE — 25000003 PHARM REV CODE 250

## 2024-12-18 RX ADMIN — Medication 1 TABLET: at 09:12

## 2024-12-18 RX ADMIN — DUTASTERIDE 0.5 MG: 0.5 CAPSULE, LIQUID FILLED ORAL at 09:12

## 2024-12-18 RX ADMIN — TAMSULOSIN HYDROCHLORIDE 0.4 MG: 0.4 CAPSULE ORAL at 09:12

## 2024-12-18 RX ADMIN — POLYETHYLENE GLYCOL 3350 17 G: 17 POWDER, FOR SOLUTION ORAL at 09:12

## 2024-12-18 RX ADMIN — METOPROLOL TARTRATE 25 MG: 25 TABLET, FILM COATED ORAL at 09:12

## 2024-12-18 RX ADMIN — PANTOPRAZOLE SODIUM 40 MG: 40 TABLET, DELAYED RELEASE ORAL at 09:12

## 2024-12-18 RX ADMIN — ZONISAMIDE 100 MG: 100 CAPSULE ORAL at 09:12

## 2024-12-18 RX ADMIN — ATORVASTATIN CALCIUM 80 MG: 40 TABLET, FILM COATED ORAL at 09:12

## 2024-12-18 RX ADMIN — LEVETIRACETAM 500 MG: 500 TABLET, FILM COATED ORAL at 09:12
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
## 2024-12-18 NOTE — NURSING
[facility]  Nurses Note -- 4 Eyes      12/17/2024       Skin assessed on: Q Shift      [x] No Pressure Injuries Present    []Prevention Measures Documented    [] Yes LDA  for Pressure Injury Previously documented     [] Yes New Pressure Injury Discovered   [] LDA for New Pressure Injury Added      Attending RN:  Ronald Garcia RN     Second RN:  Bonita

## 2024-12-18 NOTE — PT/OT/SLP PROGRESS
Occupational Therapy   Treatment    Name: Vern Lr  MRN: [id number]  Admitting Diagnosis:  Abdominal wall hematoma       Recommendations:     Discharge Recommendations: Moderate Intensity Therapy  Discharge Equipment Recommendations:  bedside commode, wheelchair (pt's family also asking for diapers and gloves)  Barriers to discharge:  Inaccessible home environment, Decreased caregiver support    Assessment:     Vern Lr is a 84 y.o. male with a medical diagnosis of Abdominal wall hematoma.  He presents with limited tolerance for OOB activity but may be 2/2 fatigue as pt is found seated upright in bedside chair, dressed to return home. Performance deficits affecting function are weakness, impaired endurance, impaired self care skills, impaired functional mobility, gait instability, impaired balance, visual deficits, decreased ROM, pain, decreased safety awareness, decreased lower extremity function, decreased upper extremity function, decreased coordination, impaired cognition, impaired cardiopulmonary response to activity.     Rehab Prognosis:  Good and Fair; patient would benefit from acute skilled OT services to address these deficits and reach maximum level of function.       Plan:     Patient to be seen 4 x/week to address the above listed problems via self-care/home management, therapeutic activities, therapeutic exercises  Plan of Care Expires: 01/11/25  Plan of Care Reviewed with: patient, family    Subjective     Chief Complaint: Pt reports he wants patience  Patient/Family Comments/goals: To get stronger  Pain/Comfort:  Pain Rating 1: 0/10    Objective:     Communicated with: brenda prior to session.  Patient found up in chair with telemetry, peripheral IV upon OT entry to room.    General Precautions: Standard, diabetic, fall, hearing impaired, vision impaired    Orthopedic Precautions:N/A  Braces: N/A  Respiratory Status: Room air     Occupational Performance:       Functional  Mobility/Transfers:  Patient completed Sit <> Stand Transfer with minimum assistance and moderate assistance  with  rolling walker   Patient completed Bed <> Chair Transfer using Stand Pivot technique with minimum assistance and moderate assistance with rolling walker  Functional Mobility: Pt with fair to fair- dynamic seated and standing balance.       [facility] 6 Click ADL: 13    Treatment & Education:  Pt educated on role of OT and POC.   Pt performing skills as listed above.     Patient left up in chair with  transport present for transition to discharge    GOALS:   Multidisciplinary Problems       Occupational Therapy Goals          Problem: Occupational Therapy    Goal Priority Disciplines Outcome Interventions   Occupational Therapy Goal     OT, PT/OT Progressing    Description: Goals to be met by: 1/11/2025      Patient will increase functional independence with ADLs by performing:    UE Dressing with Modified [place].  LE Dressing with Minimal Assistance.  Grooming while seated with Supervision.  Toileting from bedside commode with Contact Guard Assistance for hygiene and clothing management.   Toilet transfer to bedside commode with Contact Guard Assistance.  Increased functional strength to WFL for self care.  Upper extremity exercise program x10 reps per handout, with assistance as needed.                          Time Tracking:     OT Date of Treatment: 12/18/24  OT Start Time: 1200  OT Stop Time: 1212  OT Total Time (min): 12 min    Billable Minutes:Therapeutic Activity 12    OT/CHRISTY: OT     Number of CHRISTY visits since last OT visit: 0    12/18/2024

## 2024-12-18 NOTE — PLAN OF CARE
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    Allergies:Review of patient's allergies indicates:  No Known Allergies    Medications: Review discharge medications with patient and family and provide education.    Current Facility-Administered Medications   Medication Dose Route Frequency Provider Last Rate Last Admin    acetaminophen tablet 650 mg  650 mg Oral Q4H PRN Keenan Almeida MD   650 mg at 12/17/24 0859    albuterol sulfate nebulizer solution 2.5 mg  2.5 mg Nebulization Q12H Apryl Lundy-Sondra COMBS, DO   2.5 mg at 12/17/24 1940    aluminum-magnesium hydroxide-simethicone 200-200-20 mg/5 mL suspension 30 mL  30 mL Oral QID PRN Keenan Almeida MD        atorvastatin tablet 80 mg  80 mg Oral Daily Keenan Almeida MD   80 mg at 12/17/24 0859    bisacodyL suppository 10 mg  10 mg Rectal Daily PRN Keenan Almeida MD   10 mg at 12/11/24 1710    calcium-vitamin D3 500 mg-5 mcg (200 unit) per tablet 1 tablet  1 tablet Oral Daily Keenan Almeida MD   1 tablet at 12/17/24 0859    cyclobenzaprine tablet 5 mg  5 mg Oral TID PRN Atilio Walker MD   5 mg at 12/14/24 1109     dutasteride capsule 0.5 mg  0.5 mg Oral Daily Keenan Almeida MD   0.5 mg at 12/17/24 0859    glucagon (human recombinant) injection 1 mg  1 mg Intramuscular PRN Keenan Almeida MD        glucose chewable tablet 16 g  16 g Oral PRN Keenan Almeida MD        glucose chewable tablet 24 g  24 g Oral PRN Keenan Almeida MD        levETIRAcetam tablet 500 mg  500 mg Oral BID Keenan Almeida MD   500 mg at 12/17/24 2115    melatonin tablet 6 mg  6 mg Oral Nightly PRN Keenan Almeida MD   6 mg at 12/15/24 2036    metoprolol injection 5 mg  5 mg Intravenous Q5 Min PRN Drew Vázquez MD   5 mg at 12/14/24 2321    metoprolol tartrate (LOPRESSOR) tablet 25 mg  25 mg Oral BID Atilio Walker MD   25 mg at 12/17/24 2116    mineral oil enema 1 enema  1 enema Rectal Daily PRN Keenan Almeida MD        naloxone 0.4 mg/mL injection 0.02 mg  0.02 mg Intravenous PRN Keenan Almeida MD        ondansetron injection 4 mg  4 mg Intravenous Q8H PRN Keenan Almeida MD   4 mg at 12/12/24 2317    pantoprazole EC tablet 40 mg  40 mg Oral Daily Keenan Almeida MD   40 mg at 12/17/24 0859    polyethylene glycol packet 17 g  17 g Oral Daily Keenan Almeida MD   17 g at 12/17/24 0859    prochlorperazine injection Soln 5 mg  5 mg Intravenous Q6H PRN Keenan Almeida MD        sodium chloride 0.9% flush 10 mL  10 mL Intravenous Q12H PRN Keenan Almeida MD        tamsulosin 24 hr capsule 0.4 mg  0.4 mg Oral Daily Keenan Almeida MD   0.4 mg at 12/17/24 0859    zonisamide capsule 100 mg  100 mg Oral Daily Keenan Almeida MD   100 mg at 12/17/24 0859        Medication List        CONTINUE taking these medications      albuterol 90 mcg/actuation inhaler  Commonly known as: PROVENTIL HFA  Inhale 2 puffs into the lungs every 8 (eight) hours while awake. Rescue     alendronate 70 MG tablet  Commonly known as: FOSAMAX  Take 1 tablet (70 mg total) by mouth every 7 days.     atorvastatin 80 MG tablet  Commonly known as:  LIPITOR  Take 80 mg by mouth once daily.     bisacodyL 10 mg Supp  Commonly known as: DULCOLAX  Place 1 suppository (10 mg total) rectally daily as needed.     calcium citrate-vitamin D3 315 mg-6.25 mcg (250 unit) Tab  Commonly known as: CITRACAL + D MAXIMUM  Take 315 mg by mouth 2 (two) times daily.     dutasteride 0.5 mg capsule  Commonly known as: AVODART  Take 0.5 mg by mouth once daily.     ELIQUIS 5 mg Tab  Generic drug: apixaban  Take 5 mg by mouth 2 (two) times daily.     famotidine 20 MG tablet  Commonly known as: PEPCID  Take 20 mg by mouth Daily.     furosemide 40 MG tablet  Commonly known as: LASIX  Take 40 mg by mouth once daily.     ketorolac 0.5% 0.5 % Drop  Commonly known as: ACULAR  Place 1 drop into the right eye 4 (four) times daily.     levETIRAcetam 500 MG Tab  Commonly known as: KEPPRA  Take 500 mg by mouth 2 (two) times daily.     lisinopriL 5 MG tablet  Commonly known as: PRINIVIL,ZESTRIL  Take 5 mg by mouth once daily.     metoprolol tartrate 50 MG tablet  Commonly known as: LOPRESSOR  Take 1 tablet (50 mg total) by mouth once daily.     omeprazole 20 MG capsule  Commonly known as: PRILOSEC  Take 20 mg by mouth once daily.     spironolactone 25 MG tablet  Commonly known as: ALDACTONE  Take 25 mg by mouth once daily.     tamsulosin 0.4 mg Cap  Commonly known as: FLOMAX  Take 1 capsule (0.4 mg total) by mouth once daily.     zonisamide 100 MG Cap  Commonly known as: ZONEGRAN  Take 100 mg by mouth once daily.            STOP taking these medications      aspirin 81 MG EC tablet  Commonly known as: ECOTRIN                I have seen and examined this patient within the last 30 days. My clinical findings that support the need for the home health skilled services and home bound status are the following:no   Weakness/numbness causing balance and gait disturbance due to Weakness/Debility and Anemia making it taxing to leave home.  Requiring assistive device to leave home due to unsteady gait caused  by  Weakness/Debility and Anemia.     Diet:   cardiac diet    Labs:  na    Referrals/ Consults  Physical Therapy to evaluate and treat. Evaluate for home safety and equipment needs; Establish/upgrade home exercise program. Perform / instruct on therapeutic exercises, gait training, transfer training, and Range of Motion.  Occupational Therapy to evaluate and treat. Evaluate home environment for safety and equipment needs. Perform/Instruct on transfers, ADL training, ROM, and therapeutic exercises.   to evaluate for community resources/long-range planning.  Aide to provide assistance with personal care, ADLs, and vital signs.    Activities:   activity as tolerated    Nursing:   Agency to admit patient within 24 hours of hospital discharge unless specified on physician order or at patient request    SN to complete comprehensive assessment including routine vital signs. Instruct on disease process and s/s of complications to report to MD. Review/verify medication list sent home with the patient at time of discharge  and instruct patient/caregiver as needed. Frequency may be adjusted depending on start of care date.     Skilled nurse to perform up to 3 visits PRN for symptoms related to diagnosis    Notify MD if SBP > 160 or < 90; DBP > 90 or < 50; HR > 120 or < 50; Temp > 101; O2 < 88%; Other:       Ok to schedule additional visits based on staff availability and patient request on consecutive days within the home health episode.    When multiple disciplines ordered:    Start of Care occurs on Sunday - Wednesday schedule remaining discipline evaluations as ordered on separate consecutive days following the start of care.    Thursday SOC -schedule subsequent evaluations Friday and Monday the following week.     Friday - Saturday SOC - schedule subsequent discipline evaluations on consecutive days starting Monday of the following week.    For all post-discharge communication, lab results, vitals, and  subsequent orders- please contact patient's PCP.  If unable to get ahold of PCP, and question is about blood pressure, diuresis, or arrhythmia attempt to contact cardiologist.  If unable to get ahold of PCP, and question is about dialysis, please attempt to contact nephrologist.  If unable to get ahold of PCP, and question is about antibiotics or wound care, please contact infectious disease doctor.  If unable to get ahold of PCP, and question is about oxygen titration, CPAP or BIPAP, please contact pulmonologist.   If unable to get ahold of PCP or the above physicians in a reasonable time, please contact  on-call for clarification.    Home Health Aide:  Nursing Twice weekly  Physical Therapy Three times weekly  Occupational Therapy Three times weekly  Medical Social Work Weekly  Home Health Aide Twice weekly    Wound Care Orders  No      I certify that this patient is confined to her home and needs intermittent skilled nursing care, physical therapy, and occupational therapy.              Vinayak Thompson MD        Medical Director        Section Head of Hospital Medicine        Board-Certified Internal Medicine Attending

## 2024-12-18 NOTE — PLAN OF CARE
CM spoke to pt's nephew Edison regarding pt's discharge plan/what happened with discharge yesterday. Edison stated he'll have to talk to his father about what happened, but he heard from other family members that when they came to pick pt up, pt was screaming and due to that, pt's brother felt he wouldn't be able to handle him at home. Edison stated if pt discharges today, he'll attempt to pick pt up himself after he gets off of work. I informed him I placed information for Medicaid Long Term Waiver Services, [facility] on Aging, and paid sitter services on AVS. He verbalized understanding. CM updated attending physician by secure chat on the above. Will continue to follow.

## 2024-12-18 NOTE — PROGRESS NOTES
Patient has a mobility limitation that significantly impairs her ability to participate in one or more mobility related activities of daily living (MRADL's)   such as toileting, feeding, dressing, grooming, and bathing in customary locations in the home.   The mobility limitation cannot be sufficiently resolved by the use of a cane or walker.   The use of a manual wheelchair will significantly improve the patient's ability to participate in MRADLS and the patient will use it on regular basis in the home.   Patient has expressed their willingness to use a manual wheelchair in the home.   Patient also has a caregiver who is available, willing, and able to provide assistance with the wheelchair when needed.         Vinayak Thompson MD  Board-Certified Internal Medicine Attending  Section Head of [facility] Medicine

## 2024-12-18 NOTE — NURSING
Patient is been discharged. Family members at the bed side refusing to take him home. Patient's brother stated that he can't take care of him due to his age. Patient's brother is 81 year old. Charge nurse and house supervisor notified.

## 2024-12-18 NOTE — PLAN OF CARE
Case Management Final Discharge Note      Discharge Disposition: Home Health - plan of care orders sent to [facility]. Katia with [facility] accepted pt.     New DME ordered / company name: Wheelchair and bedside commode/[facility]. Family/caregiver requesting delivery to home as pt/family ready to go home. CM notified Dayne with [email or web address].     Relevant SDOH / Transition of Care Barriers:  pt has limited family support, though has a family friend Ramona who will be assisting in caring for pt after discharge.     Primary Caretaker and contact information: Edison Lr [phone]     Scheduled followup appointment: PCP - referral faxed to [facility]. Information placed on AVS. The clinic should call pt to schedule, but instructions provided for pt/family to call and schedule if they don't hear from the clinic in a few days after discharge.     Referrals placed: internal medicine     Transportation: private vehicle        Patient and family educated on discharge services and updated on DC plan. Bedside RN notified, patient clear to discharge from Case Management Perspective.      12/18/24 1130   Final Note   Assessment Type Final Discharge Note   Anticipated Discharge Disposition [facility]   Hospital Resources/Appts/Education Provided Appointments scheduled and added to AVS;Provided patient/caregiver with written discharge plan information   Post-Acute Status   Coverage [employer]   Discharge Delays None known at this time

## 2024-12-18 NOTE — NURSING
In preparation for discharge, removal of patient's saline lock and heart monitor per policy. Discharge instructions giving to patient and friend (Ms Tate). Patient verbalized understanding.  No distress noted.

## (undated) DEVICE — KIT MANIFOLD LOW PRESS TUBING

## (undated) DEVICE — KIT SYR REUSABLE

## (undated) DEVICE — CATH DXTERITY JL35 100CM 5FR

## (undated) DEVICE — KIT HAND CONTROL HIGH PRESSUR

## (undated) DEVICE — PAD DEFIB CADENCE ADULT R2

## (undated) DEVICE — PACK CATH LAB

## (undated) DEVICE — CATH 5FR JR4 100CM

## (undated) DEVICE — HEMOSTAT VASC BAND REG 24CM

## (undated) DEVICE — CATH ANGIO EXPO VENT PGTL 5FR

## (undated) DEVICE — KIT GLIDESHEATH SLEND 6FR 10CM

## (undated) DEVICE — WIRE GUIDE SAFE-T-J .035 260CM

## (undated) DEVICE — OMNIPAQUE 350 500ML